# Patient Record
Sex: FEMALE | Race: WHITE | Employment: OTHER | URBAN - METROPOLITAN AREA
[De-identification: names, ages, dates, MRNs, and addresses within clinical notes are randomized per-mention and may not be internally consistent; named-entity substitution may affect disease eponyms.]

---

## 2017-01-17 DIAGNOSIS — C50.212 MALIGNANT NEOPLASM OF UPPER-INNER QUADRANT OF LEFT FEMALE BREAST (HCC): Primary | ICD-10-CM

## 2017-04-20 RX ORDER — ANASTROZOLE 1 MG/1
1 TABLET ORAL DAILY
Qty: 30 TABLET | Refills: 6 | Status: SHIPPED | OUTPATIENT
Start: 2017-04-20 | End: 2017-08-24 | Stop reason: SDUPTHER

## 2017-05-08 ENCOUNTER — HOSPITAL ENCOUNTER (OUTPATIENT)
Dept: WOMENS IMAGING | Age: 47
Discharge: HOME | End: 2017-05-08
Payer: COMMERCIAL

## 2017-05-08 DIAGNOSIS — C50.212 MALIGNANT NEOPLASM OF UPPER-INNER QUADRANT OF LEFT FEMALE BREAST (HCC): ICD-10-CM

## 2017-05-08 PROCEDURE — 77063 BREAST TOMOSYNTHESIS BI: CPT

## 2017-05-16 ENCOUNTER — HOSPITAL ENCOUNTER (OUTPATIENT)
Age: 47
Setting detail: SPECIMEN
Discharge: HOME | End: 2017-05-16
Payer: COMMERCIAL

## 2017-05-16 LAB
ALBUMIN SERPL-MCNC: 4.1 G/DL (ref 3.5–5.2)
ALBUMIN/GLOBULIN RATIO: 1.3 (ref 1–2.5)
ALP BLD-CCNC: 88 U/L (ref 35–104)
ALT SERPL-CCNC: 19 U/L (ref 5–33)
ANION GAP SERPL CALCULATED.3IONS-SCNC: 13 MMOL/L (ref 9–17)
AST SERPL-CCNC: 20 U/L
BILIRUB SERPL-MCNC: 0.32 MG/DL (ref 0.3–1.2)
BUN BLDV-MCNC: 14 MG/DL (ref 6–20)
BUN/CREAT BLD: ABNORMAL (ref 9–20)
CALCIUM SERPL-MCNC: 8.7 MG/DL (ref 8.6–10.4)
CHLORIDE BLD-SCNC: 102 MMOL/L (ref 98–107)
CHOLESTEROL/HDL RATIO: 3.4
CHOLESTEROL: 224 MG/DL
CO2: 23 MMOL/L (ref 20–31)
CREAT SERPL-MCNC: 0.78 MG/DL (ref 0.5–0.9)
GFR AFRICAN AMERICAN: >60 ML/MIN
GFR NON-AFRICAN AMERICAN: >60 ML/MIN
GFR SERPL CREATININE-BSD FRML MDRD: ABNORMAL ML/MIN/{1.73_M2}
GFR SERPL CREATININE-BSD FRML MDRD: ABNORMAL ML/MIN/{1.73_M2}
GLUCOSE BLD-MCNC: 98 MG/DL (ref 70–99)
HCT VFR BLD CALC: 32.7 % (ref 36–46)
HDLC SERPL-MCNC: 65 MG/DL
HEMOGLOBIN: 10.6 G/DL (ref 12–16)
LDL CHOLESTEROL: 140 MG/DL (ref 0–130)
MCH RBC QN AUTO: 25.3 PG (ref 26–34)
MCHC RBC AUTO-ENTMCNC: 32.6 G/DL (ref 31–37)
MCV RBC AUTO: 77.7 FL (ref 80–100)
PDW BLD-RTO: 16.1 % (ref 12.5–15.4)
PLATELET # BLD: 236 K/UL (ref 140–450)
PMV BLD AUTO: 9.1 FL (ref 6–12)
POTASSIUM SERPL-SCNC: 3.6 MMOL/L (ref 3.7–5.3)
RBC # BLD: 4.2 M/UL (ref 4–5.2)
SODIUM BLD-SCNC: 138 MMOL/L (ref 135–144)
TOTAL PROTEIN: 7.2 G/DL (ref 6.4–8.3)
TRIGL SERPL-MCNC: 94 MG/DL
VLDLC SERPL CALC-MCNC: ABNORMAL MG/DL (ref 1–30)
WBC # BLD: 4.7 K/UL (ref 3.5–11)

## 2017-06-08 ENCOUNTER — OFFICE VISIT (OUTPATIENT)
Dept: ONCOLOGY | Age: 47
End: 2017-06-08
Payer: COMMERCIAL

## 2017-06-08 ENCOUNTER — HOSPITAL ENCOUNTER (OUTPATIENT)
Age: 47
Discharge: HOME | End: 2017-06-08
Payer: COMMERCIAL

## 2017-06-08 VITALS
DIASTOLIC BLOOD PRESSURE: 85 MMHG | BODY MASS INDEX: 43.96 KG/M2 | RESPIRATION RATE: 16 BRPM | SYSTOLIC BLOOD PRESSURE: 143 MMHG | TEMPERATURE: 98.9 F | WEIGHT: 293 LBS | HEART RATE: 56 BPM

## 2017-06-08 DIAGNOSIS — D50.0 IRON DEFICIENCY ANEMIA DUE TO CHRONIC BLOOD LOSS: ICD-10-CM

## 2017-06-08 DIAGNOSIS — C50.212 MALIGNANT NEOPLASM OF UPPER-INNER QUADRANT OF LEFT FEMALE BREAST (HCC): Primary | ICD-10-CM

## 2017-06-08 DIAGNOSIS — K92.1 GASTROINTESTINAL HEMORRHAGE WITH MELENA: ICD-10-CM

## 2017-06-08 PROCEDURE — G8419 CALC BMI OUT NRM PARAM NOF/U: HCPCS | Performed by: INTERNAL MEDICINE

## 2017-06-08 PROCEDURE — G8427 DOCREV CUR MEDS BY ELIG CLIN: HCPCS | Performed by: INTERNAL MEDICINE

## 2017-06-08 PROCEDURE — 1036F TOBACCO NON-USER: CPT | Performed by: INTERNAL MEDICINE

## 2017-06-08 PROCEDURE — 99211 OFF/OP EST MAY X REQ PHY/QHP: CPT

## 2017-06-08 PROCEDURE — 99214 OFFICE O/P EST MOD 30 MIN: CPT | Performed by: INTERNAL MEDICINE

## 2017-06-08 RX ORDER — LANOLIN ALCOHOL/MO/W.PET/CERES
325 CREAM (GRAM) TOPICAL 2 TIMES DAILY
Qty: 60 TABLET | Refills: 3 | Status: SHIPPED | OUTPATIENT
Start: 2017-06-08 | End: 2018-08-16 | Stop reason: ALTCHOICE

## 2017-06-08 RX ORDER — POTASSIUM CHLORIDE 20 MEQ/1
20 TABLET, EXTENDED RELEASE ORAL DAILY
Qty: 30 TABLET | Refills: 3 | Status: ON HOLD | OUTPATIENT
Start: 2017-06-08 | End: 2017-08-30 | Stop reason: HOSPADM

## 2017-06-12 ENCOUNTER — TELEPHONE (OUTPATIENT)
Dept: ONCOLOGY | Age: 47
End: 2017-06-12

## 2017-08-22 ENCOUNTER — HOSPITAL ENCOUNTER (OUTPATIENT)
Age: 47
Setting detail: SPECIMEN
Discharge: HOME | End: 2017-08-22
Payer: COMMERCIAL

## 2017-08-22 LAB — POTASSIUM SERPL-SCNC: 4.3 MMOL/L (ref 3.7–5.3)

## 2017-08-24 DIAGNOSIS — C50.212 MALIGNANT NEOPLASM OF UPPER-INNER QUADRANT OF LEFT FEMALE BREAST (HCC): Primary | ICD-10-CM

## 2017-08-24 RX ORDER — ANASTROZOLE 1 MG/1
1 TABLET ORAL DAILY
Qty: 30 TABLET | Refills: 6 | Status: SHIPPED | OUTPATIENT
Start: 2017-08-24 | End: 2018-04-03 | Stop reason: SDUPTHER

## 2017-08-26 ENCOUNTER — APPOINTMENT (OUTPATIENT)
Dept: CT IMAGING | Age: 47
DRG: 439 | End: 2017-08-26
Payer: COMMERCIAL

## 2017-08-26 ENCOUNTER — APPOINTMENT (OUTPATIENT)
Dept: GENERAL RADIOLOGY | Age: 47
DRG: 439 | End: 2017-08-26
Payer: COMMERCIAL

## 2017-08-26 ENCOUNTER — HOSPITAL ENCOUNTER (INPATIENT)
Age: 47
LOS: 4 days | Discharge: HOME | DRG: 439 | End: 2017-08-30
Attending: EMERGENCY MEDICINE | Admitting: INTERNAL MEDICINE
Payer: COMMERCIAL

## 2017-08-26 DIAGNOSIS — K85.90 ACUTE PANCREATITIS, UNSPECIFIED COMPLICATION STATUS, UNSPECIFIED PANCREATITIS TYPE: Primary | ICD-10-CM

## 2017-08-26 LAB
-: ABNORMAL
ABSOLUTE EOS #: 0 K/UL (ref 0–0.4)
ABSOLUTE LYMPH #: 1.62 K/UL (ref 1–4.8)
ABSOLUTE MONO #: 0.81 K/UL (ref 0.1–1.3)
ALBUMIN SERPL-MCNC: 4 G/DL (ref 3.5–5.2)
ALBUMIN/GLOBULIN RATIO: ABNORMAL (ref 1–2.5)
ALP BLD-CCNC: 83 U/L (ref 35–104)
ALT SERPL-CCNC: 14 U/L (ref 5–33)
AMORPHOUS: ABNORMAL
ANION GAP SERPL CALCULATED.3IONS-SCNC: 14 MMOL/L (ref 9–17)
AST SERPL-CCNC: 16 U/L
BACTERIA: ABNORMAL
BASOPHILS # BLD: 0 %
BASOPHILS ABSOLUTE: 0 K/UL (ref 0–0.2)
BILIRUB SERPL-MCNC: 0.26 MG/DL (ref 0.3–1.2)
BILIRUBIN URINE: NEGATIVE
BUN BLDV-MCNC: 15 MG/DL (ref 6–20)
BUN/CREAT BLD: ABNORMAL (ref 9–20)
CALCIUM SERPL-MCNC: 8.8 MG/DL (ref 8.6–10.4)
CASTS UA: ABNORMAL /LPF
CHLORIDE BLD-SCNC: 101 MMOL/L (ref 98–107)
CHOLESTEROL/HDL RATIO: 2.7
CHOLESTEROL: 214 MG/DL
CO2: 22 MMOL/L (ref 20–31)
COLOR: YELLOW
COMMENT UA: ABNORMAL
CREAT SERPL-MCNC: 0.6 MG/DL (ref 0.5–0.9)
CRYSTALS, UA: ABNORMAL /HPF
DIFFERENTIAL TYPE: ABNORMAL
EOSINOPHILS RELATIVE PERCENT: 0 %
EPITHELIAL CELLS UA: ABNORMAL /HPF
GFR AFRICAN AMERICAN: >60 ML/MIN
GFR NON-AFRICAN AMERICAN: >60 ML/MIN
GFR SERPL CREATININE-BSD FRML MDRD: ABNORMAL ML/MIN/{1.73_M2}
GFR SERPL CREATININE-BSD FRML MDRD: ABNORMAL ML/MIN/{1.73_M2}
GLUCOSE BLD-MCNC: 105 MG/DL (ref 70–99)
GLUCOSE BLD-MCNC: 113 MG/DL (ref 65–105)
GLUCOSE URINE: NEGATIVE
HCT VFR BLD CALC: 28.9 % (ref 36–46)
HDLC SERPL-MCNC: 78 MG/DL
HEMOGLOBIN: 9.2 G/DL (ref 12–16)
KETONES, URINE: NEGATIVE
LACTIC ACID: 1.3 MMOL/L (ref 0.5–2.2)
LDL CHOLESTEROL: 120 MG/DL (ref 0–130)
LEUKOCYTE ESTERASE, URINE: ABNORMAL
LIPASE: 279 U/L (ref 13–60)
LYMPHOCYTES # BLD: 14 %
MCH RBC QN AUTO: 23.6 PG (ref 26–34)
MCHC RBC AUTO-ENTMCNC: 31.9 G/DL (ref 31–37)
MCV RBC AUTO: 74 FL (ref 80–100)
MONOCYTES # BLD: 7 %
MORPHOLOGY: ABNORMAL
MUCUS: ABNORMAL
NITRITE, URINE: NEGATIVE
OTHER OBSERVATIONS UA: ABNORMAL
PDW BLD-RTO: 19.6 % (ref 11.5–14.9)
PH UA: 6.5 (ref 5–8)
PLATELET # BLD: 209 K/UL (ref 150–450)
PLATELET ESTIMATE: ABNORMAL
PMV BLD AUTO: 8.1 FL (ref 6–12)
POTASSIUM SERPL-SCNC: 3.6 MMOL/L (ref 3.7–5.3)
PROTEIN UA: NEGATIVE
RBC # BLD: 3.9 M/UL (ref 4–5.2)
RBC # BLD: ABNORMAL 10*6/UL
RBC UA: ABNORMAL /HPF
RENAL EPITHELIAL, UA: ABNORMAL /HPF
SEG NEUTROPHILS: 79 %
SEGMENTED NEUTROPHILS ABSOLUTE COUNT: 9.17 K/UL (ref 1.3–9.1)
SODIUM BLD-SCNC: 137 MMOL/L (ref 135–144)
SPECIFIC GRAVITY UA: 1.02 (ref 1–1.03)
TOTAL PROTEIN: 7.4 G/DL (ref 6.4–8.3)
TRICHOMONAS: ABNORMAL
TRIGL SERPL-MCNC: 81 MG/DL
TROPONIN INTERP: NORMAL
TROPONIN INTERP: NORMAL
TROPONIN T: <0.03 NG/ML
TROPONIN T: <0.03 NG/ML
TURBIDITY: CLEAR
URINE HGB: NEGATIVE
UROBILINOGEN, URINE: NORMAL
VLDLC SERPL CALC-MCNC: ABNORMAL MG/DL (ref 1–30)
WBC # BLD: 11.6 K/UL (ref 3.5–11)
WBC # BLD: ABNORMAL 10*3/UL
WBC UA: ABNORMAL /HPF
YEAST: ABNORMAL

## 2017-08-26 PROCEDURE — 36415 COLL VENOUS BLD VENIPUNCTURE: CPT

## 2017-08-26 PROCEDURE — 81001 URINALYSIS AUTO W/SCOPE: CPT

## 2017-08-26 PROCEDURE — 82947 ASSAY GLUCOSE BLOOD QUANT: CPT

## 2017-08-26 PROCEDURE — 87086 URINE CULTURE/COLONY COUNT: CPT

## 2017-08-26 PROCEDURE — 99285 EMERGENCY DEPT VISIT HI MDM: CPT

## 2017-08-26 PROCEDURE — 96374 THER/PROPH/DIAG INJ IV PUSH: CPT

## 2017-08-26 PROCEDURE — 74176 CT ABD & PELVIS W/O CONTRAST: CPT

## 2017-08-26 PROCEDURE — 93005 ELECTROCARDIOGRAM TRACING: CPT

## 2017-08-26 PROCEDURE — 84484 ASSAY OF TROPONIN QUANT: CPT

## 2017-08-26 PROCEDURE — 6360000002 HC RX W HCPCS: Performed by: EMERGENCY MEDICINE

## 2017-08-26 PROCEDURE — 2580000003 HC RX 258: Performed by: EMERGENCY MEDICINE

## 2017-08-26 PROCEDURE — 83605 ASSAY OF LACTIC ACID: CPT

## 2017-08-26 PROCEDURE — 80061 LIPID PANEL: CPT

## 2017-08-26 PROCEDURE — 96375 TX/PRO/DX INJ NEW DRUG ADDON: CPT

## 2017-08-26 PROCEDURE — 87040 BLOOD CULTURE FOR BACTERIA: CPT

## 2017-08-26 PROCEDURE — 6370000000 HC RX 637 (ALT 250 FOR IP): Performed by: EMERGENCY MEDICINE

## 2017-08-26 PROCEDURE — 2580000003 HC RX 258: Performed by: INTERNAL MEDICINE

## 2017-08-26 PROCEDURE — 1200000000 HC SEMI PRIVATE

## 2017-08-26 PROCEDURE — 83690 ASSAY OF LIPASE: CPT

## 2017-08-26 PROCEDURE — 85025 COMPLETE CBC W/AUTO DIFF WBC: CPT

## 2017-08-26 PROCEDURE — 71010 XR CHEST PORTABLE: CPT

## 2017-08-26 PROCEDURE — 6370000000 HC RX 637 (ALT 250 FOR IP): Performed by: INTERNAL MEDICINE

## 2017-08-26 PROCEDURE — 6360000002 HC RX W HCPCS: Performed by: INTERNAL MEDICINE

## 2017-08-26 PROCEDURE — 94664 DEMO&/EVAL PT USE INHALER: CPT

## 2017-08-26 PROCEDURE — 80053 COMPREHEN METABOLIC PANEL: CPT

## 2017-08-26 RX ORDER — SODIUM CHLORIDE 9 MG/ML
INJECTION, SOLUTION INTRAVENOUS CONTINUOUS
Status: DISCONTINUED | OUTPATIENT
Start: 2017-08-26 | End: 2017-08-30

## 2017-08-26 RX ORDER — MORPHINE SULFATE 4 MG/ML
4 INJECTION, SOLUTION INTRAMUSCULAR; INTRAVENOUS
Status: DISCONTINUED | OUTPATIENT
Start: 2017-08-26 | End: 2017-08-28

## 2017-08-26 RX ORDER — ACETAMINOPHEN 325 MG/1
650 TABLET ORAL EVERY 4 HOURS PRN
Status: DISCONTINUED | OUTPATIENT
Start: 2017-08-26 | End: 2017-08-30 | Stop reason: HOSPADM

## 2017-08-26 RX ORDER — 0.9 % SODIUM CHLORIDE 0.9 %
1000 INTRAVENOUS SOLUTION INTRAVENOUS ONCE
Status: COMPLETED | OUTPATIENT
Start: 2017-08-26 | End: 2017-08-26

## 2017-08-26 RX ORDER — ACETAMINOPHEN 325 MG/1
650 TABLET ORAL ONCE
Status: COMPLETED | OUTPATIENT
Start: 2017-08-26 | End: 2017-08-26

## 2017-08-26 RX ORDER — SODIUM CHLORIDE 0.9 % (FLUSH) 0.9 %
10 SYRINGE (ML) INJECTION EVERY 12 HOURS SCHEDULED
Status: DISCONTINUED | OUTPATIENT
Start: 2017-08-26 | End: 2017-08-30 | Stop reason: HOSPADM

## 2017-08-26 RX ORDER — ONDANSETRON 2 MG/ML
4 INJECTION INTRAMUSCULAR; INTRAVENOUS ONCE
Status: COMPLETED | OUTPATIENT
Start: 2017-08-26 | End: 2017-08-26

## 2017-08-26 RX ORDER — ONDANSETRON 2 MG/ML
4 INJECTION INTRAMUSCULAR; INTRAVENOUS EVERY 6 HOURS PRN
Status: DISCONTINUED | OUTPATIENT
Start: 2017-08-26 | End: 2017-08-27

## 2017-08-26 RX ORDER — SODIUM CHLORIDE 0.9 % (FLUSH) 0.9 %
10 SYRINGE (ML) INJECTION PRN
Status: DISCONTINUED | OUTPATIENT
Start: 2017-08-26 | End: 2017-08-30 | Stop reason: HOSPADM

## 2017-08-26 RX ORDER — MORPHINE SULFATE 4 MG/ML
2 INJECTION, SOLUTION INTRAMUSCULAR; INTRAVENOUS
Status: DISCONTINUED | OUTPATIENT
Start: 2017-08-26 | End: 2017-08-28

## 2017-08-26 RX ORDER — POTASSIUM CHLORIDE 20 MEQ/1
20 TABLET, EXTENDED RELEASE ORAL DAILY
Status: DISCONTINUED | OUTPATIENT
Start: 2017-08-26 | End: 2017-08-27

## 2017-08-26 RX ORDER — MORPHINE SULFATE 4 MG/ML
4 INJECTION, SOLUTION INTRAMUSCULAR; INTRAVENOUS ONCE
Status: COMPLETED | OUTPATIENT
Start: 2017-08-26 | End: 2017-08-26

## 2017-08-26 RX ORDER — KETOROLAC TROMETHAMINE 30 MG/ML
30 INJECTION, SOLUTION INTRAMUSCULAR; INTRAVENOUS ONCE
Status: COMPLETED | OUTPATIENT
Start: 2017-08-26 | End: 2017-08-26

## 2017-08-26 RX ORDER — VENLAFAXINE HYDROCHLORIDE 37.5 MG/1
37.5 CAPSULE, EXTENDED RELEASE ORAL
Status: DISCONTINUED | OUTPATIENT
Start: 2017-08-27 | End: 2017-08-27

## 2017-08-26 RX ADMIN — SODIUM CHLORIDE: 9 INJECTION, SOLUTION INTRAVENOUS at 17:23

## 2017-08-26 RX ADMIN — MORPHINE SULFATE 4 MG: 4 INJECTION, SOLUTION INTRAMUSCULAR; INTRAVENOUS at 20:55

## 2017-08-26 RX ADMIN — ONDANSETRON 4 MG: 2 INJECTION INTRAMUSCULAR; INTRAVENOUS at 21:19

## 2017-08-26 RX ADMIN — MORPHINE SULFATE 4 MG: 4 INJECTION, SOLUTION INTRAMUSCULAR; INTRAVENOUS at 17:42

## 2017-08-26 RX ADMIN — ACETAMINOPHEN 650 MG: 325 TABLET ORAL at 14:10

## 2017-08-26 RX ADMIN — POTASSIUM CHLORIDE 20 MEQ: 20 TABLET, EXTENDED RELEASE ORAL at 17:23

## 2017-08-26 RX ADMIN — ACETAMINOPHEN 650 MG: 325 TABLET ORAL at 20:57

## 2017-08-26 RX ADMIN — METOPROLOL TARTRATE 25 MG: 25 TABLET ORAL at 20:34

## 2017-08-26 RX ADMIN — SODIUM CHLORIDE 1000 ML: 9 INJECTION, SOLUTION INTRAVENOUS at 13:43

## 2017-08-26 RX ADMIN — KETOROLAC TROMETHAMINE 30 MG: 30 INJECTION, SOLUTION INTRAMUSCULAR at 15:33

## 2017-08-26 RX ADMIN — MORPHINE SULFATE 4 MG: 4 INJECTION, SOLUTION INTRAMUSCULAR; INTRAVENOUS at 13:45

## 2017-08-26 RX ADMIN — ONDANSETRON 4 MG: 2 INJECTION INTRAMUSCULAR; INTRAVENOUS at 13:45

## 2017-08-26 ASSESSMENT — PAIN SCALES - GENERAL
PAINLEVEL_OUTOF10: 5
PAINLEVEL_OUTOF10: 6
PAINLEVEL_OUTOF10: 5
PAINLEVEL_OUTOF10: 7
PAINLEVEL_OUTOF10: 8
PAINLEVEL_OUTOF10: 5
PAINLEVEL_OUTOF10: 5
PAINLEVEL_OUTOF10: 8
PAINLEVEL_OUTOF10: 8

## 2017-08-26 ASSESSMENT — ENCOUNTER SYMPTOMS
COUGH: 0
RESPIRATORY NEGATIVE: 1
ABDOMINAL PAIN: 1
SHORTNESS OF BREATH: 0
BACK PAIN: 0
VOMITING: 1
NAUSEA: 1
EYES NEGATIVE: 1
DIARRHEA: 0

## 2017-08-26 ASSESSMENT — PAIN DESCRIPTION - LOCATION
LOCATION: ABDOMEN
LOCATION: ABDOMEN

## 2017-08-26 ASSESSMENT — PAIN DESCRIPTION - PROGRESSION: CLINICAL_PROGRESSION: GRADUALLY IMPROVING

## 2017-08-26 ASSESSMENT — PAIN DESCRIPTION - DESCRIPTORS
DESCRIPTORS: CONSTANT;ACHING
DESCRIPTORS: ACHING

## 2017-08-26 ASSESSMENT — PAIN DESCRIPTION - PAIN TYPE
TYPE: ACUTE PAIN
TYPE: ACUTE PAIN

## 2017-08-27 PROBLEM — K85.00 IDIOPATHIC ACUTE PANCREATITIS WITHOUT INFECTION OR NECROSIS: Status: ACTIVE | Noted: 2017-08-27

## 2017-08-27 LAB
ALBUMIN SERPL-MCNC: 3.6 G/DL (ref 3.5–5.2)
ALBUMIN/GLOBULIN RATIO: ABNORMAL (ref 1–2.5)
ALP BLD-CCNC: 71 U/L (ref 35–104)
ALT SERPL-CCNC: 11 U/L (ref 5–33)
AMYLASE: 103 U/L (ref 28–100)
ANION GAP SERPL CALCULATED.3IONS-SCNC: 11 MMOL/L (ref 9–17)
AST SERPL-CCNC: 11 U/L
BILIRUB SERPL-MCNC: 0.31 MG/DL (ref 0.3–1.2)
BUN BLDV-MCNC: 11 MG/DL (ref 6–20)
BUN/CREAT BLD: ABNORMAL (ref 9–20)
CALCIUM SERPL-MCNC: 8.4 MG/DL (ref 8.6–10.4)
CHLORIDE BLD-SCNC: 101 MMOL/L (ref 98–107)
CO2: 24 MMOL/L (ref 20–31)
CREAT SERPL-MCNC: 0.53 MG/DL (ref 0.5–0.9)
CULTURE: NORMAL
CULTURE: NORMAL
GFR AFRICAN AMERICAN: >60 ML/MIN
GFR NON-AFRICAN AMERICAN: >60 ML/MIN
GFR SERPL CREATININE-BSD FRML MDRD: ABNORMAL ML/MIN/{1.73_M2}
GFR SERPL CREATININE-BSD FRML MDRD: ABNORMAL ML/MIN/{1.73_M2}
GLUCOSE BLD-MCNC: 120 MG/DL (ref 70–99)
LACTIC ACID, WHOLE BLOOD: NORMAL MMOL/L (ref 0.7–2.1)
LACTIC ACID: 0.7 MMOL/L (ref 0.5–2.2)
LIPASE: 182 U/L (ref 13–60)
Lab: NORMAL
POTASSIUM SERPL-SCNC: 3.9 MMOL/L (ref 3.7–5.3)
SODIUM BLD-SCNC: 136 MMOL/L (ref 135–144)
SPECIMEN DESCRIPTION: NORMAL
SPECIMEN DESCRIPTION: NORMAL
STATUS: NORMAL
TOTAL PROTEIN: 6.4 G/DL (ref 6.4–8.3)

## 2017-08-27 PROCEDURE — 6360000002 HC RX W HCPCS: Performed by: INTERNAL MEDICINE

## 2017-08-27 PROCEDURE — 83605 ASSAY OF LACTIC ACID: CPT

## 2017-08-27 PROCEDURE — 2500000003 HC RX 250 WO HCPCS: Performed by: INTERNAL MEDICINE

## 2017-08-27 PROCEDURE — 1200000000 HC SEMI PRIVATE

## 2017-08-27 PROCEDURE — 6370000000 HC RX 637 (ALT 250 FOR IP): Performed by: INTERNAL MEDICINE

## 2017-08-27 PROCEDURE — 2580000003 HC RX 258: Performed by: INTERNAL MEDICINE

## 2017-08-27 PROCEDURE — 36415 COLL VENOUS BLD VENIPUNCTURE: CPT

## 2017-08-27 PROCEDURE — 82150 ASSAY OF AMYLASE: CPT

## 2017-08-27 PROCEDURE — 83690 ASSAY OF LIPASE: CPT

## 2017-08-27 PROCEDURE — 80053 COMPREHEN METABOLIC PANEL: CPT

## 2017-08-27 RX ORDER — ESOMEPRAZOLE SODIUM 40 MG/5ML
40 INJECTION INTRAVENOUS DAILY
Status: DISCONTINUED | OUTPATIENT
Start: 2017-08-28 | End: 2017-08-30

## 2017-08-27 RX ORDER — 0.9 % SODIUM CHLORIDE 0.9 %
10 VIAL (ML) INJECTION DAILY
Status: DISCONTINUED | OUTPATIENT
Start: 2017-08-27 | End: 2017-08-27

## 2017-08-27 RX ORDER — 0.9 % SODIUM CHLORIDE 0.9 %
10 VIAL (ML) INJECTION DAILY
Status: DISCONTINUED | OUTPATIENT
Start: 2017-08-28 | End: 2017-08-30 | Stop reason: HOSPADM

## 2017-08-27 RX ORDER — METOPROLOL TARTRATE 5 MG/5ML
5 INJECTION INTRAVENOUS EVERY 8 HOURS
Status: DISCONTINUED | OUTPATIENT
Start: 2017-08-27 | End: 2017-08-30

## 2017-08-27 RX ORDER — ESOMEPRAZOLE SODIUM 40 MG/5ML
40 INJECTION INTRAVENOUS DAILY
Status: DISCONTINUED | OUTPATIENT
Start: 2017-08-27 | End: 2017-08-27

## 2017-08-27 RX ORDER — ONDANSETRON 2 MG/ML
4 INJECTION INTRAMUSCULAR; INTRAVENOUS ONCE
Status: COMPLETED | OUTPATIENT
Start: 2017-08-27 | End: 2017-08-27

## 2017-08-27 RX ORDER — METOPROLOL TARTRATE 5 MG/5ML
5 INJECTION INTRAVENOUS EVERY 8 HOURS
Status: DISCONTINUED | OUTPATIENT
Start: 2017-08-27 | End: 2017-08-27

## 2017-08-27 RX ORDER — ONDANSETRON 2 MG/ML
4 INJECTION INTRAMUSCULAR; INTRAVENOUS EVERY 4 HOURS PRN
Status: DISCONTINUED | OUTPATIENT
Start: 2017-08-27 | End: 2017-08-30 | Stop reason: HOSPADM

## 2017-08-27 RX ORDER — DOCUSATE SODIUM 100 MG/1
100 CAPSULE, LIQUID FILLED ORAL 2 TIMES DAILY
Status: DISCONTINUED | OUTPATIENT
Start: 2017-08-27 | End: 2017-08-30 | Stop reason: HOSPADM

## 2017-08-27 RX ORDER — PANTOPRAZOLE SODIUM 40 MG/10ML
40 INJECTION, POWDER, LYOPHILIZED, FOR SOLUTION INTRAVENOUS DAILY
Status: DISCONTINUED | OUTPATIENT
Start: 2017-08-27 | End: 2017-08-27 | Stop reason: CLARIF

## 2017-08-27 RX ADMIN — ACETAMINOPHEN 650 MG: 325 TABLET ORAL at 00:45

## 2017-08-27 RX ADMIN — SODIUM CHLORIDE: 9 INJECTION, SOLUTION INTRAVENOUS at 08:51

## 2017-08-27 RX ADMIN — SODIUM CHLORIDE: 9 INJECTION, SOLUTION INTRAVENOUS at 18:23

## 2017-08-27 RX ADMIN — MORPHINE SULFATE 4 MG: 4 INJECTION, SOLUTION INTRAMUSCULAR; INTRAVENOUS at 00:45

## 2017-08-27 RX ADMIN — MORPHINE SULFATE 2 MG: 4 INJECTION, SOLUTION INTRAMUSCULAR; INTRAVENOUS at 19:48

## 2017-08-27 RX ADMIN — MORPHINE SULFATE 4 MG: 4 INJECTION, SOLUTION INTRAMUSCULAR; INTRAVENOUS at 11:45

## 2017-08-27 RX ADMIN — ACETAMINOPHEN 650 MG: 325 TABLET ORAL at 11:45

## 2017-08-27 RX ADMIN — MORPHINE SULFATE 4 MG: 4 INJECTION, SOLUTION INTRAMUSCULAR; INTRAVENOUS at 08:46

## 2017-08-27 RX ADMIN — VENLAFAXINE HYDROCHLORIDE 37.5 MG: 37.5 CAPSULE, EXTENDED RELEASE ORAL at 08:46

## 2017-08-27 RX ADMIN — METOPROLOL TARTRATE 5 MG: 5 INJECTION INTRAVENOUS at 18:28

## 2017-08-27 RX ADMIN — DOCUSATE SODIUM 100 MG: 100 CAPSULE, LIQUID FILLED ORAL at 19:48

## 2017-08-27 RX ADMIN — MORPHINE SULFATE 4 MG: 4 INJECTION, SOLUTION INTRAMUSCULAR; INTRAVENOUS at 03:57

## 2017-08-27 RX ADMIN — MORPHINE SULFATE 4 MG: 4 INJECTION, SOLUTION INTRAMUSCULAR; INTRAVENOUS at 06:20

## 2017-08-27 RX ADMIN — ONDANSETRON 4 MG: 2 INJECTION INTRAMUSCULAR; INTRAVENOUS at 19:48

## 2017-08-27 RX ADMIN — MORPHINE SULFATE 2 MG: 4 INJECTION, SOLUTION INTRAMUSCULAR; INTRAVENOUS at 22:20

## 2017-08-27 RX ADMIN — ENOXAPARIN SODIUM 40 MG: 40 INJECTION SUBCUTANEOUS at 08:46

## 2017-08-27 RX ADMIN — ONDANSETRON 4 MG: 2 INJECTION INTRAMUSCULAR; INTRAVENOUS at 03:57

## 2017-08-27 RX ADMIN — MORPHINE SULFATE 2 MG: 4 INJECTION, SOLUTION INTRAMUSCULAR; INTRAVENOUS at 17:09

## 2017-08-27 RX ADMIN — ACETAMINOPHEN 650 MG: 325 TABLET ORAL at 20:47

## 2017-08-27 RX ADMIN — ACETAMINOPHEN 650 MG: 325 TABLET ORAL at 16:36

## 2017-08-27 RX ADMIN — ACETAMINOPHEN 650 MG: 325 TABLET ORAL at 06:20

## 2017-08-27 RX ADMIN — SODIUM CHLORIDE: 9 INJECTION, SOLUTION INTRAVENOUS at 00:47

## 2017-08-27 RX ADMIN — POTASSIUM CHLORIDE 20 MEQ: 20 TABLET, EXTENDED RELEASE ORAL at 08:46

## 2017-08-27 RX ADMIN — DOCUSATE SODIUM 100 MG: 100 CAPSULE, LIQUID FILLED ORAL at 11:45

## 2017-08-27 RX ADMIN — METOPROLOL TARTRATE 25 MG: 25 TABLET ORAL at 08:46

## 2017-08-27 RX ADMIN — ONDANSETRON 4 MG: 2 INJECTION INTRAMUSCULAR; INTRAVENOUS at 21:23

## 2017-08-27 ASSESSMENT — ENCOUNTER SYMPTOMS
HEARTBURN: 0
SHORTNESS OF BREATH: 0
CONSTIPATION: 0
VOMITING: 1
ABDOMINAL PAIN: 1
SPUTUM PRODUCTION: 0
BLOOD IN STOOL: 0
BACK PAIN: 0
NAUSEA: 1
COUGH: 0
BLURRED VISION: 0

## 2017-08-27 ASSESSMENT — PAIN SCALES - GENERAL
PAINLEVEL_OUTOF10: 6
PAINLEVEL_OUTOF10: 7
PAINLEVEL_OUTOF10: 6
PAINLEVEL_OUTOF10: 8
PAINLEVEL_OUTOF10: 6
PAINLEVEL_OUTOF10: 7
PAINLEVEL_OUTOF10: 7
PAINLEVEL_OUTOF10: 8
PAINLEVEL_OUTOF10: 4
PAINLEVEL_OUTOF10: 6
PAINLEVEL_OUTOF10: 5
PAINLEVEL_OUTOF10: 7
PAINLEVEL_OUTOF10: 6
PAINLEVEL_OUTOF10: 5
PAINLEVEL_OUTOF10: 8
PAINLEVEL_OUTOF10: 5

## 2017-08-28 ENCOUNTER — APPOINTMENT (OUTPATIENT)
Dept: NUCLEAR MEDICINE | Age: 47
DRG: 439 | End: 2017-08-28
Payer: COMMERCIAL

## 2017-08-28 ENCOUNTER — APPOINTMENT (OUTPATIENT)
Dept: ULTRASOUND IMAGING | Age: 47
DRG: 439 | End: 2017-08-28
Payer: COMMERCIAL

## 2017-08-28 ENCOUNTER — APPOINTMENT (OUTPATIENT)
Dept: MRI IMAGING | Age: 47
DRG: 439 | End: 2017-08-28
Payer: COMMERCIAL

## 2017-08-28 LAB
AMYLASE: 47 U/L (ref 28–100)
HCT VFR BLD CALC: 28.8 % (ref 36–46)
HEMOGLOBIN: 8.3 G/DL (ref 12–16)
LIPASE: 66 U/L (ref 13–60)
TOTAL CK: 57 U/L (ref 26–192)

## 2017-08-28 PROCEDURE — 2580000003 HC RX 258: Performed by: INTERNAL MEDICINE

## 2017-08-28 PROCEDURE — 86038 ANTINUCLEAR ANTIBODIES: CPT

## 2017-08-28 PROCEDURE — 36415 COLL VENOUS BLD VENIPUNCTURE: CPT

## 2017-08-28 PROCEDURE — 6360000002 HC RX W HCPCS: Performed by: INTERNAL MEDICINE

## 2017-08-28 PROCEDURE — 86709 HEPATITIS A IGM ANTIBODY: CPT

## 2017-08-28 PROCEDURE — 87340 HEPATITIS B SURFACE AG IA: CPT

## 2017-08-28 PROCEDURE — 86704 HEP B CORE ANTIBODY TOTAL: CPT

## 2017-08-28 PROCEDURE — 99254 IP/OBS CNSLTJ NEW/EST MOD 60: CPT | Performed by: INTERNAL MEDICINE

## 2017-08-28 PROCEDURE — 1200000000 HC SEMI PRIVATE

## 2017-08-28 PROCEDURE — 86803 HEPATITIS C AB TEST: CPT

## 2017-08-28 PROCEDURE — 86317 IMMUNOASSAY INFECTIOUS AGENT: CPT

## 2017-08-28 PROCEDURE — 6360000004 HC RX CONTRAST MEDICATION: Performed by: INTERNAL MEDICINE

## 2017-08-28 PROCEDURE — 82150 ASSAY OF AMYLASE: CPT

## 2017-08-28 PROCEDURE — 84478 ASSAY OF TRIGLYCERIDES: CPT

## 2017-08-28 PROCEDURE — 83516 IMMUNOASSAY NONANTIBODY: CPT

## 2017-08-28 PROCEDURE — 85014 HEMATOCRIT: CPT

## 2017-08-28 PROCEDURE — 83540 ASSAY OF IRON: CPT

## 2017-08-28 PROCEDURE — 3430000000 HC RX DIAGNOSTIC RADIOPHARMACEUTICAL: Performed by: INTERNAL MEDICINE

## 2017-08-28 PROCEDURE — 86705 HEP B CORE ANTIBODY IGM: CPT

## 2017-08-28 PROCEDURE — 86708 HEPATITIS A ANTIBODY: CPT

## 2017-08-28 PROCEDURE — 83550 IRON BINDING TEST: CPT

## 2017-08-28 PROCEDURE — 82105 ALPHA-FETOPROTEIN SERUM: CPT

## 2017-08-28 PROCEDURE — 85018 HEMOGLOBIN: CPT

## 2017-08-28 PROCEDURE — 83690 ASSAY OF LIPASE: CPT

## 2017-08-28 PROCEDURE — 78226 HEPATOBILIARY SYSTEM IMAGING: CPT

## 2017-08-28 PROCEDURE — 82550 ASSAY OF CK (CPK): CPT

## 2017-08-28 PROCEDURE — 6370000000 HC RX 637 (ALT 250 FOR IP): Performed by: INTERNAL MEDICINE

## 2017-08-28 PROCEDURE — A9537 TC99M MEBROFENIN: HCPCS | Performed by: INTERNAL MEDICINE

## 2017-08-28 PROCEDURE — 76705 ECHO EXAM OF ABDOMEN: CPT

## 2017-08-28 PROCEDURE — 2500000003 HC RX 250 WO HCPCS: Performed by: INTERNAL MEDICINE

## 2017-08-28 RX ORDER — OXYCODONE HYDROCHLORIDE AND ACETAMINOPHEN 5; 325 MG/1; MG/1
1 TABLET ORAL EVERY 4 HOURS PRN
Status: DISCONTINUED | OUTPATIENT
Start: 2017-08-28 | End: 2017-08-30 | Stop reason: HOSPADM

## 2017-08-28 RX ADMIN — MORPHINE SULFATE 2 MG: 4 INJECTION, SOLUTION INTRAMUSCULAR; INTRAVENOUS at 07:42

## 2017-08-28 RX ADMIN — ACETAMINOPHEN 650 MG: 325 TABLET ORAL at 07:44

## 2017-08-28 RX ADMIN — MORPHINE SULFATE 2 MG: 4 INJECTION, SOLUTION INTRAMUSCULAR; INTRAVENOUS at 11:30

## 2017-08-28 RX ADMIN — Medication 3 MILLICURIE: at 16:45

## 2017-08-28 RX ADMIN — SODIUM CHLORIDE: 9 INJECTION, SOLUTION INTRAVENOUS at 19:32

## 2017-08-28 RX ADMIN — SINCALIDE 2.7 MCG: 5 INJECTION, POWDER, LYOPHILIZED, FOR SOLUTION INTRAVENOUS at 16:00

## 2017-08-28 RX ADMIN — SODIUM CHLORIDE: 9 INJECTION, SOLUTION INTRAVENOUS at 01:15

## 2017-08-28 RX ADMIN — SINCALIDE 2.7 MCG: 5 INJECTION, POWDER, LYOPHILIZED, FOR SOLUTION INTRAVENOUS at 17:57

## 2017-08-28 RX ADMIN — METOPROLOL TARTRATE 5 MG: 5 INJECTION INTRAVENOUS at 19:42

## 2017-08-28 RX ADMIN — ESOMEPRAZOLE SODIUM 40 MG: 40 INJECTION INTRAVENOUS at 07:56

## 2017-08-28 RX ADMIN — MORPHINE SULFATE 2 MG: 4 INJECTION, SOLUTION INTRAMUSCULAR; INTRAVENOUS at 03:18

## 2017-08-28 RX ADMIN — ACETAMINOPHEN 650 MG: 325 TABLET ORAL at 19:32

## 2017-08-28 RX ADMIN — MORPHINE SULFATE 2 MG: 4 INJECTION, SOLUTION INTRAMUSCULAR; INTRAVENOUS at 01:12

## 2017-08-28 RX ADMIN — ACETAMINOPHEN 650 MG: 325 TABLET ORAL at 03:18

## 2017-08-28 RX ADMIN — DOCUSATE SODIUM 100 MG: 100 CAPSULE, LIQUID FILLED ORAL at 07:44

## 2017-08-28 RX ADMIN — METOPROLOL TARTRATE 5 MG: 5 INJECTION INTRAVENOUS at 01:15

## 2017-08-28 RX ADMIN — DOCUSATE SODIUM 100 MG: 100 CAPSULE, LIQUID FILLED ORAL at 22:42

## 2017-08-28 RX ADMIN — SODIUM CHLORIDE 10 ML: 9 INJECTION, SOLUTION INTRAMUSCULAR; INTRAVENOUS; SUBCUTANEOUS at 07:55

## 2017-08-28 RX ADMIN — ENOXAPARIN SODIUM 40 MG: 40 INJECTION SUBCUTANEOUS at 07:45

## 2017-08-28 ASSESSMENT — PAIN SCALES - GENERAL
PAINLEVEL_OUTOF10: 4
PAINLEVEL_OUTOF10: 0
PAINLEVEL_OUTOF10: 6
PAINLEVEL_OUTOF10: 5
PAINLEVEL_OUTOF10: 7
PAINLEVEL_OUTOF10: 5
PAINLEVEL_OUTOF10: 6
PAINLEVEL_OUTOF10: 5

## 2017-08-28 ASSESSMENT — PAIN DESCRIPTION - PAIN TYPE
TYPE: ACUTE PAIN
TYPE: ACUTE PAIN

## 2017-08-28 ASSESSMENT — PAIN DESCRIPTION - LOCATION
LOCATION: HEAD
LOCATION: ABDOMEN

## 2017-08-29 ENCOUNTER — APPOINTMENT (OUTPATIENT)
Dept: MRI IMAGING | Age: 47
DRG: 439 | End: 2017-08-29
Payer: COMMERCIAL

## 2017-08-29 PROBLEM — K82.8 BILIARY DYSKINESIA: Status: ACTIVE | Noted: 2017-08-29

## 2017-08-29 LAB
AFP: 1.5 UG/L
HAV AB SERPL IA-ACNC: NONREACTIVE
HAV IGM SER IA-ACNC: NONREACTIVE
HBV SURFACE AB TITR SER: 3.81 MIU/ML
HEPATITIS B CORE IGM ANTIBODY: NONREACTIVE
HEPATITIS B CORE TOTAL ANTIBODY: NONREACTIVE
HEPATITIS B SURFACE ANTIGEN: NONREACTIVE
HEPATITIS C ANTIBODY: NONREACTIVE
IRON SATURATION: 5 % (ref 20–55)
IRON: 15 UG/DL (ref 37–145)
TOTAL IRON BINDING CAPACITY: 283 UG/DL (ref 250–450)
TRIGL SERPL-MCNC: 86 MG/DL
UNSATURATED IRON BINDING CAPACITY: 268 UG/DL (ref 112–347)

## 2017-08-29 PROCEDURE — 99232 SBSQ HOSP IP/OBS MODERATE 35: CPT | Performed by: INTERNAL MEDICINE

## 2017-08-29 PROCEDURE — 6360000004 HC RX CONTRAST MEDICATION: Performed by: INTERNAL MEDICINE

## 2017-08-29 PROCEDURE — 1200000000 HC SEMI PRIVATE

## 2017-08-29 PROCEDURE — 6370000000 HC RX 637 (ALT 250 FOR IP): Performed by: INTERNAL MEDICINE

## 2017-08-29 PROCEDURE — 2500000003 HC RX 250 WO HCPCS: Performed by: INTERNAL MEDICINE

## 2017-08-29 PROCEDURE — 2580000003 HC RX 258: Performed by: INTERNAL MEDICINE

## 2017-08-29 PROCEDURE — A9579 GAD-BASE MR CONTRAST NOS,1ML: HCPCS | Performed by: INTERNAL MEDICINE

## 2017-08-29 PROCEDURE — 74183 MRI ABD W/O CNTR FLWD CNTR: CPT

## 2017-08-29 RX ORDER — LOSARTAN POTASSIUM 100 MG/1
100 TABLET ORAL DAILY
Status: DISCONTINUED | OUTPATIENT
Start: 2017-08-29 | End: 2017-08-30 | Stop reason: HOSPADM

## 2017-08-29 RX ORDER — 0.9 % SODIUM CHLORIDE 0.9 %
50 INTRAVENOUS SOLUTION INTRAVENOUS ONCE
Status: COMPLETED | OUTPATIENT
Start: 2017-08-29 | End: 2017-08-29

## 2017-08-29 RX ORDER — SODIUM CHLORIDE 0.9 % (FLUSH) 0.9 %
10 SYRINGE (ML) INJECTION PRN
Status: DISCONTINUED | OUTPATIENT
Start: 2017-08-29 | End: 2017-08-30 | Stop reason: HOSPADM

## 2017-08-29 RX ORDER — ALPRAZOLAM 0.5 MG/1
0.5 TABLET ORAL ONCE
Status: COMPLETED | OUTPATIENT
Start: 2017-08-29 | End: 2017-08-29

## 2017-08-29 RX ADMIN — Medication 10 ML: at 13:05

## 2017-08-29 RX ADMIN — OXYCODONE HYDROCHLORIDE AND ACETAMINOPHEN 1 TABLET: 5; 325 TABLET ORAL at 18:43

## 2017-08-29 RX ADMIN — SODIUM CHLORIDE 50 ML: 9 INJECTION, SOLUTION INTRAVENOUS at 13:05

## 2017-08-29 RX ADMIN — SODIUM CHLORIDE: 9 INJECTION, SOLUTION INTRAVENOUS at 05:57

## 2017-08-29 RX ADMIN — ACETAMINOPHEN 325 MG: 325 TABLET ORAL at 04:23

## 2017-08-29 RX ADMIN — ACETAMINOPHEN 650 MG: 325 TABLET ORAL at 23:27

## 2017-08-29 RX ADMIN — OXYCODONE HYDROCHLORIDE AND ACETAMINOPHEN 1 TABLET: 5; 325 TABLET ORAL at 23:27

## 2017-08-29 RX ADMIN — Medication 10 ML: at 19:51

## 2017-08-29 RX ADMIN — OXYCODONE HYDROCHLORIDE AND ACETAMINOPHEN 1 TABLET: 5; 325 TABLET ORAL at 04:24

## 2017-08-29 RX ADMIN — METOPROLOL TARTRATE 5 MG: 5 INJECTION INTRAVENOUS at 00:33

## 2017-08-29 RX ADMIN — DOCUSATE SODIUM 100 MG: 100 CAPSULE, LIQUID FILLED ORAL at 20:02

## 2017-08-29 RX ADMIN — LOSARTAN POTASSIUM 100 MG: 100 TABLET ORAL at 18:25

## 2017-08-29 RX ADMIN — ACETAMINOPHEN 650 MG: 325 TABLET ORAL at 00:33

## 2017-08-29 RX ADMIN — ALPRAZOLAM 0.5 MG: 0.5 TABLET ORAL at 11:08

## 2017-08-29 RX ADMIN — OXYCODONE HYDROCHLORIDE AND ACETAMINOPHEN 1 TABLET: 5; 325 TABLET ORAL at 14:02

## 2017-08-29 RX ADMIN — OXYCODONE HYDROCHLORIDE AND ACETAMINOPHEN 1 TABLET: 5; 325 TABLET ORAL at 09:43

## 2017-08-29 RX ADMIN — DOCUSATE SODIUM 100 MG: 100 CAPSULE, LIQUID FILLED ORAL at 14:02

## 2017-08-29 RX ADMIN — GADOPENTETATE DIMEGLUMINE 20 ML: 469.01 INJECTION INTRAVENOUS at 13:04

## 2017-08-29 RX ADMIN — MAGNESIUM HYDROXIDE 30 ML: 400 SUSPENSION ORAL at 14:02

## 2017-08-29 ASSESSMENT — PAIN SCALES - GENERAL
PAINLEVEL_OUTOF10: 3
PAINLEVEL_OUTOF10: 0
PAINLEVEL_OUTOF10: 6
PAINLEVEL_OUTOF10: 0
PAINLEVEL_OUTOF10: 5
PAINLEVEL_OUTOF10: 8
PAINLEVEL_OUTOF10: 7
PAINLEVEL_OUTOF10: 3
PAINLEVEL_OUTOF10: 7
PAINLEVEL_OUTOF10: 4
PAINLEVEL_OUTOF10: 4
PAINLEVEL_OUTOF10: 7
PAINLEVEL_OUTOF10: 0

## 2017-08-29 ASSESSMENT — ENCOUNTER SYMPTOMS
NAUSEA: 1
SHORTNESS OF BREATH: 0

## 2017-08-29 ASSESSMENT — PAIN DESCRIPTION - LOCATION: LOCATION: HEAD

## 2017-08-29 ASSESSMENT — PAIN DESCRIPTION - DESCRIPTORS: DESCRIPTORS: ACHING

## 2017-08-30 VITALS
TEMPERATURE: 99.1 F | SYSTOLIC BLOOD PRESSURE: 152 MMHG | HEART RATE: 71 BPM | HEIGHT: 69 IN | WEIGHT: 293 LBS | DIASTOLIC BLOOD PRESSURE: 88 MMHG | RESPIRATION RATE: 16 BRPM | OXYGEN SATURATION: 100 % | BODY MASS INDEX: 43.4 KG/M2

## 2017-08-30 LAB
ANTI-NUCLEAR ANTIBODY (ANA): NEGATIVE
MITOCHONDRIAL ANTIBODY: 3.2 UNITS (ref 0–20)
SMOOTH MUSCLE ANTIBODY: 6 UNITS (ref 0–19)

## 2017-08-30 PROCEDURE — 2580000003 HC RX 258: Performed by: INTERNAL MEDICINE

## 2017-08-30 PROCEDURE — 6360000002 HC RX W HCPCS: Performed by: INTERNAL MEDICINE

## 2017-08-30 PROCEDURE — 82784 ASSAY IGA/IGD/IGG/IGM EACH: CPT

## 2017-08-30 PROCEDURE — 99232 SBSQ HOSP IP/OBS MODERATE 35: CPT | Performed by: INTERNAL MEDICINE

## 2017-08-30 PROCEDURE — 83516 IMMUNOASSAY NONANTIBODY: CPT

## 2017-08-30 PROCEDURE — 6370000000 HC RX 637 (ALT 250 FOR IP): Performed by: INTERNAL MEDICINE

## 2017-08-30 PROCEDURE — 36415 COLL VENOUS BLD VENIPUNCTURE: CPT

## 2017-08-30 PROCEDURE — 2500000003 HC RX 250 WO HCPCS: Performed by: INTERNAL MEDICINE

## 2017-08-30 PROCEDURE — 82787 IGG 1 2 3 OR 4 EACH: CPT

## 2017-08-30 RX ORDER — OXYCODONE HYDROCHLORIDE AND ACETAMINOPHEN 5; 325 MG/1; MG/1
1 TABLET ORAL EVERY 4 HOURS PRN
Qty: 21 TABLET | Refills: 0 | Status: SHIPPED | OUTPATIENT
Start: 2017-08-30 | End: 2017-09-06

## 2017-08-30 RX ORDER — PSEUDOEPHEDRINE HCL 30 MG
100 TABLET ORAL 2 TIMES DAILY
Qty: 30 CAPSULE | Refills: 0 | COMMUNITY
Start: 2017-08-30 | End: 2021-06-21 | Stop reason: ALTCHOICE

## 2017-08-30 RX ADMIN — SODIUM CHLORIDE: 9 INJECTION, SOLUTION INTRAVENOUS at 00:54

## 2017-08-30 RX ADMIN — LOSARTAN POTASSIUM 100 MG: 100 TABLET ORAL at 09:06

## 2017-08-30 RX ADMIN — METOPROLOL TARTRATE 5 MG: 5 INJECTION INTRAVENOUS at 00:47

## 2017-08-30 RX ADMIN — METOPROLOL TARTRATE 25 MG: 25 TABLET ORAL at 09:06

## 2017-08-30 RX ADMIN — ONDANSETRON 4 MG: 2 INJECTION INTRAMUSCULAR; INTRAVENOUS at 09:25

## 2017-08-30 RX ADMIN — DOCUSATE SODIUM 100 MG: 100 CAPSULE, LIQUID FILLED ORAL at 09:06

## 2017-08-30 RX ADMIN — ACETAMINOPHEN 650 MG: 325 TABLET ORAL at 04:11

## 2017-08-30 ASSESSMENT — ENCOUNTER SYMPTOMS
NAUSEA: 0
SHORTNESS OF BREATH: 0
VOMITING: 0

## 2017-08-30 ASSESSMENT — PAIN DESCRIPTION - LOCATION: LOCATION: HEAD

## 2017-08-30 ASSESSMENT — PAIN SCALES - GENERAL
PAINLEVEL_OUTOF10: 8
PAINLEVEL_OUTOF10: 9

## 2017-08-31 LAB
EKG ATRIAL RATE: 74 BPM
EKG P AXIS: 22 DEGREES
EKG P-R INTERVAL: 158 MS
EKG Q-T INTERVAL: 420 MS
EKG QRS DURATION: 94 MS
EKG QTC CALCULATION (BAZETT): 466 MS
EKG R AXIS: 12 DEGREES
EKG T AXIS: 37 DEGREES
EKG VENTRICULAR RATE: 74 BPM
GLIADIN DEAMINIDATED PEPTIDE AB IGA: 1.5 U/ML
GLIADIN DEAMINIDATED PEPTIDE AB IGG: 0.5 U/ML
IGA: 171 MG/DL (ref 70–400)
TISSUE TRANSGLUTAMINASE IGA: 0.3 U/ML

## 2017-09-01 LAB
CULTURE: NORMAL
IGG 1: 332 MG/DL (ref 240–1118)
IGG 2: 373 MG/DL (ref 124–549)
IGG 3: 40 MG/DL (ref 21–134)
IGG 4: 14 MG/DL (ref 1–123)
Lab: NORMAL
SPECIMEN DESCRIPTION: NORMAL
STATUS: NORMAL
STATUS: NORMAL

## 2017-09-05 ENCOUNTER — HOSPITAL ENCOUNTER (OUTPATIENT)
Age: 47
Discharge: HOME | End: 2017-09-05
Payer: COMMERCIAL

## 2017-09-05 DIAGNOSIS — C50.212 MALIGNANT NEOPLASM OF UPPER-INNER QUADRANT OF LEFT FEMALE BREAST (HCC): ICD-10-CM

## 2017-09-05 DIAGNOSIS — D50.0 IRON DEFICIENCY ANEMIA DUE TO CHRONIC BLOOD LOSS: ICD-10-CM

## 2017-09-05 DIAGNOSIS — K85.90 ACUTE PANCREATITIS, UNSPECIFIED COMPLICATION STATUS, UNSPECIFIED PANCREATITIS TYPE: ICD-10-CM

## 2017-09-05 DIAGNOSIS — K92.1 GASTROINTESTINAL HEMORRHAGE WITH MELENA: ICD-10-CM

## 2017-09-05 LAB
ABSOLUTE EOS #: 0.17 K/UL (ref 0–0.4)
ABSOLUTE LYMPH #: 1.74 K/UL (ref 1–4.8)
ABSOLUTE MONO #: 0.35 K/UL (ref 0.1–1.2)
ALBUMIN SERPL-MCNC: 3.7 G/DL (ref 3.5–5.2)
ALBUMIN/GLOBULIN RATIO: 1 (ref 1–2.5)
ALP BLD-CCNC: 97 U/L (ref 35–104)
ALT SERPL-CCNC: 33 U/L (ref 5–33)
AMYLASE: 58 U/L (ref 28–100)
ANION GAP SERPL CALCULATED.3IONS-SCNC: 15 MMOL/L (ref 9–17)
ANION GAP SERPL CALCULATED.3IONS-SCNC: 16 MMOL/L (ref 9–17)
AST SERPL-CCNC: 19 U/L
BASOPHILS # BLD: 1 %
BASOPHILS ABSOLUTE: 0.06 K/UL (ref 0–0.2)
BILIRUB SERPL-MCNC: 0.21 MG/DL (ref 0.3–1.2)
BUN BLDV-MCNC: 10 MG/DL (ref 6–20)
BUN BLDV-MCNC: 10 MG/DL (ref 6–20)
BUN/CREAT BLD: ABNORMAL (ref 9–20)
BUN/CREAT BLD: ABNORMAL (ref 9–20)
CALCIUM SERPL-MCNC: 9.2 MG/DL (ref 8.6–10.4)
CALCIUM SERPL-MCNC: 9.2 MG/DL (ref 8.6–10.4)
CHLORIDE BLD-SCNC: 104 MMOL/L (ref 98–107)
CHLORIDE BLD-SCNC: 104 MMOL/L (ref 98–107)
CO2: 25 MMOL/L (ref 20–31)
CO2: 25 MMOL/L (ref 20–31)
CREAT SERPL-MCNC: 0.61 MG/DL (ref 0.5–0.9)
CREAT SERPL-MCNC: 0.65 MG/DL (ref 0.5–0.9)
DIFFERENTIAL TYPE: ABNORMAL
EOSINOPHILS RELATIVE PERCENT: 3 %
FERRITIN: 16 UG/L (ref 13–150)
GFR AFRICAN AMERICAN: >60 ML/MIN
GFR AFRICAN AMERICAN: >60 ML/MIN
GFR NON-AFRICAN AMERICAN: >60 ML/MIN
GFR NON-AFRICAN AMERICAN: >60 ML/MIN
GFR SERPL CREATININE-BSD FRML MDRD: ABNORMAL ML/MIN/{1.73_M2}
GLUCOSE BLD-MCNC: 141 MG/DL (ref 70–99)
GLUCOSE BLD-MCNC: 143 MG/DL (ref 70–99)
HCT VFR BLD CALC: 29.3 % (ref 36–46)
HEMOGLOBIN: 9.1 G/DL (ref 12–16)
LIPASE: 93 U/L (ref 13–60)
LYMPHOCYTES # BLD: 30 %
MCH RBC QN AUTO: 22.6 PG (ref 26–34)
MCHC RBC AUTO-ENTMCNC: 31 G/DL (ref 31–37)
MCV RBC AUTO: 72.8 FL (ref 80–100)
MONOCYTES # BLD: 6 %
MORPHOLOGY: ABNORMAL
PDW BLD-RTO: 19.5 % (ref 12.5–15.4)
PLATELET # BLD: 304 K/UL (ref 140–450)
PLATELET ESTIMATE: ABNORMAL
PMV BLD AUTO: 6.8 FL (ref 6–12)
POTASSIUM SERPL-SCNC: 4.2 MMOL/L (ref 3.7–5.3)
POTASSIUM SERPL-SCNC: 4.2 MMOL/L (ref 3.7–5.3)
RBC # BLD: 4.02 M/UL (ref 4–5.2)
RBC # BLD: ABNORMAL 10*6/UL
SEG NEUTROPHILS: 60 %
SEGMENTED NEUTROPHILS ABSOLUTE COUNT: 3.48 K/UL (ref 1.8–7.7)
SODIUM BLD-SCNC: 144 MMOL/L (ref 135–144)
SODIUM BLD-SCNC: 145 MMOL/L (ref 135–144)
TOTAL PROTEIN: 7.4 G/DL (ref 6.4–8.3)
WBC # BLD: 5.8 K/UL (ref 3.5–11)
WBC # BLD: ABNORMAL 10*3/UL

## 2017-09-05 PROCEDURE — 80048 BASIC METABOLIC PNL TOTAL CA: CPT

## 2017-09-05 PROCEDURE — 82728 ASSAY OF FERRITIN: CPT

## 2017-09-05 PROCEDURE — 83690 ASSAY OF LIPASE: CPT

## 2017-09-05 PROCEDURE — 85025 COMPLETE CBC W/AUTO DIFF WBC: CPT

## 2017-09-05 PROCEDURE — 82150 ASSAY OF AMYLASE: CPT

## 2017-09-05 PROCEDURE — 80053 COMPREHEN METABOLIC PANEL: CPT

## 2017-09-05 PROCEDURE — 36415 COLL VENOUS BLD VENIPUNCTURE: CPT

## 2017-09-11 ENCOUNTER — HOSPITAL ENCOUNTER (OUTPATIENT)
Age: 47
Discharge: HOME | End: 2017-09-11
Payer: COMMERCIAL

## 2017-09-11 ENCOUNTER — OFFICE VISIT (OUTPATIENT)
Dept: GASTROENTEROLOGY | Age: 47
End: 2017-09-11
Payer: COMMERCIAL

## 2017-09-11 VITALS
TEMPERATURE: 97.3 F | DIASTOLIC BLOOD PRESSURE: 88 MMHG | HEIGHT: 69 IN | BODY MASS INDEX: 42.48 KG/M2 | SYSTOLIC BLOOD PRESSURE: 123 MMHG | HEART RATE: 62 BPM | WEIGHT: 286.8 LBS | OXYGEN SATURATION: 100 %

## 2017-09-11 DIAGNOSIS — K85.00 IDIOPATHIC ACUTE PANCREATITIS WITHOUT INFECTION OR NECROSIS: Primary | ICD-10-CM

## 2017-09-11 DIAGNOSIS — K85.00 IDIOPATHIC ACUTE PANCREATITIS WITHOUT INFECTION OR NECROSIS: ICD-10-CM

## 2017-09-11 LAB — LIPASE: 76 U/L (ref 13–60)

## 2017-09-11 PROCEDURE — 1111F DSCHRG MED/CURRENT MED MERGE: CPT | Performed by: INTERNAL MEDICINE

## 2017-09-11 PROCEDURE — 1036F TOBACCO NON-USER: CPT | Performed by: INTERNAL MEDICINE

## 2017-09-11 PROCEDURE — 99214 OFFICE O/P EST MOD 30 MIN: CPT | Performed by: INTERNAL MEDICINE

## 2017-09-11 PROCEDURE — 83690 ASSAY OF LIPASE: CPT

## 2017-09-11 PROCEDURE — 36415 COLL VENOUS BLD VENIPUNCTURE: CPT

## 2017-09-11 PROCEDURE — G8417 CALC BMI ABV UP PARAM F/U: HCPCS | Performed by: INTERNAL MEDICINE

## 2017-09-11 PROCEDURE — G8427 DOCREV CUR MEDS BY ELIG CLIN: HCPCS | Performed by: INTERNAL MEDICINE

## 2017-09-11 RX ORDER — LOSARTAN POTASSIUM AND HYDROCHLOROTHIAZIDE 12.5; 1 MG/1; MG/1
1 TABLET ORAL DAILY
COMMUNITY
End: 2019-04-22

## 2017-09-11 RX ORDER — AMLODIPINE BESYLATE 5 MG/1
5 TABLET ORAL DAILY
COMMUNITY
End: 2022-01-01 | Stop reason: ALTCHOICE

## 2017-09-11 RX ORDER — POTASSIUM CHLORIDE 1.5 G/1.77G
20 POWDER, FOR SOLUTION ORAL DAILY
COMMUNITY
End: 2018-08-16 | Stop reason: ALTCHOICE

## 2017-09-11 ASSESSMENT — ENCOUNTER SYMPTOMS
WHEEZING: 0
NAUSEA: 0
RECTAL PAIN: 0
BLOOD IN STOOL: 0
FACIAL SWELLING: 0
SINUS PRESSURE: 0
RESPIRATORY NEGATIVE: 1
RHINORRHEA: 0
VOMITING: 0
ABDOMINAL PAIN: 1
ANAL BLEEDING: 0
CONSTIPATION: 0
DIARRHEA: 0
SORE THROAT: 0
BACK PAIN: 0
TROUBLE SWALLOWING: 0
ABDOMINAL DISTENTION: 0
VOICE CHANGE: 0
STRIDOR: 0
COUGH: 0
EYES NEGATIVE: 1

## 2017-09-12 ENCOUNTER — OFFICE VISIT (OUTPATIENT)
Dept: ONCOLOGY | Age: 47
End: 2017-09-12
Payer: COMMERCIAL

## 2017-09-12 VITALS
TEMPERATURE: 98.5 F | DIASTOLIC BLOOD PRESSURE: 86 MMHG | BODY MASS INDEX: 42.43 KG/M2 | WEIGHT: 287.3 LBS | SYSTOLIC BLOOD PRESSURE: 131 MMHG | HEART RATE: 58 BPM

## 2017-09-12 DIAGNOSIS — K63.89 SMALL BOWEL MASS: ICD-10-CM

## 2017-09-12 DIAGNOSIS — K85.00 IDIOPATHIC ACUTE PANCREATITIS WITHOUT INFECTION OR NECROSIS: ICD-10-CM

## 2017-09-12 DIAGNOSIS — C50.212 MALIGNANT NEOPLASM OF UPPER-INNER QUADRANT OF LEFT FEMALE BREAST (HCC): Primary | ICD-10-CM

## 2017-09-12 PROCEDURE — 99214 OFFICE O/P EST MOD 30 MIN: CPT | Performed by: INTERNAL MEDICINE

## 2017-09-12 PROCEDURE — 99211 OFF/OP EST MAY X REQ PHY/QHP: CPT

## 2017-09-12 PROCEDURE — G8427 DOCREV CUR MEDS BY ELIG CLIN: HCPCS | Performed by: INTERNAL MEDICINE

## 2017-09-12 PROCEDURE — 1111F DSCHRG MED/CURRENT MED MERGE: CPT | Performed by: INTERNAL MEDICINE

## 2017-09-12 PROCEDURE — G8417 CALC BMI ABV UP PARAM F/U: HCPCS | Performed by: INTERNAL MEDICINE

## 2017-09-12 PROCEDURE — 1036F TOBACCO NON-USER: CPT | Performed by: INTERNAL MEDICINE

## 2017-10-16 ENCOUNTER — TELEPHONE (OUTPATIENT)
Dept: GASTROENTEROLOGY | Age: 47
End: 2017-10-16

## 2017-10-16 NOTE — TELEPHONE ENCOUNTER
Called patient to cf appt for 10-23-17 and per patient she is seeing a gi doctor at [address] [place] [address] to cancel!

## 2017-10-30 ENCOUNTER — HOSPITAL ENCOUNTER (OUTPATIENT)
Age: 47
Setting detail: SPECIMEN
Discharge: HOME | End: 2017-10-30
Payer: COMMERCIAL

## 2017-10-30 LAB
HCT VFR BLD CALC: 33 % (ref 36.3–47.1)
HEMOGLOBIN: 9.6 G/DL (ref 11.9–15.1)

## 2017-11-29 ENCOUNTER — HOSPITAL ENCOUNTER (OUTPATIENT)
Age: 47
Setting detail: SPECIMEN
Discharge: HOME | End: 2017-11-29
Payer: COMMERCIAL

## 2017-11-29 LAB
FERRITIN: 8 UG/L (ref 13–150)
HCT VFR BLD CALC: 34.5 % (ref 36.3–47.1)
HEMOGLOBIN: 10.1 G/DL (ref 11.9–15.1)
IRON SATURATION: 7 % (ref 20–55)
IRON: 30 UG/DL (ref 37–145)
POTASSIUM SERPL-SCNC: 4.3 MMOL/L (ref 3.7–5.3)
TOTAL IRON BINDING CAPACITY: 403 UG/DL (ref 250–450)
UNSATURATED IRON BINDING CAPACITY: 373 UG/DL (ref 112–347)

## 2018-03-10 ENCOUNTER — HOSPITAL ENCOUNTER (OUTPATIENT)
Age: 48
Discharge: HOME | End: 2018-03-10
Payer: COMMERCIAL

## 2018-03-10 LAB
HCT VFR BLD CALC: 35.6 % (ref 36–46)
HEMOGLOBIN: 11.4 G/DL (ref 12–16)

## 2018-03-10 PROCEDURE — 85018 HEMOGLOBIN: CPT

## 2018-03-10 PROCEDURE — 85014 HEMATOCRIT: CPT

## 2018-03-10 PROCEDURE — 36415 COLL VENOUS BLD VENIPUNCTURE: CPT

## 2018-04-03 DIAGNOSIS — C50.212 MALIGNANT NEOPLASM OF UPPER-INNER QUADRANT OF LEFT FEMALE BREAST, UNSPECIFIED ESTROGEN RECEPTOR STATUS (HCC): ICD-10-CM

## 2018-04-03 RX ORDER — ANASTROZOLE 1 MG/1
1 TABLET ORAL DAILY
Qty: 30 TABLET | Refills: 6 | Status: SHIPPED | OUTPATIENT
Start: 2018-04-03 | End: 2019-08-13 | Stop reason: ALTCHOICE

## 2018-05-01 ENCOUNTER — OFFICE VISIT (OUTPATIENT)
Dept: ONCOLOGY | Age: 48
End: 2018-05-01
Payer: COMMERCIAL

## 2018-05-01 VITALS
DIASTOLIC BLOOD PRESSURE: 85 MMHG | RESPIRATION RATE: 18 BRPM | TEMPERATURE: 98.4 F | SYSTOLIC BLOOD PRESSURE: 129 MMHG | WEIGHT: 265.5 LBS | HEART RATE: 68 BPM | BODY MASS INDEX: 39.21 KG/M2

## 2018-05-01 DIAGNOSIS — C50.211 BILATERAL MALIGNANT NEOPLASM OF UPPER INNER QUADRANT OF BREAST IN FEMALE, UNSPECIFIED ESTROGEN RECEPTOR STATUS (HCC): Primary | ICD-10-CM

## 2018-05-01 DIAGNOSIS — K85.00 IDIOPATHIC ACUTE PANCREATITIS WITHOUT INFECTION OR NECROSIS: ICD-10-CM

## 2018-05-01 DIAGNOSIS — C50.212 BILATERAL MALIGNANT NEOPLASM OF UPPER INNER QUADRANT OF BREAST IN FEMALE, UNSPECIFIED ESTROGEN RECEPTOR STATUS (HCC): Primary | ICD-10-CM

## 2018-05-01 DIAGNOSIS — D50.0 IRON DEFICIENCY ANEMIA DUE TO CHRONIC BLOOD LOSS: ICD-10-CM

## 2018-05-01 PROCEDURE — 1036F TOBACCO NON-USER: CPT | Performed by: INTERNAL MEDICINE

## 2018-05-01 PROCEDURE — G8417 CALC BMI ABV UP PARAM F/U: HCPCS | Performed by: INTERNAL MEDICINE

## 2018-05-01 PROCEDURE — 99211 OFF/OP EST MAY X REQ PHY/QHP: CPT

## 2018-05-01 PROCEDURE — G8428 CUR MEDS NOT DOCUMENT: HCPCS | Performed by: INTERNAL MEDICINE

## 2018-05-01 PROCEDURE — 99214 OFFICE O/P EST MOD 30 MIN: CPT | Performed by: INTERNAL MEDICINE

## 2018-06-05 ENCOUNTER — HOSPITAL ENCOUNTER (OUTPATIENT)
Dept: WOMENS IMAGING | Age: 48
Discharge: HOME | End: 2018-06-07
Payer: COMMERCIAL

## 2018-06-05 DIAGNOSIS — C50.211 BILATERAL MALIGNANT NEOPLASM OF UPPER INNER QUADRANT OF BREAST IN FEMALE, UNSPECIFIED ESTROGEN RECEPTOR STATUS (HCC): ICD-10-CM

## 2018-06-05 DIAGNOSIS — C50.212 BILATERAL MALIGNANT NEOPLASM OF UPPER INNER QUADRANT OF BREAST IN FEMALE, UNSPECIFIED ESTROGEN RECEPTOR STATUS (HCC): ICD-10-CM

## 2018-06-05 DIAGNOSIS — K85.00 IDIOPATHIC ACUTE PANCREATITIS WITHOUT INFECTION OR NECROSIS: ICD-10-CM

## 2018-06-05 DIAGNOSIS — D50.0 IRON DEFICIENCY ANEMIA DUE TO CHRONIC BLOOD LOSS: ICD-10-CM

## 2018-06-05 PROCEDURE — 77063 BREAST TOMOSYNTHESIS BI: CPT

## 2018-07-02 ENCOUNTER — HOSPITAL ENCOUNTER (OUTPATIENT)
Dept: CT IMAGING | Age: 48
Discharge: HOME | End: 2018-07-04
Payer: COMMERCIAL

## 2018-07-02 ENCOUNTER — HOSPITAL ENCOUNTER (OUTPATIENT)
Age: 48
Discharge: HOME | End: 2018-07-02
Payer: COMMERCIAL

## 2018-07-02 DIAGNOSIS — C50.211 BILATERAL MALIGNANT NEOPLASM OF UPPER INNER QUADRANT OF BREAST IN FEMALE, UNSPECIFIED ESTROGEN RECEPTOR STATUS (HCC): ICD-10-CM

## 2018-07-02 DIAGNOSIS — D50.0 IRON DEFICIENCY ANEMIA DUE TO CHRONIC BLOOD LOSS: ICD-10-CM

## 2018-07-02 DIAGNOSIS — K85.00 IDIOPATHIC ACUTE PANCREATITIS WITHOUT INFECTION OR NECROSIS: ICD-10-CM

## 2018-07-02 DIAGNOSIS — C50.212 BILATERAL MALIGNANT NEOPLASM OF UPPER INNER QUADRANT OF BREAST IN FEMALE, UNSPECIFIED ESTROGEN RECEPTOR STATUS (HCC): ICD-10-CM

## 2018-07-02 LAB — FERRITIN: 9 UG/L (ref 13–150)

## 2018-07-02 PROCEDURE — 6360000004 HC RX CONTRAST MEDICATION: Performed by: INTERNAL MEDICINE

## 2018-07-02 PROCEDURE — 71260 CT THORAX DX C+: CPT

## 2018-07-02 PROCEDURE — 2580000003 HC RX 258: Performed by: INTERNAL MEDICINE

## 2018-07-02 PROCEDURE — 74177 CT ABD & PELVIS W/CONTRAST: CPT

## 2018-07-02 PROCEDURE — 36415 COLL VENOUS BLD VENIPUNCTURE: CPT

## 2018-07-02 PROCEDURE — 82728 ASSAY OF FERRITIN: CPT

## 2018-07-02 RX ORDER — 0.9 % SODIUM CHLORIDE 0.9 %
80 INTRAVENOUS SOLUTION INTRAVENOUS ONCE
Status: COMPLETED | OUTPATIENT
Start: 2018-07-02 | End: 2018-07-02

## 2018-07-02 RX ORDER — SODIUM CHLORIDE 0.9 % (FLUSH) 0.9 %
10 SYRINGE (ML) INJECTION PRN
Status: DISCONTINUED | OUTPATIENT
Start: 2018-07-02 | End: 2018-07-05 | Stop reason: HOSPADM

## 2018-07-02 RX ADMIN — IOPAMIDOL 100 ML: 755 INJECTION, SOLUTION INTRAVENOUS at 10:51

## 2018-07-02 RX ADMIN — SODIUM CHLORIDE 80 ML: 9 INJECTION, SOLUTION INTRAVENOUS at 10:51

## 2018-07-02 RX ADMIN — Medication 10 ML: at 10:51

## 2018-07-02 RX ADMIN — IOHEXOL 50 ML: 240 INJECTION, SOLUTION INTRATHECAL; INTRAVASCULAR; INTRAVENOUS; ORAL at 10:50

## 2018-08-01 ENCOUNTER — HOSPITAL ENCOUNTER (OUTPATIENT)
Age: 48
Setting detail: SPECIMEN
Discharge: HOME | End: 2018-08-01
Payer: COMMERCIAL

## 2018-08-01 LAB
ABSOLUTE EOS #: 0.08 K/UL (ref 0–0.44)
ABSOLUTE IMMATURE GRANULOCYTE: <0.03 K/UL (ref 0–0.3)
ABSOLUTE LYMPH #: 1.8 K/UL (ref 1.1–3.7)
ABSOLUTE MONO #: 0.23 K/UL (ref 0.1–1.2)
ALBUMIN SERPL-MCNC: 4.5 G/DL (ref 3.5–5.2)
ALBUMIN/GLOBULIN RATIO: 1.6 (ref 1–2.5)
ALP BLD-CCNC: 87 U/L (ref 35–104)
ALT SERPL-CCNC: 12 U/L (ref 5–33)
ANION GAP SERPL CALCULATED.3IONS-SCNC: 14 MMOL/L (ref 9–17)
AST SERPL-CCNC: 17 U/L
BASOPHILS # BLD: 0 % (ref 0–2)
BASOPHILS ABSOLUTE: <0.03 K/UL (ref 0–0.2)
BILIRUB SERPL-MCNC: 0.36 MG/DL (ref 0.3–1.2)
BUN BLDV-MCNC: 12 MG/DL (ref 6–20)
BUN/CREAT BLD: NORMAL (ref 9–20)
CALCIUM SERPL-MCNC: 9.6 MG/DL (ref 8.6–10.4)
CHLORIDE BLD-SCNC: 102 MMOL/L (ref 98–107)
CO2: 26 MMOL/L (ref 20–31)
CREAT SERPL-MCNC: 0.72 MG/DL (ref 0.5–0.9)
DIFFERENTIAL TYPE: ABNORMAL
EOSINOPHILS RELATIVE PERCENT: 2 % (ref 1–4)
FERRITIN: 11 UG/L (ref 13–150)
GFR AFRICAN AMERICAN: >60 ML/MIN
GFR NON-AFRICAN AMERICAN: >60 ML/MIN
GFR SERPL CREATININE-BSD FRML MDRD: NORMAL ML/MIN/{1.73_M2}
GFR SERPL CREATININE-BSD FRML MDRD: NORMAL ML/MIN/{1.73_M2}
GLUCOSE BLD-MCNC: 87 MG/DL (ref 70–99)
HCT VFR BLD CALC: 39.9 % (ref 36.3–47.1)
HEMOGLOBIN: 12.8 G/DL (ref 11.9–15.1)
IMMATURE GRANULOCYTES: 0 %
LYMPHOCYTES # BLD: 44 % (ref 24–43)
MCH RBC QN AUTO: 27 PG (ref 25.2–33.5)
MCHC RBC AUTO-ENTMCNC: 32.1 G/DL (ref 28.4–34.8)
MCV RBC AUTO: 84.2 FL (ref 82.6–102.9)
MONOCYTES # BLD: 6 % (ref 3–12)
NRBC AUTOMATED: 0 PER 100 WBC
PDW BLD-RTO: 15.8 % (ref 11.8–14.4)
PLATELET # BLD: 160 K/UL (ref 138–453)
PLATELET ESTIMATE: ABNORMAL
PMV BLD AUTO: 11.6 FL (ref 8.1–13.5)
POTASSIUM SERPL-SCNC: 3.8 MMOL/L (ref 3.7–5.3)
RBC # BLD: 4.74 M/UL (ref 3.95–5.11)
RBC # BLD: ABNORMAL 10*6/UL
SEG NEUTROPHILS: 48 % (ref 36–65)
SEGMENTED NEUTROPHILS ABSOLUTE COUNT: 2.01 K/UL (ref 1.5–8.1)
SODIUM BLD-SCNC: 142 MMOL/L (ref 135–144)
TOTAL PROTEIN: 7.3 G/DL (ref 6.4–8.3)
WBC # BLD: 4.1 K/UL (ref 3.5–11.3)
WBC # BLD: ABNORMAL 10*3/UL

## 2018-08-16 ENCOUNTER — TELEPHONE (OUTPATIENT)
Dept: ONCOLOGY | Age: 48
End: 2018-08-16

## 2018-08-16 ENCOUNTER — OFFICE VISIT (OUTPATIENT)
Dept: ONCOLOGY | Age: 48
End: 2018-08-16
Payer: COMMERCIAL

## 2018-08-16 VITALS
BODY MASS INDEX: 38.26 KG/M2 | WEIGHT: 259.06 LBS | SYSTOLIC BLOOD PRESSURE: 133 MMHG | HEART RATE: 55 BPM | DIASTOLIC BLOOD PRESSURE: 88 MMHG | TEMPERATURE: 97.9 F

## 2018-08-16 DIAGNOSIS — D50.0 IRON DEFICIENCY ANEMIA DUE TO CHRONIC BLOOD LOSS: ICD-10-CM

## 2018-08-16 DIAGNOSIS — C50.212 BILATERAL MALIGNANT NEOPLASM OF UPPER INNER QUADRANT OF BREAST IN FEMALE, UNSPECIFIED ESTROGEN RECEPTOR STATUS (HCC): Primary | ICD-10-CM

## 2018-08-16 DIAGNOSIS — C50.211 BILATERAL MALIGNANT NEOPLASM OF UPPER INNER QUADRANT OF BREAST IN FEMALE, UNSPECIFIED ESTROGEN RECEPTOR STATUS (HCC): Primary | ICD-10-CM

## 2018-08-16 PROCEDURE — 1036F TOBACCO NON-USER: CPT | Performed by: INTERNAL MEDICINE

## 2018-08-16 PROCEDURE — 99211 OFF/OP EST MAY X REQ PHY/QHP: CPT | Performed by: INTERNAL MEDICINE

## 2018-08-16 PROCEDURE — G8417 CALC BMI ABV UP PARAM F/U: HCPCS | Performed by: INTERNAL MEDICINE

## 2018-08-16 PROCEDURE — G8427 DOCREV CUR MEDS BY ELIG CLIN: HCPCS | Performed by: INTERNAL MEDICINE

## 2018-08-16 PROCEDURE — 99214 OFFICE O/P EST MOD 30 MIN: CPT | Performed by: INTERNAL MEDICINE

## 2018-08-16 NOTE — PROGRESS NOTES
intermediate grade invasive ductal carcinoma, [place] lymph node was negative. The tumor was ER/[place] positive and HER-2/eve negative. We discussed options of adjuvant therapy including chemotherapy and hormone therapy, The patient decided to proceed with adjuvant chemotherapy without undergone a Oncotype study. TC chemotherapy X4 cycles is planned  After 3 cycles, and presented with persistent GI bleeding leading to symptomatic anemia and syncope, the patient's condition was Serious enough to warrant repeated hospital admissions. Repeated GI workup including endoscopic evaluation and Tagged Red cell scans were negative. We decided to treat her conservatively and chemotherapy was stopped after 3 cycles. After that All her symptoms improved and she was started on tamoxifen. After one year, she was transitioned to anastrozole since she had no periods and hormonal testing showed post menopausal state. 2017  She had been having abdominal pain and went to [facility] for consult. She had work up which showed mass in the colon showed subtle changes in the mid-small intestine, CT enterography showed 3.6CM mass in the distal small bowel. She was also hospitalized in the interim with pancreatitis. 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 Margins were negative. Observation was decided, no need for adjuvant therapy    INTERIM HISTORY: She comes in today for a visit and to review recent results. She is not taking iron supplements and is eating diet high in iron. GYNECOLOGICAL HISTORY  Menarche at age 14. He is premenopausal with regular periods. +2. She used birth control minimally. PAST MEDICAL HISTORY: has a past medical history of Anemia; Anxiety; Atrial fibrillation ([address] Breast cancer ([address] Carpal tunnel syndrome; Depression; GI bleed; Hypertension; Lymphedema;  Neurologic cardiac syncope; Obesity; Pain; Sleep

## 2018-11-13 ENCOUNTER — TELEPHONE (OUTPATIENT)
Dept: ONCOLOGY | Age: 48
End: 2018-11-13

## 2019-04-04 ENCOUNTER — HOSPITAL ENCOUNTER (OUTPATIENT)
Age: 49
Setting detail: SPECIMEN
Discharge: HOME | End: 2019-04-04
Payer: COMMERCIAL

## 2019-04-04 LAB — FERRITIN: 33 UG/L (ref 13–150)

## 2019-04-22 ENCOUNTER — APPOINTMENT (OUTPATIENT)
Dept: CT IMAGING | Age: 49
End: 2019-04-22
Payer: COMMERCIAL

## 2019-04-22 ENCOUNTER — HOSPITAL ENCOUNTER (EMERGENCY)
Age: 49
Discharge: HOME | End: 2019-04-23
Attending: EMERGENCY MEDICINE
Payer: COMMERCIAL

## 2019-04-22 DIAGNOSIS — R10.13 EPIGASTRIC PAIN: Primary | ICD-10-CM

## 2019-04-22 LAB
ABSOLUTE EOS #: 0.1 K/UL (ref 0–0.4)
ABSOLUTE IMMATURE GRANULOCYTE: ABNORMAL K/UL (ref 0–0.3)
ABSOLUTE LYMPH #: 2.3 K/UL (ref 1–4.8)
ABSOLUTE MONO #: 0.7 K/UL (ref 0.1–1.3)
ALBUMIN SERPL-MCNC: 4.5 G/DL (ref 3.5–5.2)
ALBUMIN/GLOBULIN RATIO: ABNORMAL (ref 1–2.5)
ALP BLD-CCNC: 71 U/L (ref 35–104)
ALT SERPL-CCNC: 18 U/L (ref 5–33)
ANION GAP SERPL CALCULATED.3IONS-SCNC: 15 MMOL/L (ref 9–17)
AST SERPL-CCNC: 18 U/L
BASOPHILS # BLD: 1 % (ref 0–2)
BASOPHILS ABSOLUTE: 0 K/UL (ref 0–0.2)
BILIRUB SERPL-MCNC: <0.15 MG/DL (ref 0.3–1.2)
BUN BLDV-MCNC: 18 MG/DL (ref 6–20)
BUN/CREAT BLD: ABNORMAL (ref 9–20)
CALCIUM SERPL-MCNC: 9.3 MG/DL (ref 8.6–10.4)
CHLORIDE BLD-SCNC: 99 MMOL/L (ref 98–107)
CO2: 23 MMOL/L (ref 20–31)
CREAT SERPL-MCNC: 0.68 MG/DL (ref 0.5–0.9)
DIFFERENTIAL TYPE: ABNORMAL
EOSINOPHILS RELATIVE PERCENT: 2 % (ref 0–4)
GFR AFRICAN AMERICAN: >60 ML/MIN
GFR NON-AFRICAN AMERICAN: >60 ML/MIN
GFR SERPL CREATININE-BSD FRML MDRD: ABNORMAL ML/MIN/{1.73_M2}
GFR SERPL CREATININE-BSD FRML MDRD: ABNORMAL ML/MIN/{1.73_M2}
GLUCOSE BLD-MCNC: 128 MG/DL (ref 70–99)
HCG QUALITATIVE: NEGATIVE
HCT VFR BLD CALC: 40.2 % (ref 36–46)
HEMOGLOBIN: 13.8 G/DL (ref 12–16)
IMMATURE GRANULOCYTES: ABNORMAL %
LIPASE: 32 U/L (ref 13–60)
LYMPHOCYTES # BLD: 30 % (ref 24–44)
MAGNESIUM: 2.1 MG/DL (ref 1.6–2.6)
MCH RBC QN AUTO: 30.9 PG (ref 26–34)
MCHC RBC AUTO-ENTMCNC: 34.4 G/DL (ref 31–37)
MCV RBC AUTO: 89.7 FL (ref 80–100)
MONOCYTES # BLD: 9 % (ref 1–7)
NRBC AUTOMATED: ABNORMAL PER 100 WBC
PDW BLD-RTO: 13.1 % (ref 11.5–14.9)
PLATELET # BLD: 183 K/UL (ref 150–450)
PLATELET ESTIMATE: ABNORMAL
PMV BLD AUTO: 7.9 FL (ref 6–12)
POTASSIUM SERPL-SCNC: 3.3 MMOL/L (ref 3.7–5.3)
RBC # BLD: 4.47 M/UL (ref 4–5.2)
RBC # BLD: ABNORMAL 10*6/UL
SEG NEUTROPHILS: 58 % (ref 36–66)
SEGMENTED NEUTROPHILS ABSOLUTE COUNT: 4.5 K/UL (ref 1.3–9.1)
SODIUM BLD-SCNC: 137 MMOL/L (ref 135–144)
TOTAL PROTEIN: 7.4 G/DL (ref 6.4–8.3)
TROPONIN INTERP: NORMAL
TROPONIN T: NORMAL NG/ML
TROPONIN, HIGH SENSITIVITY: <6 NG/L (ref 0–14)
WBC # BLD: 7.7 K/UL (ref 3.5–11)
WBC # BLD: ABNORMAL 10*3/UL

## 2019-04-22 PROCEDURE — 84703 CHORIONIC GONADOTROPIN ASSAY: CPT

## 2019-04-22 PROCEDURE — 85025 COMPLETE CBC W/AUTO DIFF WBC: CPT

## 2019-04-22 PROCEDURE — 99284 EMERGENCY DEPT VISIT MOD MDM: CPT

## 2019-04-22 PROCEDURE — 80053 COMPREHEN METABOLIC PANEL: CPT

## 2019-04-22 PROCEDURE — 2580000003 HC RX 258: Performed by: EMERGENCY MEDICINE

## 2019-04-22 PROCEDURE — 93005 ELECTROCARDIOGRAM TRACING: CPT

## 2019-04-22 PROCEDURE — 83735 ASSAY OF MAGNESIUM: CPT

## 2019-04-22 PROCEDURE — 96374 THER/PROPH/DIAG INJ IV PUSH: CPT

## 2019-04-22 PROCEDURE — 6360000002 HC RX W HCPCS: Performed by: EMERGENCY MEDICINE

## 2019-04-22 PROCEDURE — 74177 CT ABD & PELVIS W/CONTRAST: CPT

## 2019-04-22 PROCEDURE — 83690 ASSAY OF LIPASE: CPT

## 2019-04-22 PROCEDURE — 36415 COLL VENOUS BLD VENIPUNCTURE: CPT

## 2019-04-22 PROCEDURE — 84484 ASSAY OF TROPONIN QUANT: CPT

## 2019-04-22 PROCEDURE — 96375 TX/PRO/DX INJ NEW DRUG ADDON: CPT

## 2019-04-22 RX ORDER — 0.9 % SODIUM CHLORIDE 0.9 %
1000 INTRAVENOUS SOLUTION INTRAVENOUS ONCE
Status: COMPLETED | OUTPATIENT
Start: 2019-04-22 | End: 2019-04-23

## 2019-04-22 RX ORDER — ONDANSETRON 2 MG/ML
4 INJECTION INTRAMUSCULAR; INTRAVENOUS ONCE
Status: COMPLETED | OUTPATIENT
Start: 2019-04-22 | End: 2019-04-22

## 2019-04-22 RX ORDER — KETOROLAC TROMETHAMINE 30 MG/ML
30 INJECTION, SOLUTION INTRAMUSCULAR; INTRAVENOUS ONCE
Status: COMPLETED | OUTPATIENT
Start: 2019-04-22 | End: 2019-04-22

## 2019-04-22 RX ORDER — 0.9 % SODIUM CHLORIDE 0.9 %
80 INTRAVENOUS SOLUTION INTRAVENOUS ONCE
Status: COMPLETED | OUTPATIENT
Start: 2019-04-23 | End: 2019-04-23

## 2019-04-22 RX ORDER — SODIUM CHLORIDE 0.9 % (FLUSH) 0.9 %
10 SYRINGE (ML) INJECTION PRN
Status: DISCONTINUED | OUTPATIENT
Start: 2019-04-22 | End: 2019-04-23 | Stop reason: HOSPADM

## 2019-04-22 RX ADMIN — SODIUM CHLORIDE 1000 ML: 9 INJECTION, SOLUTION INTRAVENOUS at 23:20

## 2019-04-22 RX ADMIN — KETOROLAC TROMETHAMINE 30 MG: 30 INJECTION, SOLUTION INTRAMUSCULAR; INTRAVENOUS at 23:23

## 2019-04-22 RX ADMIN — HYDROMORPHONE HYDROCHLORIDE 1 MG: 1 INJECTION, SOLUTION INTRAMUSCULAR; INTRAVENOUS; SUBCUTANEOUS at 23:22

## 2019-04-22 RX ADMIN — ONDANSETRON 4 MG: 2 INJECTION INTRAMUSCULAR; INTRAVENOUS at 23:20

## 2019-04-22 ASSESSMENT — PAIN DESCRIPTION - ORIENTATION: ORIENTATION: MID;UPPER

## 2019-04-22 ASSESSMENT — PAIN SCALES - GENERAL
PAINLEVEL_OUTOF10: 10
PAINLEVEL_OUTOF10: 10

## 2019-04-22 ASSESSMENT — PAIN DESCRIPTION - LOCATION: LOCATION: ABDOMEN

## 2019-04-22 ASSESSMENT — PAIN DESCRIPTION - PAIN TYPE: TYPE: ACUTE PAIN

## 2019-04-23 VITALS
DIASTOLIC BLOOD PRESSURE: 57 MMHG | TEMPERATURE: 98.2 F | BODY MASS INDEX: 41.47 KG/M2 | RESPIRATION RATE: 16 BRPM | WEIGHT: 280 LBS | OXYGEN SATURATION: 95 % | HEIGHT: 69 IN | HEART RATE: 58 BPM | SYSTOLIC BLOOD PRESSURE: 99 MMHG

## 2019-04-23 LAB
BILIRUBIN URINE: NEGATIVE
COLOR: YELLOW
COMMENT UA: ABNORMAL
GLUCOSE URINE: NEGATIVE
KETONES, URINE: NEGATIVE
LEUKOCYTE ESTERASE, URINE: NEGATIVE
NITRITE, URINE: NEGATIVE
PH UA: 5 (ref 5–8)
PROTEIN UA: NEGATIVE
SPECIFIC GRAVITY UA: 1.06 (ref 1–1.03)
TROPONIN INTERP: NORMAL
TROPONIN T: NORMAL NG/ML
TROPONIN, HIGH SENSITIVITY: <6 NG/L (ref 0–14)
TURBIDITY: CLEAR
URINE HGB: NEGATIVE
UROBILINOGEN, URINE: NORMAL

## 2019-04-23 PROCEDURE — 2580000003 HC RX 258: Performed by: EMERGENCY MEDICINE

## 2019-04-23 PROCEDURE — 84484 ASSAY OF TROPONIN QUANT: CPT

## 2019-04-23 PROCEDURE — 6360000004 HC RX CONTRAST MEDICATION: Performed by: EMERGENCY MEDICINE

## 2019-04-23 PROCEDURE — 6370000000 HC RX 637 (ALT 250 FOR IP): Performed by: EMERGENCY MEDICINE

## 2019-04-23 PROCEDURE — 36415 COLL VENOUS BLD VENIPUNCTURE: CPT

## 2019-04-23 PROCEDURE — 81003 URINALYSIS AUTO W/O SCOPE: CPT

## 2019-04-23 RX ORDER — MAGNESIUM HYDROXIDE/ALUMINUM HYDROXICE/SIMETHICONE 120; 1200; 1200 MG/30ML; MG/30ML; MG/30ML
30 SUSPENSION ORAL ONCE
Status: COMPLETED | OUTPATIENT
Start: 2019-04-23 | End: 2019-04-23

## 2019-04-23 RX ORDER — 0.9 % SODIUM CHLORIDE 0.9 %
1000 INTRAVENOUS SOLUTION INTRAVENOUS ONCE
Status: COMPLETED | OUTPATIENT
Start: 2019-04-23 | End: 2019-04-23

## 2019-04-23 RX ADMIN — LIDOCAINE HYDROCHLORIDE 15 ML: 20 SOLUTION ORAL; TOPICAL at 00:35

## 2019-04-23 RX ADMIN — SODIUM CHLORIDE 1000 ML: 9 INJECTION, SOLUTION INTRAVENOUS at 01:05

## 2019-04-23 RX ADMIN — ALUMINUM HYDROXIDE, MAGNESIUM HYDROXIDE, AND SIMETHICONE 30 ML: 200; 200; 20 SUSPENSION ORAL at 00:35

## 2019-04-23 RX ADMIN — Medication 10 ML: at 00:04

## 2019-04-23 RX ADMIN — IOVERSOL 75 ML: 741 INJECTION INTRA-ARTERIAL; INTRAVENOUS at 00:04

## 2019-04-23 RX ADMIN — SODIUM CHLORIDE 80 ML: 9 INJECTION, SOLUTION INTRAVENOUS at 00:04

## 2019-04-23 ASSESSMENT — PAIN SCALES - GENERAL: PAINLEVEL_OUTOF10: 1

## 2019-04-23 ASSESSMENT — PAIN DESCRIPTION - LOCATION: LOCATION: BACK

## 2019-04-23 ASSESSMENT — PAIN DESCRIPTION - PAIN TYPE: TYPE: ACUTE PAIN

## 2019-04-23 ASSESSMENT — PAIN DESCRIPTION - ORIENTATION: ORIENTATION: UPPER

## 2019-04-23 NOTE — ED PROVIDER NOTES
facial due to accident    CYSTOSCOPY  14    DILATION AND CURETTAGE OF UTERUS      ENTEROCELE REPAIR  14    FRACTURE SURGERY      facial due to accident    HYSTERECTOMY      ILEOSCOPY  10/12/05    KNEE ARTHROSCOPY      both knees    MASTECTOMY Left 13    with Left SLN biopsy    OTHER SURGICAL HISTORY  1969    mild inflammation in duodenum, ?diverticulum in probable 3rd part of duodenum, several areas in small bowel multiple erosions    SHOULDER ARTHROSCOPY      left    HOANG AND BSO  14    TUMOR REMOVAL      in bowel    TUNNELED VENOUS PORT PLACEMENT Right     infusa port, power port    UPPER GASTROINTESTINAL ENDOSCOPY  2013    normal    UPPER GASTROINTESTINAL ENDOSCOPY  2015    MILD DISTAL ESOPHAGITIS     CURRENT MEDICATIONS       Previous Medications    AMLODIPINE (NORVASC) 5 MG TABLET    Take 5 mg by mouth daily    ANASTROZOLE (ARIMIDEX) 1 MG TABLET    Take 1 tablet by mouth daily    DOCUSATE SODIUM (COLACE, DULCOLAX) 100 MG CAPS    Take 100 mg by mouth 2 times daily    GLUCOSAMINE-CHONDROITIN (GLUCOSAMINE CHONDR COMPLEX PO)    Take  by mouth. MAGNESIUM HYDROXIDE (MILK OF MAGNESIA) 400 MG/5ML SUSPENSION    Take 30 mLs by mouth daily as needed for Constipation    METOPROLOL TARTRATE (LOPRESSOR) 25 MG TABLET    TAKE ONE TABLET BY MOUTH TWICE A DAY    VENLAFAXINE (EFFEXOR XR) 75 MG EXTENDED RELEASE CAPSULE    TAKE ONE CAPSULE BY MOUTH DAILY     ALLERGIES     is allergic to adhesive tape. FAMILY HISTORY     indicated that her mother is alive. She indicated that her father is alive. She indicated that both of her brothers are alive.      SOCIAL HISTORY       Social History     Tobacco Use    Smoking status: Former Smoker     Types: Cigarettes     Last attempt to quit: 2010     Years since quittin.4    Smokeless tobacco: Never Used   Substance Use Topics    Alcohol use: No     Alcohol/week: 0.0 oz    Drug use: No     PHYSICAL EXAM     INITIAL VITALS: BP (!) 157/88   Pulse 53   Temp 98.2 °F (36.8 °C) (Oral)   Resp 18   Ht 5' 9\" (1.753 m)   Wt 280 lb (127 kg)   LMP 03/17/2014   SpO2 100%   BMI 41.35 kg/m²    Physical Exam    MEDICAL DECISION MAKING:            Labs Reviewed   CBC WITH AUTO DIFFERENTIAL - Abnormal; Notable for the following components:       Result Value    Monocytes 9 (*)     All other components within normal limits   COMPREHENSIVE METABOLIC PANEL W/ REFLEX TO MG FOR LOW K - Abnormal; Notable for the following components:    Glucose 128 (*)     Potassium 3.3 (*)     Total Bilirubin <0.15 (*)     All other components within normal limits   LIPASE   HCG, SERUM, QUALITATIVE   TROPONIN   MAGNESIUM   URINE RT REFLEX TO CULTURE   TROPONIN     EMERGENCY DEPARTMENTCOURSE:         Vitals:    Vitals:    04/22/19 2244   BP: (!) 157/88   Pulse: 53   Resp: 18   Temp: 98.2 °F (36.8 °C)   TempSrc: Oral   SpO2: 100%   Weight: 280 lb (127 kg)   Height: 5' 9\" (1.753 m)       The patient was given the following medications while in the emergency department:  Orders Placed This Encounter   Medications    0.9 % sodium chloride bolus    ondansetron (ZOFRAN) injection 4 mg    ketorolac (TORADOL) injection 30 mg    HYDROmorphone (DILAUDID) injection 1 mg    0.9 % sodium chloride bolus    ioversol (OPTIRAY) 74 % injection 75 mL    sodium chloride flush 0.9 % injection 10 mL    0.9 % sodium chloride bolus    lidocaine viscous (XYLOCAINE) 2 % solution 15 mL    aluminum & magnesium hydroxide-simethicone (MAALOX) 200-200-20 MG/5ML suspension 30 mL     CONSULTS:  None    FINAL IMPRESSION      1. Epigastric pain          DISPOSITION/PLAN   DISPOSITION        PATIENT REFERRED TO:  No follow-up provider specified.   DISCHARGE MEDICATIONS:  New Prescriptions    No medications on file     Noemi Rosen MD  Attending Emergency Physician                   Gene Davey MD  04/23/19 7900

## 2019-04-26 ENCOUNTER — HOSPITAL ENCOUNTER (OUTPATIENT)
Dept: ULTRASOUND IMAGING | Age: 49
Discharge: HOME | End: 2019-04-28
Payer: COMMERCIAL

## 2019-04-26 DIAGNOSIS — R10.811 RIGHT UPPER QUADRANT ABDOMINAL TENDERNESS WITHOUT REBOUND TENDERNESS: ICD-10-CM

## 2019-04-26 DIAGNOSIS — N50.819 EPIDIDYMAL PAIN: ICD-10-CM

## 2019-04-26 PROCEDURE — 76705 ECHO EXAM OF ABDOMEN: CPT

## 2019-05-15 LAB
EKG ATRIAL RATE: 50 BPM
EKG P AXIS: 17 DEGREES
EKG P-R INTERVAL: 178 MS
EKG Q-T INTERVAL: 510 MS
EKG QRS DURATION: 98 MS
EKG QTC CALCULATION (BAZETT): 464 MS
EKG R AXIS: 14 DEGREES
EKG T AXIS: 25 DEGREES
EKG VENTRICULAR RATE: 50 BPM

## 2019-06-06 ENCOUNTER — HOSPITAL ENCOUNTER (OUTPATIENT)
Dept: WOMENS IMAGING | Age: 49
Discharge: HOME | End: 2019-06-08
Payer: COMMERCIAL

## 2019-06-06 DIAGNOSIS — C50.212 BILATERAL MALIGNANT NEOPLASM OF UPPER INNER QUADRANT OF BREAST IN FEMALE, UNSPECIFIED ESTROGEN RECEPTOR STATUS (HCC): ICD-10-CM

## 2019-06-06 DIAGNOSIS — D50.0 IRON DEFICIENCY ANEMIA DUE TO CHRONIC BLOOD LOSS: ICD-10-CM

## 2019-06-06 DIAGNOSIS — C50.211 BILATERAL MALIGNANT NEOPLASM OF UPPER INNER QUADRANT OF BREAST IN FEMALE, UNSPECIFIED ESTROGEN RECEPTOR STATUS (HCC): ICD-10-CM

## 2019-06-06 PROCEDURE — 77063 BREAST TOMOSYNTHESIS BI: CPT

## 2019-08-13 ENCOUNTER — OFFICE VISIT (OUTPATIENT)
Dept: ONCOLOGY | Age: 49
End: 2019-08-13
Payer: COMMERCIAL

## 2019-08-13 ENCOUNTER — TELEPHONE (OUTPATIENT)
Dept: ONCOLOGY | Age: 49
End: 2019-08-13

## 2019-08-13 VITALS
HEART RATE: 91 BPM | BODY MASS INDEX: 43.73 KG/M2 | RESPIRATION RATE: 18 BRPM | TEMPERATURE: 98.3 F | SYSTOLIC BLOOD PRESSURE: 125 MMHG | DIASTOLIC BLOOD PRESSURE: 86 MMHG | WEIGHT: 293 LBS

## 2019-08-13 DIAGNOSIS — C50.211 BILATERAL MALIGNANT NEOPLASM OF UPPER INNER QUADRANT OF BREAST IN FEMALE, UNSPECIFIED ESTROGEN RECEPTOR STATUS (HCC): Primary | ICD-10-CM

## 2019-08-13 DIAGNOSIS — C49.A3 GIST (GASTROINTESTINAL STROMAL TUMOR) OF SMALL BOWEL, MALIGNANT (HCC): ICD-10-CM

## 2019-08-13 DIAGNOSIS — C50.212 BILATERAL MALIGNANT NEOPLASM OF UPPER INNER QUADRANT OF BREAST IN FEMALE, UNSPECIFIED ESTROGEN RECEPTOR STATUS (HCC): Primary | ICD-10-CM

## 2019-08-13 PROCEDURE — 99214 OFFICE O/P EST MOD 30 MIN: CPT | Performed by: INTERNAL MEDICINE

## 2019-08-13 PROCEDURE — 99211 OFF/OP EST MAY X REQ PHY/QHP: CPT | Performed by: INTERNAL MEDICINE

## 2019-08-13 RX ORDER — LOSARTAN POTASSIUM AND HYDROCHLOROTHIAZIDE 12.5; 1 MG/1; MG/1
1 TABLET ORAL DAILY
COMMUNITY
Start: 2017-05-05 | End: 2020-05-19 | Stop reason: ALTCHOICE

## 2019-08-13 NOTE — PROGRESS NOTES
DIAGNOSIS:   1- T2, N0, M0 ER positive CA positive and HER-2/eve negative invasive ductal carcinoma of the left breast ( inner upper quadrant)  2- Repeated GI bleeding greetings 2 symptomatic anemia, despite extensive GI workup, source of bleeding was never found. 3- finally a small mass was seen in the jejunum. 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 Margins negative     CURRENT THERAPY:  1- Mastectomy and axillary sampling  2- adjuvant chemotherapy with Taxotere and Cytoxan starting 5/16/2013. Chemotherapy stopped after 3 cycles because of ongoing GI bleeding and symptomatic anemia  3- Conservative management for GI bleeding. Leading completely stopped after the discontinuation of chemotherapy  4- started tamoxifen after chemotherapy was completed. 8/2013. 5- transitioned to Arimidex 6/2014, completed 08/2018  6- GIST resection, resected 9/2017     BRIEF CASE HISTORY:   Ochoa Machado is a very pleasant 51 y.o. who felt a mass in the medial aspect of her left breast, around the 9:00 position. Patient sought medical attention and mammogram and ultrasound were done. Showing an irregular, high-density mass with indistinct margins containing pleomorphic calcifications in the lower inner quadrant of the left breast close to 9:00 location this mass measures are mammogram 2.7 x 2.6 x 2 cm. Subsequently targeted ultrasound was performed showing hypoechoic, irregular, taller than wider, solid mass at 9:00 with posterior acoustic shadowing. The calcifications were visible on ultrasound . On ultrasound the solid mass measured 2.4 x 2.6 x 1 cm. Image guided biopsy of the mass was done and showed invasive ductal carcinoma with surrounding DCIS (cribriform type) the tumor was ER and CA positive and HER-2/eve negative with a fish ratio 1.1. The patient was referred to us and she also was seen by radiation oncology , She  decided on proceeding with total mastectomy and breast reconstruction .  Allergy showed 2.5 cm 04/22/2019 2315    CO2 23 04/22/2019 2315    BUN 18 04/22/2019 2315    CREATININE 0.68 04/22/2019 2315        Component Value Date/Time    CALCIUM 9.3 04/22/2019 2315    ALKPHOS 71 04/22/2019 2315    AST 18 04/22/2019 2315    ALT 18 04/22/2019 2315    BILITOT <0.15 (L) 04/22/2019 2315        REVIEW OF RADIOLOGICAL RESULTS:  06/06/2019 CEFERINO SCREENING RIGHT IMPRESSION:   No evidence of malignancy right breast.  Patient is status post left   mastectomy.  Advise annual screening mammography.       BI-RADS 1       BIRADS:   BIRADS - CATEGORY 1       Negative, no evidence of malignancy in either breast.       OVERALL ASSESSMENT - NEGATIVE       03/15/2015 DEXA RESULTS:  LUMBAR TSCORE:-0.3  L HIP TSCORE:1.7  FEMORAL NECK TSCORE: 0.7        IMPRESSION:   1- Frances Nye  Has  T2, N0, M0 ER/[place] positive and HER-2/eve negative invasive ductal carcinoma of the left breast  2- Chemotherapy: received 3 cycles of TC, stopped due to GI bleeding  3- GI bleeding , related to the GIST tumor. Currently on oral iron and hemoglobin is improving  4- on anastrozole. We will continue, plan 5 years of therapy, completed 08/2018  5- for hot flashes, better on Effexor. 6- Recent pancreatitis, possibly biliary. MRI CT scan did not show any gallbladder stone. We will discuss with her surgeons. 7- recently identified distal small bowel mass measuring 3.6 cm, resection shows low risk GIST. No need for adjuvant therapy, margins were negative. PLAN:   1. Her lab work was reviewed and discussed, her HGB has normalized. 2. We reviewed her recent mammogram which was benign. 3. Her exam today was negative. 4. She is doing very well in remission. 5. We discussed continued follow up plan with one more follow up with continued remission.    6. I am ordering repeat lab work and mammogram to be done 06/2020.   7. Return in 1 year with mammogram.

## 2020-02-20 ENCOUNTER — HOSPITAL ENCOUNTER (OUTPATIENT)
Age: 50
Setting detail: SPECIMEN
Discharge: HOME | End: 2020-02-20
Payer: COMMERCIAL

## 2020-02-20 LAB
MAGNESIUM: 2.2 MG/DL (ref 1.6–2.6)
POTASSIUM SERPL-SCNC: 3.6 MMOL/L (ref 3.7–5.3)

## 2020-02-28 ENCOUNTER — HOSPITAL ENCOUNTER (OUTPATIENT)
Age: 50
Setting detail: SPECIMEN
Discharge: HOME | End: 2020-02-28
Payer: COMMERCIAL

## 2020-02-28 LAB — POTASSIUM SERPL-SCNC: 4 MMOL/L (ref 3.7–5.3)

## 2020-05-19 ENCOUNTER — OFFICE VISIT (OUTPATIENT)
Dept: ONCOLOGY | Age: 50
End: 2020-05-19
Payer: COMMERCIAL

## 2020-05-19 ENCOUNTER — TELEPHONE (OUTPATIENT)
Dept: ONCOLOGY | Age: 50
End: 2020-05-19

## 2020-05-19 VITALS
TEMPERATURE: 98 F | HEART RATE: 78 BPM | SYSTOLIC BLOOD PRESSURE: 130 MMHG | DIASTOLIC BLOOD PRESSURE: 85 MMHG | RESPIRATION RATE: 20 BRPM | WEIGHT: 293 LBS | BODY MASS INDEX: 44.49 KG/M2

## 2020-05-19 PROCEDURE — 99211 OFF/OP EST MAY X REQ PHY/QHP: CPT | Performed by: INTERNAL MEDICINE

## 2020-05-19 PROCEDURE — 99215 OFFICE O/P EST HI 40 MIN: CPT | Performed by: INTERNAL MEDICINE

## 2020-05-19 RX ORDER — LOSARTAN POTASSIUM 100 MG/1
100 TABLET ORAL DAILY
COMMUNITY

## 2020-05-19 RX ORDER — TRAMADOL HYDROCHLORIDE 50 MG/1
50 TABLET ORAL EVERY 6 HOURS PRN
COMMUNITY
End: 2020-06-02 | Stop reason: ALTCHOICE

## 2020-05-19 RX ORDER — DEXAMETHASONE 4 MG/1
12 TABLET ORAL
COMMUNITY
End: 2020-08-27 | Stop reason: ALTCHOICE

## 2020-05-19 RX ORDER — CYCLOBENZAPRINE HCL 10 MG
10 TABLET ORAL 3 TIMES DAILY PRN
COMMUNITY
End: 2020-08-27 | Stop reason: ALTCHOICE

## 2020-05-19 RX ORDER — DULOXETIN HYDROCHLORIDE 30 MG/1
30 CAPSULE, DELAYED RELEASE ORAL DAILY
COMMUNITY
End: 2021-03-11 | Stop reason: SDUPTHER

## 2020-05-19 RX ORDER — HYDROCHLOROTHIAZIDE 12.5 MG/1
12.5 TABLET ORAL DAILY
COMMUNITY

## 2020-05-19 RX ORDER — CELECOXIB 200 MG/1
200 CAPSULE ORAL DAILY
COMMUNITY
End: 2020-08-27 | Stop reason: ALTCHOICE

## 2020-05-19 RX ORDER — OMEPRAZOLE 20 MG/1
20 CAPSULE, DELAYED RELEASE ORAL DAILY
COMMUNITY

## 2020-05-19 NOTE — PROGRESS NOTES
radiation oncology , She  decided on proceeding with total mastectomy and breast reconstruction . Allergy showed 2.5 cm intermediate grade invasive ductal carcinoma, [place] lymph node was negative. The tumor was ER/[place] positive and HER-2/eve negative. We discussed options of adjuvant therapy including chemotherapy and hormone therapy, The patient decided to proceed with adjuvant chemotherapy without undergone a Oncotype study. TC chemotherapy X4 cycles is planned  After 3 cycles, and presented with persistent GI bleeding leading to symptomatic anemia and syncope, the patient's condition was Serious enough to warrant repeated hospital admissions. Repeated GI workup including endoscopic evaluation and Tagged Red cell scans were negative. We decided to treat her conservatively and chemotherapy was stopped after 3 cycles. After that All her symptoms improved and she was started on tamoxifen. After one year, she was transitioned to anastrozole since she had no periods and hormonal testing showed post menopausal state. 08/2017  She had been having abdominal pain and went to [facility] for consult. She had work up which showed mass in the colon showed subtle changes in the mid-small intestine, CT enterography showed 3.6CM mass in the distal small bowel. She was also hospitalized in the interim with pancreatitis. 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 Margins were negative. Observation was decided, no need for adjuvant therapy    INTERIM HISTORY: She comes in today for follow up for GIST tumor, breast cancer and to review mammogram. She is feeling discouraged by disease recurrence. She reports 11/2019 she felt pop in rib area accompanied by pain, she followed up with chiropractor with imaging and treatment.  Since that time she has had increasing back pain overall radiating to the chest and underwent physcial therapy and aqua therapy, her pain persisted. She had MRI with PCP last week that shows some abnormality. She has follow up with [facility] today. GYNECOLOGICAL HISTORY  Menarche at age 14. He is premenopausal with regular periods. +2. She used birth control minimally. PAST MEDICAL HISTORY: has a past medical history of Anemia, Anxiety, Atrial fibrillation ([facility] [address] Breast cancer ([facility] [address] Carpal tunnel syndrome, Depression, GI bleed, Hypertension, Lymphedema, Neurologic cardiac syncope, Obesity, Pain, Sleep apnea, ANA PAULA (stress urinary incontinence, female), Tachycardia, Varicella, and Varicella without complication. PAST SURGICAL HISTORY: has a past surgical history that includes Shoulder arthroscopy (); Colonoscopy (); Dilation and curettage of uterus; Knee arthroscopy; fracture surgery; Tunneled venous port placement (Right); Upper gastrointestinal endoscopy (2013); Colonoscopy (2013); Mastectomy (Left, 13); other surgical history (1969); Breast biopsy (Left, 3/21/13); ileoscopy (10/12/05); Cosmetic surgery; Hysterectomy; denia and bso (cervix removed) (14); Enterocele repair (14); Cystoscopy (14); bladder suspension (14); Colonoscopy (10/12/2005); Upper gastrointestinal endoscopy (2015); and tumor removal.     CURRENT MEDICATIONS:  has a current medication list which includes the following prescription(s): boswellia-glucosamine-vit d, duloxetine, cyclobenzaprine, tramadol, losartan, dexamethasone, omeprazole, celecoxib, hydrochlorothiazide, amlodipine, docusate, and metoprolol tartrate. ALLERGIES:  is allergic to adhesive tape. FAMILY HISTORY: Negative for any hematological or oncological conditions. Specifically no history of breast cancer in the family    SOCIAL HISTORY:  reports that she quit smoking about 2 years ago. Her smoking use included Cigarettes. She smoked About 20 years.  She has never used smokeless tobacco. She reports that she does not drink alcohol or use illicit drugs. REVIEW OF SYSTEMS:   General: No fever or night sweats. Weight is stable. Eyes: No double or blurred vision. Ears: No tinnitus or hearing problem,    Throat: No dysphagia or sore throat   Respiratory: No chest pain, shortness of breath, cough no hemoptysis. Cardiovascular: Denies chest pain, PND or orthopnea. No L E swelling or palpitations. Gastrointestinal: No nausea or vomiting, diarrhea or constipation, or abdominal pain  Genitourinary: Denies dysuria, hematuria, frequency, urgency or incontinence. No vaginal bleeding. Neurological: Denies headaches, decreased LOC, no sensory or motor focal deficits. Musculoskeletal: No arthralgia no back pain or joint swelling. +upper back pain radiating to chest  Skin: There are no rashes or bleeding. Psychiatric: No anxiety, no depression. Endocrine: No diabetes or thyroid disease. Hematologic: No bleeding, no adenopathy. PHYSICAL EXAM:  The patient is not in acute distress. Vital signs: Blood pressure 130/85, pulse 78, temperature 98 °F (36.7 °C), temperature source Oral, resp. rate 20, weight (!) 301 lb 4.8 oz (136.7 kg), last menstrual period 03/17/2014, not currently breastfeeding. HEENT:  Eyes are normal. Ears, nose is congested, no bleeding. Neck: Supple. No lymph node enlargement. No thyroid enlargement. Trachea is centrally located. Chest:  Clear to auscultation. No wheezes or crepitations. Breast:  Right: No masses, no adenopathy. No skin or nippple abnormalities. Left: left-sided mastectomy, surgery site is healed completely, no adenopathy  Heart: Regular sinus rhythm. Abdomen: Soft, nontender. No hepatosplenomegaly. No masses. Extremities:  With no edema. Lymph Nodes:  No cervical, axillary or inguinal lymph node enlargement. Neurologic: Conscious and oriented. No focal neurological deficits. Psychosocial: No depression, anxiety or stress.  Skin: No rashes, bruises or ecchymoses       REVIEW OF

## 2020-05-20 ENCOUNTER — TELEPHONE (OUTPATIENT)
Dept: ONCOLOGY | Age: 50
End: 2020-05-20

## 2020-06-02 ENCOUNTER — TELEPHONE (OUTPATIENT)
Dept: ONCOLOGY | Age: 50
End: 2020-06-02

## 2020-06-02 ENCOUNTER — OFFICE VISIT (OUTPATIENT)
Dept: ONCOLOGY | Age: 50
End: 2020-06-02
Payer: COMMERCIAL

## 2020-06-02 VITALS
RESPIRATION RATE: 18 BRPM | WEIGHT: 293 LBS | SYSTOLIC BLOOD PRESSURE: 121 MMHG | DIASTOLIC BLOOD PRESSURE: 83 MMHG | TEMPERATURE: 98.4 F | HEART RATE: 65 BPM | BODY MASS INDEX: 43.8 KG/M2

## 2020-06-02 PROCEDURE — 99215 OFFICE O/P EST HI 40 MIN: CPT | Performed by: INTERNAL MEDICINE

## 2020-06-02 PROCEDURE — 99211 OFF/OP EST MAY X REQ PHY/QHP: CPT | Performed by: INTERNAL MEDICINE

## 2020-06-02 RX ORDER — ANASTROZOLE 1 MG/1
1 TABLET ORAL DAILY
Qty: 30 TABLET | Refills: 6 | Status: SHIPPED | OUTPATIENT
Start: 2020-06-02 | End: 2020-12-16 | Stop reason: SDUPTHER

## 2020-06-02 NOTE — TELEPHONE ENCOUNTER
Dr Teofilo Herndon requested HER2 by [facility] results from [facility]. Writer called Irais Sapp, nurse care manager at [facility], at [phone] and left a detailed voicemail requesting above test results. Writer provided Irais Sapp with our office phone number to call back at her earliest convenience.  Wendi Reddy

## 2020-06-02 NOTE — PROGRESS NOTES
conditions. Specifically no history of breast cancer in the family    SOCIAL HISTORY:  reports that she quit smoking about 2 years ago. Her smoking use included Cigarettes. She smoked About 20 years. She has never used smokeless tobacco. She reports that she does not drink alcohol or use illicit drugs. REVIEW OF SYSTEMS:   General: No fever or night sweats. Weight is stable. Eyes: No double or blurred vision. Ears: No tinnitus or hearing problem,    Throat: No dysphagia or sore throat   Respiratory: No chest pain, shortness of breath, cough no hemoptysis. Cardiovascular: Denies chest pain, PND or orthopnea. No L E swelling or palpitations. Gastrointestinal: No nausea or vomiting, diarrhea or constipation, or abdominal pain  Genitourinary: Denies dysuria, hematuria, frequency, urgency or incontinence. No vaginal bleeding. Neurological: Denies headaches, decreased LOC, no sensory or motor focal deficits. Musculoskeletal: No arthralgia no back pain or joint swelling. +upper back pain radiating to chest - resolved; sternal, left hip +femur, and lower back pain  Skin: There are no rashes or bleeding. Psychiatric: No anxiety, no depression. Endocrine: No diabetes or thyroid disease. Hematologic: No bleeding, no adenopathy. PHYSICAL EXAM:  The patient is not in acute distress. Vital signs: Blood pressure 121/83, pulse 65, temperature 98.4 °F (36.9 °C), temperature source Oral, resp. rate 18, weight 296 lb 9.6 oz (134.5 kg), last menstrual period 03/17/2014, not currently breastfeeding. HEENT:  Eyes are normal. Ears, nose is congested, no bleeding. Neck: Supple. No lymph node enlargement. No thyroid enlargement. Trachea is centrally located. Chest:  Clear to auscultation. No wheezes or crepitations. Breast:  Right: No masses, no adenopathy. No skin or nippple abnormalities. Left: left-sided mastectomy, surgery site is healed completely, no adenopathy  Heart: Regular sinus rhythm.   Abdomen: hot flashes, better on Effexor. 6- Recent pancreatitis, possibly biliary. MRI CT scan did not show any gallbladder stone. We will discuss with her surgeons. 7-   distal small bowel mass measuring 3.6 cm, resection shows low risk GIST. No need for adjuvant therapy, margins were negative. 8-bone metastases with compression fracture diagnosed in May/2020. Status post kyphoplasty and biopsy. PLAN:   1. We had detailed review of her pathology showing weakly ER/OK+ status, HER2 is pending and I will follow up. 2. Her kyphoplasty was successful with no recurrent pain in that area, but her left femur, sternum and lower back pain persist and we will plan for radiation and I am putting in referral.   3. We reviewed her CT which was negative for malignancy in the lung and liver and will plan for staging PET. 4. I explained plan for Arimidex to be started with radiation and will continue to assess for recommendations once it has been completed. 5. I discussed the plan for palliative radiation and during her treatment, we will put together a plan for systemic therapy. It will almost completely depend on the HER-2 testing. Her tumor was very weakly positive for ER so possibly I could treat with antiestrogen plus CDK inhibitor. 6. We will refill pain medication as needed. 7. Return in 2 weeks to discuss further recommendations.

## 2020-06-05 ENCOUNTER — HOSPITAL ENCOUNTER (OUTPATIENT)
Dept: NUCLEAR MEDICINE | Age: 50
Discharge: HOME | End: 2020-06-07
Payer: COMMERCIAL

## 2020-06-05 VITALS — BODY MASS INDEX: 43.4 KG/M2 | HEIGHT: 69 IN | WEIGHT: 293 LBS

## 2020-06-05 LAB — GLUCOSE BLD-MCNC: 109 MG/DL (ref 65–105)

## 2020-06-05 PROCEDURE — 2580000003 HC RX 258: Performed by: INTERNAL MEDICINE

## 2020-06-05 PROCEDURE — 3430000000 HC RX DIAGNOSTIC RADIOPHARMACEUTICAL: Performed by: INTERNAL MEDICINE

## 2020-06-05 PROCEDURE — 82947 ASSAY GLUCOSE BLOOD QUANT: CPT

## 2020-06-05 PROCEDURE — 78815 PET IMAGE W/CT SKULL-THIGH: CPT

## 2020-06-05 PROCEDURE — A9552 F18 FDG: HCPCS | Performed by: INTERNAL MEDICINE

## 2020-06-05 RX ORDER — FLUDEOXYGLUCOSE F 18 200 MCI/ML
10 INJECTION, SOLUTION INTRAVENOUS
Status: COMPLETED | OUTPATIENT
Start: 2020-06-05 | End: 2020-06-05

## 2020-06-05 RX ORDER — SODIUM CHLORIDE 0.9 % (FLUSH) 0.9 %
10 SYRINGE (ML) INJECTION PRN
Status: DISCONTINUED | OUTPATIENT
Start: 2020-06-05 | End: 2020-06-08 | Stop reason: HOSPADM

## 2020-06-05 RX ADMIN — FLUDEOXYGLUCOSE F 18 8.5 MILLICURIE: 200 INJECTION, SOLUTION INTRAVENOUS at 14:28

## 2020-06-05 RX ADMIN — Medication 10 ML: at 14:28

## 2020-06-08 ENCOUNTER — HOSPITAL ENCOUNTER (OUTPATIENT)
Dept: RADIATION ONCOLOGY | Age: 50
Discharge: HOME | End: 2020-06-08
Payer: COMMERCIAL

## 2020-06-08 ENCOUNTER — TELEPHONE (OUTPATIENT)
Dept: ONCOLOGY | Age: 50
End: 2020-06-08

## 2020-06-08 VITALS
HEART RATE: 74 BPM | TEMPERATURE: 98.2 F | OXYGEN SATURATION: 94 % | DIASTOLIC BLOOD PRESSURE: 85 MMHG | SYSTOLIC BLOOD PRESSURE: 123 MMHG | RESPIRATION RATE: 16 BRPM

## 2020-06-08 PROCEDURE — 99213 OFFICE O/P EST LOW 20 MIN: CPT | Performed by: RADIOLOGY

## 2020-06-08 PROCEDURE — 99204 OFFICE O/P NEW MOD 45 MIN: CPT | Performed by: RADIOLOGY

## 2020-06-08 NOTE — TELEPHONE ENCOUNTER
Notified of metastatic breast cancer diagnosis. Will follow. Plan for xrt course with Dr. Barrington Michelle follow up with Dr. Magen Mcdaniel and referral to orthopedic surgeon. Pt on pending.

## 2020-06-08 NOTE — CONSULTS
Relationship status: Not on file    Intimate partner violence     Fear of current or ex partner: Not on file     Emotionally abused: Not on file     Physically abused: Not on file     Forced sexual activity: Not on file   Other Topics Concern    Not on file   Social History Narrative    Not on file       FAMILY HISTORY:  Family History   Problem Relation Age of Onset    High Blood Pressure Mother     High Blood Pressure Father     High Blood Pressure Brother     High Blood Pressure Brother        REVIEW OF SYSTEMS:    GENERAL/CONSTITUTIONAL: The patient denies fever, fatigue, weakness, weight gain or weight loss. HEENT: The patient denies pain, redness, loss of vision, double or blurred vision. The patient denies ringing in the ears, loss of hearing, hoarseness, trouble swallowing, or painful swallowing. CARDIOVASCULAR: The patient denies chest pain, irregular heartbeats, sudden changes in heartbeat or palpitation, shortness of breath. RESPIRATORY: The patient denies shortness of breath, cough, hemoptysis. GASTROINTESTINAL: The patient denies nausea, vomiting, diarrhea, constipation, blood in the stools. GENITOURINARY: The patient denies difficult urination, pain or burning with urination, blood in the urine. MUSCULOSKELETAL: (+) Sternum pain, back pain from the thoracic to sacral spine, left hip pain. The patient denies arm, buttock, thigh or calf cramps. No muscle pain. SKIN: The patient denies easy bruising, skin redness, skin rash, hives. NEUROLOGIC: The patient denies headache, dizziness, fainting, muscle spasm, loss of consciousness. ENDOCRINE: (+) Hot flashes. the patient denies intolerance to hot or cold temperature, flushing. HEMATOLOGIC/LYMPHATIC: The patient denies anemia, bleeding tendency or clotting tendency. ALLERGIC/IMMUNOLOGIC: The patient denies rhinitis, asthma, skin sensitivity. PYSCHOLOGIC: The patient denies any depression, anxiety, or confusion.     A full 14 point review

## 2020-06-09 ENCOUNTER — OFFICE VISIT (OUTPATIENT)
Dept: ORTHOPEDIC SURGERY | Age: 50
End: 2020-06-09
Payer: COMMERCIAL

## 2020-06-09 ENCOUNTER — HOSPITAL ENCOUNTER (OUTPATIENT)
Dept: RADIATION ONCOLOGY | Age: 50
Discharge: HOME | End: 2020-06-09
Attending: RADIOLOGY
Payer: COMMERCIAL

## 2020-06-09 ENCOUNTER — TELEPHONE (OUTPATIENT)
Dept: RADIATION ONCOLOGY | Age: 50
End: 2020-06-09

## 2020-06-09 PROCEDURE — 99203 OFFICE O/P NEW LOW 30 MIN: CPT | Performed by: ORTHOPAEDIC SURGERY

## 2020-06-09 PROCEDURE — 77263 THER RADIOLOGY TX PLNG CPLX: CPT | Performed by: RADIOLOGY

## 2020-06-09 PROCEDURE — 77334 RADIATION TREATMENT AID(S): CPT | Performed by: RADIOLOGY

## 2020-06-09 PROCEDURE — 77470 SPECIAL RADIATION TREATMENT: CPT | Performed by: RADIOLOGY

## 2020-06-09 PROCEDURE — 77290 THER RAD SIMULAJ FIELD CPLX: CPT | Performed by: RADIOLOGY

## 2020-06-09 NOTE — PROGRESS NOTES
Patient ID: Andreina Kapoor is a 47 y.o. female    Chief Compliant:  Chief Complaint   Patient presents with    Established New Doctor    Pain     left femur         History of Present Illness:  47 y.o. female presents for evaluation of left femur pain and low back pain. She notes that her left hip pain is located mostly at her groin region. 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 Patient with history of T5 kyphoplasty. Patient has metastatic breast cancer. She reports that 7 years ago she was first diagnosed with breast cancer than had a cancerous mass in her bowel. She was recently confirmed with metastatic breast cancer and plans to start radiation treatments. Patient pretty presents with predominately 2 regions of pain low back and left hip    Review of Systems   Constitutional: Negative for fever, chills, sweats, or weight loss. Eyes: Negative for changes in vision, pain. HENT: Negative for congestion, bleeding, or pain. Respiratory/Cardio: Negative for chest pain, SOB. Gastrointestinal:  Negative for abdominal pain, Nausea/vomiting/diarrhea. Genitourinary: Negative for any urinary symptoms. Neurological: Negative for paresthesia, asthenia. Integumentary: Negative for rash, wounds, itching, ecchymosis. Musculoskeletal: Positive for Established New Doctor and Pain (left femur )       Past History:    Current Outpatient Medications:     oxyCODONE HCl 5 MG TABA, Take 5 mg by mouth every 4 hours as needed. , Disp: , Rfl:     anastrozole (ARIMIDEX) 1 MG tablet, Take 1 tablet by mouth daily, Disp: 30 tablet, Rfl: 6    Boswellia-Glucosamine-Vit D (OSTEO BI-FLEX ONE PER DAY PO), Take 1 tablet by mouth daily, Disp: , Rfl:     DULoxetine (CYMBALTA) 30 MG extended release capsule, Take 30 mg by mouth daily, Disp: , Rfl:     cyclobenzaprine (FLEXERIL) 10 MG tablet, Take 10 mg by mouth 3 times daily as needed for Muscle spasms, Disp: , Rfl:     losartan (COZAAR) 100 MG tablet, Take 100 mg by mouth daily, Disp: , Rfl:     dexamethasone (DECADRON) 4 MG tablet, Take 12 mg by mouth daily (with breakfast), Disp: , Rfl:     omeprazole (PRILOSEC) 20 MG delayed release capsule, Take 20 mg by mouth daily, Disp: , Rfl:     celecoxib (CELEBREX) 200 MG capsule, Take 200 mg by mouth daily PRN, Disp: , Rfl:     hydrochlorothiazide (HYDRODIURIL) 12.5 MG tablet, Take 12.5 mg by mouth daily, Disp: , Rfl:     amLODIPine (NORVASC) 5 MG tablet, Take 10 mg by mouth daily , Disp: , Rfl:     docusate sodium (COLACE, DULCOLAX) 100 MG CAPS, Take 100 mg by mouth 2 times daily, Disp: 30 capsule, Rfl: 0    metoprolol tartrate (LOPRESSOR) 25 MG tablet, TAKE ONE TABLET BY MOUTH TWICE A DAY, Disp: 60 tablet, Rfl: 4  Allergies   Allergen Reactions    Adhesive Tape Other (See Comments)     Skin breakdown     Social History     Socioeconomic History    Marital status: Single     Spouse name: Not on file    Number of children: Not on file    Years of education: Not on file    Highest education level: Not on file   Occupational History    Not on file   Social Needs    Financial resource strain: Not on file    Food insecurity     Worry: Not on file     Inability: Not on file    Transportation needs     Medical: Not on file     Non-medical: Not on file   Tobacco Use    Smoking status: Former Smoker     Types: Cigarettes     Last attempt to quit: 2010     Years since quittin.5    Smokeless tobacco: Never Used   Substance and Sexual Activity    Alcohol use: No     Alcohol/week: 0.0 standard drinks    Drug use: No    Sexual activity: Yes     Partners: Male     Birth control/protection: Surgical     Comment: HOANG BYRD   Lifestyle    Physical activity     Days per week: Not on file     Minutes per session: Not on file    Stress: Not on file   Relationships    Social connections     Talks on phone: Not on file     Gets together: Not on file     Attends Religion service: Not on file     Active member of club or organization: Not on file     Attends meetings of clubs or organizations: Not on file     Relationship status: Not on file    Intimate partner violence     Fear of current or ex partner: Not on file     Emotionally abused: Not on file     Physically abused: Not on file     Forced sexual activity: Not on file   Other Topics Concern    Not on file   Social History Narrative    Not on file     Past Medical History:   Diagnosis Date    Anemia     Anxiety     Atrial fibrillation ([address] 3 28 12    [facility]     lt./chemo 6/2013    Carpal tunnel syndrome     Depression     post partum    GI bleed     Hypertension     Lymphedema     Neurologic cardiac syncope     Obesity     Pain     pelvic    Sleep apnea     ANA PAULA (stress urinary incontinence, female) 3/4/2014    Tachycardia     Varicella     Age 4, severe    Varicella without complication 0/8/9162     Past Surgical History:   Procedure Laterality Date    BLADDER SUSPENSION  4/23/14    midurethral    BREAST BIOPSY Left 3/21/13    COLONOSCOPY  2005    normal    COLONOSCOPY  7/6/2013    normal, fair prep    COLONOSCOPY  10/12/2005    normal    COSMETIC SURGERY      facial due to accident    CYSTOSCOPY  4/23/14    DILATION AND CURETTAGE OF UTERUS      ENTEROCELE REPAIR  4/23/14    FRACTURE SURGERY      facial due to accident    HYSTERECTOMY      ILEOSCOPY  10/12/05    KNEE ARTHROSCOPY      both knees    MASTECTOMY Left 4/8/13    with Left SLN biopsy    OTHER SURGICAL HISTORY  1969    mild inflammation in duodenum, ?diverticulum in probable 3rd part of duodenum, several areas in small bowel multiple erosions    SHOULDER ARTHROSCOPY  2008    left    HOANG AND BSO  4/23/14    TUMOR REMOVAL      in bowel    TUNNELED VENOUS PORT PLACEMENT Right     infusa port, power port    UPPER GASTROINTESTINAL ENDOSCOPY  7/6/2013    normal    UPPER GASTROINTESTINAL ENDOSCOPY  04/23/2015    MILD DISTAL ESOPHAGITIS

## 2020-06-09 NOTE — TELEPHONE ENCOUNTER
Writer attempted to contact nurse at [facility] for an update on pt status, unable to talk at this time. Will attempt at a later time.

## 2020-06-10 ENCOUNTER — HOSPITAL ENCOUNTER (OUTPATIENT)
Dept: RADIATION ONCOLOGY | Age: 50
Discharge: HOME | End: 2020-06-10
Attending: RADIOLOGY
Payer: COMMERCIAL

## 2020-06-10 PROCEDURE — 77334 RADIATION TREATMENT AID(S): CPT | Performed by: RADIOLOGY

## 2020-06-10 PROCEDURE — 77307 TELETHX ISODOSE PLAN CPLX: CPT | Performed by: RADIOLOGY

## 2020-06-12 ENCOUNTER — FOLLOWUP TELEPHONE ENCOUNTER (OUTPATIENT)
Dept: NUTRITION | Age: 50
End: 2020-06-12

## 2020-06-15 ENCOUNTER — TELEPHONE (OUTPATIENT)
Dept: ONCOLOGY | Age: 50
End: 2020-06-15

## 2020-06-15 ENCOUNTER — TELEPHONE (OUTPATIENT)
Dept: RADIATION ONCOLOGY | Age: 50
End: 2020-06-15

## 2020-06-15 NOTE — TELEPHONE ENCOUNTER
Pt was called to see when/where her surgery is scheduled. Per Dr Krista Eisenberg pt to start radiation treatments post surgery. No answer, VM left. Pt later returned call and reported that she had surgery at [address] [place] [address] but is still admitted due to \" blood clots in both legs\" She will call RO office once she is discharged to begin her RT treatments.

## 2020-06-16 ENCOUNTER — FOLLOWUP TELEPHONE ENCOUNTER (OUTPATIENT)
Dept: NUTRITION | Age: 50
End: 2020-06-16

## 2020-06-16 ENCOUNTER — TELEPHONE (OUTPATIENT)
Dept: ONCOLOGY | Age: 50
End: 2020-06-16

## 2020-06-17 ENCOUNTER — TELEPHONE (OUTPATIENT)
Dept: ONCOLOGY | Age: 50
End: 2020-06-17

## 2020-06-17 NOTE — TELEPHONE ENCOUNTER
Called and spoke with Levi Weston. Reminded her I am the nurse navigator at [facility] ([facility] in [place]. I work with the radiation oncologist at [address] [place] [address] at [facility] holly Spivey. Levi Weston let me know that last Thursday she had kyphoplasty on L5, she also had a charisma an pins placed in her left femur/hip to stabilize it. She relates the plan was to come home on Saturday from [facility]. On Friday she noticed some numbness and tingling in her leg and toes. She was found to have a large clot in her thigh and in her calf. She was started on heparin. She is now on coumadin. And needs two days of INR at 2.0 before they will discharge. She asked how she would get set up with a coumadin clinic locally. I let her know that we have a coumadin clinic in the [facility] where she is coming for radiation therapy. I gave her the phone number to give to her nurse to get fax number to fax order to. Encouraged her to notify me tomorrow if she is discharged. If Friday or later I asked her to call the radiation oncology department and I gave her the phone number. I let her know that I will be out of town next week. I let her know that I will update Dr. Jovita Guerra and rad onc nurse of her status.

## 2020-06-23 ENCOUNTER — APPOINTMENT (OUTPATIENT)
Dept: RADIATION ONCOLOGY | Age: 50
End: 2020-06-23
Attending: RADIOLOGY
Payer: COMMERCIAL

## 2020-06-23 ENCOUNTER — HOSPITAL ENCOUNTER (OUTPATIENT)
Dept: RADIATION ONCOLOGY | Age: 50
Discharge: HOME | End: 2020-06-23
Attending: RADIOLOGY
Payer: COMMERCIAL

## 2020-06-23 PROCEDURE — 77280 THER RAD SIMULAJ FIELD SMPL: CPT | Performed by: RADIOLOGY

## 2020-06-23 PROCEDURE — 77412 RADIATION TX DELIVERY LVL 3: CPT | Performed by: RADIOLOGY

## 2020-06-24 ENCOUNTER — HOSPITAL ENCOUNTER (OUTPATIENT)
Dept: RADIATION ONCOLOGY | Age: 50
Discharge: HOME | End: 2020-06-24
Payer: COMMERCIAL

## 2020-06-24 ENCOUNTER — HOSPITAL ENCOUNTER (OUTPATIENT)
Dept: RADIATION ONCOLOGY | Age: 50
Discharge: HOME | End: 2020-06-24
Attending: RADIOLOGY
Payer: COMMERCIAL

## 2020-06-24 VITALS
DIASTOLIC BLOOD PRESSURE: 79 MMHG | BODY MASS INDEX: 43.56 KG/M2 | OXYGEN SATURATION: 99 % | TEMPERATURE: 97 F | HEART RATE: 99 BPM | WEIGHT: 293 LBS | RESPIRATION RATE: 14 BRPM | SYSTOLIC BLOOD PRESSURE: 112 MMHG

## 2020-06-24 PROCEDURE — 77387 GUIDANCE FOR RADJ TX DLVR: CPT | Performed by: RADIOLOGY

## 2020-06-24 PROCEDURE — G6002 STEREOSCOPIC X-RAY GUIDANCE: HCPCS | Performed by: RADIOLOGY

## 2020-06-24 PROCEDURE — 77412 RADIATION TX DELIVERY LVL 3: CPT | Performed by: RADIOLOGY

## 2020-06-24 ASSESSMENT — PAIN SCALES - GENERAL: PAINLEVEL_OUTOF10: 4

## 2020-06-25 ENCOUNTER — HOSPITAL ENCOUNTER (OUTPATIENT)
Dept: RADIATION ONCOLOGY | Age: 50
Discharge: HOME | End: 2020-06-25
Attending: RADIOLOGY
Payer: COMMERCIAL

## 2020-06-25 PROCEDURE — 77387 GUIDANCE FOR RADJ TX DLVR: CPT | Performed by: RADIOLOGY

## 2020-06-25 PROCEDURE — G6002 STEREOSCOPIC X-RAY GUIDANCE: HCPCS | Performed by: RADIOLOGY

## 2020-06-25 PROCEDURE — 77336 RADIATION PHYSICS CONSULT: CPT | Performed by: RADIOLOGY

## 2020-06-25 PROCEDURE — 77412 RADIATION TX DELIVERY LVL 3: CPT | Performed by: RADIOLOGY

## 2020-06-25 NOTE — PROGRESS NOTES
[facility]            Radiation Oncology          [facility]          [address]        Lawence Boast: [phone]        F: [phone]       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 Overall she is doing well since being discharged from the hospital.  She does continue to have pain as well as significant fatigue as she started physical therapy for her hip yesterday. She does note some discomfort in her sternum and has not yet experienced any pain relief though is taking her pain medication to help. She denies any symptoms of chest pain, esophagitis, coughing, shortness of breath, or skin irritation.     OBJECTIVE:   CHAPERONE: Not Required    ECO Symptomatic, <50% in bed during the day    VITAL SIGNS: /79   Pulse 99   Temp 97 °F (36.1 °C)   Resp 14   Wt 295 lb (133.8 kg)   LMP 2014   SpO2 99%   BMI 43.56 kg/m²   Wt Readings from Last 5 Encounters:   20 295 lb (133.8 kg)   20 296 lb (134.3 kg)   20 296 lb 9.6 oz (134.5 kg)   20 (!) 301 lb 4.8 oz (136.7 kg)   19 296 lb 1.6 oz (134.3 kg)     GENERAL:  General appearance is that of a well-nourished, well-developed in no apparent distress. SKIN: No erythema or desquamation. LABS:  WBC   Date Value Ref Range Status   04/22/2019 7.7 3.5 - 11.0 k/uL Final     Segs Absolute   Date Value Ref Range Status   04/22/2019 4.50 1.3 - 9.1 k/uL Final     Hemoglobin   Date Value Ref Range Status   04/22/2019 13.8 12.0 - 16.0 g/dL Final     Platelet Count   Date Value Ref Range Status   04/26/2012 181 140 - 450 k/uL Final     Platelets   Date Value Ref Range Status   04/22/2019 183 150 - 450 k/uL Final     No results found for: , CEA  No results found for: PSA    MEDICATIONS:    Current Outpatient Medications:     oxyCODONE HCl 5 MG TABA, Take 5 mg by mouth every 4 hours as needed. , Disp: , Rfl:     anastrozole (ARIMIDEX) 1 MG tablet, Take 1 tablet by mouth daily, Disp: 30 tablet, Rfl: 6    Boswellia-Glucosamine-Vit D (OSTEO BI-FLEX ONE PER DAY PO), Take 1 tablet by mouth daily, Disp: , Rfl:     DULoxetine (CYMBALTA) 30 MG extended release capsule, Take 30 mg by mouth daily, Disp: , Rfl:     cyclobenzaprine (FLEXERIL) 10 MG tablet, Take 10 mg by mouth 3 times daily as needed for Muscle spasms, Disp: , Rfl:     losartan (COZAAR) 100 MG tablet, Take 100 mg by mouth daily, Disp: , Rfl:     dexamethasone (DECADRON) 4 MG tablet, Take 12 mg by mouth daily (with breakfast), Disp: , Rfl:     omeprazole (PRILOSEC) 20 MG delayed release capsule, Take 20 mg by mouth daily, Disp: , Rfl:     celecoxib (CELEBREX) 200 MG capsule, Take 200 mg by mouth daily PRN, Disp: , Rfl:     hydrochlorothiazide (HYDRODIURIL) 12.5 MG tablet, Take 12.5 mg by mouth daily, Disp: , Rfl:     amLODIPine (NORVASC) 5 MG tablet, Take 10 mg by mouth daily , Disp: , Rfl:     docusate sodium (COLACE, DULCOLAX) 100 MG CAPS, Take 100 mg by mouth 2 times daily, Disp: 30 capsule, Rfl: 0    metoprolol tartrate (LOPRESSOR) 25 MG tablet, TAKE ONE TABLET BY MOUTH TWICE A DAY, Disp: 60 tablet, Rfl: 4      ASSESSMENT PLAN:   Treatment setup and plan

## 2020-06-26 ENCOUNTER — HOSPITAL ENCOUNTER (OUTPATIENT)
Dept: RADIATION ONCOLOGY | Age: 50
Discharge: HOME | End: 2020-06-26
Attending: RADIOLOGY
Payer: COMMERCIAL

## 2020-06-26 PROCEDURE — 77412 RADIATION TX DELIVERY LVL 3: CPT | Performed by: RADIOLOGY

## 2020-06-26 PROCEDURE — 77387 GUIDANCE FOR RADJ TX DLVR: CPT | Performed by: RADIOLOGY

## 2020-06-26 PROCEDURE — G6002 STEREOSCOPIC X-RAY GUIDANCE: HCPCS | Performed by: RADIOLOGY

## 2020-06-29 ENCOUNTER — HOSPITAL ENCOUNTER (OUTPATIENT)
Dept: RADIATION ONCOLOGY | Age: 50
Discharge: HOME | End: 2020-06-29
Attending: RADIOLOGY
Payer: COMMERCIAL

## 2020-06-29 ENCOUNTER — TELEPHONE (OUTPATIENT)
Dept: ONCOLOGY | Age: 50
End: 2020-06-29

## 2020-06-29 PROCEDURE — 77412 RADIATION TX DELIVERY LVL 3: CPT | Performed by: RADIOLOGY

## 2020-06-29 PROCEDURE — 77387 GUIDANCE FOR RADJ TX DLVR: CPT | Performed by: RADIOLOGY

## 2020-06-29 PROCEDURE — 77427 RADIATION TX MANAGEMENT X5: CPT | Performed by: RADIOLOGY

## 2020-06-29 PROCEDURE — G6002 STEREOSCOPIC X-RAY GUIDANCE: HCPCS | Performed by: RADIOLOGY

## 2020-06-29 NOTE — TELEPHONE ENCOUNTER
Called and spoke with Keith Baptiste today. She relates she is doing well. Radiation treatments are going well. She will have another sim so that they can treat another area. She relates her inr is good. Keith Baptiste denies any needs at present. She relates she has a home health nurse. Encouraged to call as needed. Pt on pending.

## 2020-06-30 ENCOUNTER — HOSPITAL ENCOUNTER (OUTPATIENT)
Dept: RADIATION ONCOLOGY | Age: 50
Discharge: HOME | End: 2020-06-30
Attending: RADIOLOGY
Payer: COMMERCIAL

## 2020-06-30 PROCEDURE — 77412 RADIATION TX DELIVERY LVL 3: CPT | Performed by: RADIOLOGY

## 2020-06-30 PROCEDURE — 77334 RADIATION TREATMENT AID(S): CPT | Performed by: RADIOLOGY

## 2020-06-30 PROCEDURE — 77470 SPECIAL RADIATION TREATMENT: CPT | Performed by: RADIOLOGY

## 2020-06-30 PROCEDURE — 77417 THER RADIOLOGY PORT IMAGE(S): CPT | Performed by: RADIOLOGY

## 2020-06-30 PROCEDURE — 77290 THER RAD SIMULAJ FIELD CPLX: CPT | Performed by: RADIOLOGY

## 2020-06-30 PROCEDURE — 77263 THER RADIOLOGY TX PLNG CPLX: CPT | Performed by: RADIOLOGY

## 2020-07-01 ENCOUNTER — HOSPITAL ENCOUNTER (OUTPATIENT)
Dept: RADIATION ONCOLOGY | Age: 50
Discharge: HOME | End: 2020-07-01
Payer: COMMERCIAL

## 2020-07-01 ENCOUNTER — HOSPITAL ENCOUNTER (OUTPATIENT)
Dept: RADIATION ONCOLOGY | Age: 50
Discharge: HOME | End: 2020-07-01
Attending: RADIOLOGY
Payer: COMMERCIAL

## 2020-07-01 PROCEDURE — 77412 RADIATION TX DELIVERY LVL 3: CPT | Performed by: RADIOLOGY

## 2020-07-01 PROCEDURE — G6002 STEREOSCOPIC X-RAY GUIDANCE: HCPCS | Performed by: RADIOLOGY

## 2020-07-01 PROCEDURE — 77387 GUIDANCE FOR RADJ TX DLVR: CPT | Performed by: RADIOLOGY

## 2020-07-01 NOTE — PROGRESS NOTES
[facility]            Radiation Oncology          [address] [facility]          [address]        Aleisha Passer: [phone]        F: [phone]       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 Overall she is doing well noting some relief in the pain in her sternum. She continues to have lower back pain which causes her difficulty with sleeping. Patient did undergo CT simulation for treatment planning for treatment to her lower back her left ribs and her left femur yesterday. She does also have constipation for the past 6 days. Patient notes some easy bruising on her foot likely related to her Coumadin. She denies any symptoms of chest pain, esophagitis, coughing, shortness of breath, or any skin irritation.     OBJECTIVE:   CHAPERONE: Not Required    ECO Symptomatic, <50% in bed during the day    VITAL SIGNS: LMP 2014   Wt Readings from Last 5 Encounters:   20 295 lb (133.8 kg)   20 296 lb (134.3 kg)   20 296 lb 9.6 oz (134.5 kg)   20 (!) 301 lb 4.8 oz (136.7 kg)   19 296 lb 1.6 oz (134.3 kg)     GENERAL:  General appearance is that of a well-nourished, well-developed in no apparent distress. SKIN: No erythema or desquamation. LABS:  WBC   Date Value Ref Range Status   04/22/2019 7.7 3.5 - 11.0 k/uL Final     Segs Absolute   Date Value Ref Range Status   04/22/2019 4.50 1.3 - 9.1 k/uL Final     Hemoglobin   Date Value Ref Range Status   04/22/2019 13.8 12.0 - 16.0 g/dL Final     Platelet Count   Date Value Ref Range Status   04/26/2012 181 140 - 450 k/uL Final     Platelets   Date Value Ref Range Status   04/22/2019 183 150 - 450 k/uL Final       MEDICATIONS:    Current Outpatient Medications:     anastrozole (ARIMIDEX) 1 MG tablet, Take 1 tablet by mouth daily, Disp: 30 tablet, Rfl: 6    Boswellia-Glucosamine-Vit D (OSTEO BI-FLEX ONE PER DAY PO), Take 1 tablet by mouth daily, Disp: , Rfl:     DULoxetine (CYMBALTA) 30 MG extended release capsule, Take 30 mg by mouth daily, Disp: , Rfl:     cyclobenzaprine (FLEXERIL) 10 MG tablet, Take 10 mg by mouth 3 times daily as needed for Muscle spasms, Disp: , Rfl:     losartan (COZAAR) 100 MG tablet, Take 100 mg by mouth daily, Disp: , Rfl:     dexamethasone (DECADRON) 4 MG tablet, Take 12 mg by mouth daily (with breakfast), Disp: , Rfl:     omeprazole (PRILOSEC) 20 MG delayed release capsule, Take 20 mg by mouth daily, Disp: , Rfl:     celecoxib (CELEBREX) 200 MG capsule, Take 200 mg by mouth daily PRN, Disp: , Rfl:     hydrochlorothiazide (HYDRODIURIL) 12.5 MG tablet, Take 12.5 mg by mouth daily, Disp: , Rfl:     amLODIPine (NORVASC) 5 MG tablet, Take 10 mg by mouth daily , Disp: , Rfl:     docusate sodium (COLACE, DULCOLAX) 100 MG CAPS, Take 100 mg by mouth 2 times daily, Disp: 30 capsule, Rfl: 0    metoprolol tartrate (LOPRESSOR) 25 MG tablet, TAKE ONE TABLET BY MOUTH TWICE A DAY, Disp: 60 tablet, Rfl: 4      ASSESSMENT PLAN:   Treatment setup and plan reviewed. Port images/CBCT images reviewed. Appropriate laboratory work was reviewed.  Treatment side effects and toxicities reviewed with the patient, and appropriate management was advised. Will continue radiation treatment as planned, and recommend patient contact us if they have any questions or concerns. Recommend skin care to help avoid irritation. Encourage patient to use stool softener to help with her constipation, and recommend she take it regularly due to her pain medication. Electronically signed by Candy Vance MD on 7/1/2020 at 11:59 AM      Drugs Prescribed:  New Prescriptions    No medications on file       Other Orders Placed:  No orders of the defined types were placed in this encounter.

## 2020-07-02 ENCOUNTER — HOSPITAL ENCOUNTER (OUTPATIENT)
Dept: RADIATION ONCOLOGY | Age: 50
Discharge: HOME | End: 2020-07-02
Attending: RADIOLOGY
Payer: COMMERCIAL

## 2020-07-02 ENCOUNTER — OFFICE VISIT (OUTPATIENT)
Dept: ONCOLOGY | Age: 50
End: 2020-07-02
Payer: COMMERCIAL

## 2020-07-02 ENCOUNTER — TELEPHONE (OUTPATIENT)
Dept: ONCOLOGY | Age: 50
End: 2020-07-02

## 2020-07-02 VITALS
HEART RATE: 97 BPM | WEIGHT: 290.6 LBS | TEMPERATURE: 98.7 F | BODY MASS INDEX: 42.91 KG/M2 | DIASTOLIC BLOOD PRESSURE: 72 MMHG | SYSTOLIC BLOOD PRESSURE: 109 MMHG

## 2020-07-02 PROBLEM — Z17.0 MALIGNANT NEOPLASM OF UPPER-OUTER QUADRANT OF LEFT BREAST IN FEMALE, ESTROGEN RECEPTOR POSITIVE (HCC): Status: ACTIVE | Noted: 2020-07-02

## 2020-07-02 PROBLEM — C49.A3 GIST (GASTROINTESTINAL STROMAL TUMOR) OF SMALL BOWEL, MALIGNANT (HCC): Status: ACTIVE | Noted: 2020-07-02

## 2020-07-02 PROBLEM — C50.412 MALIGNANT NEOPLASM OF UPPER-OUTER QUADRANT OF LEFT BREAST IN FEMALE, ESTROGEN RECEPTOR POSITIVE (HCC): Status: ACTIVE | Noted: 2020-07-02

## 2020-07-02 PROBLEM — C79.51 BONE METASTASIS (HCC): Status: ACTIVE | Noted: 2020-07-02

## 2020-07-02 PROCEDURE — 77412 RADIATION TX DELIVERY LVL 3: CPT | Performed by: RADIOLOGY

## 2020-07-02 PROCEDURE — 77307 TELETHX ISODOSE PLAN CPLX: CPT | Performed by: RADIOLOGY

## 2020-07-02 PROCEDURE — 77336 RADIATION PHYSICS CONSULT: CPT | Performed by: RADIOLOGY

## 2020-07-02 PROCEDURE — 99211 OFF/OP EST MAY X REQ PHY/QHP: CPT | Performed by: INTERNAL MEDICINE

## 2020-07-02 PROCEDURE — 77334 RADIATION TREATMENT AID(S): CPT | Performed by: RADIOLOGY

## 2020-07-02 PROCEDURE — 99214 OFFICE O/P EST MOD 30 MIN: CPT | Performed by: INTERNAL MEDICINE

## 2020-07-02 PROCEDURE — G6002 STEREOSCOPIC X-RAY GUIDANCE: HCPCS | Performed by: RADIOLOGY

## 2020-07-02 PROCEDURE — 77387 GUIDANCE FOR RADJ TX DLVR: CPT | Performed by: RADIOLOGY

## 2020-07-02 RX ORDER — FENTANYL 12 UG/H
1 PATCH TRANSDERMAL
COMMUNITY
End: 2020-08-27 | Stop reason: ALTCHOICE

## 2020-07-02 RX ORDER — ONDANSETRON 4 MG/1
TABLET, ORALLY DISINTEGRATING ORAL
COMMUNITY
Start: 2020-06-18 | End: 2020-07-30 | Stop reason: SDUPTHER

## 2020-07-02 RX ORDER — DIAZEPAM 2 MG/1
TABLET ORAL
COMMUNITY
Start: 2020-05-06 | End: 2021-01-01 | Stop reason: ALTCHOICE

## 2020-07-02 RX ORDER — PALBOCICLIB 125 MG/1
CAPSULE ORAL
Qty: 21 CAPSULE | Refills: 3 | Status: SHIPPED | OUTPATIENT
Start: 2020-07-02 | End: 2020-07-02 | Stop reason: SDUPTHER

## 2020-07-02 RX ORDER — PALBOCICLIB 125 MG/1
125 TABLET, FILM COATED ORAL DAILY
Qty: 21 TABLET | Refills: 3 | Status: SHIPPED | OUTPATIENT
Start: 2020-07-02 | End: 2020-07-06 | Stop reason: SDUPTHER

## 2020-07-02 RX ORDER — OXYCODONE HYDROCHLORIDE 5 MG/1
5 CAPSULE ORAL EVERY 4 HOURS PRN
COMMUNITY
End: 2020-08-27

## 2020-07-02 RX ORDER — PALBOCICLIB 125 MG/1
CAPSULE ORAL
Qty: 21 CAPSULE | Refills: 3 | Status: SHIPPED
Start: 2020-07-02 | End: 2020-07-06 | Stop reason: CLARIF

## 2020-07-02 RX ORDER — WARFARIN SODIUM 5 MG/1
5 TABLET ORAL
COMMUNITY
End: 2021-01-01

## 2020-07-02 RX ORDER — SENNA PLUS 8.6 MG/1
17.2 TABLET ORAL 2 TIMES DAILY
COMMUNITY
Start: 2020-06-18 | End: 2020-07-18

## 2020-07-02 NOTE — TELEPHONE ENCOUNTER
Per avs, rv in 4 weeks/appt scheduled for 7/30/2020 @ 0915. See orders for Krissy Tinoco (send for pharmacy) , plan to start after I see her/script given to triage. See orders for Xgeva to start in 4-6 weeks depending on xrt/Adry will contact pt to schedule once back from precert. Need cbc, cmp CA 27-29 upon RV/lab slips given to pt, pt aware and understood.

## 2020-07-02 NOTE — TELEPHONE ENCOUNTER
[facility] escbribed to [facility]. Faxed demographics and last progress note to [facility] with confirmation.

## 2020-07-02 NOTE — PROGRESS NOTES
DIAGNOSIS:   1- T2, N0, M0 ER positive MI positive and HER-2/eve negative invasive ductal carcinoma of the left breast ( inner upper quadrant)  2- Repeated GI bleeding greetings 2 symptomatic anemia, despite extensive GI workup, source of bleeding was never found. 3- finally a small mass was seen in the jejunum. 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 Margins negative   4- compression fractures and bone lesions. Likely metastatic cancer (5/2020)     CURRENT THERAPY:  1- Mastectomy and axillary sampling  2- adjuvant chemotherapy with Taxotere and Cytoxan starting 5/16/2013. Chemotherapy stopped after 3 cycles because of ongoing GI bleeding and symptomatic anemia  3- Conservative management for GI bleeding. Leading completely stopped after the discontinuation of chemotherapy  4- started tamoxifen after chemotherapy was completed. 8/2013. 5- transitioned to Arimidex 6/2014, completed 08/2018  6- GIST resection, resected 9/2017   7-status post kyphoplasty and biopsy of bone metastases 5/2020. The tumor was minimally ER positive at 5% and MI negative. 8- rodding of the left femur completed. Plan for  Radiation  9- Systemic therapy, plan Arimidex plus Ibrance  10- Plan Xgeva monthly after XRT completed     BRIEF CASE HISTORY:   Zack Shrestha is a very pleasant 47 y.o. who felt a mass in the medial aspect of her left breast, around the 9:00 position. Patient sought medical attention and mammogram and ultrasound were done. Showing an irregular, high-density mass with indistinct margins containing pleomorphic calcifications in the lower inner quadrant of the left breast close to 9:00 location this mass measures are mammogram 2.7 x 2.6 x 2 cm. Subsequently targeted ultrasound was performed showing hypoechoic, irregular, taller than wider, solid mass at 9:00 with posterior acoustic shadowing. The calcifications were visible on ultrasound . On ultrasound the solid mass measured 2.4 x 2.6 x 1 cm.   Image guided biopsy of the mass was done and showed invasive ductal carcinoma with surrounding DCIS (cribriform type) the tumor was ER and LA positive and HER-2/eve negative with a fish ratio 1.1. The patient was referred to us and she also was seen by radiation oncology , She  decided on proceeding with total mastectomy and breast reconstruction . Allergy showed 2.5 cm intermediate grade invasive ductal carcinoma, [place] lymph node was negative. The tumor was ER/LA positive and HER-2/eve negative. We discussed options of adjuvant therapy including chemotherapy and hormone therapy, The patient decided to proceed with adjuvant chemotherapy without undergone a Oncotype study. TC chemotherapy X4 cycles is planned  After 3 cycles, and presented with persistent GI bleeding leading to symptomatic anemia and syncope, the patient's condition was Serious enough to warrant repeated hospital admissions. Repeated GI workup including endoscopic evaluation and Tagged Red cell scans were negative. We decided to treat her conservatively and chemotherapy was stopped after 3 cycles. After that All her symptoms improved and she was started on tamoxifen. After one year, she was transitioned to anastrozole since she had no periods and hormonal testing showed post menopausal state. 08/2017  She had been having abdominal pain and went to [facility] for consult. She had work up which showed mass in the colon showed subtle changes in the mid-small intestine, CT enterography showed 3.6CM mass in the distal small bowel. She was also hospitalized in the interim with pancreatitis. 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 Margins were negative. Observation was decided, no need for adjuvant therapy  In May/2020, the patient presented with severe back pain. MRI showed multiple bone lesions with evidence of compression fracture.  Patient underwent successful in acute distress. Vital signs: Blood pressure 109/72, pulse 97, temperature 98.7 °F (37.1 °C), temperature source Oral, weight 290 lb 9.6 oz (131.8 kg), last menstrual period 03/17/2014, not currently breastfeeding. HEENT:  Eyes are normal. Ears, nose is congested, no bleeding. Neck: Supple. No lymph node enlargement. No thyroid enlargement. Trachea is centrally located. Chest:  Clear to auscultation. No wheezes or crepitations. Breast:  Right: No masses, no adenopathy. No skin or nippple abnormalities. Left: left-sided mastectomy, surgery site is healed completely, no adenopathy  Heart: Regular sinus rhythm. Abdomen: Soft, nontender. No hepatosplenomegaly. No masses. Extremities:  With no edema. Lymph Nodes:  No cervical, axillary or inguinal lymph node enlargement. Neurologic: Conscious and oriented. No focal neurological deficits. Psychosocial: No depression, anxiety or stress. Skin: No rashes, bruises or ecchymoses       REVIEW OF LABORATORY DATA:     Lab Results   Component Value Date    WBC 7.7 04/22/2019    HGB 13.8 04/22/2019    HCT 40.2 04/22/2019    MCV 89.7 04/22/2019     04/22/2019     Lab Results   Component Value Date    IRON 30 (L) 11/29/2017    TIBC 403 11/29/2017    FERRITIN 33 04/04/2019           Chemistry        Component Value Date/Time     04/22/2019 2315    K 4.0 02/28/2020 0715    CL 99 04/22/2019 2315    CO2 23 04/22/2019 2315    BUN 18 04/22/2019 2315    CREATININE 0.68 04/22/2019 2315        Component Value Date/Time    CALCIUM 9.3 04/22/2019 2315    ALKPHOS 71 04/22/2019 2315    AST 18 04/22/2019 2315    ALT 18 04/22/2019 2315    BILITOT <0.15 (L) 04/22/2019 2315        REVIEW OF RADIOLOGICAL RESULTS:        IMPRESSION:   1- Sha Tate  Has  T2, N0, M0 ER/CA positive and HER-2/eve negative invasive ductal carcinoma of the left breast  2- Chemotherapy: received 3 cycles of TC, stopped due to GI bleeding  3- GI bleeding , related to the GIST tumor.  Currently on oral iron and hemoglobin is improving  4- on anastrozole. We will continue, plan 5 years of therapy, completed 08/2018  5- for hot flashes, better on Effexor. 6- Recent pancreatitis, possibly biliary. MRI CT scan did not show any gallbladder stone. We will discuss with her surgeons. 7-   distal small bowel mass measuring 3.6 cm, resection shows low risk GIST. No need for adjuvant therapy, margins were negative. 8-bone metastases with compression fracture diagnosed in May/2020. Status post kyphoplasty and biopsy. 9-plan for radiation followed by CDK inhibitor and continued Armidex      PLAN:   1. We reviewed her recent surgery and current plans for radiation. 2. I completed toxicity check. 3. I discussed plan for oral CDK inhibitor, likely Ibrance to be taken concurrently with Arimidex but this will have to start after radiation because of hematologic toxicity. 4. I explained following radiation we will plan for bone building therapy. 5. We reviewed prognostic data and plan for prolonged palliative treatment and goals of treatment. 6. We discussed managing her pain, I have consulted with her pain management physician and I will fill scripts throughout her treatment. 7. Return in 4 weeks.

## 2020-07-02 NOTE — PATIENT INSTRUCTIONS
rv in 4 weeks,   See orders for Lonni Slot (send for pharmacy) , plan to start after I see her.   See orders for Xgeva to start in4-6 weeks depending on xrt   Need cbc, cmp CA 27-29 upon RV

## 2020-07-06 ENCOUNTER — HOSPITAL ENCOUNTER (OUTPATIENT)
Dept: RADIATION ONCOLOGY | Age: 50
Discharge: HOME | End: 2020-07-06
Attending: RADIOLOGY
Payer: COMMERCIAL

## 2020-07-06 PROCEDURE — 77412 RADIATION TX DELIVERY LVL 3: CPT | Performed by: RADIOLOGY

## 2020-07-06 PROCEDURE — G6002 STEREOSCOPIC X-RAY GUIDANCE: HCPCS | Performed by: RADIOLOGY

## 2020-07-06 PROCEDURE — 77387 GUIDANCE FOR RADJ TX DLVR: CPT | Performed by: RADIOLOGY

## 2020-07-06 RX ORDER — PALBOCICLIB 125 MG/1
125 TABLET, FILM COATED ORAL DAILY
Qty: 21 TABLET | Refills: 3 | Status: SHIPPED | OUTPATIENT
Start: 2020-07-06 | End: 2020-08-27

## 2020-07-06 NOTE — TELEPHONE ENCOUNTER
[facility] stated that they cannot fill Ibrance and stated that their pharmacist gave a verbal rx to [facility] on 7/2. Patient will need a teach once approved.  Brea Burton

## 2020-07-07 ENCOUNTER — APPOINTMENT (OUTPATIENT)
Dept: RADIATION ONCOLOGY | Age: 50
End: 2020-07-07
Attending: RADIOLOGY
Payer: COMMERCIAL

## 2020-07-07 ENCOUNTER — TELEPHONE (OUTPATIENT)
Dept: ONCOLOGY | Age: 50
End: 2020-07-07

## 2020-07-07 ENCOUNTER — HOSPITAL ENCOUNTER (OUTPATIENT)
Dept: RADIATION ONCOLOGY | Age: 50
Discharge: HOME | End: 2020-07-07
Attending: RADIOLOGY
Payer: COMMERCIAL

## 2020-07-07 PROCEDURE — 77427 RADIATION TX MANAGEMENT X5: CPT | Performed by: RADIOLOGY

## 2020-07-07 PROCEDURE — 77280 THER RAD SIMULAJ FIELD SMPL: CPT | Performed by: RADIOLOGY

## 2020-07-07 PROCEDURE — 77412 RADIATION TX DELIVERY LVL 3: CPT | Performed by: RADIOLOGY

## 2020-07-08 ENCOUNTER — APPOINTMENT (OUTPATIENT)
Dept: RADIATION ONCOLOGY | Age: 50
End: 2020-07-08
Attending: RADIOLOGY
Payer: COMMERCIAL

## 2020-07-09 ENCOUNTER — HOSPITAL ENCOUNTER (OUTPATIENT)
Dept: RADIATION ONCOLOGY | Age: 50
Discharge: HOME | End: 2020-07-09
Attending: RADIOLOGY
Payer: COMMERCIAL

## 2020-07-09 ENCOUNTER — APPOINTMENT (OUTPATIENT)
Dept: RADIATION ONCOLOGY | Age: 50
End: 2020-07-09
Attending: RADIOLOGY
Payer: COMMERCIAL

## 2020-07-09 VITALS
SYSTOLIC BLOOD PRESSURE: 117 MMHG | DIASTOLIC BLOOD PRESSURE: 90 MMHG | TEMPERATURE: 98 F | RESPIRATION RATE: 14 BRPM | HEART RATE: 90 BPM | BODY MASS INDEX: 43.27 KG/M2 | WEIGHT: 293 LBS | OXYGEN SATURATION: 98 %

## 2020-07-09 PROCEDURE — 77387 GUIDANCE FOR RADJ TX DLVR: CPT | Performed by: RADIOLOGY

## 2020-07-09 PROCEDURE — 77412 RADIATION TX DELIVERY LVL 3: CPT | Performed by: RADIOLOGY

## 2020-07-09 PROCEDURE — G6002 STEREOSCOPIC X-RAY GUIDANCE: HCPCS | Performed by: RADIOLOGY

## 2020-07-09 NOTE — PROGRESS NOTES
Rowan Bryant  7/9/2020  Wt Readings from Last 3 Encounters:   07/09/20 293 lb (132.9 kg)   07/02/20 290 lb 9.6 oz (131.8 kg)   06/24/20 295 lb (133.8 kg)     Body mass index is 43.27 kg/m². Treatment Area:rib,Spine, left femur     Patient was seen today for weekly visit. Comfort Alteration  Fatigue: Moderate      Nutritional Alteration  Anorexia: Yes   Nausea: No   Vomiting: No     Elimination Alterations  Constipation: no  Diarrhea:  no  Bowel Incontinence: No  Urinary Incontinence: No    Skin Alteration   Sensation:intact     Radiation Dermatitis:  Intact [x]     Erythema  []     Discoloration  []     Rash []     Dry desquamation  []     Moist desquamation []       Emotional  Coping: somewhat effective      Injury, potential bleeding or infection: n/a     Lab Results   Component Value Date    WBC 7.7 04/22/2019     04/22/2019         BP (!) 117/90   Pulse 90   Temp 98 °F (36.7 °C)   Resp 14   Wt 293 lb (132.9 kg)   LMP 03/17/2014   SpO2 98%   BMI 43.27 kg/m²   Patient Currently in Pain: Yes                 Assessment/Plan: Patient was seen today for weekly visit. SHe reprots having discomfort in her throat. Dr Nav Ferrari notified and examined pt.  MMW ordered      Deneise Conception

## 2020-07-10 ENCOUNTER — APPOINTMENT (OUTPATIENT)
Dept: RADIATION ONCOLOGY | Age: 50
End: 2020-07-10
Attending: RADIOLOGY
Payer: COMMERCIAL

## 2020-07-10 ENCOUNTER — HOSPITAL ENCOUNTER (OUTPATIENT)
Dept: RADIATION ONCOLOGY | Age: 50
Discharge: HOME | End: 2020-07-10
Attending: RADIOLOGY
Payer: COMMERCIAL

## 2020-07-10 PROCEDURE — G6002 STEREOSCOPIC X-RAY GUIDANCE: HCPCS | Performed by: RADIOLOGY

## 2020-07-10 PROCEDURE — 77412 RADIATION TX DELIVERY LVL 3: CPT | Performed by: RADIOLOGY

## 2020-07-10 PROCEDURE — 77387 GUIDANCE FOR RADJ TX DLVR: CPT | Performed by: RADIOLOGY

## 2020-07-10 NOTE — TELEPHONE ENCOUNTER
Fax received from [facility] stating that they did not receive completed form for [place]. Form re-faxed to [phone]. Confirmation fax received.  Kanwal Choi

## 2020-07-13 ENCOUNTER — APPOINTMENT (OUTPATIENT)
Dept: RADIATION ONCOLOGY | Age: 50
End: 2020-07-13
Attending: RADIOLOGY
Payer: COMMERCIAL

## 2020-07-13 ENCOUNTER — HOSPITAL ENCOUNTER (OUTPATIENT)
Dept: RADIATION ONCOLOGY | Age: 50
Discharge: HOME | End: 2020-07-13
Attending: RADIOLOGY
Payer: COMMERCIAL

## 2020-07-13 PROCEDURE — 77387 GUIDANCE FOR RADJ TX DLVR: CPT | Performed by: RADIOLOGY

## 2020-07-13 PROCEDURE — G6002 STEREOSCOPIC X-RAY GUIDANCE: HCPCS | Performed by: RADIOLOGY

## 2020-07-13 PROCEDURE — 77412 RADIATION TX DELIVERY LVL 3: CPT | Performed by: RADIOLOGY

## 2020-07-14 ENCOUNTER — HOSPITAL ENCOUNTER (OUTPATIENT)
Dept: RADIATION ONCOLOGY | Age: 50
Discharge: HOME | End: 2020-07-14
Attending: RADIOLOGY
Payer: COMMERCIAL

## 2020-07-14 ENCOUNTER — APPOINTMENT (OUTPATIENT)
Dept: RADIATION ONCOLOGY | Age: 50
End: 2020-07-14
Attending: RADIOLOGY
Payer: COMMERCIAL

## 2020-07-14 LAB
ABSOLUTE EOS #: 0.03 K/UL (ref 0–0.4)
ABSOLUTE IMMATURE GRANULOCYTE: ABNORMAL K/UL (ref 0–0.3)
ABSOLUTE LYMPH #: 0.63 K/UL (ref 1–4.8)
ABSOLUTE MONO #: 0.1 K/UL (ref 0.1–0.8)
ALBUMIN SERPL-MCNC: 3.8 G/DL (ref 3.5–5.2)
ALBUMIN/GLOBULIN RATIO: 1.2 (ref 1–2.5)
ALP BLD-CCNC: 158 U/L (ref 35–104)
ALT SERPL-CCNC: 26 U/L (ref 5–33)
ANION GAP SERPL CALCULATED.3IONS-SCNC: 13 MMOL/L (ref 9–17)
AST SERPL-CCNC: 31 U/L
BASOPHILS # BLD: 0 % (ref 0–2)
BASOPHILS ABSOLUTE: 0 K/UL (ref 0–0.2)
BILIRUB SERPL-MCNC: 0.5 MG/DL (ref 0.3–1.2)
BUN BLDV-MCNC: 9 MG/DL (ref 6–20)
BUN/CREAT BLD: ABNORMAL (ref 9–20)
CALCIUM SERPL-MCNC: 9.6 MG/DL (ref 8.6–10.4)
CHLORIDE BLD-SCNC: 100 MMOL/L (ref 98–107)
CO2: 27 MMOL/L (ref 20–31)
CREAT SERPL-MCNC: 0.68 MG/DL (ref 0.5–0.9)
DIFFERENTIAL TYPE: ABNORMAL
EOSINOPHILS RELATIVE PERCENT: 1 % (ref 1–4)
GFR AFRICAN AMERICAN: >60 ML/MIN
GFR NON-AFRICAN AMERICAN: >60 ML/MIN
GFR SERPL CREATININE-BSD FRML MDRD: ABNORMAL ML/MIN/{1.73_M2}
GFR SERPL CREATININE-BSD FRML MDRD: ABNORMAL ML/MIN/{1.73_M2}
GLUCOSE BLD-MCNC: 121 MG/DL (ref 70–99)
HCT VFR BLD CALC: 37.1 % (ref 36–46)
HEMOGLOBIN: 12.2 G/DL (ref 12–16)
IMMATURE GRANULOCYTES: ABNORMAL %
LYMPHOCYTES # BLD: 25 % (ref 24–44)
MCH RBC QN AUTO: 29.1 PG (ref 26–34)
MCHC RBC AUTO-ENTMCNC: 32.8 G/DL (ref 31–37)
MCV RBC AUTO: 88.6 FL (ref 80–100)
MONOCYTES # BLD: 4 % (ref 1–7)
MORPHOLOGY: NORMAL
NRBC AUTOMATED: ABNORMAL PER 100 WBC
NUCLEATED RED BLOOD CELLS: 1 PER 100 WBC
PDW BLD-RTO: 16.8 % (ref 12.5–15.4)
PLATELET # BLD: 171 K/UL (ref 140–450)
PLATELET ESTIMATE: ABNORMAL
PMV BLD AUTO: 7.5 FL (ref 6–12)
POTASSIUM SERPL-SCNC: 3.4 MMOL/L (ref 3.7–5.3)
RBC # BLD: 4.19 M/UL (ref 4–5.2)
RBC # BLD: ABNORMAL 10*6/UL
SEG NEUTROPHILS: 70 % (ref 36–66)
SEGMENTED NEUTROPHILS ABSOLUTE COUNT: 1.74 K/UL (ref 1.8–7.7)
SODIUM BLD-SCNC: 140 MMOL/L (ref 135–144)
TOTAL PROTEIN: 6.9 G/DL (ref 6.4–8.3)
WBC # BLD: 2.5 K/UL (ref 3.5–11)
WBC # BLD: ABNORMAL 10*3/UL

## 2020-07-14 PROCEDURE — 85025 COMPLETE CBC W/AUTO DIFF WBC: CPT

## 2020-07-14 PROCEDURE — 36415 COLL VENOUS BLD VENIPUNCTURE: CPT

## 2020-07-14 PROCEDURE — 77412 RADIATION TX DELIVERY LVL 3: CPT | Performed by: RADIOLOGY

## 2020-07-14 PROCEDURE — 80053 COMPREHEN METABOLIC PANEL: CPT

## 2020-07-14 PROCEDURE — 77387 GUIDANCE FOR RADJ TX DLVR: CPT | Performed by: RADIOLOGY

## 2020-07-14 PROCEDURE — G6002 STEREOSCOPIC X-RAY GUIDANCE: HCPCS | Performed by: RADIOLOGY

## 2020-07-15 ENCOUNTER — HOSPITAL ENCOUNTER (OUTPATIENT)
Dept: RADIATION ONCOLOGY | Age: 50
Discharge: HOME | End: 2020-07-15
Attending: RADIOLOGY
Payer: COMMERCIAL

## 2020-07-15 ENCOUNTER — APPOINTMENT (OUTPATIENT)
Dept: RADIATION ONCOLOGY | Age: 50
End: 2020-07-15
Attending: RADIOLOGY
Payer: COMMERCIAL

## 2020-07-15 VITALS — WEIGHT: 283 LBS | BODY MASS INDEX: 41.79 KG/M2

## 2020-07-15 VITALS
RESPIRATION RATE: 14 BRPM | HEART RATE: 83 BPM | OXYGEN SATURATION: 99 % | DIASTOLIC BLOOD PRESSURE: 86 MMHG | SYSTOLIC BLOOD PRESSURE: 117 MMHG | TEMPERATURE: 97.7 F

## 2020-07-15 PROCEDURE — 77336 RADIATION PHYSICS CONSULT: CPT | Performed by: RADIOLOGY

## 2020-07-15 PROCEDURE — 77387 GUIDANCE FOR RADJ TX DLVR: CPT | Performed by: RADIOLOGY

## 2020-07-15 PROCEDURE — G6002 STEREOSCOPIC X-RAY GUIDANCE: HCPCS | Performed by: RADIOLOGY

## 2020-07-15 PROCEDURE — 77427 RADIATION TX MANAGEMENT X5: CPT | Performed by: RADIOLOGY

## 2020-07-15 PROCEDURE — 77412 RADIATION TX DELIVERY LVL 3: CPT | Performed by: RADIOLOGY

## 2020-07-15 RX ORDER — POTASSIUM CHLORIDE 20 MEQ/1
20 TABLET, EXTENDED RELEASE ORAL 2 TIMES DAILY
Qty: 60 TABLET | Refills: 0 | Status: SHIPPED | OUTPATIENT
Start: 2020-07-15 | End: 2020-08-27 | Stop reason: ALTCHOICE

## 2020-07-15 RX ORDER — MEGESTROL ACETATE 40 MG/ML
400 SUSPENSION ORAL DAILY
Qty: 240 ML | Refills: 2 | Status: SHIPPED | OUTPATIENT
Start: 2020-07-15 | End: 2021-01-14

## 2020-07-15 ASSESSMENT — PAIN SCALES - GENERAL: PAINLEVEL_OUTOF10: 3

## 2020-07-15 NOTE — PROGRESS NOTES
[facility]            Radiation Oncology          [address] [facility]          [address]        Ailyn Davisster: [phone]        F: [phone]       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 Overall she is having more fatigue as well as had a 10 pound weight loss. She has no appetite she states as well as no energy. Her pain is better controlled now that she is only taking her oxycodone and is off her fentanyl patch. She notes relief and pain in her sternum as well as ribs although she still has pain in her lower back and femur which make it difficult for her to sleep and therefore she is sleeping in her recliner. She denies have any further symptoms of esophagitis and is no longer using the Magic mouthwash.         OBJECTIVE:   CHAPERONE: Not Required    ECO Symptomatic, <50% in bed during the day    VITAL SIGNS: Wt 283 lb (128.4 kg)   LMP 2014   BMI 41.79 kg/m²   Wt Readings from Last 5 Encounters:   07/15/20 283 lb (128.4 kg)   20 293 lb (132.9 kg)   20 290 lb 9.6 oz (131.8 kg)   20 295 lb (133.8 kg)   20 296 lb (134.3 kg)     GENERAL:  General appearance is that of a well-nourished, well-developed in no apparent distress. SKIN: No erythema or desquamation.     LABS:  WBC   Date Value Ref Range Status   07/14/2020 2.5 (L) 3.5 - 11.0 k/uL Final     Segs Absolute   Date Value Ref Range Status   07/14/2020 1.74 (L) 1.8 - 7.7 k/uL Final     Hemoglobin   Date Value Ref Range Status   07/14/2020 12.2 12.0 - 16.0 g/dL Final     Platelet Count   Date Value Ref Range Status   04/26/2012 181 140 - 450 k/uL Final     Platelets   Date Value Ref Range Status   07/14/2020 171 140 - 450 k/uL Final     Lab Results   Component Value Date     07/14/2020    K 3.4 (L) 07/14/2020     07/14/2020    CO2 27 07/14/2020    BUN 9 07/14/2020    CREATININE 0.68 07/14/2020    GLUCOSE 121 (H) 07/14/2020    CALCIUM 9.6 07/14/2020    PROT 6.9 07/14/2020    LABALBU 3.8 07/14/2020    BILITOT 0.50 07/14/2020    ALKPHOS 158 (H) 07/14/2020    AST 31 07/14/2020    ALT 26 07/14/2020    LABGLOM >60 07/14/2020    GFRAA >60 07/14/2020    GLOB NOT REPORTED 04/21/2015           MEDICATIONS:    Current Outpatient Medications:     potassium chloride (KLOR-CON M) 20 MEQ extended release tablet, Take 1 tablet by mouth 2 times daily, Disp: 60 tablet, Rfl: 0    megestrol (MEGACE) 40 MG/ML suspension, Take 10 mLs by mouth daily, Disp: 240 mL, Rfl: 2    Magic Mouthwash (MIRACLE MOUTHWASH), Swish and spit 5 mLs 4 times daily as needed for Irritation, Disp: 400 mL, Rfl: 1    palbociclib (IBRANCE) 125 MG tablet, Take 125 mg by mouth daily Three weeks on then one week off, Disp: 21 tablet, Rfl: 3    warfarin (COUMADIN) 5 MG tablet, Take 5 mg by mouth Mon,Wed,Fri 7.5mg other days 5mg, Disp: , Rfl:     ondansetron (ZOFRAN-ODT) 4 MG disintegrating tablet, , Disp: , Rfl:     senna (SENOKOT) 8.6 MG tablet, Take 17.2 mg by mouth 2 times daily, Disp: , Rfl:     diazePAM (VALIUM) 2 MG tablet, TAKE ONE TABLET BY MOUTH FOR ONE DOSE PRIOR TO MRI, Disp: , Rfl:     fentaNYL (DURAGESIC) 12 MCG/HR, Place 1 patch onto the skin every 72 hours. , Disp: , Rfl:     oxyCODONE 5 MG capsule, Take 5 mg by mouth every 4 hours as needed for Pain., Disp: , Rfl:     anastrozole (ARIMIDEX) 1 MG tablet, Take 1 tablet by mouth daily, Disp: 30 tablet, Rfl: 6    DULoxetine (CYMBALTA) 30 MG extended release capsule, Take 30 mg by mouth daily, Disp: , Rfl:     cyclobenzaprine (FLEXERIL) 10 MG tablet, Take 10 mg by mouth 3 times daily as needed for Muscle spasms, Disp: , Rfl:     losartan (COZAAR) 100 MG tablet, Take 100 mg by mouth daily, Disp: , Rfl:     dexamethasone (DECADRON) 4 MG tablet, Take 12 mg by mouth daily (with breakfast), Disp: , Rfl:     omeprazole (PRILOSEC) 20 MG delayed release capsule, Take 20 mg by mouth daily, Disp: , Rfl:     celecoxib (CELEBREX) 200 MG capsule, Take 200 mg by mouth daily PRN, Disp: , Rfl:     hydrochlorothiazide (HYDRODIURIL) 12.5 MG tablet, Take 12.5 mg by mouth daily, Disp: , Rfl:     amLODIPine (NORVASC) 5 MG tablet, Take 10 mg by mouth daily , Disp: , Rfl:     docusate sodium (COLACE, DULCOLAX) 100 MG CAPS, Take 100 mg by mouth 2 times daily, Disp: 30 capsule, Rfl: 0    metoprolol tartrate (LOPRESSOR) 25 MG tablet, TAKE ONE TABLET BY MOUTH TWICE A DAY, Disp: 60 tablet, Rfl: 4      ASSESSMENT PLAN:   Treatment setup and plan reviewed. Port images/CBCT images reviewed. Appropriate laboratory work was reviewed. Treatment side effects and toxicities reviewed with the patient, and appropriate management was advised. Will continue radiation treatment as planned, and recommend patient contact us if they have any questions or concerns. Recommend skin care to help avoid irritation. Offered patient the option to get IV hydration as well as potassium supplements however patient did not want to stay for infusion and feels that she is getting enough fluids. We do recommend patient increase her oral intake and will prescribe Megace to help with appetite stimulation.   We will also prescribe patient with potassium tablets to take to help supplement her diet and will remeasure her labs next week. Electronically signed by Harjinder Kumar MD on 7/15/2020 at 3:00 PM      Drugs Prescribed:  New Prescriptions    MEGESTROL (MEGACE) 40 MG/ML SUSPENSION    Take 10 mLs by mouth daily    POTASSIUM CHLORIDE (KLOR-CON M) 20 MEQ EXTENDED RELEASE TABLET    Take 1 tablet by mouth 2 times daily       Other Orders Placed:  No orders of the defined types were placed in this encounter.

## 2020-07-16 ENCOUNTER — APPOINTMENT (OUTPATIENT)
Dept: RADIATION ONCOLOGY | Age: 50
End: 2020-07-16
Attending: RADIOLOGY
Payer: COMMERCIAL

## 2020-07-16 ENCOUNTER — TELEPHONE (OUTPATIENT)
Dept: ONCOLOGY | Age: 50
End: 2020-07-16

## 2020-07-16 ENCOUNTER — HOSPITAL ENCOUNTER (OUTPATIENT)
Dept: RADIATION ONCOLOGY | Age: 50
Discharge: HOME | End: 2020-07-16
Attending: RADIOLOGY
Payer: COMMERCIAL

## 2020-07-16 ENCOUNTER — FOLLOWUP TELEPHONE ENCOUNTER (OUTPATIENT)
Dept: NUTRITION | Age: 50
End: 2020-07-16

## 2020-07-16 PROCEDURE — 77417 THER RADIOLOGY PORT IMAGE(S): CPT | Performed by: RADIOLOGY

## 2020-07-16 PROCEDURE — 77412 RADIATION TX DELIVERY LVL 3: CPT | Performed by: RADIOLOGY

## 2020-07-16 NOTE — TELEPHONE ENCOUNTER
Writer reached out to Renuka Nowak to schedule a consultation. No answer, left message with a call back number. Will monitor.

## 2020-07-16 NOTE — TELEPHONE ENCOUNTER
Writer called [facility] to check status of medication. Eden Young states medication is approved with $0 copay. She states they reached out to pt to set up delivery, but they have not had a call back. Writer called pt and left detailed message and provided her with pharmacy phone number. Also notified Estefany Gore. oral compliance RN to follow up and schedule teach.

## 2020-07-16 NOTE — PROGRESS NOTES
NUTRITION NOTE    Called pt on listed home phone number re: pgsga score. No answer, left detailed message encouraging return phone call if pt has nutrition concerns or questions. Noted: Pt wt unplanned wt loss 10# x 1 month and reported poor appetite according to MD note 7/15. Will continue to follow.   Tere Rahman, FELECIA, LD  Registered Dietitian  Kenneth Olivera  [phone]

## 2020-07-17 ENCOUNTER — APPOINTMENT (OUTPATIENT)
Dept: RADIATION ONCOLOGY | Age: 50
End: 2020-07-17
Attending: RADIOLOGY
Payer: COMMERCIAL

## 2020-07-17 ENCOUNTER — HOSPITAL ENCOUNTER (OUTPATIENT)
Dept: RADIATION ONCOLOGY | Age: 50
Discharge: HOME | End: 2020-07-17
Attending: RADIOLOGY
Payer: COMMERCIAL

## 2020-07-17 PROCEDURE — 77387 GUIDANCE FOR RADJ TX DLVR: CPT | Performed by: RADIOLOGY

## 2020-07-17 PROCEDURE — G6002 STEREOSCOPIC X-RAY GUIDANCE: HCPCS | Performed by: RADIOLOGY

## 2020-07-17 PROCEDURE — 77412 RADIATION TX DELIVERY LVL 3: CPT | Performed by: RADIOLOGY

## 2020-07-20 ENCOUNTER — APPOINTMENT (OUTPATIENT)
Dept: RADIATION ONCOLOGY | Age: 50
End: 2020-07-20
Attending: RADIOLOGY
Payer: COMMERCIAL

## 2020-07-20 ENCOUNTER — HOSPITAL ENCOUNTER (OUTPATIENT)
Dept: RADIATION ONCOLOGY | Age: 50
Discharge: HOME | End: 2020-07-20
Attending: RADIOLOGY
Payer: COMMERCIAL

## 2020-07-20 PROCEDURE — 77387 GUIDANCE FOR RADJ TX DLVR: CPT | Performed by: RADIOLOGY

## 2020-07-20 PROCEDURE — G6002 STEREOSCOPIC X-RAY GUIDANCE: HCPCS | Performed by: RADIOLOGY

## 2020-07-20 PROCEDURE — 77412 RADIATION TX DELIVERY LVL 3: CPT | Performed by: RADIOLOGY

## 2020-07-21 ENCOUNTER — HOSPITAL ENCOUNTER (OUTPATIENT)
Dept: RADIATION ONCOLOGY | Age: 50
Discharge: HOME | End: 2020-07-21
Attending: RADIOLOGY
Payer: COMMERCIAL

## 2020-07-21 ENCOUNTER — HOSPITAL ENCOUNTER (OUTPATIENT)
Age: 50
Discharge: HOME | End: 2020-07-21
Payer: COMMERCIAL

## 2020-07-21 DIAGNOSIS — C50.412 MALIGNANT NEOPLASM OF UPPER-OUTER QUADRANT OF LEFT BREAST IN FEMALE, ESTROGEN RECEPTOR POSITIVE (HCC): ICD-10-CM

## 2020-07-21 DIAGNOSIS — C49.A3 GIST (GASTROINTESTINAL STROMAL TUMOR) OF SMALL BOWEL, MALIGNANT (HCC): ICD-10-CM

## 2020-07-21 DIAGNOSIS — Z17.0 MALIGNANT NEOPLASM OF UPPER-OUTER QUADRANT OF LEFT BREAST IN FEMALE, ESTROGEN RECEPTOR POSITIVE (HCC): ICD-10-CM

## 2020-07-21 DIAGNOSIS — C79.51 BONE METASTASIS (HCC): ICD-10-CM

## 2020-07-21 LAB
ABSOLUTE EOS #: 0.2 K/UL (ref 0–0.4)
ABSOLUTE IMMATURE GRANULOCYTE: ABNORMAL K/UL (ref 0–0.3)
ABSOLUTE LYMPH #: 0.44 K/UL (ref 1–4.8)
ABSOLUTE MONO #: 0.26 K/UL (ref 0.1–0.8)
ALBUMIN SERPL-MCNC: 4.1 G/DL (ref 3.5–5.2)
ALBUMIN/GLOBULIN RATIO: 1.2 (ref 1–2.5)
ALP BLD-CCNC: 140 U/L (ref 35–104)
ALT SERPL-CCNC: 53 U/L (ref 5–33)
ANION GAP SERPL CALCULATED.3IONS-SCNC: 12 MMOL/L (ref 9–17)
AST SERPL-CCNC: 52 U/L
BASOPHILS # BLD: 0 % (ref 0–2)
BASOPHILS ABSOLUTE: 0 K/UL (ref 0–0.2)
BILIRUB SERPL-MCNC: 0.44 MG/DL (ref 0.3–1.2)
BUN BLDV-MCNC: 10 MG/DL (ref 6–20)
BUN/CREAT BLD: ABNORMAL (ref 9–20)
CALCIUM SERPL-MCNC: 10.5 MG/DL (ref 8.6–10.4)
CHLORIDE BLD-SCNC: 99 MMOL/L (ref 98–107)
CO2: 26 MMOL/L (ref 20–31)
CREAT SERPL-MCNC: 0.55 MG/DL (ref 0.5–0.9)
DIFFERENTIAL TYPE: ABNORMAL
EOSINOPHILS RELATIVE PERCENT: 7 % (ref 1–4)
GFR AFRICAN AMERICAN: >60 ML/MIN
GFR NON-AFRICAN AMERICAN: >60 ML/MIN
GFR SERPL CREATININE-BSD FRML MDRD: ABNORMAL ML/MIN/{1.73_M2}
GFR SERPL CREATININE-BSD FRML MDRD: ABNORMAL ML/MIN/{1.73_M2}
GLUCOSE BLD-MCNC: 95 MG/DL (ref 70–99)
HCT VFR BLD CALC: 39 % (ref 36–46)
HEMOGLOBIN: 12.9 G/DL (ref 12–16)
IMMATURE GRANULOCYTES: ABNORMAL %
LYMPHOCYTES # BLD: 15 % (ref 24–44)
MCH RBC QN AUTO: 29 PG (ref 26–34)
MCHC RBC AUTO-ENTMCNC: 33.1 G/DL (ref 31–37)
MCV RBC AUTO: 87.9 FL (ref 80–100)
METAMYELOCYTES ABSOLUTE COUNT: 0.03 K/UL
METAMYELOCYTES: 1 %
MONOCYTES # BLD: 9 % (ref 1–7)
MORPHOLOGY: NORMAL
NRBC AUTOMATED: ABNORMAL PER 100 WBC
PDW BLD-RTO: 16.9 % (ref 12.5–15.4)
PLATELET # BLD: 165 K/UL (ref 140–450)
PLATELET ESTIMATE: ABNORMAL
PMV BLD AUTO: 7.3 FL (ref 6–12)
POTASSIUM SERPL-SCNC: 3.3 MMOL/L (ref 3.7–5.3)
RBC # BLD: 4.44 M/UL (ref 4–5.2)
RBC # BLD: ABNORMAL 10*6/UL
SEG NEUTROPHILS: 68 % (ref 36–66)
SEGMENTED NEUTROPHILS ABSOLUTE COUNT: 1.97 K/UL (ref 1.8–7.7)
SODIUM BLD-SCNC: 137 MMOL/L (ref 135–144)
TOTAL PROTEIN: 7.5 G/DL (ref 6.4–8.3)
WBC # BLD: 2.9 K/UL (ref 3.5–11)
WBC # BLD: ABNORMAL 10*3/UL

## 2020-07-21 PROCEDURE — 77412 RADIATION TX DELIVERY LVL 3: CPT | Performed by: RADIOLOGY

## 2020-07-21 PROCEDURE — 86300 IMMUNOASSAY TUMOR CA 15-3: CPT

## 2020-07-21 PROCEDURE — 36415 COLL VENOUS BLD VENIPUNCTURE: CPT

## 2020-07-21 PROCEDURE — 85025 COMPLETE CBC W/AUTO DIFF WBC: CPT

## 2020-07-21 PROCEDURE — 80053 COMPREHEN METABOLIC PANEL: CPT

## 2020-07-21 PROCEDURE — G6002 STEREOSCOPIC X-RAY GUIDANCE: HCPCS | Performed by: RADIOLOGY

## 2020-07-21 PROCEDURE — 77387 GUIDANCE FOR RADJ TX DLVR: CPT | Performed by: RADIOLOGY

## 2020-07-21 NOTE — PROGRESS NOTES
Pt's potassium remains low after PO potassium medications. Per Dr Ceasar Crum pt to have Potassium infusion. Order given to [address], infusion . Pt will have infusion tomorrow.

## 2020-07-22 ENCOUNTER — HOSPITAL ENCOUNTER (OUTPATIENT)
Dept: RADIATION ONCOLOGY | Age: 50
Discharge: HOME | End: 2020-07-22
Attending: RADIOLOGY
Payer: COMMERCIAL

## 2020-07-22 ENCOUNTER — HOSPITAL ENCOUNTER (OUTPATIENT)
Dept: INFUSION THERAPY | Age: 50
Discharge: HOME | End: 2020-07-22
Payer: COMMERCIAL

## 2020-07-22 VITALS
OXYGEN SATURATION: 98 % | RESPIRATION RATE: 14 BRPM | BODY MASS INDEX: 41.64 KG/M2 | WEIGHT: 282 LBS | TEMPERATURE: 97 F | SYSTOLIC BLOOD PRESSURE: 107 MMHG | HEART RATE: 68 BPM | DIASTOLIC BLOOD PRESSURE: 89 MMHG

## 2020-07-22 VITALS
TEMPERATURE: 98.8 F | RESPIRATION RATE: 16 BRPM | DIASTOLIC BLOOD PRESSURE: 79 MMHG | SYSTOLIC BLOOD PRESSURE: 115 MMHG | HEART RATE: 78 BPM

## 2020-07-22 LAB — CA 27-29: 164 U/ML (ref 0–38)

## 2020-07-22 PROCEDURE — 77412 RADIATION TX DELIVERY LVL 3: CPT | Performed by: RADIOLOGY

## 2020-07-22 PROCEDURE — 77336 RADIATION PHYSICS CONSULT: CPT | Performed by: RADIOLOGY

## 2020-07-22 PROCEDURE — 96361 HYDRATE IV INFUSION ADD-ON: CPT | Performed by: NURSE PRACTITIONER

## 2020-07-22 PROCEDURE — 2580000003 HC RX 258: Performed by: RADIOLOGY

## 2020-07-22 PROCEDURE — 6360000002 HC RX W HCPCS: Performed by: RADIOLOGY

## 2020-07-22 PROCEDURE — G6002 STEREOSCOPIC X-RAY GUIDANCE: HCPCS | Performed by: RADIOLOGY

## 2020-07-22 PROCEDURE — 96360 HYDRATION IV INFUSION INIT: CPT | Performed by: NURSE PRACTITIONER

## 2020-07-22 PROCEDURE — 77387 GUIDANCE FOR RADJ TX DLVR: CPT | Performed by: RADIOLOGY

## 2020-07-22 PROCEDURE — 77427 RADIATION TX MANAGEMENT X5: CPT | Performed by: RADIOLOGY

## 2020-07-22 PROCEDURE — 2500000003 HC RX 250 WO HCPCS: Performed by: RADIOLOGY

## 2020-07-22 RX ADMIN — POTASSIUM CHLORIDE: 2 INJECTION, SOLUTION, CONCENTRATE INTRAVENOUS at 12:06

## 2020-07-22 ASSESSMENT — PAIN SCALES - GENERAL: PAINLEVEL_OUTOF10: 3

## 2020-07-22 NOTE — PROGRESS NOTES
[facility]            Radiation Oncology          [address] [facility] [address]        Aleisha Passer: [phone]        F: [phone]       [email or web address]         Date of Service: 2020      Location:  [facility] [place],   [address] 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 Overall she is having less pain and using only her pain medication 2 or 3 times a day. Her weight and energy are stable. She is able to sleep better with less discomfort. She no longer has any symptoms of esophagitis and difficulty eating. Overall she is feeling much better. Her last blood work did show that her potassium was still low therefore we have referred her to get IV hydration and potassium infusion today.       OBJECTIVE:   CHAPERONE: Not Required    ECO Symptomatic, <50% in bed during the day    VITAL SIGNS: /89   Pulse 68   Temp 97 °F (36.1 °C)   Resp 14   Wt 282 lb (127.9 kg)   LMP 2014   SpO2 98%   BMI 41.64 kg/m²   Wt Readings from Last 5 Encounters:   20 282 lb (127.9 kg)   07/15/20 283 lb (128.4 kg)   20 293 lb (132.9 kg)   20 290 lb 9.6 oz (131.8 kg)   20 295 lb (133.8 kg)     GENERAL:  General appearance is that of a well-nourished, well-developed in no apparent distress. SKIN: No erythema or desquamation. LABS:  WBC   Date Value Ref Range Status   07/21/2020 2.9 (L) 3.5 - 11.0 k/uL Final     Segs Absolute   Date Value Ref Range Status   07/21/2020 1.97 1.8 - 7.7 k/uL Final     Hemoglobin   Date Value Ref Range Status   07/21/2020 12.9 12.0 - 16.0 g/dL Final     Platelet Count   Date Value Ref Range Status   04/26/2012 181 140 - 450 k/uL Final     Platelets   Date Value Ref Range Status   07/21/2020 165 140 - 450 k/uL Final     Lab Results   Component Value Date     07/21/2020    K 3.3 (L) 07/21/2020    CL 99 07/21/2020    CO2 26 07/21/2020    BUN 10 07/21/2020    CREATININE 0.55 07/21/2020    GLUCOSE 95 07/21/2020    CALCIUM 10.5 (H) 07/21/2020    PROT 7.5 07/21/2020    LABALBU 4.1 07/21/2020    BILITOT 0.44 07/21/2020    ALKPHOS 140 (H) 07/21/2020    AST 52 (H) 07/21/2020    ALT 53 (H) 07/21/2020    LABGLOM >60 07/21/2020    GFRAA >60 07/21/2020    GLOB NOT REPORTED 04/21/2015           MEDICATIONS:    Current Outpatient Medications:     potassium chloride (KLOR-CON M) 20 MEQ extended release tablet, Take 1 tablet by mouth 2 times daily, Disp: 60 tablet, Rfl: 0    megestrol (MEGACE) 40 MG/ML suspension, Take 10 mLs by mouth daily, Disp: 240 mL, Rfl: 2    Magic Mouthwash (MIRACLE MOUTHWASH), Swish and spit 5 mLs 4 times daily as needed for Irritation, Disp: 400 mL, Rfl: 1    palbociclib (IBRANCE) 125 MG tablet, Take 125 mg by mouth daily Three weeks on then one week off, Disp: 21 tablet, Rfl: 3    warfarin (COUMADIN) 5 MG tablet, Take 5 mg by mouth Mon,Wed,Fri 7.5mg other days 5mg, Disp: , Rfl:     ondansetron (ZOFRAN-ODT) 4 MG disintegrating tablet, , Disp: , Rfl:     diazePAM (VALIUM) 2 MG tablet, TAKE ONE TABLET BY MOUTH FOR ONE DOSE PRIOR TO MRI, Disp: , Rfl:     fentaNYL (DURAGESIC) 12 MCG/HR, Place 1 patch onto the skin every 72 hours. , Disp: , Rfl:     oxyCODONE 5 MG capsule, Take 5 mg by mouth every 4 hours as needed for Pain., Disp: , Rfl:     anastrozole (ARIMIDEX) 1 MG tablet, Take 1 tablet by mouth daily, Disp: 30 tablet, Rfl: 6    DULoxetine (CYMBALTA) 30 MG extended release capsule, Take 30 mg by mouth daily, Disp: , Rfl:     cyclobenzaprine (FLEXERIL) 10 MG tablet, Take 10 mg by mouth 3 times daily as needed for Muscle spasms, Disp: , Rfl:     losartan (COZAAR) 100 MG tablet, Take 100 mg by mouth daily, Disp: , Rfl:     dexamethasone (DECADRON) 4 MG tablet, Take 12 mg by mouth daily (with breakfast), Disp: , Rfl:     omeprazole (PRILOSEC) 20 MG delayed release capsule, Take 20 mg by mouth daily, Disp: , Rfl:     celecoxib (CELEBREX) 200 MG capsule, Take 200 mg by mouth daily PRN, Disp: , Rfl:     hydrochlorothiazide (HYDRODIURIL) 12.5 MG tablet, Take 12.5 mg by mouth daily, Disp: , Rfl:     amLODIPine (NORVASC) 5 MG tablet, Take 10 mg by mouth daily , Disp: , Rfl:     docusate sodium (COLACE, DULCOLAX) 100 MG CAPS, Take 100 mg by mouth 2 times daily, Disp: 30 capsule, Rfl: 0    metoprolol tartrate (LOPRESSOR) 25 MG tablet, TAKE ONE TABLET BY MOUTH TWICE A DAY, Disp: 60 tablet, Rfl: 4      ASSESSMENT PLAN:   Treatment setup and plan reviewed. Port images/CBCT images reviewed. Appropriate laboratory work was reviewed. Treatment side effects and toxicities reviewed with the patient, and appropriate management was advised. Patient complete radiation treatment as planned, and recommend patient contact us if they have any questions or concerns. Recommend skin care to help avoid irritation and to promote healing. Patient will also be getting IV hydration with potassium supplement today. She is recommended to continue close follow-up with medical oncology for her next systemic therapy and treatments.   Patient will be recommended to come back to see us in approximately 1 month for routine follow-up visit and exam.    Electronically signed by Shira Robertson MD on 7/22/2020 at 4:44 PM      Drugs Prescribed:  New Prescriptions    No medications on file       Other Orders Placed:  No orders of the defined types were placed in this encounter.

## 2020-07-22 NOTE — PROGRESS NOTES
Patient here for hydration with K+. K+ was 3.3. She will receive 1000mls NS with 40meq potassium per Dr. Radha Delgadillo. IV started without incident. Patient tolerated hydration with K+ well.

## 2020-07-22 NOTE — PROGRESS NOTES
Presley Queen  7/22/2020  Wt Readings from Last 3 Encounters:   07/22/20 282 lb (127.9 kg)   07/15/20 283 lb (128.4 kg)   07/09/20 293 lb (132.9 kg)     Body mass index is 41.64 kg/m². Treatment Area:10 th rib, L4-S1, L femur     Patient was seen today for weekly visit. Comfort Alteration  Fatigue: Moderate      Nutritional Alteration  Anorexia: Yes   Nausea: Yes   Vomiting: No     Elimination Alterations  Constipation: no  Diarrhea:  no  Bowel Incontinence: No  Urinary Incontinence: No    Skin Alteration   Sensation:intact     Radiation Dermatitis:  Intact [x]     Erythema  []     Discoloration  []     Rash []     Dry desquamation  []     Moist desquamation []       Emotional  Coping: not very effective , support provided     Injury, potential bleeding or infection:n/a     Lab Results   Component Value Date    WBC 2.9 (L) 07/21/2020     07/21/2020         /89   Pulse 68   Temp 97 °F (36.1 °C)   Resp 14   Wt 282 lb (127.9 kg)   LMP 03/17/2014   SpO2 98%   BMI 41.64 kg/m²   Patient Currently in Pain: Yes     Pain Level: 3           Assessment/Plan: Patient was seen today for weekly visit. Dr Rubi Marquis notified and examined pt. Pt to f/u in 1 month.       Tawny Bagley Attending Attestation (For Attendings USE Only)...

## 2020-07-22 NOTE — FLOWSHEET NOTE
Situation:  Writer visited with Ms. Emily Gilbert and her Aunt in the treatment cubicle. Assessment:  Ms. Emily Gilbert was joined by her aunt. Patient shared that she went through treatment for breast cancer 6 years ago and that it returned two months ago. Pt stated, \"I learned I will have cancer the rest of my life. \" Pt acknowledged that this has been difficult for her. She shared about her recent health challenges. She identified her family and friends as what has helped her cope. She smiled as she talked about her grandson who lives with her. She expressed gratitude for the prayers and support she is receiving from others. 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 Pt was receptive to learning about the support group for people with metastatic breast cancer. She expressed gratitude to know about other women who are going through the same thing she is. Intervention:  Writer provided supportive presence and explored Pt's coping and needs; inquired about Pt's sources of support and strength; offered words of encouragement and support; told Pt about the [employer] and the [facility] support group; gave Pt a flyer for the support group along with writer's business card and brochure; told Pt she is available for support. Outcome:  Ms. Valeriano Garvey for the information and the visit.        07/22/20 1516   Encounter Summary   Services provided to: Patient and family together   Referral/Consult From: [address] [facility] Family members;Friends/neighbors   Continue Visiting   (7/22/20)   Complexity of Encounter Moderate   Length of Encounter 15 minutes   Spiritual Assessment Completed Yes   Routine   Type Follow up   Spiritual/Evangelical   Type Spiritual support   Assessment Calm; Approachable;Coping; Anticipatory grief   Intervention Active listening;Explored feelings, thoughts, concerns;Explored coping resources;Provided reading materials/devotional materials;Sustaining presence/ Ministry of presence; Discussed illness/injury and it's impact; Discussed belief system/Islam practices/bibiana;Discussed relationship with God;Discussed meaning/purpose   Outcome Hopeful;Encouraged;Receptive;Coping;Expressed feelings/needs/concerns;Engaged in conversation;Expressed gratitude     Electronically signed by Karen Win, Oncology Outpatient [facility], [address] [facility] Radiation Oncology  7/22/2020  3:17 PM

## 2020-07-23 ENCOUNTER — TELEPHONE (OUTPATIENT)
Dept: RADIATION ONCOLOGY | Age: 50
End: 2020-07-23

## 2020-07-24 NOTE — PROGRESS NOTES
[facility]            Radiation Oncology          [address]          [address]        Preethi Mu: [phone]        F: [phone]       [email or web address]         Dear Dr Madge Moritz: Thank you for referring Presley Queen to me for evaluation and treatment. Below is a summary of the patient's recently completed radiation course. If you have questions, please do not hesitate to call me. I look forward to following this patient along with you.     Sincerely,  Electronically signed by Candy Vance MD on 20 at 3:55 PM EDT      CC: Patient Care Team:  Natali Clancy MD as PCP - General  Rohit Hernández MD as Surgeon (General Surgery)  Michelle Briceño DO as Consulting Physician (Obstetrics & Gynecology)  Eileen Haile MD as Consulting Physician (Hematology and Oncology)  Brenna Holloway RN as Nurse Navigator (Oncology)  Dane Dominguez RN as Registered Nurse (Oncology)  ------------------------------------------------------------------------------------------------------------------------------------------------------------------------------------------        Date of Service: 2020     Location:  [facility] Radiation Oncology,   [address] [place]   [phone]        RADIATION ONCOLOGY END OF TREATMENT SUMMARY:    Patient ID:   Presley Queen  : 1970   MRN: [id number]    DIAGNOSIS:  Cancer Staging  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   bone mets  3,000 cGy x15  2020  14 10                Treatment Summary:  Radiation Oncology - Course: 2 Protocol:    Treatment Site Current Dose Modality From To Elapsed Days Fx. L 10th rib  3,000 cGy x10  7/09/2020 7/22/2020  13 10   L4-SI Joints  3,000 cGy x15  7/09/2020 7/22/2020  13 10   L Femur  3,000 cGy x15  7/09/2020 7/22/2020  13 10                    Concurrent Chemotherapy: N/A    CLINICAL COURSE:    Patient completed the treatment as prescribed and tolerated treatment as expected. Patient developed some esophagitis, but did get pain relief. Patient will come back in 1 month for a follow up visit and to assess treatment toxicity and response. Patient was advised to continue close follow up with medical oncology and orthopedic surgery as well. Patient does have our contact information in case they have any questions or concerns in the interim.     Electronically signed by Isreal Dow MD on 7/24/2020 at 3:55 PM

## 2020-07-30 ENCOUNTER — OFFICE VISIT (OUTPATIENT)
Dept: ONCOLOGY | Age: 50
End: 2020-07-30
Payer: COMMERCIAL

## 2020-07-30 ENCOUNTER — HOSPITAL ENCOUNTER (OUTPATIENT)
Dept: INFUSION THERAPY | Age: 50
Discharge: HOME | End: 2020-07-30
Payer: COMMERCIAL

## 2020-07-30 ENCOUNTER — TELEPHONE (OUTPATIENT)
Dept: ONCOLOGY | Age: 50
End: 2020-07-30

## 2020-07-30 VITALS
TEMPERATURE: 97.7 F | WEIGHT: 281.6 LBS | HEART RATE: 116 BPM | SYSTOLIC BLOOD PRESSURE: 127 MMHG | BODY MASS INDEX: 41.59 KG/M2 | DIASTOLIC BLOOD PRESSURE: 85 MMHG

## 2020-07-30 DIAGNOSIS — C50.211 BILATERAL MALIGNANT NEOPLASM OF UPPER INNER QUADRANT OF BREAST IN FEMALE, UNSPECIFIED ESTROGEN RECEPTOR STATUS (HCC): Primary | ICD-10-CM

## 2020-07-30 DIAGNOSIS — C79.51 BONE METASTASIS (HCC): ICD-10-CM

## 2020-07-30 DIAGNOSIS — C50.212 BILATERAL MALIGNANT NEOPLASM OF UPPER INNER QUADRANT OF BREAST IN FEMALE, UNSPECIFIED ESTROGEN RECEPTOR STATUS (HCC): Primary | ICD-10-CM

## 2020-07-30 PROCEDURE — 96401 CHEMO ANTI-NEOPL SQ/IM: CPT

## 2020-07-30 PROCEDURE — 6360000002 HC RX W HCPCS: Performed by: INTERNAL MEDICINE

## 2020-07-30 PROCEDURE — 99215 OFFICE O/P EST HI 40 MIN: CPT | Performed by: INTERNAL MEDICINE

## 2020-07-30 PROCEDURE — 99211 OFF/OP EST MAY X REQ PHY/QHP: CPT | Performed by: INTERNAL MEDICINE

## 2020-07-30 RX ORDER — ONDANSETRON 4 MG/1
4 TABLET, ORALLY DISINTEGRATING ORAL EVERY 8 HOURS PRN
Qty: 30 TABLET | Refills: 1 | Status: SHIPPED | OUTPATIENT
Start: 2020-07-30 | End: 2020-09-24 | Stop reason: SDUPTHER

## 2020-07-30 RX ORDER — LIDOCAINE 4 G/G
1 PATCH TOPICAL DAILY
Qty: 30 PATCH | Refills: 0 | Status: SHIPPED | OUTPATIENT
Start: 2020-07-30 | End: 2020-08-29

## 2020-07-30 RX ADMIN — DENOSUMAB 120 MG: 120 INJECTION SUBCUTANEOUS at 10:58

## 2020-07-30 NOTE — TELEPHONE ENCOUNTER
start Stephen Sites and Xgeva   2- need weekly cbc and cmp, until next visit pt wants to get them closer to home (lab slips given to pt w/instruction)  3- rv in 4 weeks (8/27/2020) with labs and Xgeva , labs being cbc, cmp CA 27-29  4- give pt info about  \"chronic pain relief clinic Dr Fay Hernandez on [facility]\"-phone number given to pt to call.

## 2020-07-30 NOTE — TELEPHONE ENCOUNTER
Reviewed Dr. Rian Morales notes from visit today. Patient to start [address] [facility]. Pain and constipation seem to be problems. Patient had a busy day today with appointment. I will call patient tomorrow.

## 2020-07-30 NOTE — PATIENT INSTRUCTIONS
Plan  1- start Ibrance and Xgeva   2- need weekly cbc and cmp, until next visit pt wants to get them closer to home  3- rv in 4 weeks with labs and Xgeva , labs being cbc, cmp CA 27-29  4- give pt info about  \"chronic pain relief clinic Dr Winn Situ on [facility]\"

## 2020-07-31 ENCOUNTER — TELEPHONE (OUTPATIENT)
Dept: ONCOLOGY | Age: 50
End: 2020-07-31

## 2020-07-31 NOTE — TELEPHONE ENCOUNTER
Called [place] and left message. Reminded her that I am her oncology nurse navigator at [facility] Let her know that I wanted to check in with her, see how she is doing. Reminded her that I am here as a resource, support, and to help facilitate care. Encouraged her to call me. Left my name and contact information.

## 2020-08-03 ENCOUNTER — TELEPHONE (OUTPATIENT)
Dept: ONCOLOGY | Age: 50
End: 2020-08-03

## 2020-08-03 NOTE — TELEPHONE ENCOUNTER
Pt phoned in today stating she has vomited today for the first time. This is day 5 of taking her new chemo. She did take a Zofran and it seems to be working. Instructed her to take the Zofran ATC for a couple days to see if that helps then taper off. Dr Melissa Alonzo is here on Thursday and she will let us know if it is working if not we will have her see Dr Melissa Alonzo. Pt verbalizes understanding.

## 2020-08-04 ENCOUNTER — TELEPHONE (OUTPATIENT)
Dept: ONCOLOGY | Age: 50
End: 2020-08-04

## 2020-08-04 NOTE — TELEPHONE ENCOUNTER
Beth Edwards returned my call. She relates she has received first xgeva injection and has started the ibrance. She relates that she has been on for 5 days. She relates she had nausea and vomiting yesterday. She is taking the anti nausea medication and seems to be doing better today. Pt relates she is doing ok. Patient is quiet. I asked her if there is anything I can do and she relates she does not think so. I let her know that she can speak with our  or spiritual care rep. She relates she has a open relationship with her  and does not think she needs regular calls at this time from [place]. I let her know that [facility] may have some programs she would like or she can consider a support group. I encouraged her to call me if she wants information about any of these or if she needs assistance. She relates she will and thanked me for the call. Pt on pending.

## 2020-08-05 ENCOUNTER — HOSPITAL ENCOUNTER (OUTPATIENT)
Age: 50
Setting detail: SPECIMEN
Discharge: HOME | End: 2020-08-05
Payer: COMMERCIAL

## 2020-08-05 LAB
ALBUMIN SERPL-MCNC: 3.8 G/DL (ref 3.5–5.2)
ALBUMIN/GLOBULIN RATIO: 1.5 (ref 1–2.5)
ALP BLD-CCNC: 130 U/L (ref 35–104)
ALT SERPL-CCNC: 33 U/L (ref 5–33)
ANION GAP SERPL CALCULATED.3IONS-SCNC: 18 MMOL/L (ref 9–17)
AST SERPL-CCNC: 34 U/L
BILIRUB SERPL-MCNC: 0.34 MG/DL (ref 0.3–1.2)
BUN BLDV-MCNC: 10 MG/DL (ref 6–20)
BUN/CREAT BLD: ABNORMAL (ref 9–20)
CALCIUM SERPL-MCNC: 8.5 MG/DL (ref 8.6–10.4)
CHLORIDE BLD-SCNC: 105 MMOL/L (ref 98–107)
CO2: 20 MMOL/L (ref 20–31)
CREAT SERPL-MCNC: 0.63 MG/DL (ref 0.5–0.9)
GFR AFRICAN AMERICAN: >60 ML/MIN
GFR NON-AFRICAN AMERICAN: >60 ML/MIN
GFR SERPL CREATININE-BSD FRML MDRD: ABNORMAL ML/MIN/{1.73_M2}
GFR SERPL CREATININE-BSD FRML MDRD: ABNORMAL ML/MIN/{1.73_M2}
GLUCOSE BLD-MCNC: 109 MG/DL (ref 70–99)
POTASSIUM SERPL-SCNC: 4.2 MMOL/L (ref 3.7–5.3)
SODIUM BLD-SCNC: 143 MMOL/L (ref 135–144)
TOTAL PROTEIN: 6.4 G/DL (ref 6.4–8.3)

## 2020-08-06 ENCOUNTER — TELEPHONE (OUTPATIENT)
Dept: ONCOLOGY | Age: 50
End: 2020-08-06

## 2020-08-06 LAB
ABSOLUTE EOS #: 0.23 K/UL (ref 0–0.4)
ABSOLUTE IMMATURE GRANULOCYTE: 0 K/UL (ref 0–0.3)
ABSOLUTE LYMPH #: 0.99 K/UL (ref 1–4.8)
ABSOLUTE MONO #: 0.2 K/UL (ref 0.1–0.8)
BASOPHILS # BLD: 0 % (ref 0–2)
BASOPHILS ABSOLUTE: 0 K/UL (ref 0–0.2)
DIFFERENTIAL TYPE: ABNORMAL
EOSINOPHILS RELATIVE PERCENT: 8 % (ref 1–4)
HCT VFR BLD CALC: 35.3 % (ref 36.3–47.1)
HEMOGLOBIN: 11.3 G/DL (ref 11.9–15.1)
IMMATURE GRANULOCYTES: 0 %
LYMPHOCYTES # BLD: 34 % (ref 24–44)
MCH RBC QN AUTO: 29 PG (ref 25.2–33.5)
MCHC RBC AUTO-ENTMCNC: 32 G/DL (ref 28.4–34.8)
MCV RBC AUTO: 90.7 FL (ref 82.6–102.9)
MONOCYTES # BLD: 7 % (ref 1–7)
MORPHOLOGY: ABNORMAL
NRBC AUTOMATED: 0 PER 100 WBC
PDW BLD-RTO: 16.2 % (ref 11.8–14.4)
PLATELET # BLD: 219 K/UL (ref 138–453)
PLATELET ESTIMATE: ABNORMAL
PMV BLD AUTO: 10.5 FL (ref 8.1–13.5)
RBC # BLD: 3.89 M/UL (ref 3.95–5.11)
RBC # BLD: ABNORMAL 10*6/UL
SEG NEUTROPHILS: 51 % (ref 36–66)
SEGMENTED NEUTROPHILS ABSOLUTE COUNT: 1.48 K/UL (ref 1.8–7.7)
WBC # BLD: 2.9 K/UL (ref 3.5–11.3)
WBC # BLD: ABNORMAL 10*3/UL

## 2020-08-06 NOTE — TELEPHONE ENCOUNTER
Pt called stating she took her Ibrance pill this morning and vomited shortly after. She did not see the pill, but did not know if she should take another one just in case. Writer called Mirna oral compliance RN, she states not to take another pill. Notified pt and she verbalized understanding. Pt states she has had this happen 3 times now, and asked what she should do to prevent vomiting. Advised for pt to take zofran 30-60 minutes prior to taking Ibrance. Pt verbalized understanding. Advised to call office if this continues while using zofran.

## 2020-08-11 ENCOUNTER — TELEPHONE (OUTPATIENT)
Dept: ONCOLOGY | Age: 50
End: 2020-08-11

## 2020-08-11 NOTE — TELEPHONE ENCOUNTER
Writer placed call to monitor Ibrance. Stu Reyes has had some nausea and vomiting since starting Ibrance. Stu Reyes says taking the zofran 30 min before taking the Kim Husk is helping. Stu Reyes says she either has diarrhea or constipation. She had medications for this. Poor appetite, Stu Reyes it drinking at least 1 protein drink daily to help with nutrition. Will continue to monitor.

## 2020-08-12 ENCOUNTER — HOSPITAL ENCOUNTER (OUTPATIENT)
Age: 50
Setting detail: SPECIMEN
Discharge: HOME | End: 2020-08-12
Payer: COMMERCIAL

## 2020-08-13 ENCOUNTER — TELEPHONE (OUTPATIENT)
Dept: ONCOLOGY | Age: 50
End: 2020-08-13

## 2020-08-13 LAB
ABSOLUTE EOS #: 0.05 K/UL (ref 0–0.4)
ABSOLUTE IMMATURE GRANULOCYTE: 0 K/UL (ref 0–0.3)
ABSOLUTE LYMPH #: 0.88 K/UL (ref 1–4.8)
ABSOLUTE MONO #: 0.12 K/UL (ref 0.1–0.8)
ALBUMIN SERPL-MCNC: 4 G/DL (ref 3.5–5.2)
ALBUMIN/GLOBULIN RATIO: 1.5 (ref 1–2.5)
ALP BLD-CCNC: 156 U/L (ref 35–104)
ALT SERPL-CCNC: 30 U/L (ref 5–33)
ANION GAP SERPL CALCULATED.3IONS-SCNC: 17 MMOL/L (ref 9–17)
AST SERPL-CCNC: 34 U/L
BASOPHILS # BLD: 1 % (ref 0–2)
BASOPHILS ABSOLUTE: 0.02 K/UL (ref 0–0.2)
BILIRUB SERPL-MCNC: 0.44 MG/DL (ref 0.3–1.2)
BUN BLDV-MCNC: 12 MG/DL (ref 6–20)
BUN/CREAT BLD: ABNORMAL (ref 9–20)
CALCIUM SERPL-MCNC: 8.5 MG/DL (ref 8.6–10.4)
CHLORIDE BLD-SCNC: 102 MMOL/L (ref 98–107)
CO2: 21 MMOL/L (ref 20–31)
CREAT SERPL-MCNC: 0.75 MG/DL (ref 0.5–0.9)
DIFFERENTIAL TYPE: ABNORMAL
EOSINOPHILS RELATIVE PERCENT: 3 % (ref 1–4)
GFR AFRICAN AMERICAN: >60 ML/MIN
GFR NON-AFRICAN AMERICAN: >60 ML/MIN
GFR SERPL CREATININE-BSD FRML MDRD: ABNORMAL ML/MIN/{1.73_M2}
GFR SERPL CREATININE-BSD FRML MDRD: ABNORMAL ML/MIN/{1.73_M2}
GLUCOSE BLD-MCNC: 148 MG/DL (ref 70–99)
HCT VFR BLD CALC: 39.9 % (ref 36.3–47.1)
HEMOGLOBIN: 12.3 G/DL (ref 11.9–15.1)
IMMATURE GRANULOCYTES: 0 %
LYMPHOCYTES # BLD: 52 % (ref 24–44)
MCH RBC QN AUTO: 29.8 PG (ref 25.2–33.5)
MCHC RBC AUTO-ENTMCNC: 30.8 G/DL (ref 28.4–34.8)
MCV RBC AUTO: 96.6 FL (ref 82.6–102.9)
MONOCYTES # BLD: 7 % (ref 1–7)
MORPHOLOGY: ABNORMAL
NRBC AUTOMATED: 0 PER 100 WBC
PDW BLD-RTO: 17.8 % (ref 11.8–14.4)
PLATELET # BLD: 172 K/UL (ref 138–453)
PLATELET ESTIMATE: ABNORMAL
PMV BLD AUTO: 10.2 FL (ref 8.1–13.5)
POTASSIUM SERPL-SCNC: 4.1 MMOL/L (ref 3.7–5.3)
RBC # BLD: 4.13 M/UL (ref 3.95–5.11)
RBC # BLD: ABNORMAL 10*6/UL
SEG NEUTROPHILS: 37 % (ref 36–66)
SEGMENTED NEUTROPHILS ABSOLUTE COUNT: 0.63 K/UL (ref 1.8–7.7)
SODIUM BLD-SCNC: 140 MMOL/L (ref 135–144)
TOTAL PROTEIN: 6.7 G/DL (ref 6.4–8.3)
WBC # BLD: 1.7 K/UL (ref 3.5–11.3)
WBC # BLD: ABNORMAL 10*3/UL

## 2020-08-19 ENCOUNTER — HOSPITAL ENCOUNTER (OUTPATIENT)
Age: 50
Setting detail: SPECIMEN
Discharge: HOME | End: 2020-08-19
Payer: COMMERCIAL

## 2020-08-19 LAB
ABSOLUTE EOS #: 0 K/UL (ref 0–0.4)
ABSOLUTE IMMATURE GRANULOCYTE: 0 K/UL (ref 0–0.3)
ABSOLUTE LYMPH #: 1.08 K/UL (ref 1–4.8)
ABSOLUTE MONO #: 0.03 K/UL (ref 0.1–0.8)
ALBUMIN SERPL-MCNC: 3.9 G/DL (ref 3.5–5.2)
ALBUMIN/GLOBULIN RATIO: 1.6 (ref 1–2.5)
ALP BLD-CCNC: 168 U/L (ref 35–104)
ALT SERPL-CCNC: 22 U/L (ref 5–33)
ANION GAP SERPL CALCULATED.3IONS-SCNC: 15 MMOL/L (ref 9–17)
AST SERPL-CCNC: 28 U/L
BASOPHILS # BLD: 2 % (ref 0–2)
BASOPHILS ABSOLUTE: 0.03 K/UL (ref 0–0.2)
BILIRUB SERPL-MCNC: 0.44 MG/DL (ref 0.3–1.2)
BUN BLDV-MCNC: 11 MG/DL (ref 6–20)
BUN/CREAT BLD: ABNORMAL (ref 9–20)
CALCIUM SERPL-MCNC: 8.3 MG/DL (ref 8.6–10.4)
CELLS COUNTED: 50
CHLORIDE BLD-SCNC: 102 MMOL/L (ref 98–107)
CO2: 22 MMOL/L (ref 20–31)
CREAT SERPL-MCNC: 0.7 MG/DL (ref 0.5–0.9)
DIFFERENTIAL TYPE: ABNORMAL
EOSINOPHILS RELATIVE PERCENT: 0 % (ref 1–4)
GFR AFRICAN AMERICAN: >60 ML/MIN
GFR NON-AFRICAN AMERICAN: >60 ML/MIN
GFR SERPL CREATININE-BSD FRML MDRD: ABNORMAL ML/MIN/{1.73_M2}
GFR SERPL CREATININE-BSD FRML MDRD: ABNORMAL ML/MIN/{1.73_M2}
GLUCOSE BLD-MCNC: 126 MG/DL (ref 70–99)
HCT VFR BLD CALC: 38.1 % (ref 36.3–47.1)
HEMOGLOBIN: 12 G/DL (ref 11.9–15.1)
IMMATURE GRANULOCYTES: 0 %
LYMPHOCYTES # BLD: 72 % (ref 24–44)
MCH RBC QN AUTO: 29.9 PG (ref 25.2–33.5)
MCHC RBC AUTO-ENTMCNC: 31.5 G/DL (ref 28.4–34.8)
MCV RBC AUTO: 94.8 FL (ref 82.6–102.9)
MONOCYTES # BLD: 2 % (ref 1–7)
MORPHOLOGY: ABNORMAL
NRBC AUTOMATED: 0 PER 100 WBC
PDW BLD-RTO: 18.6 % (ref 11.8–14.4)
PLATELET # BLD: 113 K/UL (ref 138–453)
PLATELET ESTIMATE: ABNORMAL
PMV BLD AUTO: 10.9 FL (ref 8.1–13.5)
POTASSIUM SERPL-SCNC: 3.9 MMOL/L (ref 3.7–5.3)
RBC # BLD: 4.02 M/UL (ref 3.95–5.11)
RBC # BLD: ABNORMAL 10*6/UL
SEG NEUTROPHILS: 24 % (ref 36–66)
SEGMENTED NEUTROPHILS ABSOLUTE COUNT: 0.36 K/UL (ref 1.8–7.7)
SODIUM BLD-SCNC: 139 MMOL/L (ref 135–144)
TOTAL PROTEIN: 6.4 G/DL (ref 6.4–8.3)
WBC # BLD: 1.5 K/UL (ref 3.5–11.3)
WBC # BLD: ABNORMAL 10*3/UL

## 2020-08-20 ENCOUNTER — TELEPHONE (OUTPATIENT)
Dept: ONCOLOGY | Age: 50
End: 2020-08-20

## 2020-08-20 NOTE — TELEPHONE ENCOUNTER
Writer reached out to Víctor Maya to review lab results with her from yesterday. No answer, left message with return contact number will monitor.

## 2020-08-20 NOTE — TELEPHONE ENCOUNTER
Abraham Ann returned the phone call. Abraham Ann denies signs or symptoms of infection. Informed her that she has a low white count and can be more prone to infection. Abraham Ann denies any soreness in her mouth or upper respiratory symptoms. Instructed her to call if temp goes to 100.4 f. Encouraged hand washing using face mask as well as good hygiene and diet . This will be Arlene's week off of [facility] and she will have her follow up on 8/27/20 with lab work. Call placed to Dr Parul Davalos to update patient status. Left message on his phone. Will continue to monitor.

## 2020-08-27 ENCOUNTER — TELEPHONE (OUTPATIENT)
Dept: RADIATION ONCOLOGY | Age: 50
End: 2020-08-27

## 2020-08-27 ENCOUNTER — HOSPITAL ENCOUNTER (OUTPATIENT)
Dept: INFUSION THERAPY | Age: 50
Discharge: HOME | End: 2020-08-27
Payer: COMMERCIAL

## 2020-08-27 ENCOUNTER — TELEPHONE (OUTPATIENT)
Dept: ONCOLOGY | Age: 50
End: 2020-08-27

## 2020-08-27 ENCOUNTER — TELEPHONE (OUTPATIENT)
Dept: INFUSION THERAPY | Age: 50
End: 2020-08-27

## 2020-08-27 ENCOUNTER — HOSPITAL ENCOUNTER (OUTPATIENT)
Dept: GENERAL RADIOLOGY | Age: 50
Discharge: HOME | End: 2020-08-29
Payer: COMMERCIAL

## 2020-08-27 ENCOUNTER — HOSPITAL ENCOUNTER (OUTPATIENT)
Dept: RADIATION ONCOLOGY | Age: 50
Discharge: HOME | End: 2020-08-27
Attending: RADIOLOGY
Payer: COMMERCIAL

## 2020-08-27 ENCOUNTER — HOSPITAL ENCOUNTER (OUTPATIENT)
Age: 50
Discharge: HOME | End: 2020-08-29
Payer: COMMERCIAL

## 2020-08-27 ENCOUNTER — OFFICE VISIT (OUTPATIENT)
Dept: ONCOLOGY | Age: 50
End: 2020-08-27
Payer: COMMERCIAL

## 2020-08-27 VITALS
SYSTOLIC BLOOD PRESSURE: 110 MMHG | TEMPERATURE: 97.9 F | HEART RATE: 64 BPM | BODY MASS INDEX: 41.59 KG/M2 | DIASTOLIC BLOOD PRESSURE: 74 MMHG | WEIGHT: 281.6 LBS | RESPIRATION RATE: 18 BRPM

## 2020-08-27 VITALS
RESPIRATION RATE: 14 BRPM | HEART RATE: 72 BPM | TEMPERATURE: 97.8 F | SYSTOLIC BLOOD PRESSURE: 122 MMHG | DIASTOLIC BLOOD PRESSURE: 82 MMHG

## 2020-08-27 DIAGNOSIS — C50.211 BILATERAL MALIGNANT NEOPLASM OF UPPER INNER QUADRANT OF BREAST IN FEMALE, UNSPECIFIED ESTROGEN RECEPTOR STATUS (HCC): ICD-10-CM

## 2020-08-27 DIAGNOSIS — Z17.0 MALIGNANT NEOPLASM OF UPPER-OUTER QUADRANT OF LEFT BREAST IN FEMALE, ESTROGEN RECEPTOR POSITIVE (HCC): ICD-10-CM

## 2020-08-27 DIAGNOSIS — C79.51 BONE METASTASIS (HCC): Primary | ICD-10-CM

## 2020-08-27 DIAGNOSIS — C50.412 MALIGNANT NEOPLASM OF UPPER-OUTER QUADRANT OF LEFT BREAST IN FEMALE, ESTROGEN RECEPTOR POSITIVE (HCC): ICD-10-CM

## 2020-08-27 DIAGNOSIS — C50.212 BILATERAL MALIGNANT NEOPLASM OF UPPER INNER QUADRANT OF BREAST IN FEMALE, UNSPECIFIED ESTROGEN RECEPTOR STATUS (HCC): ICD-10-CM

## 2020-08-27 DIAGNOSIS — C49.A3 GIST (GASTROINTESTINAL STROMAL TUMOR) OF SMALL BOWEL, MALIGNANT (HCC): ICD-10-CM

## 2020-08-27 LAB
ABSOLUTE EOS #: 0.02 K/UL (ref 0–0.4)
ABSOLUTE IMMATURE GRANULOCYTE: ABNORMAL K/UL (ref 0–0.3)
ABSOLUTE LYMPH #: 0.82 K/UL (ref 1–4.8)
ABSOLUTE MONO #: 0.3 K/UL (ref 0.1–0.8)
ALBUMIN SERPL-MCNC: 4 G/DL (ref 3.5–5.2)
ALBUMIN/GLOBULIN RATIO: 1.3 (ref 1–2.5)
ALP BLD-CCNC: 176 U/L (ref 35–104)
ALT SERPL-CCNC: 29 U/L (ref 5–33)
ANION GAP SERPL CALCULATED.3IONS-SCNC: 13 MMOL/L (ref 9–17)
AST SERPL-CCNC: 34 U/L
BASOPHILS # BLD: 0 % (ref 0–2)
BASOPHILS ABSOLUTE: 0 K/UL (ref 0–0.2)
BILIRUB SERPL-MCNC: 0.37 MG/DL (ref 0.3–1.2)
BUN BLDV-MCNC: 9 MG/DL (ref 6–20)
BUN/CREAT BLD: ABNORMAL (ref 9–20)
CALCIUM SERPL-MCNC: 9 MG/DL (ref 8.6–10.4)
CHLORIDE BLD-SCNC: 101 MMOL/L (ref 98–107)
CO2: 24 MMOL/L (ref 20–31)
CREAT SERPL-MCNC: 0.56 MG/DL (ref 0.5–0.9)
DIFFERENTIAL TYPE: ABNORMAL
EOSINOPHILS RELATIVE PERCENT: 1 % (ref 1–4)
GFR AFRICAN AMERICAN: >60 ML/MIN
GFR NON-AFRICAN AMERICAN: >60 ML/MIN
GFR SERPL CREATININE-BSD FRML MDRD: ABNORMAL ML/MIN/{1.73_M2}
GFR SERPL CREATININE-BSD FRML MDRD: ABNORMAL ML/MIN/{1.73_M2}
GLUCOSE BLD-MCNC: 136 MG/DL (ref 70–99)
HCT VFR BLD CALC: 34.6 % (ref 36–46)
HEMOGLOBIN: 11.9 G/DL (ref 12–16)
IMMATURE GRANULOCYTES: ABNORMAL %
LYMPHOCYTES # BLD: 51 % (ref 24–44)
MCH RBC QN AUTO: 30.7 PG (ref 26–34)
MCHC RBC AUTO-ENTMCNC: 34.4 G/DL (ref 31–37)
MCV RBC AUTO: 89.2 FL (ref 80–100)
MONOCYTES # BLD: 19 % (ref 1–7)
MORPHOLOGY: ABNORMAL
NRBC AUTOMATED: ABNORMAL PER 100 WBC
PDW BLD-RTO: 22.3 % (ref 12.5–15.4)
PLATELET # BLD: 128 K/UL (ref 140–450)
PLATELET ESTIMATE: ABNORMAL
PMV BLD AUTO: 7.4 FL (ref 6–12)
POTASSIUM SERPL-SCNC: 3.5 MMOL/L (ref 3.7–5.3)
RBC # BLD: 3.89 M/UL (ref 4–5.2)
RBC # BLD: ABNORMAL 10*6/UL
SEG NEUTROPHILS: 29 % (ref 36–66)
SEGMENTED NEUTROPHILS ABSOLUTE COUNT: 0.46 K/UL (ref 1.8–7.7)
SODIUM BLD-SCNC: 138 MMOL/L (ref 135–144)
TOTAL PROTEIN: 7 G/DL (ref 6.4–8.3)
WBC # BLD: 1.6 K/UL (ref 3.5–11)
WBC # BLD: ABNORMAL 10*3/UL

## 2020-08-27 PROCEDURE — 6360000002 HC RX W HCPCS: Performed by: INTERNAL MEDICINE

## 2020-08-27 PROCEDURE — 80053 COMPREHEN METABOLIC PANEL: CPT

## 2020-08-27 PROCEDURE — 99212 OFFICE O/P EST SF 10 MIN: CPT | Performed by: RADIOLOGY

## 2020-08-27 PROCEDURE — 86300 IMMUNOASSAY TUMOR CA 15-3: CPT

## 2020-08-27 PROCEDURE — 36415 COLL VENOUS BLD VENIPUNCTURE: CPT

## 2020-08-27 PROCEDURE — 96401 CHEMO ANTI-NEOPL SQ/IM: CPT

## 2020-08-27 PROCEDURE — 73030 X-RAY EXAM OF SHOULDER: CPT

## 2020-08-27 PROCEDURE — 99214 OFFICE O/P EST MOD 30 MIN: CPT | Performed by: INTERNAL MEDICINE

## 2020-08-27 PROCEDURE — 85025 COMPLETE CBC W/AUTO DIFF WBC: CPT

## 2020-08-27 PROCEDURE — 71101 X-RAY EXAM UNILAT RIBS/CHEST: CPT

## 2020-08-27 PROCEDURE — 99211 OFF/OP EST MAY X REQ PHY/QHP: CPT

## 2020-08-27 PROCEDURE — 73060 X-RAY EXAM OF HUMERUS: CPT

## 2020-08-27 RX ORDER — OXYCODONE HYDROCHLORIDE 5 MG/1
5 TABLET ORAL EVERY 6 HOURS PRN
Qty: 90 TABLET | Refills: 0 | Status: SHIPPED | OUTPATIENT
Start: 2020-08-27 | End: 2021-02-11 | Stop reason: SDUPTHER

## 2020-08-27 RX ORDER — OXYCODONE HYDROCHLORIDE 5 MG/1
5 CAPSULE ORAL EVERY 4 HOURS PRN
Status: CANCELLED | OUTPATIENT
Start: 2020-08-27

## 2020-08-27 RX ADMIN — DENOSUMAB 120 MG: 120 INJECTION SUBCUTANEOUS at 12:53

## 2020-08-27 ASSESSMENT — PAIN DESCRIPTION - ORIENTATION: ORIENTATION: RIGHT

## 2020-08-27 ASSESSMENT — PAIN DESCRIPTION - FREQUENCY: FREQUENCY: INTERMITTENT

## 2020-08-27 ASSESSMENT — PAIN DESCRIPTION - LOCATION: LOCATION: ARM;ELBOW;SHOULDER

## 2020-08-27 ASSESSMENT — PAIN DESCRIPTION - PAIN TYPE: TYPE: ACUTE PAIN

## 2020-08-27 ASSESSMENT — PAIN SCALES - GENERAL: PAINLEVEL_OUTOF10: 6

## 2020-08-27 NOTE — PROGRESS NOTES
DIAGNOSIS:   1- T2, N0, M0 ER positive WA positive and HER-2/eve negative invasive ductal carcinoma of the left breast ( inner upper quadrant)  2- Repeated GI bleeding greetings 2 symptomatic anemia, despite extensive GI workup, source of bleeding was never found. 3- finally a small mass was seen in the jejunum. 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 Margins negative   4- compression fractures and bone lesions. Likely metastatic cancer (5/2020)     CURRENT THERAPY:  1- Mastectomy and axillary sampling  2- adjuvant chemotherapy with Taxotere and Cytoxan starting 5/16/2013. Chemotherapy stopped after 3 cycles because of ongoing GI bleeding and symptomatic anemia  3- Conservative management for GI bleeding. Leading completely stopped after the discontinuation of chemotherapy  4- started tamoxifen after chemotherapy was completed. 8/2013. 5- transitioned to Arimidex 6/2014, completed 08/2018  6- GIST resection, resected 9/2017   7-status post kyphoplasty and biopsy of bone metastases 5/2020. The tumor was minimally ER positive at 5% and WA negative. 8- rodding of the left femur completed. Plan for  Radiation  9- Systemic therapy, plan Arimidex plus Ibrance  10 - XRT completed 07/2020  10- Plan Mirella Shirley and Nandini Rhodes 07/30/2020    BRIEF CASE HISTORY:   Olivia Ramirez is a very pleasant 47 y.o. who felt a mass in the medial aspect of her left breast, around the 9:00 position. Patient sought medical attention and mammogram and ultrasound were done. Showing an irregular, high-density mass with indistinct margins containing pleomorphic calcifications in the lower inner quadrant of the left breast close to 9:00 location this mass measures are mammogram 2.7 x 2.6 x 2 cm. Subsequently targeted ultrasound was performed showing hypoechoic, irregular, taller than wider, solid mass at 9:00 with posterior acoustic shadowing. The calcifications were visible on ultrasound .  On ultrasound the solid mass measured 2.4 x 2.6 x 1 underwent successful kyphoplasty and biopsy and pathology showed breast cancer, it was 5% ER positive and AK was negative. HER-2 was +2 which is equivocal so a fish test was ordered. Plan for radiation, staging PET, and Arimidex. PET scan showed widespread metastatic bony disease including the thoracic spine, the lumbar spine, the left femur and the rib area. She underwent repeated kyphoplasty and rodding of the left femur. She is undergoing radiation to the painful bony areas in thoracic, rib and lumbar areas as well as the left femur. We will likely use Ibrance plus Arimidex. As well as monthly Xgeva to be started after completion of radiation. INTERIM HISTORY: She comes in today for follow up for metastatic breast cancer and for cycle #2 of Xgeva. She reports she tolerated Ibrance with no mouth sores, diarrhea, or rash. Her bowels have fluctuated and she is working to manage with stool softener. She did have single episode of nausea and vomiting, managed with Zofran. Her left side pain is resolved but now has right sided pain, may be over compensation, imaging has been scheduled by rad-onc. She has some pain in left lower leg, she is concerned about previous clotting, she does continue with Coumadin with INR in therapeutic range. GYNECOLOGICAL HISTORY  Menarche at age 14. He is premenopausal with regular periods. +2. She used birth control minimally. PAST MEDICAL HISTORY: has a past medical history of Anemia, Anxiety, Atrial fibrillation ([address] Breast cancer ([address] Carpal tunnel syndrome, Depression, GI bleed, Hypertension, Lymphedema, Neurologic cardiac syncope, Obesity, Pain, Sleep apnea, ANA PAULA (stress urinary incontinence, female), Tachycardia, Varicella, and Varicella without complication. PAST SURGICAL HISTORY: has a past surgical history that includes Shoulder arthroscopy (); Colonoscopy (); Dilation and curettage of uterus; Knee arthroscopy; fracture surgery;  Tunneled venous port placement (Right); Upper gastrointestinal endoscopy (7/6/2013); Colonoscopy (7/6/2013); Mastectomy (Left, 4/8/13); other surgical history (1969); Breast biopsy (Left, 3/21/13); ileoscopy (10/12/05); Cosmetic surgery; Hysterectomy; denia and bso (cervix removed) (4/23/14); Enterocele repair (4/23/14); Cystoscopy (4/23/14); bladder suspension (4/23/14); Colonoscopy (10/12/2005); Upper gastrointestinal endoscopy (04/23/2015); and tumor removal.     CURRENT MEDICATIONS:  has a current medication list which includes the following prescription(s): ondansetron, lidocaine, ibrance, diazepam, oxycodone, anastrozole, duloxetine, losartan, omeprazole, hydrochlorothiazide, amlodipine, docusate, metoprolol tartrate, megestrol, and warfarin. ALLERGIES:  is allergic to adhesive tape. FAMILY HISTORY: Negative for any hematological or oncological conditions. Specifically no history of breast cancer in the family    SOCIAL HISTORY:  reports that she quit smoking about 2 years ago. Her smoking use included Cigarettes. She smoked About 20 years. She has never used smokeless tobacco. She reports that she does not drink alcohol or use illicit drugs. REVIEW OF SYSTEMS:   General: No fever or night sweats. Weight is stable. +hot flashes   Eyes: No double or blurred vision. Ears: No tinnitus or hearing problem,    Throat: No dysphagia or sore throat   Respiratory: No chest pain, shortness of breath, cough no hemoptysis. Cardiovascular: Denies chest pain, PND or orthopnea. No L E swelling or palpitations. Gastrointestinal: No diarrhea, or abdominal pain +constipation - managing; +nausea and vomiting controlled  Genitourinary: Denies dysuria, hematuria, frequency, urgency or incontinence. No vaginal bleeding. Neurological: Denies headaches, decreased LOC, no sensory or motor focal deficits.   Musculoskeletal: No arthralgia no back pain or joint swelling. +  right rib pain and lower left leg pain  Skin: There are no rashes or bleeding. Psychiatric: No anxiety, no depression. Endocrine: No diabetes or thyroid disease. Hematologic: No bleeding, no adenopathy. PHYSICAL EXAM:  The patient is not in acute distress. Vital signs: Blood pressure 110/74, pulse 64, temperature 97.9 °F (36.6 °C), temperature source Tympanic, resp. rate 18, weight 281 lb 9.6 oz (127.7 kg), last menstrual period 03/17/2014, not currently breastfeeding. HEENT:  Eyes are normal. Ears, nose is congested, no bleeding. Neck: Supple. No lymph node enlargement. No thyroid enlargement. Trachea is centrally located. Chest:  Clear to auscultation. No wheezes or crepitations. Breast:  Right: No masses, no adenopathy. No skin or nippple abnormalities. Left: left-sided mastectomy, surgery site is healed completely, no adenopathy  Heart: Regular sinus rhythm. Abdomen: Soft, nontender. No hepatosplenomegaly. No masses. Extremities:  With no edema. Lymph Nodes:  No cervical, axillary or inguinal lymph node enlargement. Neurologic: Conscious and oriented. No focal neurological deficits. Psychosocial: No depression, anxiety or stress.  Skin: No rashes, bruises or ecchymoses       REVIEW OF LABORATORY DATA:     Lab Results   Component Value Date    WBC 1.6 (L) 08/27/2020    HGB 11.9 (L) 08/27/2020    HCT 34.6 (L) 08/27/2020    MCV 89.2 08/27/2020     (L) 08/27/2020     Lab Results   Component Value Date    IRON 30 (L) 11/29/2017    TIBC 403 11/29/2017    FERRITIN 33 04/04/2019     Lab Results   Component Value Date    NEUTROABS 0.46 (LL) 08/27/2020         Chemistry        Component Value Date/Time     08/27/2020 1020    K 3.5 (L) 08/27/2020 1020     08/27/2020 1020    CO2 24 08/27/2020 1020    BUN 9 08/27/2020 1020    CREATININE 0.56 08/27/2020 1020        Component Value Date/Time    CALCIUM 9.0 08/27/2020 1020    ALKPHOS 176 (H) 08/27/2020 1020    AST 34 (H) 08/27/2020 1020    ALT 29 08/27/2020 1020    BILITOT 0.37 08/27/2020 1020        REVIEW OF RADIOLOGICAL RESULTS:        IMPRESSION:   1- Jelena Garcia  Has  T2, N0, M0 ER/DC positive and HER-2/eve negative invasive ductal carcinoma of the left breast  2- Chemotherapy: received 3 cycles of TC, stopped due to GI bleeding  3- GI bleeding , related to the GIST tumor. Currently on oral iron and hemoglobin is improving  4- on anastrozole. We will continue, plan 5 years of therapy, completed 08/2018  5- for hot flashes, better on Effexor. 6- Recent pancreatitis, possibly biliary. MRI CT scan did not show any gallbladder stone. We will discuss with her surgeons. 7-   distal small bowel mass measuring 3.6 cm, resection shows low risk GIST. No need for adjuvant therapy, margins were negative. 8-bone metastases with compression fracture diagnosed in May/2020. Status post kyphoplasty and biopsy. 9- S/P radiation 07/2020  10- for systemic therapy, she was started on Arimidex, adding Ibrance       PLAN:   1. Her lab work was reviewed and discussed, her [address] is improving. 2. I completed toxicity check - nausea, vomiting, contstipation managed. 3. With her low ANC I explained plan to hold Ibrance until it reaches 1.00 and I am writing for lower dose and we discussed dosing schedule adjustment, plan for lab work next week to assess. 4. I discussed plan for PET after 3 cycles of Ibrance. 5. We will proceed with Xgeva today as per orders, continue with Arimidex unchanged. 6. Clinically she is improved, we discussed her new pain, possibly over compensation, I will review imaging when available. 7. We discussed her concerns about recurrent clotting, with INR in therapeutic range we will continue with Coumadin. 8. I am refilling her Oxycodone. 9. Return in 4 weeks.

## 2020-08-27 NOTE — TELEPHONE ENCOUNTER
Dr Hannah Mccall reviewed imaging. Per Dr Hannah Mccall, pt notified of images and that no treatment will be needed at this time. Pt to call PRN .

## 2020-08-27 NOTE — FLOWSHEET NOTE
Situation:  Writer visited with Ms. Michele Lopez and [employer] as they were checking out following Pt's medical oncology visit. Assessment:  Ms. Michele Lopez was joined by her [employer]. She updated writer about her progress, noting that she wished her white blood cell count was not as low as it was. 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 She told writer she was not interested in the Metast[facility] Breast Cancer group at this time. Intervention:  Writer provided supportive presence and explored Pt's coping and needs; inquired about Pt's sources of support and strength; offered words of encouragement and support; affirmed Pt's strengths; asked if Pt is interested in the [facility] group. Outcome:  Ms. Michele Lopez and [employer] acknowledged writer's words of encouragement and expressed thanks. 08/27/20 1539   Encounter Summary   Services provided to: Patient and family together   Referral/Consult From: [address] [facility] Family members   Continue Visiting   (8/27/20)   Complexity of Encounter Moderate   Length of Encounter 15 minutes   Spiritual Assessment Completed Yes   Routine   Type Follow up   Spiritual/Quaker   Type Spiritual support   Assessment Calm; Approachable; Hopeful;Coping   Intervention Active listening;Explored feelings, thoughts, concerns;Explored coping resources;Sustaining presence/ Ministry of presence; Discussed meaning/purpose   Outcome Receptive; Hopeful;Encouraged;Coping;Expressed feelings/needs/concerns;Engaged in conversation;Expressed gratitude     Electronically signed by Teto Chamberlain, Oncology Outpatient [address], [address] [facility] Radiation Oncology  8/27/2020  3:41 PM

## 2020-08-27 NOTE — PROGRESS NOTES
Pt here for xgeva. Denies any problems. Had Dr appt prior to treatment. Will return 9/24 for Dr visit and treatment.

## 2020-08-27 NOTE — PROGRESS NOTES
Rubio Police  8/27/2020  11:31 AM      Vitals:    08/27/20 1030   BP: 122/82   Pulse: 72   Resp: 14   Temp: 97.8 °F (36.6 °C)    : Patient Currently in Pain: Yes     Pain Level: 6  Patient's Stated Pain Goal: No pain    Wt Readings from Last 1 Encounters:   07/30/20 281 lb 9.6 oz (127.7 kg)                Current Outpatient Medications:     ondansetron (ZOFRAN-ODT) 4 MG disintegrating tablet, Place 1 tablet under the tongue every 8 hours as needed for Nausea or Vomiting, Disp: 30 tablet, Rfl: 1    lidocaine 4 % external patch, Place 1 patch onto the skin daily, Disp: 30 patch, Rfl: 0    potassium chloride (KLOR-CON M) 20 MEQ extended release tablet, Take 1 tablet by mouth 2 times daily, Disp: 60 tablet, Rfl: 0    megestrol (MEGACE) 40 MG/ML suspension, Take 10 mLs by mouth daily, Disp: 240 mL, Rfl: 2    Magic Mouthwash (MIRACLE MOUTHWASH), Swish and spit 5 mLs 4 times daily as needed for Irritation, Disp: 400 mL, Rfl: 1    palbociclib (IBRANCE) 125 MG tablet, Take 125 mg by mouth daily Three weeks on then one week off, Disp: 21 tablet, Rfl: 3    warfarin (COUMADIN) 5 MG tablet, Take 5 mg by mouth Mon,Wed,Fri 7.5mg other days 5mg, Disp: , Rfl:     diazePAM (VALIUM) 2 MG tablet, TAKE ONE TABLET BY MOUTH FOR ONE DOSE PRIOR TO MRI, Disp: , Rfl:     fentaNYL (DURAGESIC) 12 MCG/HR, Place 1 patch onto the skin every 72 hours. , Disp: , Rfl:     oxyCODONE 5 MG capsule, Take 5 mg by mouth every 4 hours as needed for Pain., Disp: , Rfl:     anastrozole (ARIMIDEX) 1 MG tablet, Take 1 tablet by mouth daily, Disp: 30 tablet, Rfl: 6    DULoxetine (CYMBALTA) 30 MG extended release capsule, Take 30 mg by mouth daily, Disp: , Rfl:     cyclobenzaprine (FLEXERIL) 10 MG tablet, Take 10 mg by mouth 3 times daily as needed for Muscle spasms, Disp: , Rfl:     losartan (COZAAR) 100 MG tablet, Take 100 mg by mouth daily, Disp: , Rfl:     dexamethasone (DECADRON) 4 MG tablet, Take 12 mg by mouth daily (with breakfast), Disp: , Rfl:     omeprazole (PRILOSEC) 20 MG delayed release capsule, Take 20 mg by mouth daily, Disp: , Rfl:     celecoxib (CELEBREX) 200 MG capsule, Take 200 mg by mouth daily PRN, Disp: , Rfl:     hydrochlorothiazide (HYDRODIURIL) 12.5 MG tablet, Take 12.5 mg by mouth daily, Disp: , Rfl:     amLODIPine (NORVASC) 5 MG tablet, Take 10 mg by mouth daily , Disp: , Rfl:     docusate sodium (COLACE, DULCOLAX) 100 MG CAPS, Take 100 mg by mouth 2 times daily, Disp: 30 capsule, Rfl: 0    metoprolol tartrate (LOPRESSOR) 25 MG tablet, TAKE ONE TABLET BY MOUTH TWICE A DAY, Disp: 60 tablet, Rfl: 4    Immunizations:    Influenza status:    []   Current   [x]   Patient declined    Pneumococcal status:  []   Current  [x]   Patient declined    Smoking Status:    [] Smoker - PPD:  Smoking cessation education: Provided []   Declined []    [x] Nonsmoker - Quit Date:2010               [] Never a smoker        Hormone:  Lupron []   Last dose given:           Next dose due:   Eligard []   Last dose given:           Next dose due:   Aromatase Inhibitors [x]   Medication name: Arimidex   N/A:  []                  FALLS RISK SCREEN  Instructions:  Assess the patient and enter the appropriate indicators that are present for fall risk identification. Total the numbers entered and assign a fall risk score from Table 2.  Reassess patient at a minimum every 12 weeks or with status change. Assessment   Date  8/27/2020     1. Mental Ability: confusion/cognitively impaired 0     2. Elimination Issues: incontinence, frequency 0       3. Ambulatory: use of assistive devices (walker, cane, off-loading devices),        attached to equipment (IV pole, oxygen) 2     4. Sensory Limitations: dizziness, vertigo, impaired vision 0     5. Age less than 65        0     6. Age 72 or greater 0     7. Medication: diuretics, strong analgesics, hypnotics, sedatives,        antihypertensive agents 3   8.   Falls:  recent history of falls within the last 3

## 2020-08-27 NOTE — LETTER
[address].  Phone: [phone]  Fax: [phone]    Marley Tim MD        August 27, 2020     Patient: Jazz Demarco   YOB: 1970   Date of Visit: 8/27/2020       To Whom It May Concern: It is my medical opinion that Huyen Orr should refrain from participation until 12/1/2020. If you have any questions or concerns, please don't hesitate to call.     Sincerely,        Marley Tim MD

## 2020-08-27 NOTE — PATIENT INSTRUCTIONS
Plan  1- continue to hold Ibrance  2- recheck counts weekly, resume Ibrance when ANC>1000, see new script for 100 mg.   3- rv in 4 weeks with Lisandro

## 2020-08-27 NOTE — LETTER
[address] [facility] CANCER [address].  Phone: [phone]  Fax: [phone]    Mary Carmen Root MD        August 27, 2020     Patient: Darcy Francis   YOB: 1970   Date of Visit: 8/27/2020       To Whom It May Concern: It is my medical opinion that Jass Sloan requires a disability parking placard for the following reasons:  She cannot walk 200 feet without stopping to rest.  Duration of need: 2 years    If you have any questions or concerns, please don't hesitate to call.     Sincerely,        Mary Carmen Root MD

## 2020-08-27 NOTE — TELEPHONE ENCOUNTER
1- continue to hold Ibrance  2- recheck counts weekly, resume Ibrance when ANC>1000, see new script for 100 mg.   3- rv in 4 weeks with Xgeva (9/24/2020)

## 2020-08-28 ENCOUNTER — TELEPHONE (OUTPATIENT)
Dept: ONCOLOGY | Age: 50
End: 2020-08-28

## 2020-08-28 LAB — CA 27-29: 88 U/ML (ref 0–38)

## 2020-08-28 NOTE — TELEPHONE ENCOUNTER
Reviewed patient's follow up with Dr. Lebron Saunders. Holding ibrance till anc is 1.0 or greater. New orders for ibrance dosage change. Checking weekly labs. Pet ct after 3 cycles of ibrance. Pt on pending.

## 2020-08-28 NOTE — PROGRESS NOTES
[address]            Radiation Oncology          [address] [facility]          [place], [address]        Erlinda Connors: [phone]        F: [phone]       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 [facility])  Staging form: Breast, AJCC 7th Edition  - Clinical stage from 2020: Stage IV (T2, N0, M1) - Signed by Deo Perkins MD on 2020  -s/p L MRM SLN 13  -s/p TC x3   -on Tamoxifen then Arimedex completing in   RT to new bone mets:  Sternum, L 6th ribs, and T4-T8: 30Gy 30Gy 20  L 10th rib, L4-SI Joints: 30Gy 20  L femur (s/p ORIF): 30Gy 20  On Ibrance and Xgeva    INTERVAL HISTORY:   Ms Loren Montoya is a 58-year-old female with newly diagnosed stage IV breast cancer for which she underwent palliative radiation therapy to multiple sites completing approximately 1 month ago. Patient is here today for a follow-up visit is overall doing well. She does note improvement in the areas where we treated. Patient however does report new pain in the right shoulder and arm which have started recently since completion of her previous radiation treatments on the left side. Patient is unsure if this is due to bony disease or straining from favoring the right side when sitting up or using her arm. She otherwise denies any other acute symptoms of headaches, dizziness, chest pain, shortness of breath, abdominal pain, nausea, diarrhea, dysuria, or any bleeding. She states that her energy is improving as well as her appetite. She does follow with Dr. Angelique Aquino who she is seeing today.     MEDICATIONS:    Current Outpatient Medications:     palbociclib (IBRANCE) 100 MG capsule, Take 1 daily for 21 days then 1 week off, Disp: 21 capsule, Rfl: 3    oxyCODONE (ROXICODONE) 5 MG immediate release tablet, Take 1 tablet by mouth every 6 hours as needed for Pain for up to 30 days. , Disp: 90 tablet, Rfl: 0    ondansetron (ZOFRAN-ODT) 4 MG disintegrating tablet, Place 1 tablet under the tongue every 8 hours as needed for Nausea or Vomiting, Disp: 30 tablet, Rfl: 1    lidocaine 4 % external patch, Place 1 patch onto the skin daily, Disp: 30 patch, Rfl: 0    megestrol (MEGACE) 40 MG/ML suspension, Take 10 mLs by mouth daily (Patient not taking: Reported on 2020), Disp: 240 mL, Rfl: 2    warfarin (COUMADIN) 5 MG tablet, Take 5 mg by mouth Mon,Wed,Fri 7.5mg other days 5mg, Disp: , Rfl:     diazePAM (VALIUM) 2 MG tablet, TAKE ONE TABLET BY MOUTH FOR ONE DOSE PRIOR TO MRI, Disp: , Rfl:     anastrozole (ARIMIDEX) 1 MG tablet, Take 1 tablet by mouth daily, Disp: 30 tablet, Rfl: 6    DULoxetine (CYMBALTA) 30 MG extended release capsule, Take 30 mg by mouth daily, Disp: , Rfl:     losartan (COZAAR) 100 MG tablet, Take 100 mg by mouth daily, Disp: , Rfl:     omeprazole (PRILOSEC) 20 MG delayed release capsule, Take 20 mg by mouth daily, Disp: , Rfl:     hydrochlorothiazide (HYDRODIURIL) 12.5 MG tablet, Take 12.5 mg by mouth daily, Disp: , Rfl:     amLODIPine (NORVASC) 5 MG tablet, Take 10 mg by mouth daily , Disp: , Rfl:     docusate sodium (COLACE, DULCOLAX) 100 MG CAPS, Take 100 mg by mouth 2 times daily, Disp: 30 capsule, Rfl: 0    metoprolol tartrate (LOPRESSOR) 25 MG tablet, TAKE ONE TABLET BY MOUTH TWICE A DAY, Disp: 60 tablet, Rfl: 4    ALLERGIES:  Allergies   Allergen Reactions    Adhesive Tape Other (See Comments)     Skin breakdown         REVIEW OF SYSTEMS:    A full 14 point review of systems was performed and assessed and found to be negative except as noted above.       PHYSICAL EXAMINATION:    CHAPERONE: Family/friend/companieon Present    ECO Symptomatic but completely ambulatory    VITAL SIGNS: /82   Pulse 72   Temp 97.8 °F (36.6 °C)   Resp 14   LMP 03/17/2014   GENERAL:  General appearance is that of a well-nourished, well-developed in no apparent distress. HEENT: Normocephalic, atraumatic, EOMI, moist mucosa, no erythema. NECK:  No adenopathy or a palpable thyroid mass, trachea is midline. HEART:  Regular rate and rhythm, S1, S2, no murmurs. LUNGS:  Clear to auscultation bilaterally with no wheezing or crackles. ABDOMEN:  Soft, nontender, non distended. EXTREMITIES:  (+) R arm tenderness. No clubbing, cyanosis, or edema. No calf tenderness. MSK: No spinal tenderness. NEUROLOGICAL: No focal deficits. CN II-XII intact. Strength and sensation intact bilaterally. SKIN: No erythema or desquamation. LABS:  WBC   Date Value Ref Range Status   08/27/2020 1.6 (L) 3.5 - 11.0 k/uL Final     Segs Absolute   Date Value Ref Range Status   08/27/2020 0.46 (LL) 1.8 - 7.7 k/uL Final     Hemoglobin   Date Value Ref Range Status   08/27/2020 11.9 (L) 12.0 - 16.0 g/dL Final     Platelet Count   Date Value Ref Range Status   04/26/2012 181 140 - 450 k/uL Final     Platelets   Date Value Ref Range Status   08/27/2020 128 (L) 140 - 450 k/uL Final        IMAGING:   None recent      ASSESSMENT AND PLAN:  Cayetano Diana is a 47 y.o. 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 Overall she is doing well her pain is better controlled. However she does have new symptoms of pain in her right shoulder and arm. Therefore we will order a set of x-rays changes ensure there is no significant metastatic disease that requires urgent treatment. Otherwise patient is recommended to continue on her systemic therapy as it will be her primary method of treatment and it will treat all sites of her disease. Patient otherwise has had improvement in her pain or fatigue and we encouraged her to maintain good hydration and nutrition. Patient is recommended to come back in 3 months for another follow up visit and exam, or can call to come see us sooner if symptoms change. Patient was in agreement with my recommendations. All questions were answered to their satisfaction. Patient was advised to contact us anytime should they have any questions or concerns.      Electronically signed by Maggie Fried MD on 8/28/2020 at 2:13 PM        Medications Prescribed:   New Prescriptions    No medications on file       Orders:   Orders Placed This Encounter   Procedures    XR HUMERUS RIGHT (MIN 2 VIEWS)    XR SHOULDER RIGHT (MIN 2 VIEWS)    XR RIBS RIGHT INCLUDE CHEST (MIN 3 VIEWS)       CC:  Patient Care Team:  No Burden MD as PCP - Sade Valle MD as Surgeon (General Surgery)  Thalia Hernandez DO as Consulting Physician (Obstetrics & Gynecology)  Marley Tim MD as Consulting Physician (Hematology and Oncology)  Zunilda Valles RN as Nurse Navigator (Oncology)  Rosendo Wolf RN as Registered Nurse (Oncology)

## 2020-09-01 ENCOUNTER — TELEPHONE (OUTPATIENT)
Dept: ONCOLOGY | Age: 50
End: 2020-09-01

## 2020-09-02 ENCOUNTER — HOSPITAL ENCOUNTER (OUTPATIENT)
Age: 50
Setting detail: SPECIMEN
Discharge: HOME | End: 2020-09-02
Payer: COMMERCIAL

## 2020-09-02 LAB
ABSOLUTE EOS #: 0 K/UL (ref 0–0.4)
ABSOLUTE IMMATURE GRANULOCYTE: 0 K/UL (ref 0–0.3)
ABSOLUTE LYMPH #: 0.68 K/UL (ref 1–4.8)
ABSOLUTE MONO #: 0.25 K/UL (ref 0.1–0.8)
ALBUMIN SERPL-MCNC: 3.9 G/DL (ref 3.5–5.2)
ALBUMIN/GLOBULIN RATIO: 1.6 (ref 1–2.5)
ALP BLD-CCNC: 178 U/L (ref 35–104)
ALT SERPL-CCNC: 73 U/L (ref 5–33)
ANION GAP SERPL CALCULATED.3IONS-SCNC: 13 MMOL/L (ref 9–17)
AST SERPL-CCNC: 66 U/L
BASOPHILS # BLD: 2 % (ref 0–2)
BASOPHILS ABSOLUTE: 0.04 K/UL (ref 0–0.2)
BILIRUB SERPL-MCNC: 0.42 MG/DL (ref 0.3–1.2)
BUN BLDV-MCNC: 8 MG/DL (ref 6–20)
BUN/CREAT BLD: ABNORMAL (ref 9–20)
CALCIUM SERPL-MCNC: 8.5 MG/DL (ref 8.6–10.4)
CHLORIDE BLD-SCNC: 106 MMOL/L (ref 98–107)
CO2: 23 MMOL/L (ref 20–31)
CREAT SERPL-MCNC: 0.63 MG/DL (ref 0.5–0.9)
DIFFERENTIAL TYPE: ABNORMAL
EOSINOPHILS RELATIVE PERCENT: 0 % (ref 1–4)
GFR AFRICAN AMERICAN: >60 ML/MIN
GFR NON-AFRICAN AMERICAN: >60 ML/MIN
GFR SERPL CREATININE-BSD FRML MDRD: ABNORMAL ML/MIN/{1.73_M2}
GFR SERPL CREATININE-BSD FRML MDRD: ABNORMAL ML/MIN/{1.73_M2}
GLUCOSE BLD-MCNC: 123 MG/DL (ref 70–99)
HCT VFR BLD CALC: 36.8 % (ref 36.3–47.1)
HEMOGLOBIN: 11.9 G/DL (ref 11.9–15.1)
IMMATURE GRANULOCYTES: 0 %
LYMPHOCYTES # BLD: 36 % (ref 24–44)
MCH RBC QN AUTO: 30.1 PG (ref 25.2–33.5)
MCHC RBC AUTO-ENTMCNC: 32.3 G/DL (ref 28.4–34.8)
MCV RBC AUTO: 92.9 FL (ref 82.6–102.9)
MONOCYTES # BLD: 13 % (ref 1–7)
MORPHOLOGY: ABNORMAL
NRBC AUTOMATED: 1 PER 100 WBC
NUCLEATED RED BLOOD CELLS: 1 PER 100 WBC
PDW BLD-RTO: 19.3 % (ref 11.8–14.4)
PLATELET # BLD: 190 K/UL (ref 138–453)
PLATELET ESTIMATE: ABNORMAL
PMV BLD AUTO: 10.7 FL (ref 8.1–13.5)
POTASSIUM SERPL-SCNC: 3.8 MMOL/L (ref 3.7–5.3)
RBC # BLD: 3.96 M/UL (ref 3.95–5.11)
RBC # BLD: ABNORMAL 10*6/UL
SEG NEUTROPHILS: 49 % (ref 36–66)
SEGMENTED NEUTROPHILS ABSOLUTE COUNT: 0.93 K/UL (ref 1.8–7.7)
SODIUM BLD-SCNC: 142 MMOL/L (ref 135–144)
TOTAL PROTEIN: 6.3 G/DL (ref 6.4–8.3)
WBC # BLD: 1.9 K/UL (ref 3.5–11.3)
WBC # BLD: ABNORMAL 10*3/UL

## 2020-09-02 RX ORDER — PALBOCICLIB 100 MG/1
TABLET, FILM COATED ORAL
Qty: 21 TABLET | Refills: 3 | Status: SHIPPED | OUTPATIENT
Start: 2020-09-02 | End: 2020-12-28 | Stop reason: SDUPTHER

## 2020-09-03 ENCOUNTER — TELEPHONE (OUTPATIENT)
Dept: ONCOLOGY | Age: 50
End: 2020-09-03

## 2020-09-03 NOTE — TELEPHONE ENCOUNTER
Patient called office stating she had labs drawn 9/2. Patient stated, per Dr Garcia Hunt, she is to resume Ibrance when ANC>1000. Patient's ANC is 0.93; therefore, she will continue to hold Ibrance and will get labs drawn again next week. Patient to call office 1 day after lab draw to check on results to see if she can resume Ibrance. Writer informed Yuliet Cochran oral chemo compliance RN, of above.  Leticia Godinez

## 2020-09-09 ENCOUNTER — HOSPITAL ENCOUNTER (OUTPATIENT)
Age: 50
Setting detail: SPECIMEN
Discharge: HOME | End: 2020-09-09
Payer: COMMERCIAL

## 2020-09-09 LAB
ABSOLUTE EOS #: 0.08 K/UL (ref 0–0.44)
ABSOLUTE IMMATURE GRANULOCYTE: <0.03 K/UL (ref 0–0.3)
ABSOLUTE LYMPH #: 1.06 K/UL (ref 1.1–3.7)
ABSOLUTE MONO #: 0.31 K/UL (ref 0.1–1.2)
ALBUMIN SERPL-MCNC: 3.9 G/DL (ref 3.5–5.2)
ALBUMIN/GLOBULIN RATIO: 1.6 (ref 1–2.5)
ALP BLD-CCNC: 162 U/L (ref 35–104)
ALT SERPL-CCNC: 51 U/L (ref 5–33)
ANION GAP SERPL CALCULATED.3IONS-SCNC: 15 MMOL/L (ref 9–17)
AST SERPL-CCNC: 46 U/L
BASOPHILS # BLD: 1 % (ref 0–2)
BASOPHILS ABSOLUTE: <0.03 K/UL (ref 0–0.2)
BILIRUB SERPL-MCNC: 0.4 MG/DL (ref 0.3–1.2)
BUN BLDV-MCNC: 8 MG/DL (ref 6–20)
BUN/CREAT BLD: ABNORMAL (ref 9–20)
CALCIUM SERPL-MCNC: 8.7 MG/DL (ref 8.6–10.4)
CHLORIDE BLD-SCNC: 108 MMOL/L (ref 98–107)
CO2: 18 MMOL/L (ref 20–31)
CREAT SERPL-MCNC: 0.7 MG/DL (ref 0.5–0.9)
DIFFERENTIAL TYPE: ABNORMAL
EOSINOPHILS RELATIVE PERCENT: 3 % (ref 1–4)
GFR AFRICAN AMERICAN: >60 ML/MIN
GFR NON-AFRICAN AMERICAN: >60 ML/MIN
GFR SERPL CREATININE-BSD FRML MDRD: ABNORMAL ML/MIN/{1.73_M2}
GFR SERPL CREATININE-BSD FRML MDRD: ABNORMAL ML/MIN/{1.73_M2}
GLUCOSE BLD-MCNC: 155 MG/DL (ref 70–99)
HCT VFR BLD CALC: 35.7 % (ref 36.3–47.1)
HEMOGLOBIN: 11.7 G/DL (ref 11.9–15.1)
IMMATURE GRANULOCYTES: 0 %
LYMPHOCYTES # BLD: 36 % (ref 24–43)
MCH RBC QN AUTO: 30.5 PG (ref 25.2–33.5)
MCHC RBC AUTO-ENTMCNC: 32.8 G/DL (ref 28.4–34.8)
MCV RBC AUTO: 93.2 FL (ref 82.6–102.9)
MONOCYTES # BLD: 10 % (ref 3–12)
NRBC AUTOMATED: 0 PER 100 WBC
PDW BLD-RTO: 18.7 % (ref 11.8–14.4)
PLATELET # BLD: 214 K/UL (ref 138–453)
PLATELET ESTIMATE: ABNORMAL
PMV BLD AUTO: 10.1 FL (ref 8.1–13.5)
POTASSIUM SERPL-SCNC: 3.7 MMOL/L (ref 3.7–5.3)
RBC # BLD: 3.83 M/UL (ref 3.95–5.11)
RBC # BLD: ABNORMAL 10*6/UL
SEG NEUTROPHILS: 50 % (ref 36–65)
SEGMENTED NEUTROPHILS ABSOLUTE COUNT: 1.5 K/UL (ref 1.5–8.1)
SODIUM BLD-SCNC: 141 MMOL/L (ref 135–144)
TOTAL PROTEIN: 6.4 G/DL (ref 6.4–8.3)
WBC # BLD: 3 K/UL (ref 3.5–11.3)
WBC # BLD: ABNORMAL 10*3/UL

## 2020-09-10 ENCOUNTER — TELEPHONE (OUTPATIENT)
Dept: ONCOLOGY | Age: 50
End: 2020-09-10

## 2020-09-16 ENCOUNTER — HOSPITAL ENCOUNTER (OUTPATIENT)
Age: 50
Setting detail: SPECIMEN
Discharge: HOME | End: 2020-09-16
Payer: COMMERCIAL

## 2020-09-16 LAB
ABSOLUTE EOS #: 0.22 K/UL (ref 0–0.44)
ABSOLUTE IMMATURE GRANULOCYTE: <0.03 K/UL (ref 0–0.3)
ABSOLUTE LYMPH #: 1.26 K/UL (ref 1.1–3.7)
ABSOLUTE MONO #: 0.21 K/UL (ref 0.1–1.2)
ALBUMIN SERPL-MCNC: 4 G/DL (ref 3.5–5.2)
ALBUMIN/GLOBULIN RATIO: 1.6 (ref 1–2.5)
ALP BLD-CCNC: 132 U/L (ref 35–104)
ALT SERPL-CCNC: 23 U/L (ref 5–33)
ANION GAP SERPL CALCULATED.3IONS-SCNC: 13 MMOL/L (ref 9–17)
AST SERPL-CCNC: 26 U/L
BASOPHILS # BLD: 1 % (ref 0–2)
BASOPHILS ABSOLUTE: 0.03 K/UL (ref 0–0.2)
BILIRUB SERPL-MCNC: 0.42 MG/DL (ref 0.3–1.2)
BUN BLDV-MCNC: 9 MG/DL (ref 6–20)
BUN/CREAT BLD: ABNORMAL (ref 9–20)
CALCIUM SERPL-MCNC: 8.6 MG/DL (ref 8.6–10.4)
CHLORIDE BLD-SCNC: 107 MMOL/L (ref 98–107)
CO2: 23 MMOL/L (ref 20–31)
CREAT SERPL-MCNC: 0.63 MG/DL (ref 0.5–0.9)
DIFFERENTIAL TYPE: ABNORMAL
EOSINOPHILS RELATIVE PERCENT: 5 % (ref 1–4)
GFR AFRICAN AMERICAN: >60 ML/MIN
GFR NON-AFRICAN AMERICAN: >60 ML/MIN
GFR SERPL CREATININE-BSD FRML MDRD: ABNORMAL ML/MIN/{1.73_M2}
GFR SERPL CREATININE-BSD FRML MDRD: ABNORMAL ML/MIN/{1.73_M2}
GLUCOSE BLD-MCNC: 170 MG/DL (ref 70–99)
HCT VFR BLD CALC: 37.6 % (ref 36.3–47.1)
HEMOGLOBIN: 12.2 G/DL (ref 11.9–15.1)
IMMATURE GRANULOCYTES: 0 %
LYMPHOCYTES # BLD: 30 % (ref 24–43)
MCH RBC QN AUTO: 30.4 PG (ref 25.2–33.5)
MCHC RBC AUTO-ENTMCNC: 32.4 G/DL (ref 28.4–34.8)
MCV RBC AUTO: 93.8 FL (ref 82.6–102.9)
MONOCYTES # BLD: 5 % (ref 3–12)
NRBC AUTOMATED: 0 PER 100 WBC
PDW BLD-RTO: 18.3 % (ref 11.8–14.4)
PLATELET # BLD: 240 K/UL (ref 138–453)
PLATELET ESTIMATE: ABNORMAL
PMV BLD AUTO: 10.1 FL (ref 8.1–13.5)
POTASSIUM SERPL-SCNC: 3.8 MMOL/L (ref 3.7–5.3)
RBC # BLD: 4.01 M/UL (ref 3.95–5.11)
RBC # BLD: ABNORMAL 10*6/UL
SEG NEUTROPHILS: 59 % (ref 36–65)
SEGMENTED NEUTROPHILS ABSOLUTE COUNT: 2.41 K/UL (ref 1.5–8.1)
SODIUM BLD-SCNC: 143 MMOL/L (ref 135–144)
TOTAL PROTEIN: 6.5 G/DL (ref 6.4–8.3)
WBC # BLD: 4.1 K/UL (ref 3.5–11.3)
WBC # BLD: ABNORMAL 10*3/UL

## 2020-09-23 ENCOUNTER — TELEPHONE (OUTPATIENT)
Dept: ONCOLOGY | Age: 50
End: 2020-09-23

## 2020-09-24 ENCOUNTER — HOSPITAL ENCOUNTER (OUTPATIENT)
Dept: INFUSION THERAPY | Age: 50
Discharge: HOME | End: 2020-09-24
Payer: COMMERCIAL

## 2020-09-24 ENCOUNTER — OFFICE VISIT (OUTPATIENT)
Dept: ONCOLOGY | Age: 50
End: 2020-09-24
Payer: COMMERCIAL

## 2020-09-24 ENCOUNTER — TELEPHONE (OUTPATIENT)
Dept: ONCOLOGY | Age: 50
End: 2020-09-24

## 2020-09-24 VITALS
TEMPERATURE: 97.7 F | RESPIRATION RATE: 18 BRPM | BODY MASS INDEX: 41.53 KG/M2 | WEIGHT: 281.2 LBS | DIASTOLIC BLOOD PRESSURE: 85 MMHG | SYSTOLIC BLOOD PRESSURE: 117 MMHG | HEART RATE: 98 BPM

## 2020-09-24 DIAGNOSIS — Z17.0 MALIGNANT NEOPLASM OF UPPER-OUTER QUADRANT OF LEFT BREAST IN FEMALE, ESTROGEN RECEPTOR POSITIVE (HCC): ICD-10-CM

## 2020-09-24 DIAGNOSIS — C50.211 BILATERAL MALIGNANT NEOPLASM OF UPPER INNER QUADRANT OF BREAST IN FEMALE, UNSPECIFIED ESTROGEN RECEPTOR STATUS (HCC): ICD-10-CM

## 2020-09-24 DIAGNOSIS — C50.212 BILATERAL MALIGNANT NEOPLASM OF UPPER INNER QUADRANT OF BREAST IN FEMALE, UNSPECIFIED ESTROGEN RECEPTOR STATUS (HCC): ICD-10-CM

## 2020-09-24 DIAGNOSIS — C49.A3 GIST (GASTROINTESTINAL STROMAL TUMOR) OF SMALL BOWEL, MALIGNANT (HCC): ICD-10-CM

## 2020-09-24 DIAGNOSIS — C50.412 MALIGNANT NEOPLASM OF UPPER-OUTER QUADRANT OF LEFT BREAST IN FEMALE, ESTROGEN RECEPTOR POSITIVE (HCC): ICD-10-CM

## 2020-09-24 DIAGNOSIS — C79.51 BONE METASTASIS (HCC): Primary | ICD-10-CM

## 2020-09-24 LAB
ABSOLUTE EOS #: 0.09 K/UL (ref 0–0.4)
ABSOLUTE IMMATURE GRANULOCYTE: ABNORMAL K/UL (ref 0–0.3)
ABSOLUTE LYMPH #: 0.94 K/UL (ref 1–4.8)
ABSOLUTE MONO #: 0.17 K/UL (ref 0.1–1.2)
ALBUMIN SERPL-MCNC: 3.9 G/DL (ref 3.5–5.2)
ALBUMIN/GLOBULIN RATIO: 1.3 (ref 1–2.5)
ALP BLD-CCNC: 119 U/L (ref 35–104)
ALT SERPL-CCNC: 20 U/L (ref 5–33)
ANION GAP SERPL CALCULATED.3IONS-SCNC: 16 MMOL/L (ref 9–17)
AST SERPL-CCNC: 24 U/L
BASOPHILS # BLD: 1 % (ref 0–2)
BASOPHILS ABSOLUTE: 0.01 K/UL (ref 0–0.2)
BILIRUB SERPL-MCNC: 0.42 MG/DL (ref 0.3–1.2)
BUN BLDV-MCNC: 10 MG/DL (ref 6–20)
BUN/CREAT BLD: ABNORMAL (ref 9–20)
CALCIUM SERPL-MCNC: 8.5 MG/DL (ref 8.6–10.4)
CHLORIDE BLD-SCNC: 104 MMOL/L (ref 98–107)
CO2: 21 MMOL/L (ref 20–31)
CREAT SERPL-MCNC: 0.73 MG/DL (ref 0.5–0.9)
DIFFERENTIAL TYPE: ABNORMAL
EOSINOPHILS RELATIVE PERCENT: 4 % (ref 1–4)
GFR AFRICAN AMERICAN: >60 ML/MIN
GFR NON-AFRICAN AMERICAN: >60 ML/MIN
GFR SERPL CREATININE-BSD FRML MDRD: ABNORMAL ML/MIN/{1.73_M2}
GFR SERPL CREATININE-BSD FRML MDRD: ABNORMAL ML/MIN/{1.73_M2}
GLUCOSE BLD-MCNC: 169 MG/DL (ref 70–99)
HCT VFR BLD CALC: 33.7 % (ref 36–46)
HEMOGLOBIN: 11.7 G/DL (ref 12–16)
IMMATURE GRANULOCYTES: ABNORMAL %
LYMPHOCYTES # BLD: 43 % (ref 24–44)
MCH RBC QN AUTO: 31.1 PG (ref 26–34)
MCHC RBC AUTO-ENTMCNC: 34.7 G/DL (ref 31–37)
MCV RBC AUTO: 89.6 FL (ref 80–100)
MONOCYTES # BLD: 8 % (ref 2–11)
NRBC AUTOMATED: ABNORMAL PER 100 WBC
PDW BLD-RTO: 18.4 % (ref 12.5–15.4)
PLATELET # BLD: 172 K/UL (ref 140–450)
PLATELET ESTIMATE: ABNORMAL
PMV BLD AUTO: 8.8 FL (ref 8–14)
POTASSIUM SERPL-SCNC: 2.8 MMOL/L (ref 3.7–5.3)
RBC # BLD: 3.76 M/UL (ref 4–5.2)
RBC # BLD: ABNORMAL 10*6/UL
SEG NEUTROPHILS: 44 % (ref 36–66)
SEGMENTED NEUTROPHILS ABSOLUTE COUNT: 0.98 K/UL (ref 1.8–7.7)
SODIUM BLD-SCNC: 141 MMOL/L (ref 135–144)
TOTAL PROTEIN: 7 G/DL (ref 6.4–8.3)
WBC # BLD: 2.2 K/UL (ref 3.5–11)
WBC # BLD: ABNORMAL 10*3/UL

## 2020-09-24 PROCEDURE — 6370000000 HC RX 637 (ALT 250 FOR IP): Performed by: INTERNAL MEDICINE

## 2020-09-24 PROCEDURE — 86300 IMMUNOASSAY TUMOR CA 15-3: CPT

## 2020-09-24 PROCEDURE — 96401 CHEMO ANTI-NEOPL SQ/IM: CPT

## 2020-09-24 PROCEDURE — 80053 COMPREHEN METABOLIC PANEL: CPT

## 2020-09-24 PROCEDURE — 85025 COMPLETE CBC W/AUTO DIFF WBC: CPT

## 2020-09-24 PROCEDURE — 6360000002 HC RX W HCPCS: Performed by: INTERNAL MEDICINE

## 2020-09-24 PROCEDURE — 99211 OFF/OP EST MAY X REQ PHY/QHP: CPT | Performed by: INTERNAL MEDICINE

## 2020-09-24 PROCEDURE — 99214 OFFICE O/P EST MOD 30 MIN: CPT | Performed by: INTERNAL MEDICINE

## 2020-09-24 PROCEDURE — 36415 COLL VENOUS BLD VENIPUNCTURE: CPT

## 2020-09-24 RX ORDER — POTASSIUM CHLORIDE 20 MEQ/1
40 TABLET, EXTENDED RELEASE ORAL ONCE
Status: COMPLETED | OUTPATIENT
Start: 2020-09-24 | End: 2020-09-24

## 2020-09-24 RX ORDER — POTASSIUM CHLORIDE 20 MEQ/1
20 TABLET, EXTENDED RELEASE ORAL DAILY
Qty: 30 TABLET | Refills: 5 | Status: SHIPPED | OUTPATIENT
Start: 2020-09-24 | End: 2021-03-15

## 2020-09-24 RX ORDER — ONDANSETRON 4 MG/1
4 TABLET, ORALLY DISINTEGRATING ORAL EVERY 8 HOURS PRN
Qty: 30 TABLET | Refills: 1 | Status: SHIPPED | OUTPATIENT
Start: 2020-09-24 | End: 2021-07-29 | Stop reason: SDUPTHER

## 2020-09-24 RX ADMIN — DENOSUMAB 120 MG: 120 INJECTION SUBCUTANEOUS at 10:59

## 2020-09-24 RX ADMIN — POTASSIUM CHLORIDE 40 MEQ: 1500 TABLET, EXTENDED RELEASE ORAL at 10:59

## 2020-09-24 NOTE — PROGRESS NOTES
Patient here for xgeva injection. Labs reviewed. She saw Dr. Frank Allen today see his dictation. K+ low and 40Meq given her orders. She tolerated injection well and was discharged home in stable condition. She is due to return 10/22 for next injection.

## 2020-09-24 NOTE — PROGRESS NOTES
DIAGNOSIS:   1- T2, N0, M0 ER positive NE positive and HER-2/eve negative invasive ductal carcinoma of the left breast ( inner upper quadrant)  2- Repeated GI bleeding greetings 2 symptomatic anemia, despite extensive GI workup, source of bleeding was never found. 3- finally a small mass was seen in the jejunum. 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 Margins negative   4- compression fractures and bone lesions. Likely metastatic cancer (5/2020)     CURRENT THERAPY:  1- Mastectomy and axillary sampling  2- adjuvant chemotherapy with Taxotere and Cytoxan starting 5/16/2013. Chemotherapy stopped after 3 cycles because of ongoing GI bleeding and symptomatic anemia  3- Conservative management for GI bleeding. Leading completely stopped after the discontinuation of chemotherapy  4- started tamoxifen after chemotherapy was completed. 8/2013. 5- transitioned to Arimidex 6/2014, completed 08/2018  6- GIST resection, resected 9/2017   7-status post kyphoplasty and biopsy of bone metastases 5/2020. The tumor was minimally ER positive at 5% and NE negative. 8- rodding of the left femur completed. Plan for  Radiation  9- Systemic therapy, plan Arimidex plus Ibrance  10 - XRT completed 07/2020  10-current treatment starting 7/30/2020, monthly Xgeva, oral Ibrance and Arimidex    BRIEF CASE HISTORY:   Joi Rios is a very pleasant 47 y.o. who felt a mass in the medial aspect of her left breast, around the 9:00 position. Patient sought medical attention and mammogram and ultrasound were done. Showing an irregular, high-density mass with indistinct margins containing pleomorphic calcifications in the lower inner quadrant of the left breast close to 9:00 location this mass measures are mammogram 2.7 x 2.6 x 2 cm. Subsequently targeted ultrasound was performed showing hypoechoic, irregular, taller than wider, solid mass at 9:00 with posterior acoustic shadowing. The calcifications were visible on ultrasound .  On ultrasound the solid mass measured 2.4 x 2.6 x 1 cm. Image guided biopsy of the mass was done and showed invasive ductal carcinoma with surrounding DCIS (cribriform type) the tumor was ER and MA positive and HER-2/eve negative with a fish ratio 1.1. The patient was referred to us and she also was seen by radiation oncology , She  decided on proceeding with total mastectomy and breast reconstruction . Allergy showed 2.5 cm intermediate grade invasive ductal carcinoma, Kennedy lymph node was negative. The tumor was ER/MA positive and HER-2/eve negative. We discussed options of adjuvant therapy including chemotherapy and hormone therapy, The patient decided to proceed with adjuvant chemotherapy without undergone a Oncotype study. TC chemotherapy X4 cycles is planned  After 3 cycles, and presented with persistent GI bleeding leading to symptomatic anemia and syncope, the patient's condition was Serious enough to warrant repeated hospital admissions. Repeated GI workup including endoscopic evaluation and Tagged Red cell scans were negative. We decided to treat her conservatively and chemotherapy was stopped after 3 cycles. After that All her symptoms improved and she was started on tamoxifen. After one year, she was transitioned to anastrozole since she had no periods and hormonal testing showed post menopausal state. 08/2017  She had been having abdominal pain and went to [facility] for consult. She had work up which showed mass in the colon showed subtle changes in the mid-small intestine, CT enterography showed 3.6CM mass in the distal small bowel. She was also hospitalized in the interim with pancreatitis. 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 Margins were negative. Observation was decided, no need for adjuvant therapy  In May/2020, the patient presented with severe back pain.   MRI showed multiple bone lesions with evidence of compression fracture. Patient underwent successful kyphoplasty and biopsy and pathology showed breast cancer, it was 5% ER positive and ME was negative. HER-2 was +2 which is equivocal so a fish test was ordered. Plan for radiation, staging PET, and Arimidex. PET scan showed widespread metastatic bony disease including the thoracic spine, the lumbar spine, the left femur and the rib area. She underwent repeated kyphoplasty and rodding of the left femur. She is undergoing radiation to the painful bony areas in thoracic, rib and lumbar areas as well as the left femur. We will likely use Ibrance plus Arimidex. As well as monthly Xgeva to be started after completion of radiation. After completion of radiation, we maintained her on Arimidex plus Ibrance, due to cytopenias, Ibrance dose was decreased to 100 mg/day which was much better tolerated. Also we maintained her on Xgeva monthly  INTERIM HISTORY: She comes in today for follow up for metastatic breast cancer and for cycle #3 of Xgeva. She is now using walker and feel significantly improved. She is taking Ibrance at previous and will start new dose with next cycle. She notes persistent dull headache and dry cough in the morning. GYNECOLOGICAL HISTORY  Menarche at age 14. He is premenopausal with regular periods. +2. She used birth control minimally. PAST MEDICAL HISTORY: has a past medical history of Anemia, Anxiety, Atrial fibrillation ([address] Breast cancer ([address] Carpal tunnel syndrome, Depression, GI bleed, Hypertension, Lymphedema, Neurologic cardiac syncope, Obesity, Pain, Sleep apnea, ANA PAULA (stress urinary incontinence, female), Tachycardia, Varicella, and Varicella without complication. PAST SURGICAL HISTORY: has a past surgical history that includes Shoulder arthroscopy (); Colonoscopy (); Dilation and curettage of uterus; Knee arthroscopy; fracture surgery; Tunneled venous port placement (Right);  Upper gastrointestinal endoscopy (7/6/2013); Colonoscopy (7/6/2013); Mastectomy (Left, 4/8/13); other surgical history (1969); Breast biopsy (Left, 3/21/13); ileoscopy (10/12/05); Cosmetic surgery; Hysterectomy; denia and bso (cervix removed) (4/23/14); Enterocele repair (4/23/14); Cystoscopy (4/23/14); bladder suspension (4/23/14); Colonoscopy (10/12/2005); Upper gastrointestinal endoscopy (04/23/2015); and tumor removal.     CURRENT MEDICATIONS:  has a current medication list which includes the following prescription(s): calcium-vitamin d, ibrance, palbociclib, oxycodone, ondansetron, warfarin, diazepam, anastrozole, duloxetine, losartan, omeprazole, hydrochlorothiazide, amlodipine, docusate, metoprolol tartrate, and megestrol. ALLERGIES:  is allergic to adhesive tape. FAMILY HISTORY: Negative for any hematological or oncological conditions. Specifically no history of breast cancer in the family    SOCIAL HISTORY:  reports that she quit smoking about 2 years ago. Her smoking use included Cigarettes. She smoked About 20 years. She has never used smokeless tobacco. She reports that she does not drink alcohol or use illicit drugs. REVIEW OF SYSTEMS:   General: No fever or night sweats. Weight is stable. +hot flashes   Eyes: No double or blurred vision. Ears: No tinnitus or hearing problem,    Throat: No dysphagia or sore throat   Respiratory: No chest pain, shortness of breath, no hemoptysis. +dry cough in the morning  Cardiovascular: Denies chest pain, PND or orthopnea. No L E swelling or palpitations. Gastrointestinal: No diarrhea, or abdominal pain +constipation - managing; +nausea and vomiting controlled  Genitourinary: Denies dysuria, hematuria, frequency, urgency or incontinence. No vaginal bleeding. Neurological: Denies decreased LOC, no sensory or motor focal deficits. +headaches  Musculoskeletal: No arthralgia no back pain or joint swelling. +  right rib pain and lower left leg pain - 09/16/2020 0900        Results for Radha Godoy (MRN [id number]) as of 9/24/2020 10:18   Ref. Range 7/21/2020 10:42 8/27/2020 10:20   CA 27-29 Latest Ref Range: 0 - 38 U/mL 164 (H) 88 (H)       REVIEW OF RADIOLOGICAL RESULTS:        IMPRESSION:   1- Arlene  Has  T2, N0, M0 ER/MA positive and HER-2/eve negative invasive ductal carcinoma of the left breast  2- Chemotherapy: received 3 cycles of TC, stopped due to GI bleeding  3- GI bleeding , related to the GIST tumor. Currently on oral iron and hemoglobin is improving  4- on anastrozole. We will continue, plan 5 years of therapy, completed 08/2018  5- for hot flashes, better on Effexor. 6- Recent pancreatitis, possibly biliary. MRI CT scan did not show any gallbladder stone. We will discuss with her surgeons. 7-   distal small bowel mass measuring 3.6 cm, resection shows low risk GIST. No need for adjuvant therapy, margins were negative. 8-bone metastases with compression fracture diagnosed in May/2020. Status post kyphoplasty and biopsy. 9- S/P radiation 07/2020  10- for systemic therapy, she was started on Arimidex, adding Ibrance . Due to cytopenias, dose was reduced to 100 mg/day      PLAN:   1. Her lab work was reviewed and discussed, her previous cancer marker showed significant decrease, her counts are stable. Her ANC is above 500 so we will keep her at 100 mg  2. I completed toxicity check - headaches are mild and controllable with Tylenol  3. Clinically she is improved and I feel she is responding to treatment. 4. She is severely hypokalemic, will give her some potassium today and prescription of potassium was given to her today. 5. We will continue with treatment today as per orders and I discussed plan for PET next month. 6. I encouraged her to increase activity. 7. Return in 4 weeks.

## 2020-09-24 NOTE — PATIENT INSTRUCTIONS
Continue current therapy without changes   Give 40 MEQ kcl today PO   rv in 4 weeks with labs  Need PEt scan prior to RV

## 2020-09-24 NOTE — TELEPHONE ENCOUNTER
Pt was seen today by Dr. Maribel Epps. Per AVS, pt is scheduled for pet scan on 10-15-20 at the [facility] ED. Writer called Lennie AGUILAR/pt to have [address] today. Follow up scheduled for 10-22-20 with labs and  tx to follow.

## 2020-09-25 ENCOUNTER — TELEPHONE (OUTPATIENT)
Dept: ONCOLOGY | Age: 50
End: 2020-09-25

## 2020-09-25 LAB — CA 27-29: 95 U/ML (ref 0–38)

## 2020-09-25 NOTE — TELEPHONE ENCOUNTER
Disability paperwork completed for [employer] [place] [employer] and faxed to [phone]. Confirmation fax and paperwork placed in pile to be scanned. Writer called and informed patient of above.  Misha Dorsey

## 2020-09-30 ENCOUNTER — HOSPITAL ENCOUNTER (OUTPATIENT)
Age: 50
Setting detail: SPECIMEN
Discharge: HOME | End: 2020-09-30
Payer: COMMERCIAL

## 2020-09-30 LAB
ABSOLUTE EOS #: 0.04 K/UL (ref 0–0.4)
ABSOLUTE IMMATURE GRANULOCYTE: 0 K/UL (ref 0–0.3)
ABSOLUTE LYMPH #: 0.68 K/UL (ref 1–4.8)
ABSOLUTE MONO #: 0.12 K/UL (ref 0.1–0.8)
ALBUMIN SERPL-MCNC: 3.9 G/DL (ref 3.5–5.2)
ALBUMIN/GLOBULIN RATIO: 1.4 (ref 1–2.5)
ALP BLD-CCNC: 102 U/L (ref 35–104)
ALT SERPL-CCNC: 19 U/L (ref 5–33)
ANION GAP SERPL CALCULATED.3IONS-SCNC: 14 MMOL/L (ref 9–17)
AST SERPL-CCNC: 25 U/L
BASOPHILS # BLD: 0 % (ref 0–2)
BASOPHILS ABSOLUTE: 0 K/UL (ref 0–0.2)
BILIRUB SERPL-MCNC: 0.28 MG/DL (ref 0.3–1.2)
BUN BLDV-MCNC: 10 MG/DL (ref 6–20)
BUN/CREAT BLD: ABNORMAL (ref 9–20)
CALCIUM SERPL-MCNC: 8.6 MG/DL (ref 8.6–10.4)
CELLS COUNTED: 50
CHLORIDE BLD-SCNC: 106 MMOL/L (ref 98–107)
CO2: 20 MMOL/L (ref 20–31)
CREAT SERPL-MCNC: 0.7 MG/DL (ref 0.5–0.9)
DIFFERENTIAL TYPE: ABNORMAL
EOSINOPHILS RELATIVE PERCENT: 2 % (ref 1–4)
GFR AFRICAN AMERICAN: >60 ML/MIN
GFR NON-AFRICAN AMERICAN: >60 ML/MIN
GFR SERPL CREATININE-BSD FRML MDRD: ABNORMAL ML/MIN/{1.73_M2}
GFR SERPL CREATININE-BSD FRML MDRD: ABNORMAL ML/MIN/{1.73_M2}
GLUCOSE BLD-MCNC: 116 MG/DL (ref 70–99)
HCT VFR BLD CALC: 35.9 % (ref 36.3–47.1)
HEMOGLOBIN: 12 G/DL (ref 11.9–15.1)
IMMATURE GRANULOCYTES: 0 %
LYMPHOCYTES # BLD: 34 % (ref 24–44)
MCH RBC QN AUTO: 31.3 PG (ref 25.2–33.5)
MCHC RBC AUTO-ENTMCNC: 33.4 G/DL (ref 28.4–34.8)
MCV RBC AUTO: 93.5 FL (ref 82.6–102.9)
MONOCYTES # BLD: 6 % (ref 1–7)
MORPHOLOGY: ABNORMAL
NRBC AUTOMATED: 0 PER 100 WBC
PDW BLD-RTO: 18.8 % (ref 11.8–14.4)
PLATELET # BLD: 155 K/UL (ref 138–453)
PLATELET ESTIMATE: ABNORMAL
PMV BLD AUTO: 10.2 FL (ref 8.1–13.5)
POTASSIUM SERPL-SCNC: 3.5 MMOL/L (ref 3.7–5.3)
RBC # BLD: 3.84 M/UL (ref 3.95–5.11)
RBC # BLD: ABNORMAL 10*6/UL
SEG NEUTROPHILS: 58 % (ref 36–66)
SEGMENTED NEUTROPHILS ABSOLUTE COUNT: 1.16 K/UL (ref 1.8–7.7)
SODIUM BLD-SCNC: 140 MMOL/L (ref 135–144)
TOTAL PROTEIN: 6.6 G/DL (ref 6.4–8.3)
WBC # BLD: 2 K/UL (ref 3.5–11.3)
WBC # BLD: ABNORMAL 10*3/UL

## 2020-10-06 ENCOUNTER — TELEPHONE (OUTPATIENT)
Dept: ONCOLOGY | Age: 50
End: 2020-10-06

## 2020-10-06 ENCOUNTER — HOSPITAL ENCOUNTER (OUTPATIENT)
Dept: MAMMOGRAPHY | Age: 50
Discharge: HOME | End: 2020-10-08
Payer: COMMERCIAL

## 2020-10-06 PROCEDURE — 77063 BREAST TOMOSYNTHESIS BI: CPT

## 2020-10-06 NOTE — TELEPHONE ENCOUNTER
Pt called stating the [facility] is requesting last 3 office notes, PET scan, and bx report. Documentation faxed to [address] [place] [address] [address] at [phone] as requested with confirmation.

## 2020-10-07 ENCOUNTER — HOSPITAL ENCOUNTER (OUTPATIENT)
Age: 50
Setting detail: SPECIMEN
Discharge: HOME | End: 2020-10-07
Payer: COMMERCIAL

## 2020-10-07 LAB
ABSOLUTE EOS #: 0.07 K/UL (ref 0–0.44)
ABSOLUTE IMMATURE GRANULOCYTE: <0.03 K/UL (ref 0–0.3)
ABSOLUTE LYMPH #: 0.82 K/UL (ref 1.1–3.7)
ABSOLUTE MONO #: 0.28 K/UL (ref 0.1–1.2)
ALBUMIN SERPL-MCNC: 3.8 G/DL (ref 3.5–5.2)
ALBUMIN/GLOBULIN RATIO: 1.6 (ref 1–2.5)
ALP BLD-CCNC: 101 U/L (ref 35–104)
ALT SERPL-CCNC: 39 U/L (ref 5–33)
ANION GAP SERPL CALCULATED.3IONS-SCNC: 13 MMOL/L (ref 9–17)
AST SERPL-CCNC: 47 U/L
BASOPHILS # BLD: 1 % (ref 0–2)
BASOPHILS ABSOLUTE: 0.03 K/UL (ref 0–0.2)
BILIRUB SERPL-MCNC: 0.43 MG/DL (ref 0.3–1.2)
BUN BLDV-MCNC: 10 MG/DL (ref 6–20)
BUN/CREAT BLD: ABNORMAL (ref 9–20)
CALCIUM SERPL-MCNC: 8.2 MG/DL (ref 8.6–10.4)
CHLORIDE BLD-SCNC: 107 MMOL/L (ref 98–107)
CO2: 19 MMOL/L (ref 20–31)
CREAT SERPL-MCNC: 0.66 MG/DL (ref 0.5–0.9)
DIFFERENTIAL TYPE: ABNORMAL
EOSINOPHILS RELATIVE PERCENT: 3 % (ref 1–4)
GFR AFRICAN AMERICAN: >60 ML/MIN
GFR NON-AFRICAN AMERICAN: >60 ML/MIN
GFR SERPL CREATININE-BSD FRML MDRD: ABNORMAL ML/MIN/{1.73_M2}
GFR SERPL CREATININE-BSD FRML MDRD: ABNORMAL ML/MIN/{1.73_M2}
GLUCOSE BLD-MCNC: 146 MG/DL (ref 70–99)
HCT VFR BLD CALC: 34.2 % (ref 36.3–47.1)
HEMOGLOBIN: 11.6 G/DL (ref 11.9–15.1)
IMMATURE GRANULOCYTES: 0 %
LYMPHOCYTES # BLD: 35 % (ref 24–43)
MCH RBC QN AUTO: 31.4 PG (ref 25.2–33.5)
MCHC RBC AUTO-ENTMCNC: 33.9 G/DL (ref 28.4–34.8)
MCV RBC AUTO: 92.7 FL (ref 82.6–102.9)
MONOCYTES # BLD: 12 % (ref 3–12)
NRBC AUTOMATED: 0 PER 100 WBC
PDW BLD-RTO: 18.6 % (ref 11.8–14.4)
PLATELET # BLD: 136 K/UL (ref 138–453)
PLATELET ESTIMATE: ABNORMAL
PMV BLD AUTO: 10.2 FL (ref 8.1–13.5)
POTASSIUM SERPL-SCNC: 3.5 MMOL/L (ref 3.7–5.3)
RBC # BLD: 3.69 M/UL (ref 3.95–5.11)
RBC # BLD: ABNORMAL 10*6/UL
SEG NEUTROPHILS: 48 % (ref 36–65)
SEGMENTED NEUTROPHILS ABSOLUTE COUNT: 1.13 K/UL (ref 1.5–8.1)
SODIUM BLD-SCNC: 139 MMOL/L (ref 135–144)
TOTAL PROTEIN: 6.2 G/DL (ref 6.4–8.3)
WBC # BLD: 2.3 K/UL (ref 3.5–11.3)
WBC # BLD: ABNORMAL 10*3/UL

## 2020-10-08 ENCOUNTER — TELEPHONE (OUTPATIENT)
Dept: ONCOLOGY | Age: 50
End: 2020-10-08

## 2020-10-08 NOTE — TELEPHONE ENCOUNTER
Vinay Georges called triage to review lab results, call forwarded to writer. Writer reviewed lab results, anc= 1.13 and needs to be >1000 to start the next cycle. Writer called Vinay Georges to let her know it was alright to start her next cycle. Understanding voiced. Will continue to monitor.

## 2020-10-14 ENCOUNTER — HOSPITAL ENCOUNTER (OUTPATIENT)
Age: 50
Setting detail: SPECIMEN
Discharge: HOME | End: 2020-10-14
Payer: COMMERCIAL

## 2020-10-15 ENCOUNTER — HOSPITAL ENCOUNTER (OUTPATIENT)
Dept: NUCLEAR MEDICINE | Age: 50
Discharge: HOME | End: 2020-10-17
Payer: COMMERCIAL

## 2020-10-15 LAB
ABSOLUTE EOS #: 0.04 K/UL (ref 0–0.4)
ABSOLUTE IMMATURE GRANULOCYTE: 0 K/UL (ref 0–0.3)
ABSOLUTE LYMPH #: 0.77 K/UL (ref 1–4.8)
ABSOLUTE MONO #: 0.22 K/UL (ref 0.1–0.8)
ALBUMIN SERPL-MCNC: 4.2 G/DL (ref 3.5–5.2)
ALBUMIN/GLOBULIN RATIO: 1.6 (ref 1–2.5)
ALP BLD-CCNC: 103 U/L (ref 35–104)
ALT SERPL-CCNC: 25 U/L (ref 5–33)
ANION GAP SERPL CALCULATED.3IONS-SCNC: 15 MMOL/L (ref 9–17)
AST SERPL-CCNC: 37 U/L
BASOPHILS # BLD: 0 % (ref 0–2)
BASOPHILS ABSOLUTE: 0 K/UL (ref 0–0.2)
BILIRUB SERPL-MCNC: 0.55 MG/DL (ref 0.3–1.2)
BUN BLDV-MCNC: 9 MG/DL (ref 6–20)
BUN/CREAT BLD: ABNORMAL (ref 9–20)
CALCIUM SERPL-MCNC: 9.4 MG/DL (ref 8.6–10.4)
CHLORIDE BLD-SCNC: 105 MMOL/L (ref 98–107)
CO2: 22 MMOL/L (ref 20–31)
CREAT SERPL-MCNC: 0.71 MG/DL (ref 0.5–0.9)
DIFFERENTIAL TYPE: ABNORMAL
EOSINOPHILS RELATIVE PERCENT: 2 % (ref 1–4)
GFR AFRICAN AMERICAN: >60 ML/MIN
GFR NON-AFRICAN AMERICAN: >60 ML/MIN
GFR SERPL CREATININE-BSD FRML MDRD: ABNORMAL ML/MIN/{1.73_M2}
GFR SERPL CREATININE-BSD FRML MDRD: ABNORMAL ML/MIN/{1.73_M2}
GLUCOSE BLD-MCNC: 103 MG/DL (ref 65–105)
GLUCOSE BLD-MCNC: 91 MG/DL (ref 70–99)
HCT VFR BLD CALC: 39.6 % (ref 36.3–47.1)
HEMOGLOBIN: 12.8 G/DL (ref 11.9–15.1)
IMMATURE GRANULOCYTES: 0 %
LYMPHOCYTES # BLD: 35 % (ref 24–44)
MCH RBC QN AUTO: 31.5 PG (ref 25.2–33.5)
MCHC RBC AUTO-ENTMCNC: 32.3 G/DL (ref 28.4–34.8)
MCV RBC AUTO: 97.5 FL (ref 82.6–102.9)
MONOCYTES # BLD: 10 % (ref 1–7)
MORPHOLOGY: ABNORMAL
NRBC AUTOMATED: 0 PER 100 WBC
PDW BLD-RTO: 19 % (ref 11.8–14.4)
PLATELET # BLD: 233 K/UL (ref 138–453)
PLATELET ESTIMATE: ABNORMAL
PMV BLD AUTO: 10.4 FL (ref 8.1–13.5)
POTASSIUM SERPL-SCNC: 4.3 MMOL/L (ref 3.7–5.3)
RBC # BLD: 4.06 M/UL (ref 3.95–5.11)
RBC # BLD: ABNORMAL 10*6/UL
SEG NEUTROPHILS: 53 % (ref 36–66)
SEGMENTED NEUTROPHILS ABSOLUTE COUNT: 1.17 K/UL (ref 1.8–7.7)
SODIUM BLD-SCNC: 142 MMOL/L (ref 135–144)
TOTAL PROTEIN: 6.8 G/DL (ref 6.4–8.3)
WBC # BLD: 2.2 K/UL (ref 3.5–11.3)
WBC # BLD: ABNORMAL 10*3/UL

## 2020-10-15 PROCEDURE — 78815 PET IMAGE W/CT SKULL-THIGH: CPT

## 2020-10-15 PROCEDURE — 2580000003 HC RX 258: Performed by: INTERNAL MEDICINE

## 2020-10-15 PROCEDURE — 82947 ASSAY GLUCOSE BLOOD QUANT: CPT

## 2020-10-15 PROCEDURE — A9552 F18 FDG: HCPCS | Performed by: INTERNAL MEDICINE

## 2020-10-15 PROCEDURE — 3430000000 HC RX DIAGNOSTIC RADIOPHARMACEUTICAL: Performed by: INTERNAL MEDICINE

## 2020-10-15 RX ORDER — SODIUM CHLORIDE 0.9 % (FLUSH) 0.9 %
10 SYRINGE (ML) INJECTION ONCE
Status: COMPLETED | OUTPATIENT
Start: 2020-10-15 | End: 2020-10-15

## 2020-10-15 RX ORDER — FLUDEOXYGLUCOSE F 18 200 MCI/ML
10 INJECTION, SOLUTION INTRAVENOUS
Status: COMPLETED | OUTPATIENT
Start: 2020-10-15 | End: 2020-10-15

## 2020-10-15 RX ADMIN — FLUDEOXYGLUCOSE F 18 12.57 MILLICURIE: 200 INJECTION, SOLUTION INTRAVENOUS at 09:16

## 2020-10-15 RX ADMIN — Medication 10 ML: at 09:16

## 2020-10-22 ENCOUNTER — TELEPHONE (OUTPATIENT)
Dept: ONCOLOGY | Age: 50
End: 2020-10-22

## 2020-10-22 ENCOUNTER — OFFICE VISIT (OUTPATIENT)
Dept: ONCOLOGY | Age: 50
End: 2020-10-22
Payer: COMMERCIAL

## 2020-10-22 ENCOUNTER — HOSPITAL ENCOUNTER (OUTPATIENT)
Dept: INFUSION THERAPY | Age: 50
Discharge: HOME | End: 2020-10-22
Payer: COMMERCIAL

## 2020-10-22 VITALS
WEIGHT: 281.5 LBS | DIASTOLIC BLOOD PRESSURE: 88 MMHG | TEMPERATURE: 98.2 F | SYSTOLIC BLOOD PRESSURE: 117 MMHG | BODY MASS INDEX: 41.57 KG/M2 | HEART RATE: 80 BPM

## 2020-10-22 DIAGNOSIS — C50.412 MALIGNANT NEOPLASM OF UPPER-OUTER QUADRANT OF LEFT BREAST IN FEMALE, ESTROGEN RECEPTOR POSITIVE (HCC): Primary | ICD-10-CM

## 2020-10-22 DIAGNOSIS — Z17.0 MALIGNANT NEOPLASM OF UPPER-OUTER QUADRANT OF LEFT BREAST IN FEMALE, ESTROGEN RECEPTOR POSITIVE (HCC): Primary | ICD-10-CM

## 2020-10-22 DIAGNOSIS — C50.211 BILATERAL MALIGNANT NEOPLASM OF UPPER INNER QUADRANT OF BREAST IN FEMALE, UNSPECIFIED ESTROGEN RECEPTOR STATUS (HCC): ICD-10-CM

## 2020-10-22 DIAGNOSIS — C50.212 BILATERAL MALIGNANT NEOPLASM OF UPPER INNER QUADRANT OF BREAST IN FEMALE, UNSPECIFIED ESTROGEN RECEPTOR STATUS (HCC): ICD-10-CM

## 2020-10-22 DIAGNOSIS — C49.A3 GIST (GASTROINTESTINAL STROMAL TUMOR) OF SMALL BOWEL, MALIGNANT (HCC): ICD-10-CM

## 2020-10-22 DIAGNOSIS — C79.51 BONE METASTASIS (HCC): ICD-10-CM

## 2020-10-22 LAB
ABSOLUTE EOS #: 0.1 K/UL (ref 0–0.4)
ABSOLUTE IMMATURE GRANULOCYTE: ABNORMAL K/UL (ref 0–0.3)
ABSOLUTE LYMPH #: 0.5 K/UL (ref 1–4.8)
ABSOLUTE MONO #: 0.1 K/UL (ref 0.1–1.2)
ALBUMIN SERPL-MCNC: 3.9 G/DL (ref 3.5–5.2)
ALBUMIN/GLOBULIN RATIO: 1.6 (ref 1–2.5)
ALP BLD-CCNC: 95 U/L (ref 35–104)
ALT SERPL-CCNC: 24 U/L (ref 5–33)
ANION GAP SERPL CALCULATED.3IONS-SCNC: 10 MMOL/L (ref 9–17)
AST SERPL-CCNC: 30 U/L
BASOPHILS # BLD: 1 % (ref 0–2)
BASOPHILS ABSOLUTE: 0 K/UL (ref 0–0.2)
BILIRUB SERPL-MCNC: 0.51 MG/DL (ref 0.3–1.2)
BUN BLDV-MCNC: 11 MG/DL (ref 6–20)
BUN/CREAT BLD: ABNORMAL (ref 9–20)
CALCIUM SERPL-MCNC: 9.1 MG/DL (ref 8.6–10.4)
CHLORIDE BLD-SCNC: 106 MMOL/L (ref 98–107)
CO2: 24 MMOL/L (ref 20–31)
CREAT SERPL-MCNC: 0.86 MG/DL (ref 0.5–0.9)
DIFFERENTIAL TYPE: ABNORMAL
EOSINOPHILS RELATIVE PERCENT: 4 % (ref 1–4)
GFR AFRICAN AMERICAN: >60 ML/MIN
GFR NON-AFRICAN AMERICAN: >60 ML/MIN
GFR SERPL CREATININE-BSD FRML MDRD: ABNORMAL ML/MIN/{1.73_M2}
GFR SERPL CREATININE-BSD FRML MDRD: ABNORMAL ML/MIN/{1.73_M2}
GLUCOSE BLD-MCNC: 127 MG/DL (ref 70–99)
HCT VFR BLD CALC: 35.4 % (ref 36–46)
HEMOGLOBIN: 12 G/DL (ref 12–16)
IMMATURE GRANULOCYTES: ABNORMAL %
LYMPHOCYTES # BLD: 30 % (ref 24–44)
MCH RBC QN AUTO: 31.9 PG (ref 26–34)
MCHC RBC AUTO-ENTMCNC: 33.9 G/DL (ref 31–37)
MCV RBC AUTO: 94.1 FL (ref 80–100)
MONOCYTES # BLD: 8 % (ref 2–11)
NRBC AUTOMATED: ABNORMAL PER 100 WBC
PDW BLD-RTO: 19.4 % (ref 12.5–15.4)
PLATELET # BLD: 189 K/UL (ref 140–450)
PLATELET ESTIMATE: ABNORMAL
PMV BLD AUTO: 7 FL (ref 6–12)
POTASSIUM SERPL-SCNC: 3.5 MMOL/L (ref 3.7–5.3)
RBC # BLD: 3.76 M/UL (ref 4–5.2)
RBC # BLD: ABNORMAL 10*6/UL
SEG NEUTROPHILS: 57 % (ref 36–66)
SEGMENTED NEUTROPHILS ABSOLUTE COUNT: 1 K/UL (ref 1.8–7.7)
SODIUM BLD-SCNC: 140 MMOL/L (ref 135–144)
TOTAL PROTEIN: 6.4 G/DL (ref 6.4–8.3)
WBC # BLD: 1.7 K/UL (ref 3.5–11)
WBC # BLD: ABNORMAL 10*3/UL

## 2020-10-22 PROCEDURE — 99214 OFFICE O/P EST MOD 30 MIN: CPT | Performed by: INTERNAL MEDICINE

## 2020-10-22 PROCEDURE — 85025 COMPLETE CBC W/AUTO DIFF WBC: CPT

## 2020-10-22 PROCEDURE — 96401 CHEMO ANTI-NEOPL SQ/IM: CPT

## 2020-10-22 PROCEDURE — 6360000002 HC RX W HCPCS: Performed by: INTERNAL MEDICINE

## 2020-10-22 PROCEDURE — 80053 COMPREHEN METABOLIC PANEL: CPT

## 2020-10-22 PROCEDURE — 99211 OFF/OP EST MAY X REQ PHY/QHP: CPT | Performed by: INTERNAL MEDICINE

## 2020-10-22 PROCEDURE — 86300 IMMUNOASSAY TUMOR CA 15-3: CPT

## 2020-10-22 PROCEDURE — 36415 COLL VENOUS BLD VENIPUNCTURE: CPT

## 2020-10-22 RX ADMIN — DENOSUMAB 120 MG: 120 INJECTION SUBCUTANEOUS at 11:17

## 2020-10-22 NOTE — TELEPHONE ENCOUNTER
Pt was seen today by Dr. Cathie Barros. Per AVS, Change labs to every 4 weeks to heidi done here on the day of xgeva  Continue ibrance at the same dose, continue arimidex. rv to see me in 4 weeks with xgeva and labs cbc, cmp CA27-29  Pt is scheduled for f/u on 11-19-20 with cbc cmp ca27.29 to be drawn with inj to follow. Yes

## 2020-10-22 NOTE — PROGRESS NOTES
DIAGNOSIS:   1- T2, N0, M0 ER positive SD positive and HER-2/eve negative invasive ductal carcinoma of the left breast ( inner upper quadrant)  2- Repeated GI bleeding greetings 2 symptomatic anemia, despite extensive GI workup, source of bleeding was never found. 3- finally a small mass was seen in the jejunum. 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 Margins negative   4- compression fractures and bone lesions. Likely metastatic cancer (5/2020)     CURRENT THERAPY:  1- Mastectomy and axillary sampling  2- adjuvant chemotherapy with Taxotere and Cytoxan starting 5/16/2013. Chemotherapy stopped after 3 cycles because of ongoing GI bleeding and symptomatic anemia  3- Conservative management for GI bleeding. Leading completely stopped after the discontinuation of chemotherapy  4- started tamoxifen after chemotherapy was completed. 8/2013. 5- transitioned to Arimidex 6/2014, completed 08/2018  6- GIST resection, resected 9/2017   7-status post kyphoplasty and biopsy of bone metastases 5/2020. The tumor was minimally ER positive at 5% and SD negative. 8- rodding of the left femur completed. Plan for  Radiation  9- Systemic therapy, plan Arimidex plus Ibrance  10 - XRT completed 07/2020  10-current treatment starting 7/30/2020, monthly Xgeva, oral Ibrance and Arimidex    BRIEF CASE HISTORY:   Xiomara Sheppard is a very pleasant 47 y.o. who felt a mass in the medial aspect of her left breast, around the 9:00 position. Patient sought medical attention and mammogram and ultrasound were done. Showing an irregular, high-density mass with indistinct margins containing pleomorphic calcifications in the lower inner quadrant of the left breast close to 9:00 location this mass measures are mammogram 2.7 x 2.6 x 2 cm. Subsequently targeted ultrasound was performed showing hypoechoic, irregular, taller than wider, solid mass at 9:00 with posterior acoustic shadowing. The calcifications were visible on ultrasound .  On ultrasound the solid mass measured 2.4 x 2.6 x 1 cm. Image guided biopsy of the mass was done and showed invasive ductal carcinoma with surrounding DCIS (cribriform type) the tumor was ER and [place] positive and HER-2/eve negative with a fish ratio 1.1. The patient was referred to us and she also was seen by radiation oncology , She  decided on proceeding with total mastectomy and breast reconstruction . Allergy showed 2.5 cm intermediate grade invasive ductal carcinoma, [place] lymph node was negative. The tumor was ER/NJ positive and HER-2/eve negative. We discussed options of adjuvant therapy including chemotherapy and hormone therapy, The patient decided to proceed with adjuvant chemotherapy without undergone a Oncotype study. TC chemotherapy X4 cycles is planned  After 3 cycles, and presented with persistent GI bleeding leading to symptomatic anemia and syncope, the patient's condition was Serious enough to warrant repeated hospital admissions. Repeated GI workup including endoscopic evaluation and Tagged Red cell scans were negative. We decided to treat her conservatively and chemotherapy was stopped after 3 cycles. After that All her symptoms improved and she was started on tamoxifen. After one year, she was transitioned to anastrozole since she had no periods and hormonal testing showed post menopausal state. 08/2017  She had been having abdominal pain and went to [facility] for consult. She had work up which showed mass in the colon showed subtle changes in the mid-small intestine, CT enterography showed 3.6CM mass in the distal small bowel. She was also hospitalized in the interim with pancreatitis. 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 Margins were negative. Observation was decided, no need for adjuvant therapy  In May/2020, the patient presented with severe back pain.   MRI showed multiple bone lesions and curettage of uterus; Knee arthroscopy; fracture surgery; Tunneled venous port placement (Right); Upper gastrointestinal endoscopy (7/6/2013); Colonoscopy (7/6/2013); Mastectomy (Left, 4/8/13); other surgical history (1969); Breast biopsy (Left, 3/21/13); ileoscopy (10/12/05); Cosmetic surgery; Hysterectomy; denia and bso (cervix removed) (4/23/14); Enterocele repair (4/23/14); Cystoscopy (4/23/14); bladder suspension (4/23/14); Colonoscopy (10/12/2005); Upper gastrointestinal endoscopy (04/23/2015); and tumor removal.     CURRENT MEDICATIONS:  has a current medication list which includes the following prescription(s): ondansetron, calcium-vitamin d, potassium chloride, ibrance, palbociclib, megestrol, warfarin, diazepam, anastrozole, duloxetine, losartan, omeprazole, hydrochlorothiazide, amlodipine, docusate, and metoprolol tartrate. ALLERGIES:  is allergic to adhesive tape. FAMILY HISTORY: Negative for any hematological or oncological conditions. Specifically no history of breast cancer in the family    SOCIAL HISTORY:  reports that she quit smoking about 2 years ago. Her smoking use included Cigarettes. She smoked About 20 years. She has never used smokeless tobacco. She reports that she does not drink alcohol or use illicit drugs. REVIEW OF SYSTEMS:   General: No fever or night sweats. Weight is stable. +hot flashes   Eyes: No double or blurred vision. Ears: No tinnitus or hearing problem,    Throat: No dysphagia or sore throat   Respiratory: No chest pain, shortness of breath, no hemoptysis. +dry cough in the morning  Cardiovascular: Denies chest pain, PND or orthopnea. No L E swelling or palpitations. Gastrointestinal: No abdominal pain +constipation and diarrhea - managing; +nausea and vomiting resolved  Genitourinary: Denies dysuria, hematuria, frequency, urgency or incontinence. No vaginal bleeding. Neurological: Denies decreased LOC, no sensory or motor focal deficits. +headaches - unchanged  Musculoskeletal: No arthralgia no back pain or joint swelling. +  right rib pain and lower left leg pain - controlled  Skin: There are no rashes or bleeding. +hair thinning  Psychiatric: No anxiety, no depression. Endocrine: No diabetes or thyroid disease. Hematologic: No bleeding, no adenopathy. PHYSICAL EXAM:  The patient is not in acute distress. Vital signs: Blood pressure 117/88, pulse 80, temperature 98.2 °F (36.8 °C), temperature source Oral, weight 281 lb 8 oz (127.7 kg), last menstrual period 03/17/2014, not currently breastfeeding. HEENT:  Eyes are normal. Ears, nose is congested, no bleeding. Neck: Supple. No lymph node enlargement. No thyroid enlargement. Trachea is centrally located. Chest:  Clear to auscultation. No wheezes or crepitations. Breast:  Right: No masses, no adenopathy. No skin or nippple abnormalities. Left: left-sided mastectomy, surgery site is healed completely, no adenopathy  Heart: Regular sinus rhythm. Abdomen: Soft, nontender. No hepatosplenomegaly. No masses. Extremities:  With no edema. Lymph Nodes:  No cervical, axillary or inguinal lymph node enlargement. Neurologic: Conscious and oriented. No focal neurological deficits. Psychosocial: No depression, anxiety or stress.  Skin: No rashes, bruises or ecchymoses       REVIEW OF LABORATORY DATA:     Lab Results   Component Value Date    WBC 1.7 (L) 10/22/2020    HGB 12.0 10/22/2020    HCT 35.4 (L) 10/22/2020    MCV 94.1 10/22/2020     10/22/2020     Lab Results   Component Value Date    IRON 30 (L) 11/29/2017    TIBC 403 11/29/2017    FERRITIN 33 04/04/2019     Lab Results   Component Value Date    NEUTROABS 1.00 (L) 10/22/2020         Chemistry        Component Value Date/Time     10/22/2020 0924    K 3.5 (L) 10/22/2020 0924     10/22/2020 0924    CO2 24 10/22/2020 0924    BUN 11 10/22/2020 0924    CREATININE 0.86 10/22/2020 0924        Component Value Date/Time    CALCIUM 9.1 10/22/2020 0924    ALKPHOS 95 10/22/2020 0924    AST 30 10/22/2020 0924    ALT 24 10/22/2020 0924    BILITOT 0.51 10/22/2020 0924        Results for Nicole Reyes (MRN [id number]) as of 10/22/2020 10:46   Ref. Range 8/27/2020 10:20 9/24/2020 09:47   CA 27-29 Latest Ref Range: 0 - 38 U/mL 88 (H) 95 (H)         REVIEW OF RADIOLOGICAL RESULTS:  10/15/2020 PET IMPRESSION:   1. The previously identified abnormal FDG activity within the pancreatic body    appears to have resolved.  No new areas of abnormal FDG activity is    identified to suggest progression of disease. 2. Diffuse FDG avid bony metastatic disease similar in distribution to the    prior study from 06/05/2020.    3. Focal areas of abnormal FDG activity is identified involving the superior    segments of the lower lobes corresponding to ill-defined consolidations    within this region.  No definite mass is seen within the region on the    concurrent CT.  Finding is nonspecific and underlying infectious process    cannot be excluded.  CT follow-up is recommended. IMPRESSION:   1- Arlene  Has  T2, N0, M0 ER/[place] positive and HER-2/eve negative invasive ductal carcinoma of the left breast  2- Chemotherapy: received 3 cycles of TC, stopped due to GI bleeding  3- GI bleeding , related to the GIST tumor. Currently on oral iron and hemoglobin is improving  4- on anastrozole. We will continue, plan 5 years of therapy, completed 08/2018  5- for hot flashes, better on Effexor. 6- Recent pancreatitis, possibly biliary. MRI CT scan did not show any gallbladder stone. We will discuss with her surgeons. 7-   distal small bowel mass measuring 3.6 cm, resection shows low risk GIST. No need for adjuvant therapy, margins were negative. 8-bone metastases with compression fracture diagnosed in May/2020. Status post kyphoplasty and biopsy. 9- S/P radiation 07/2020  10- for systemic therapy, she was started on Arimidex, adding Ibrance .  Due to cytopenias, dose was reduced to 100 mg/day      PLAN:   1. We reviewed in detail her PET which shows very good radiological response to treatment with some minor activity remaining in the right hip, some infiltrative changes seen in the lung incidentally - likely inflammatory. she is not sympotmatic. 2. Her lab work was reviewed, counts are stable with leukopenia with ANC above 1000, she is hypokalemic but electrolytes are otherwise stable. Her cancer marker is elevated but stable. Has potassium supplement at home and we asked her to continue taking it. 3. I asked her to continue with potassium unchanged. 4. We will continue with treatment unchanged. 5. She is doing very well with treatment and I recommend she increase her activity. 6. I discussed plan for reducing lab work to treatment days only. 7. Return in 4 weeks.

## 2020-10-22 NOTE — PROGRESS NOTES
Pt here for Xgeva and  appt. Denies any problems. Tolerates very well. Will return 11/19 for Dr visit and treatment.

## 2020-10-22 NOTE — PATIENT INSTRUCTIONS
Change labs to every 4 weeks to heidi done here on the day of xgeva  Continue ibrance at the same dose, continue arimidex.   rv to see me in 4 weeks with xgeva and labs cbc, cmp CA27-29

## 2020-10-23 LAB — CA 27-29: 129 U/ML (ref 0–38)

## 2020-11-19 ENCOUNTER — OFFICE VISIT (OUTPATIENT)
Dept: ONCOLOGY | Age: 50
End: 2020-11-19
Payer: COMMERCIAL

## 2020-11-19 ENCOUNTER — TELEPHONE (OUTPATIENT)
Dept: ONCOLOGY | Age: 50
End: 2020-11-19

## 2020-11-19 ENCOUNTER — HOSPITAL ENCOUNTER (OUTPATIENT)
Dept: INFUSION THERAPY | Age: 50
Discharge: HOME | End: 2020-11-19
Payer: COMMERCIAL

## 2020-11-19 VITALS
HEART RATE: 102 BPM | BODY MASS INDEX: 40.61 KG/M2 | TEMPERATURE: 97.8 F | DIASTOLIC BLOOD PRESSURE: 85 MMHG | WEIGHT: 275 LBS | OXYGEN SATURATION: 98 % | SYSTOLIC BLOOD PRESSURE: 121 MMHG

## 2020-11-19 DIAGNOSIS — C49.A3 GIST (GASTROINTESTINAL STROMAL TUMOR) OF SMALL BOWEL, MALIGNANT (HCC): ICD-10-CM

## 2020-11-19 DIAGNOSIS — Z17.0 MALIGNANT NEOPLASM OF UPPER-OUTER QUADRANT OF LEFT BREAST IN FEMALE, ESTROGEN RECEPTOR POSITIVE (HCC): ICD-10-CM

## 2020-11-19 DIAGNOSIS — C50.211 BILATERAL MALIGNANT NEOPLASM OF UPPER INNER QUADRANT OF BREAST IN FEMALE, UNSPECIFIED ESTROGEN RECEPTOR STATUS (HCC): Primary | ICD-10-CM

## 2020-11-19 DIAGNOSIS — C50.412 MALIGNANT NEOPLASM OF UPPER-OUTER QUADRANT OF LEFT BREAST IN FEMALE, ESTROGEN RECEPTOR POSITIVE (HCC): ICD-10-CM

## 2020-11-19 DIAGNOSIS — C79.51 BONE METASTASIS (HCC): ICD-10-CM

## 2020-11-19 DIAGNOSIS — C50.212 BILATERAL MALIGNANT NEOPLASM OF UPPER INNER QUADRANT OF BREAST IN FEMALE, UNSPECIFIED ESTROGEN RECEPTOR STATUS (HCC): Primary | ICD-10-CM

## 2020-11-19 LAB
ABSOLUTE EOS #: 0.1 K/UL (ref 0–0.4)
ABSOLUTE IMMATURE GRANULOCYTE: ABNORMAL K/UL (ref 0–0.3)
ABSOLUTE LYMPH #: 0.5 K/UL (ref 1–4.8)
ABSOLUTE MONO #: 0.2 K/UL (ref 0.1–1.2)
ALBUMIN SERPL-MCNC: 3.9 G/DL (ref 3.5–5.2)
ALBUMIN/GLOBULIN RATIO: 1.3 (ref 1–2.5)
ALP BLD-CCNC: 86 U/L (ref 35–104)
ALT SERPL-CCNC: 19 U/L (ref 5–33)
ANION GAP SERPL CALCULATED.3IONS-SCNC: 13 MMOL/L (ref 9–17)
AST SERPL-CCNC: 26 U/L
BASOPHILS # BLD: 1 % (ref 0–2)
BASOPHILS ABSOLUTE: 0 K/UL (ref 0–0.2)
BILIRUB SERPL-MCNC: 0.38 MG/DL (ref 0.3–1.2)
BUN BLDV-MCNC: 13 MG/DL (ref 6–20)
BUN/CREAT BLD: ABNORMAL (ref 9–20)
CALCIUM SERPL-MCNC: 8.8 MG/DL (ref 8.6–10.4)
CHLORIDE BLD-SCNC: 108 MMOL/L (ref 98–107)
CO2: 20 MMOL/L (ref 20–31)
CREAT SERPL-MCNC: 0.85 MG/DL (ref 0.5–0.9)
DIFFERENTIAL TYPE: ABNORMAL
EOSINOPHILS RELATIVE PERCENT: 4 % (ref 1–4)
GFR AFRICAN AMERICAN: >60 ML/MIN
GFR NON-AFRICAN AMERICAN: >60 ML/MIN
GFR SERPL CREATININE-BSD FRML MDRD: ABNORMAL ML/MIN/{1.73_M2}
GFR SERPL CREATININE-BSD FRML MDRD: ABNORMAL ML/MIN/{1.73_M2}
GLUCOSE BLD-MCNC: 151 MG/DL (ref 70–99)
HCT VFR BLD CALC: 35.4 % (ref 36–46)
HEMOGLOBIN: 11.8 G/DL (ref 12–16)
IMMATURE GRANULOCYTES: ABNORMAL %
LYMPHOCYTES # BLD: 27 % (ref 24–44)
MCH RBC QN AUTO: 33 PG (ref 26–34)
MCHC RBC AUTO-ENTMCNC: 33.2 G/DL (ref 31–37)
MCV RBC AUTO: 99.3 FL (ref 80–100)
MONOCYTES # BLD: 9 % (ref 2–11)
NRBC AUTOMATED: ABNORMAL PER 100 WBC
PDW BLD-RTO: 18.1 % (ref 12.5–15.4)
PLATELET # BLD: 196 K/UL (ref 140–450)
PLATELET ESTIMATE: ABNORMAL
PMV BLD AUTO: 7.2 FL (ref 6–12)
POTASSIUM SERPL-SCNC: 3.4 MMOL/L (ref 3.7–5.3)
RBC # BLD: 3.56 M/UL (ref 4–5.2)
RBC # BLD: ABNORMAL 10*6/UL
SEG NEUTROPHILS: 59 % (ref 36–66)
SEGMENTED NEUTROPHILS ABSOLUTE COUNT: 1 K/UL (ref 1.8–7.7)
SODIUM BLD-SCNC: 141 MMOL/L (ref 135–144)
TOTAL PROTEIN: 7 G/DL (ref 6.4–8.3)
WBC # BLD: 1.7 K/UL (ref 3.5–11)
WBC # BLD: ABNORMAL 10*3/UL

## 2020-11-19 PROCEDURE — 99211 OFF/OP EST MAY X REQ PHY/QHP: CPT | Performed by: INTERNAL MEDICINE

## 2020-11-19 PROCEDURE — 6360000002 HC RX W HCPCS: Performed by: INTERNAL MEDICINE

## 2020-11-19 PROCEDURE — 85025 COMPLETE CBC W/AUTO DIFF WBC: CPT

## 2020-11-19 PROCEDURE — 86300 IMMUNOASSAY TUMOR CA 15-3: CPT

## 2020-11-19 PROCEDURE — 96401 CHEMO ANTI-NEOPL SQ/IM: CPT

## 2020-11-19 PROCEDURE — 80053 COMPREHEN METABOLIC PANEL: CPT

## 2020-11-19 PROCEDURE — 36415 COLL VENOUS BLD VENIPUNCTURE: CPT

## 2020-11-19 PROCEDURE — 99214 OFFICE O/P EST MOD 30 MIN: CPT | Performed by: INTERNAL MEDICINE

## 2020-11-19 RX ADMIN — DENOSUMAB 120 MG: 120 INJECTION SUBCUTANEOUS at 11:07

## 2020-11-19 NOTE — PROGRESS NOTES
Pt here for Xgeva. Arrives ambulatory by self. Pt was seen by Dr. Kim Garza, order rec'd to proceed with tx. Lab results reviewed. Injection complete without incident. D/c'd in stable condition. Returns 12/17/20 for f/u with Dr. Kim Garza + Bronwyn Phillips.

## 2020-11-19 NOTE — PROGRESS NOTES
DIAGNOSIS:   1- T2, N0, M0 ER positive [place] positive and HER-2/eve negative invasive ductal carcinoma of the left breast ( inner upper quadrant)  2- Repeated GI bleeding greetings 2 symptomatic anemia, despite extensive GI workup, source of bleeding was never found. 3- finally a small mass was seen in the jejunum. 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 Margins negative   4- compression fractures and bone lesions. Likely metastatic cancer (5/2020)     CURRENT THERAPY:  1- Mastectomy and axillary sampling  2- adjuvant chemotherapy with Taxotere and Cytoxan starting 5/16/2013. Chemotherapy stopped after 3 cycles because of ongoing GI bleeding and symptomatic anemia  3- Conservative management for GI bleeding. Leading completely stopped after the discontinuation of chemotherapy  4- started tamoxifen after chemotherapy was completed. 8/2013. 5- transitioned to Arimidex 6/2014, completed 08/2018  6- GIST resection, resected 9/2017   7-status post kyphoplasty and biopsy of bone metastases 5/2020. The tumor was minimally ER positive at 5% and OH negative. 8- rodding of the left femur completed. Plan for  Radiation  9- Systemic therapy, plan Arimidex plus Ibrance  10 - XRT completed 07/2020  10-current treatment starting 7/30/2020, monthly Xgeva, oral Ibrance and Arimidex    BRIEF CASE HISTORY:   Maddi Francis is a very pleasant 47 y.o. who felt a mass in the medial aspect of her left breast, around the 9:00 position. Patient sought medical attention and mammogram and ultrasound were done. Showing an irregular, high-density mass with indistinct margins containing pleomorphic calcifications in the lower inner quadrant of the left breast close to 9:00 location this mass measures are mammogram 2.7 x 2.6 x 2 cm. Subsequently targeted ultrasound was performed showing hypoechoic, irregular, taller than wider, solid mass at 9:00 with posterior acoustic shadowing. The calcifications were visible on ultrasound .  On ultrasound the solid mass measured 2.4 x 2.6 x 1 cm. Image guided biopsy of the mass was done and showed invasive ductal carcinoma with surrounding DCIS (cribriform type) the tumor was ER and IA positive and HER-2/eve negative with a fish ratio 1.1. The patient was referred to us and she also was seen by radiation oncology , She  decided on proceeding with total mastectomy and breast reconstruction . Allergy showed 2.5 cm intermediate grade invasive ductal carcinoma, [place] lymph node was negative. The tumor was ER/[place] positive and HER-2/eve negative. We discussed options of adjuvant therapy including chemotherapy and hormone therapy, The patient decided to proceed with adjuvant chemotherapy without undergone a Oncotype study. TC chemotherapy X4 cycles is planned  After 3 cycles, and presented with persistent GI bleeding leading to symptomatic anemia and syncope, the patient's condition was Serious enough to warrant repeated hospital admissions. Repeated GI workup including endoscopic evaluation and Tagged Red cell scans were negative. We decided to treat her conservatively and chemotherapy was stopped after 3 cycles. After that All her symptoms improved and she was started on tamoxifen. After one year, she was transitioned to anastrozole since she had no periods and hormonal testing showed post menopausal state. 08/2017  She had been having abdominal pain and went to [facility] for consult. She had work up which showed mass in the colon showed subtle changes in the mid-small intestine, CT enterography showed 3.6CM mass in the distal small bowel. She was also hospitalized in the interim with pancreatitis. 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 Margins were negative. Observation was decided, no need for adjuvant therapy  In May/2020, the patient presented with severe back pain.   MRI showed multiple bone lesions with evidence of compression fracture. Patient underwent successful kyphoplasty and biopsy and pathology showed breast cancer, it was 5% ER positive and CT was negative. HER-2 was +2 which is equivocal so a fish test was ordered. Plan for radiation, staging PET, and Arimidex. PET scan showed widespread metastatic bony disease including the thoracic spine, the lumbar spine, the left femur and the rib area. She underwent repeated kyphoplasty and rodding of the left femur. She is undergoing radiation to the painful bony areas in thoracic, rib and lumbar areas as well as the left femur. We will likely use Ibrance plus Arimidex. As well as monthly Xgeva to be started after completion of radiation. After completion of radiation, we maintained her on Arimidex plus Ibrance, due to cytopenias, Ibrance dose was decreased to 100 mg/day which was much better tolerated. Also we maintained her on Xgeva monthly. PET scan was done in October and showed essentially stable disease. There was some suspicious activity in the hip but this was asymptomatic and we decided that this might be post radiation or related to physical therapy. We decided to continue current therapy until clear progression. INTERIM HISTORY: She comes in today for follow up for metastatic breast cancer and for cycle #5 of Xgeva  She is doing well and feeling well. She is more active. Only complaint is fatigue and exercise intolerance and I think reviewing her imaging study, there is no evidence of lung metastases. She is not anemic. I think most of that is due to deconditioning. She is working with physical therapy and we advised her to start an exercise program.      GYNECOLOGICAL HISTORY  Menarche at age 14. He is premenopausal with regular periods. +2. She used birth control minimally.      PAST MEDICAL HISTORY: has a past medical history of Anemia, Anxiety, Atrial fibrillation ([address] Breast cancer ([address] Carpal tunnel syndrome, Depression, GI bleed, Hypertension, Lymphedema, Neurologic cardiac syncope, Obesity, Pain, Sleep apnea, ANA PAULA (stress urinary incontinence, female), Tachycardia, Varicella, and Varicella without complication. PAST SURGICAL HISTORY: has a past surgical history that includes Shoulder arthroscopy (2008); Colonoscopy (2005); Dilation and curettage of uterus; Knee arthroscopy; fracture surgery; Tunneled venous port placement (Right); Upper gastrointestinal endoscopy (7/6/2013); Colonoscopy (7/6/2013); Mastectomy (Left, 4/8/13); other surgical history (1969); Breast biopsy (Left, 3/21/13); ileoscopy (10/12/05); Cosmetic surgery; Hysterectomy; denia and bso (cervix removed) (4/23/14); Enterocele repair (4/23/14); Cystoscopy (4/23/14); bladder suspension (4/23/14); Colonoscopy (10/12/2005); Upper gastrointestinal endoscopy (04/23/2015); and tumor removal.     CURRENT MEDICATIONS:  has a current medication list which includes the following prescription(s): ondansetron, calcium-vitamin d, potassium chloride, ibrance, palbociclib, megestrol, warfarin, diazepam, duloxetine, losartan, omeprazole, hydrochlorothiazide, amlodipine, docusate, metoprolol tartrate, and anastrozole, and the following Facility-Administered Medications: denosumab. ALLERGIES:  is allergic to adhesive tape. FAMILY HISTORY: Negative for any hematological or oncological conditions. Specifically no history of breast cancer in the family    SOCIAL HISTORY:  reports that she quit smoking about 2 years ago. Her smoking use included Cigarettes. She smoked About 20 years. She has never used smokeless tobacco. She reports that she does not drink alcohol or use illicit drugs. REVIEW OF SYSTEMS:   General: No fever or night sweats. Weight is stable. +hot flashes, fatigue and exercise intolerance  Eyes: No double or blurred vision.   Ears: No tinnitus or hearing problem,    Throat: No dysphagia or sore throat   Respiratory: No chest pain, shortness of breath at rest, Date    IRON 30 (L) 11/29/2017    TIBC 403 11/29/2017    FERRITIN 33 04/04/2019     Lab Results   Component Value Date    NEUTROABS 1.00 (L) 11/19/2020         Chemistry        Component Value Date/Time     11/19/2020 1017    K 3.4 (L) 11/19/2020 1017     (H) 11/19/2020 1017    CO2 20 11/19/2020 1017    BUN 13 11/19/2020 1017    CREATININE 0.85 11/19/2020 1017        Component Value Date/Time    CALCIUM 8.8 11/19/2020 1017    ALKPHOS 86 11/19/2020 1017    AST 26 11/19/2020 1017    ALT 19 11/19/2020 1017    BILITOT 0.38 11/19/2020 1017        Results for Jason Miller (MRN [id number]) as of 11/19/2020 11:05   Ref. Range 8/30/2017 09:15 7/21/2020 10:42 8/27/2020 10:20 9/24/2020 09:47 10/22/2020 09:24   CA 27-29 Latest Ref Range: 0 - 38 U/mL  164 (H) 88 (H) 95 (H) 129 (H)         REVIEW OF RADIOLOGICAL RESULTS:  10/15/2020 PET IMPRESSION:   1. The previously identified abnormal FDG activity within the pancreatic body    appears to have resolved.  No new areas of abnormal FDG activity is    identified to suggest progression of disease. 2. Diffuse FDG avid bony metastatic disease similar in distribution to the    prior study from 06/05/2020.    3. Focal areas of abnormal FDG activity is identified involving the superior    segments of the lower lobes corresponding to ill-defined consolidations    within this region.  No definite mass is seen within the region on the    concurrent CT.  Finding is nonspecific and underlying infectious process    cannot be excluded.  CT follow-up is recommended. IMPRESSION:   1- Arlene  Has  T2, N0, M0 ER/NE positive and HER-2/eve negative invasive ductal carcinoma of the left breast  2- Chemotherapy: received 3 cycles of TC, stopped due to GI bleeding  3- GI bleeding , related to the GIST tumor. Currently on oral iron and hemoglobin is improving  4- on anastrozole. We will continue, plan 5 years of therapy, completed 08/2018  5- for hot flashes, better on Effexor. 6- Recent pancreatitis, possibly biliary. MRI CT scan did not show any gallbladder stone. We will discuss with her surgeons. 7-   distal small bowel mass measuring 3.6 cm, resection shows low risk GIST. No need for adjuvant therapy, margins were negative. 8-bone metastases with compression fracture diagnosed in May/2020. Status post kyphoplasty and biopsy. 9- S/P radiation 07/2020  10- for systemic therapy, she was started on Arimidex, adding Ibrance . Due to cytopenias, dose was reduced to 100 mg/day      PLAN:   1. Jayda Mayo continues to do very well, based on imaging study and clinical examination, she has stable disease  2. I am not sure how accurate is her tumor marker. We will continue to follow. But I have no plans to change therapy unless she has clear evidence of progression clinically or by radiological evaluation  3. Continue current therapy without any changes, her ANC is about 1000 today. No need for further adjustment of her medication, she has leukopenia and anemia that are grade 1 at this point  4. We talked about an exercise program  5. She has grade 1 GI toxicity is managed with medication. 6. She continues to have mild hypokalemia, continue potassium supplementation.   7. We will see her back in 4 weeks for follow-up

## 2020-11-19 NOTE — TELEPHONE ENCOUNTER
AVS from 11/19/20     Proceed with Xgeva today per orders, return in 4 weeks with labs and Xgeva    rv scheduled for 12/17/20  @ 10:15am with inj to follow    Pt was given AVS and appt schedule

## 2020-11-20 LAB — CA 27-29: 146 U/ML (ref 0–38)

## 2020-12-03 ENCOUNTER — APPOINTMENT (OUTPATIENT)
Dept: RADIATION ONCOLOGY | Age: 50
End: 2020-12-03
Attending: RADIOLOGY
Payer: COMMERCIAL

## 2020-12-14 ENCOUNTER — HOSPITAL ENCOUNTER (OUTPATIENT)
Dept: RADIATION ONCOLOGY | Age: 50
Discharge: HOME | End: 2020-12-14
Attending: RADIOLOGY
Payer: COMMERCIAL

## 2020-12-14 ENCOUNTER — TELEPHONE (OUTPATIENT)
Dept: SPIRITUAL SERVICES | Age: 50
End: 2020-12-14

## 2020-12-14 ENCOUNTER — HOSPITAL ENCOUNTER (OUTPATIENT)
Age: 50
Discharge: HOME | End: 2020-12-14
Payer: COMMERCIAL

## 2020-12-14 VITALS
WEIGHT: 283 LBS | HEART RATE: 86 BPM | SYSTOLIC BLOOD PRESSURE: 136 MMHG | BODY MASS INDEX: 41.79 KG/M2 | RESPIRATION RATE: 18 BRPM | TEMPERATURE: 97.8 F | DIASTOLIC BLOOD PRESSURE: 85 MMHG

## 2020-12-14 DIAGNOSIS — Z17.0 MALIGNANT NEOPLASM OF UPPER-OUTER QUADRANT OF LEFT BREAST IN FEMALE, ESTROGEN RECEPTOR POSITIVE (HCC): ICD-10-CM

## 2020-12-14 DIAGNOSIS — C50.412 MALIGNANT NEOPLASM OF UPPER-OUTER QUADRANT OF LEFT BREAST IN FEMALE, ESTROGEN RECEPTOR POSITIVE (HCC): ICD-10-CM

## 2020-12-14 DIAGNOSIS — C50.212 BILATERAL MALIGNANT NEOPLASM OF UPPER INNER QUADRANT OF BREAST IN FEMALE, UNSPECIFIED ESTROGEN RECEPTOR STATUS (HCC): ICD-10-CM

## 2020-12-14 DIAGNOSIS — C49.A3 GIST (GASTROINTESTINAL STROMAL TUMOR) OF SMALL BOWEL, MALIGNANT (HCC): ICD-10-CM

## 2020-12-14 DIAGNOSIS — C50.211 BILATERAL MALIGNANT NEOPLASM OF UPPER INNER QUADRANT OF BREAST IN FEMALE, UNSPECIFIED ESTROGEN RECEPTOR STATUS (HCC): ICD-10-CM

## 2020-12-14 DIAGNOSIS — C79.51 BONE METASTASIS (HCC): ICD-10-CM

## 2020-12-14 LAB
ABSOLUTE EOS #: 0.02 K/UL (ref 0–0.4)
ABSOLUTE IMMATURE GRANULOCYTE: ABNORMAL K/UL (ref 0–0.3)
ABSOLUTE LYMPH #: 0.46 K/UL (ref 1–4.8)
ABSOLUTE MONO #: 0.16 K/UL (ref 0.1–0.8)
ALBUMIN SERPL-MCNC: 4.1 G/DL (ref 3.5–5.2)
ALBUMIN/GLOBULIN RATIO: 1.3 (ref 1–2.5)
ALP BLD-CCNC: 82 U/L (ref 35–104)
ALT SERPL-CCNC: 25 U/L (ref 5–33)
ANION GAP SERPL CALCULATED.3IONS-SCNC: 11 MMOL/L (ref 9–17)
AST SERPL-CCNC: 29 U/L
BASOPHILS # BLD: 1 % (ref 0–2)
BASOPHILS ABSOLUTE: 0.02 K/UL (ref 0–0.2)
BILIRUB SERPL-MCNC: 0.3 MG/DL (ref 0.3–1.2)
BUN BLDV-MCNC: 13 MG/DL (ref 6–20)
BUN/CREAT BLD: NORMAL (ref 9–20)
CALCIUM SERPL-MCNC: 9.5 MG/DL (ref 8.6–10.4)
CHLORIDE BLD-SCNC: 103 MMOL/L (ref 98–107)
CO2: 24 MMOL/L (ref 20–31)
CREAT SERPL-MCNC: 0.81 MG/DL (ref 0.5–0.9)
DIFFERENTIAL TYPE: ABNORMAL
EOSINOPHILS RELATIVE PERCENT: 1 % (ref 1–4)
GFR AFRICAN AMERICAN: >60 ML/MIN
GFR NON-AFRICAN AMERICAN: >60 ML/MIN
GFR SERPL CREATININE-BSD FRML MDRD: NORMAL ML/MIN/{1.73_M2}
GFR SERPL CREATININE-BSD FRML MDRD: NORMAL ML/MIN/{1.73_M2}
GLUCOSE BLD-MCNC: 97 MG/DL (ref 70–99)
HCT VFR BLD CALC: 34.8 % (ref 36–46)
HEMOGLOBIN: 11.9 G/DL (ref 12–16)
IMMATURE GRANULOCYTES: ABNORMAL %
LYMPHOCYTES # BLD: 23 % (ref 24–44)
MCH RBC QN AUTO: 33.5 PG (ref 26–34)
MCHC RBC AUTO-ENTMCNC: 34.3 G/DL (ref 31–37)
MCV RBC AUTO: 97.9 FL (ref 80–100)
MONOCYTES # BLD: 8 % (ref 1–7)
MORPHOLOGY: NORMAL
NRBC AUTOMATED: ABNORMAL PER 100 WBC
PDW BLD-RTO: 16.4 % (ref 12.5–15.4)
PLATELET # BLD: 249 K/UL (ref 140–450)
PLATELET ESTIMATE: ABNORMAL
PMV BLD AUTO: 6.8 FL (ref 6–12)
POTASSIUM SERPL-SCNC: 3.8 MMOL/L (ref 3.7–5.3)
RBC # BLD: 3.56 M/UL (ref 4–5.2)
RBC # BLD: ABNORMAL 10*6/UL
SEG NEUTROPHILS: 67 % (ref 36–66)
SEGMENTED NEUTROPHILS ABSOLUTE COUNT: 1.34 K/UL (ref 1.8–7.7)
SODIUM BLD-SCNC: 138 MMOL/L (ref 135–144)
TOTAL PROTEIN: 7.2 G/DL (ref 6.4–8.3)
WBC # BLD: 2 K/UL (ref 3.5–11)
WBC # BLD: ABNORMAL 10*3/UL

## 2020-12-14 PROCEDURE — 99212 OFFICE O/P EST SF 10 MIN: CPT | Performed by: RADIOLOGY

## 2020-12-14 PROCEDURE — 36415 COLL VENOUS BLD VENIPUNCTURE: CPT

## 2020-12-14 PROCEDURE — 99213 OFFICE O/P EST LOW 20 MIN: CPT | Performed by: RADIOLOGY

## 2020-12-14 PROCEDURE — 86300 IMMUNOASSAY TUMOR CA 15-3: CPT

## 2020-12-14 PROCEDURE — 85025 COMPLETE CBC W/AUTO DIFF WBC: CPT

## 2020-12-14 PROCEDURE — 80053 COMPREHEN METABOLIC PANEL: CPT

## 2020-12-14 ASSESSMENT — PAIN DESCRIPTION - LOCATION: LOCATION: HIP

## 2020-12-14 ASSESSMENT — PAIN DESCRIPTION - ORIENTATION: ORIENTATION: RIGHT

## 2020-12-14 ASSESSMENT — PAIN DESCRIPTION - FREQUENCY: FREQUENCY: INTERMITTENT

## 2020-12-14 ASSESSMENT — PAIN DESCRIPTION - PAIN TYPE: TYPE: ACUTE PAIN

## 2020-12-14 ASSESSMENT — PAIN SCALES - GENERAL: PAINLEVEL_OUTOF10: 4

## 2020-12-14 NOTE — PROGRESS NOTES
Rebecca Gathers  12/14/2020  2:22 PM      Vitals:    12/14/20 1400   BP: 136/85   Pulse: 86   Resp: 18   Temp: 97.8 °F (36.6 °C)    :   Patient Currently in Pain: Yes     Pain Level: 4       Wt Readings from Last 1 Encounters:   12/14/20 283 lb (128.4 kg)                Current Outpatient Medications:     ondansetron (ZOFRAN-ODT) 4 MG disintegrating tablet, Place 1 tablet under the tongue every 8 hours as needed for Nausea or Vomiting, Disp: 30 tablet, Rfl: 1    CALCIUM-VITAMIN D PO, Take by mouth daily, Disp: , Rfl:     potassium chloride (KLOR-CON M) 20 MEQ extended release tablet, Take 1 tablet by mouth daily, Disp: 30 tablet, Rfl: 5    palbociclib (IBRANCE) 100 MG tablet, Take 1 tab po daily for 21 days then off 1 week., Disp: 21 tablet, Rfl: 3    palbociclib (IBRANCE) 100 MG capsule, Take 1 daily for 21 days then 1 week off, Disp: 21 capsule, Rfl: 3    megestrol (MEGACE) 40 MG/ML suspension, Take 10 mLs by mouth daily, Disp: 240 mL, Rfl: 2    warfarin (COUMADIN) 5 MG tablet, Take 5 mg by mouth Mon,Wed,Fri 7.5mg other days 5mg, Disp: , Rfl:     diazePAM (VALIUM) 2 MG tablet, TAKE ONE TABLET BY MOUTH FOR ONE DOSE PRIOR TO MRI, Disp: , Rfl:     anastrozole (ARIMIDEX) 1 MG tablet, Take 1 tablet by mouth daily, Disp: 30 tablet, Rfl: 6    DULoxetine (CYMBALTA) 30 MG extended release capsule, Take 30 mg by mouth daily, Disp: , Rfl:     losartan (COZAAR) 100 MG tablet, Take 100 mg by mouth daily, Disp: , Rfl:     omeprazole (PRILOSEC) 20 MG delayed release capsule, Take 20 mg by mouth daily, Disp: , Rfl:     hydrochlorothiazide (HYDRODIURIL) 12.5 MG tablet, Take 12.5 mg by mouth daily, Disp: , Rfl:     amLODIPine (NORVASC) 5 MG tablet, Take 10 mg by mouth daily , Disp: , Rfl:     docusate sodium (COLACE, DULCOLAX) 100 MG CAPS, Take 100 mg by mouth 2 times daily, Disp: 30 capsule, Rfl: 0    metoprolol tartrate (LOPRESSOR) 25 MG tablet, TAKE ONE TABLET BY MOUTH TWICE A DAY, Disp: 60 tablet, Rfl: 4 Immunizations:    Influenza status:    [x]   Current   []   Patient declined    Pneumococcal status:  [x]   Current  []   Patient declined    Smoking Status:    [] Smoker - PPD:  Smoking cessation education: Provided []   Declined []    [x] Nonsmoker - Quit Date: 2010              [] Never a smoker           Cancer Screening:  Colonoscopy [x] Current [] Not current   [] Not current, but scheduled   [] NA  Mammogram [x] Current [] Not current   [] Not current, but scheduled   [] NA  Prostate [] Current [] Not current   [] Not current, but scheduled   [x] NA  PAP/Pelvic [] Current [x] Not current   [] Not current, but scheduled   [] NA  Skin  [] Current  [x] Not current   [] Not current, but scheduled   [] NA    Hormone:  Lupron []   Last dose given:           Next dose due:   Eligard []   Last dose given:           Next dose due:   Aromatase Inhibitors [x]   Medication name: Arimidex   N/A:  []              *BREAST Patient only:    Lymphedema Eval:   [] left arm      [] right arm  Location:     Measurement (cm)    Upper Bicep :    Lower Bicep :         FALLS RISK SCREEN  Instructions:  Assess the patient and enter the appropriate indicators that are present for fall risk identification. Total the numbers entered and assign a fall risk score from Table 2.  Reassess patient at a minimum every 12 weeks or with status change. Assessment   Date  12/14/2020     1. Mental Ability: confusion/cognitively impaired 0     2. Elimination Issues: incontinence, frequency 0       3. Ambulatory: use of assistive devices (walker, cane, off-loading devices),        attached to equipment (IV pole, oxygen) 2     4. Sensory Limitations: dizziness, vertigo, impaired vision 0     5. Age less than 65        0     6. Age 72 or greater 0     7. Medication: diuretics, strong analgesics, hypnotics, sedatives,        antihypertensive agents 3   8.   Falls:  recent history of falls within the last 3 months (not to include slipping or tripping) 0   TOTAL 5    If score of 4 or greater was education given? Yes           TABLE 2   Risk Score Risk Level Plan of Care   0-3 Little or  No Risk 1. Provide assistance as indicated for ambulation activities  2. Reorient confused/cognitively impaired patient  3. Chair/bed in low position, stretcher/bed with siderails up except when performing patient care activities  5. Educate patient/family/caregiver on falls prevention  6.  Reassess in 12 weeks or with any noted change in patient condition which places them at a risk for a fall   4-6 Moderate Risk 1. Provide assistance as indicated for ambulation activities  2. Reorient confused/cognitively impaired patient  3. Chair/bed in low position, stretcher/bed with siderails up except when performing patient care activities  4. Educate patient/family/caregiver on falls prevention     7 or   Higher High Risk 1. Place patient in easily observable treatment room  2. Patient attended at all times by family member or staff  3. Provide assistance as indicated for ambulation activities  4. Reorient confused/cognitively impaired patient  5. Chair/bed in low position, stretcher/bed with siderails up except when performing patient care activities  6. Educate patient/family/caregiver on falls prevention         PLAN: Patient is seen today in follow up. She reports that she is having mild pains in her lower back and hips but states it \"doesn't feel like the pains I had before. \" Dr Emily Koehler notified and examined pt. Pt to f/u with RO prn. Per Dr Esperanza Garcia notified Jefferson Hitchcock to help navigate pt.            Carin Buckner

## 2020-12-14 NOTE — TELEPHONE ENCOUNTER
Writer spoke with Patient on the phone.     Electronically signed by Digna Lamb, Oncology Outpatient [facility], [address] [facility] Radiation Oncology  12/14/2020  5:53 PM

## 2020-12-14 NOTE — FLOWSHEET NOTE
Situation:  Writer received referral from Ms. Sylvia Schaefer who communicated with Bernadine Calderon, that she wanted to speak with writer. Assessment: Ms. Sylvia Schaefer answered the phone. She reported that she was doing \"much better. \" She told writer she would like to attend the monthly virtual [address] [place] [address] group and would like to contribute. She stated her intention to call the [facility] and [facility] to the group. She acknowledged the impact of the pandemic on her and expressed a desire to have more social support. She shared that she has accepted her diagnosis. Intervention:  Writer provided supportive presence and explored Pt's coping and needs; gave Pt information about the [address] [place] [address] group and encouraged her to call the [facility] to RSVP to the group; affirmed Pt's strengths; offered words of encouragement and support. Outcome:  Ms. Steph Mayo for the call and told her she would see her next month. 12/14/20 6815   Encounter Summary   Services provided to: Patient   Referral/Consult From: Patient   Support System Children;Friends/neighbors; Family members   Continue Visiting   (12/14/20)   Complexity of Encounter Moderate   Length of Encounter 15 minutes   Spiritual Assessment Completed Yes   Routine   Type Follow up   Spiritual/Worship   Type Spiritual support   Assessment Calm   Intervention Active listening;Explored feelings, thoughts, concerns;Explored coping resources;Sustaining presence/ Ministry of presence; Discussed illness/injury and it's impact; Discussed meaning/purpose   Outcome Expressed feelings/needs/concerns;Engaged in conversation;Expressed gratitude;Receptive;Coping; Hopeful;Encouraged     Electronically signed by Kanwal Guillen, [address] [facility] [address], [address] [facility] Radiation Oncology  12/14/2020  6:00 PM

## 2020-12-15 NOTE — PROGRESS NOTES
[facility] [address]            Radiation Oncology          [address] [facility]          [address]        Qi Ee: [phone]        F: [phone]       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 [facility])  Staging form: Breast, AJCC 7th Edition  - Clinical stage from 2020: Stage IV (T2, N0, M1) - Signed by Delfin Mehta MD on 2020  -s/p L MRM SLN 13  -s/p TC x3   -on Tamoxifen then Arimedex completing in 2018  RT to new bone mets:  Sternum, L 6th ribs, and T4-T8: 30Gy 30Gy 20  L 10th rib, L4-SI Joints: 30Gy 20  L femur (s/p ORIF): 30Gy 20  On [place]    INTERVAL HISTORY:   diazePAM (VALIUM) 2 MG tablet, TAKE ONE TABLET BY MOUTH FOR ONE DOSE PRIOR TO MRI, Disp: , Rfl:     anastrozole (ARIMIDEX) 1 MG tablet, Take 1 tablet by mouth daily, Disp: 30 tablet, Rfl: 6    DULoxetine (CYMBALTA) 30 MG extended release capsule, Take 30 mg by mouth daily, Disp: , Rfl:     losartan (COZAAR) 100 MG tablet, Take 100 mg by mouth daily, Disp: , Rfl:     omeprazole (PRILOSEC) 20 MG delayed release capsule, Take 20 mg by mouth daily, Disp: , Rfl:     hydrochlorothiazide (HYDRODIURIL) 12.5 MG tablet, Take 12.5 mg by mouth daily, Disp: , Rfl:     amLODIPine (NORVASC) 5 MG tablet, Take 10 mg by mouth daily , Disp: , Rfl:     docusate sodium (COLACE, DULCOLAX) 100 MG CAPS, Take 100 mg by mouth 2 times daily, Disp: 30 capsule, Rfl: 0    metoprolol tartrate (LOPRESSOR) 25 MG tablet, TAKE ONE TABLET BY MOUTH TWICE A DAY, Disp: 60 tablet, Rfl: 4    ALLERGIES:  Allergies   Allergen Reactions    Adhesive Tape Other (See Comments)     Skin breakdown         REVIEW OF SYSTEMS:    A full 14 point review of systems was performed and assessed and found to be negative except as noted above. PHYSICAL EXAMINATION:    CHAPERONE: Family/friend/companieon Present    ECO Symptomatic but completely ambulatory    VITAL SIGNS: /85   Pulse 86   Temp 97.8 °F (36.6 °C)   Resp 18   Wt 283 lb (128.4 kg)   LMP 2014   BMI 41.79 kg/m²   GENERAL:  General appearance is that of a well-nourished, well-developed in no apparent distress. HEENT: Normocephalic, atraumatic, EOMI, moist mucosa, no erythema. NECK:  No adenopathy or a palpable thyroid mass, trachea is midline. HEART:  Regular rate and rhythm, S1, S2, no murmurs. LUNGS:  Clear to auscultation bilaterally with no wheezing or crackles. ABDOMEN:  Soft, nontender, non distended. EXTREMITIES:  No clubbing, cyanosis, or edema. No calf tenderness. MSK: No spinal tenderness. NEUROLOGICAL: No focal deficits. CN II-XII intact. Strength and sensation intact bilaterally. SKIN: No erythema or desquamation. LABS:  WBC   Date Value Ref Range Status   12/14/2020 2.0 (L) 3.5 - 11.0 k/uL Final     Segs Absolute   Date Value Ref Range Status   12/14/2020 1.34 (L) 1.8 - 7.7 k/uL Final     Hemoglobin   Date Value Ref Range Status   12/14/2020 11.9 (L) 12.0 - 16.0 g/dL Final     Platelet Count   Date Value Ref Range Status   04/26/2012 181 140 - 450 k/uL Final     Platelets   Date Value Ref Range Status   12/14/2020 249 140 - 450 k/uL Final        IMAGING:   PET Scan 10/15/20  Impression   1. The previously identified abnormal FDG activity within the pancreatic body   appears to have resolved.  No new areas of abnormal FDG activity is   identified to suggest progression of disease. 2. Diffuse FDG avid bony metastatic disease similar in distribution to the   prior study from 06/05/2020.   3. Focal areas of abnormal FDG activity is identified involving the superior   segments of the lower lobes corresponding to ill-defined consolidations   within this region.  No definite mass is seen within the region on the   concurrent CT.  Finding is nonspecific and underlying infectious process   cannot be excluded.  CT follow-up is recommended.          ASSESSMENT AND PLAN:  Sheryl Méndez is a 45 y.o. 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 ([facility] [address]  Staging form: Breast, AJCC 7th Edition  - Clinical stage from 5/27/2020: Stage IV (T2, N0, M1) - Signed by Shon Baird MD on 6/8/2020  -s/p L MRM SLN 4/8/13  -s/p TC x3 2013  -on Tamoxifen then Arimedex completing in 2018  RT to new bone mets:  Sternum, L 6th ribs, and T4-T8: 30Gy 30Gy 7/7/20  L 10th rib, L4-SI Joints: 30Gy 7/22/20  L femur (s/p ORIF): 30Gy 7/22/20  On Ibrance and Rosezena Din Patient comes in today for 5-month follow-up visit after completion of her radiation therapy course to the multiple sites outlined above. Overall she is doing well her pain completely controlled. She does continue to tolerate her systemic treatments well with some mild symptoms of headaches and loose stool. Patient otherwise though is doing well and has no clinical or radiographic signs of any disease progression at this time. We encourage patient to follow-up with the [facility] as well as our  to discuss supportive groups that may be beneficial for her to partake in. Patient otherwise is encouraged to maintain good hydration and nutrition. Patient was offered the option to continue seeing us for routine follow-up visit and exams, however patient elected to see us on an as-needed basis that she does follow with her medical oncologist regularly. Patient does have our contact information was told should she have any questions or concerns or change in symptoms in the future she can certainly call us or come see us anytime. Patient was in agreement with my recommendations. All questions were answered to their satisfaction. Patient was advised to contact us anytime should they have any questions or concerns. Electronically signed by Wilner Gaytan MD on 12/15/2020 at 10:05 AM        Medications Prescribed:   New Prescriptions    No medications on file       Orders:   No orders of the defined types were placed in this encounter.       CC:  Patient Care Team:  Yumiko Noguera MD as PCP - Iris Key MD as Surgeon (General Surgery)  Sade Jaime DO as Consulting Physician (Obstetrics & Gynecology)  Kayleigh De La O MD as Consulting Physician (Hematology and Oncology)  Marianne Miller RN as Nurse Navigator (Oncology)  Hewitt Alpers, RN as Registered Nurse (Oncology)

## 2020-12-16 LAB — CA 27-29: 208 U/ML (ref 0–38)

## 2020-12-16 RX ORDER — ANASTROZOLE 1 MG/1
1 TABLET ORAL DAILY
Qty: 30 TABLET | Refills: 6 | Status: SHIPPED | OUTPATIENT
Start: 2020-12-16 | End: 2021-04-08 | Stop reason: ALTCHOICE

## 2020-12-17 ENCOUNTER — OFFICE VISIT (OUTPATIENT)
Dept: ONCOLOGY | Age: 50
End: 2020-12-17
Payer: COMMERCIAL

## 2020-12-17 ENCOUNTER — HOSPITAL ENCOUNTER (OUTPATIENT)
Dept: INFUSION THERAPY | Age: 50
Discharge: HOME | End: 2020-12-17
Payer: COMMERCIAL

## 2020-12-17 ENCOUNTER — TELEPHONE (OUTPATIENT)
Dept: ONCOLOGY | Age: 50
End: 2020-12-17

## 2020-12-17 VITALS
HEART RATE: 87 BPM | WEIGHT: 283.6 LBS | SYSTOLIC BLOOD PRESSURE: 137 MMHG | TEMPERATURE: 98.1 F | OXYGEN SATURATION: 97 % | BODY MASS INDEX: 41.88 KG/M2 | DIASTOLIC BLOOD PRESSURE: 82 MMHG

## 2020-12-17 DIAGNOSIS — C50.211 BILATERAL MALIGNANT NEOPLASM OF UPPER INNER QUADRANT OF BREAST IN FEMALE, UNSPECIFIED ESTROGEN RECEPTOR STATUS (HCC): Primary | ICD-10-CM

## 2020-12-17 DIAGNOSIS — C50.212 BILATERAL MALIGNANT NEOPLASM OF UPPER INNER QUADRANT OF BREAST IN FEMALE, UNSPECIFIED ESTROGEN RECEPTOR STATUS (HCC): Primary | ICD-10-CM

## 2020-12-17 DIAGNOSIS — C79.51 BONE METASTASIS (HCC): ICD-10-CM

## 2020-12-17 PROCEDURE — 96401 CHEMO ANTI-NEOPL SQ/IM: CPT

## 2020-12-17 PROCEDURE — 99214 OFFICE O/P EST MOD 30 MIN: CPT | Performed by: INTERNAL MEDICINE

## 2020-12-17 PROCEDURE — 6360000002 HC RX W HCPCS: Performed by: INTERNAL MEDICINE

## 2020-12-17 RX ADMIN — DENOSUMAB 120 MG: 120 INJECTION SUBCUTANEOUS at 11:21

## 2020-12-17 NOTE — PROGRESS NOTES
Pt here for Dr visit and Lebron Morales. Denies any problems. Will return 1/14 for Dr visit. PET scan and labs prior to return Dr visit.

## 2020-12-17 NOTE — TELEPHONE ENCOUNTER
AVS from 12/17/20      Continue current therapy without changes, return in 4 weeks with Xgeva. Labs CBC, CMP and CA 27-29 before next visit.   Please order PET CT scan to be done before mid January    Continue tx as scheduled  rv scheduled for 1/14/21 @ 9:30am with tx to follow  Pet/ct scheduled for 1/11/21 @ 9:30am, pt will have labs at that time     Pt was given AVS and appt schedule

## 2020-12-17 NOTE — PROGRESS NOTES
DIAGNOSIS:   1- T2, N0, M0 ER positive SD positive and HER-2/eve negative invasive ductal carcinoma of the left breast ( inner upper quadrant)  2- Repeated GI bleeding greetings 2 symptomatic anemia, despite extensive GI workup, source of bleeding was never found. 3- finally a small mass was seen in the jejunum. 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 Margins negative   4- compression fractures and bone lesions. Likely metastatic cancer (5/2020)     CURRENT THERAPY:  1- Mastectomy and axillary sampling  2- adjuvant chemotherapy with Taxotere and Cytoxan starting 5/16/2013. Chemotherapy stopped after 3 cycles because of ongoing GI bleeding and symptomatic anemia  3- Conservative management for GI bleeding. Leading completely stopped after the discontinuation of chemotherapy  4- started tamoxifen after chemotherapy was completed. 8/2013. 5- transitioned to Arimidex 6/2014, completed 08/2018  6- GIST resection, resected 9/2017   7-status post kyphoplasty and biopsy of bone metastases 5/2020. The tumor was minimally ER positive at 5% and SD negative. 8- rodding of the left femur completed. Plan for  Radiation  9- Systemic therapy, plan Arimidex plus Ibrance  10 - XRT completed 07/2020  10-current treatment starting 7/30/2020, monthly Xgeva, oral Ibrance and Arimidex    BRIEF CASE HISTORY:   Freedom Munroe is a very pleasant 45 y.o. who felt a mass in the medial aspect of her left breast, around the 9:00 position. Patient sought medical attention and mammogram and ultrasound were done. Showing an irregular, high-density mass with indistinct margins containing pleomorphic calcifications in the lower inner quadrant of the left breast close to 9:00 location this mass measures are mammogram 2.7 x 2.6 x 2 cm. Subsequently targeted ultrasound was performed showing hypoechoic, irregular, taller than wider, solid mass at 9:00 with posterior acoustic shadowing. The calcifications were visible on ultrasound . On ultrasound the solid mass measured 2.4 x 2.6 x 1 cm. Image guided biopsy of the mass was done and showed invasive ductal carcinoma with surrounding DCIS (cribriform type) the tumor was ER and WI positive and HER-2/eve negative with a fish ratio 1.1. The patient was referred to us and she also was seen by radiation oncology , She  decided on proceeding with total mastectomy and breast reconstruction . Allergy showed 2.5 cm intermediate grade invasive ductal carcinoma, [place] lymph node was negative. The tumor was ER/WI positive and HER-2/eve negative. We discussed options of adjuvant therapy including chemotherapy and hormone therapy, The patient decided to proceed with adjuvant chemotherapy without undergone a Oncotype study. TC chemotherapy X4 cycles is planned  After 3 cycles, and presented with persistent GI bleeding leading to symptomatic anemia and syncope, the patient's condition was Serious enough to warrant repeated hospital admissions. Repeated GI workup including endoscopic evaluation and Tagged Red cell scans were negative. We decided to treat her conservatively and chemotherapy was stopped after 3 cycles. After that All her symptoms improved and she was started on tamoxifen. After one year, she was transitioned to anastrozole since she had no periods and hormonal testing showed post menopausal state. 08/2017  She had been having abdominal pain and went to [facility] for consult. She had work up which showed mass in the colon showed subtle changes in the mid-small intestine, CT enterography showed 3.6CM mass in the distal small bowel. She was also hospitalized in the interim with pancreatitis. 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 Margins were negative. Observation was decided, no need for adjuvant therapy  In May/2020, the patient presented with severe back pain. MRI showed multiple bone lesions with evidence of compression fracture. Patient underwent successful kyphoplasty and biopsy and pathology showed breast cancer, it was 5% ER positive and MO was negative. HER-2 was +2 which is equivocal so a fish test was ordered. Plan for radiation, staging PET, and Arimidex. PET scan showed widespread metastatic bony disease including the thoracic spine, the lumbar spine, the left femur and the rib area. She underwent repeated kyphoplasty and rodding of the left femur. She is undergoing radiation to the painful bony areas in thoracic, rib and lumbar areas as well as the left femur. We will likely use Ibrance plus Arimidex. As well as monthly Xgeva to be started after completion of radiation. After completion of radiation, we maintained her on Arimidex plus Ibrance, due to cytopenias, Ibrance dose was decreased to 100 mg/day which was much better tolerated. Also we maintained her on Xgeva monthly. PET scan was done in October and showed essentially stable disease. There was some suspicious activity in the hip but this was asymptomatic and we decided that this might be post radiation or related to physical therapy. We decided to continue current therapy until clear progression. INTERIM HISTORY: She comes in today for follow up for metastatic breast cancer and to receive  Xgeva and to discuss results. She complains of increased pain in upper right and arm as well as left rib area, she has been taking pain medication sparingly to manage. She reports she feels unwell overall with fatigue. She does have some diarrhea and headache with first week of treatment, both are manageable.          GYNECOLOGICAL HISTORY Menarche at age 14. He is premenopausal with regular periods. +2. She used birth control minimally. PAST MEDICAL HISTORY: has a past medical history of Anemia, Anxiety, Atrial fibrillation ([address] Breast cancer ([facility] [address] Carpal tunnel syndrome, Depression, GI bleed, Hypertension, Lymphedema, Neurologic cardiac syncope, Obesity, Pain, Sleep apnea, ANA PAULA (stress urinary incontinence, female), Tachycardia, Varicella, and Varicella without complication. PAST SURGICAL HISTORY: has a past surgical history that includes Shoulder arthroscopy (); Colonoscopy (); Dilation and curettage of uterus; Knee arthroscopy; fracture surgery; Tunneled venous port placement (Right); Upper gastrointestinal endoscopy (2013); Colonoscopy (2013); Mastectomy (Left, 13); other surgical history (1969); Breast biopsy (Left, 3/21/13); ileoscopy (10/12/05); Cosmetic surgery; Hysterectomy; denia and bso (cervix removed) (14); Enterocele repair (14); Cystoscopy (14); bladder suspension (14); Colonoscopy (10/12/2005); Upper gastrointestinal endoscopy (2015); and tumor removal.     CURRENT MEDICATIONS:  has a current medication list which includes the following prescription(s): anastrozole, ondansetron, calcium-vitamin d, potassium chloride, ibrance, palbociclib, megestrol, warfarin, diazepam, duloxetine, losartan, omeprazole, hydrochlorothiazide, amlodipine, docusate, and metoprolol tartrate, and the following Facility-Administered Medications: denosumab. ALLERGIES:  is allergic to adhesive tape. FAMILY HISTORY: Negative for any hematological or oncological conditions. Specifically no history of breast cancer in the family    SOCIAL HISTORY:  reports that she quit smoking about 2 years ago. Her smoking use included Cigarettes. She smoked About 20 years. She has never used smokeless tobacco. She reports that she does not drink alcohol or use illicit drugs.     REVIEW OF SYSTEMS: HEENT:  Eyes are normal. Ears, nose is congested, no bleeding. Neck: Supple. No lymph node enlargement. No thyroid enlargement. Trachea is centrally located. Chest:  Clear to auscultation. No wheezes or crepitations. Breast:  Right: No masses, no adenopathy. No skin or nippple abnormalities. Left: left-sided mastectomy, surgery site is healed completely, no adenopathy  Heart: Regular sinus rhythm. Abdomen: Soft, nontender. No hepatosplenomegaly. No masses. Extremities:  With no edema. Lymph Nodes:  No cervical, axillary or inguinal lymph node enlargement. Neurologic: Conscious and oriented. No focal neurological deficits. Psychosocial: No depression, anxiety or stress. Skin: No rashes, bruises or ecchymoses       REVIEW OF LABORATORY DATA:     Lab Results   Component Value Date    WBC 2.0 (L) 12/14/2020    HGB 11.9 (L) 12/14/2020    HCT 34.8 (L) 12/14/2020    MCV 97.9 12/14/2020     12/14/2020     Lab Results   Component Value Date    IRON 30 (L) 11/29/2017    TIBC 403 11/29/2017    FERRITIN 33 04/04/2019     Lab Results   Component Value Date    NEUTROABS 1.34 (L) 12/14/2020         Chemistry        Component Value Date/Time     12/14/2020 1438    K 3.8 12/14/2020 1438     12/14/2020 1438    CO2 24 12/14/2020 1438    BUN 13 12/14/2020 1438    CREATININE 0.81 12/14/2020 1438        Component Value Date/Time    CALCIUM 9.5 12/14/2020 1438    ALKPHOS 82 12/14/2020 1438    AST 29 12/14/2020 1438    ALT 25 12/14/2020 1438    BILITOT 0.30 12/14/2020 1438        Results for Jessica Conteh (MRN [id number]) as of 11/19/2020 11:05   Ref.  Range 8/30/2017 09:15 7/21/2020 10:42 8/27/2020 10:20 9/24/2020 09:47 10/22/2020 09:24   CA 27-29 Latest Ref Range: 0 - 38 U/mL  164 (H) 88 (H) 95 (H) 129 (H)         REVIEW OF RADIOLOGICAL RESULTS:    IMPRESSION:   1- Arlene  Has  T2, N0, M0 ER/ID positive and HER-2/eve negative invasive ductal carcinoma of the left breast 2- Chemotherapy: received 3 cycles of TC, stopped due to GI bleeding  3- GI bleeding , related to the GIST tumor. Currently on oral iron and hemoglobin is improving  4- on anastrozole. We will continue, plan 5 years of therapy, completed 08/2018  5- for hot flashes, better on Effexor. 6- Recent pancreatitis, possibly biliary. MRI CT scan did not show any gallbladder stone. We will discuss with her surgeons. 7-   distal small bowel mass measuring 3.6 cm, resection shows low risk GIST. No need for adjuvant therapy, margins were negative. 8-bone metastases with compression fracture diagnosed in May/2020. Status post kyphoplasty and biopsy. 9- S/P radiation 07/2020  10- for systemic therapy, she was started on Arimidex, adding Ibrance. Due to cytopenias, dose was reduced to 100 mg/day      PLAN:   1. Her lab work was reviewed and discussed, her cancer marker continues to rise, counts are stable and electrolytes are in range. 2. I completed toxicity check - grade 1 diarrhea and headache. 3. We discussed her pain , it is definitely worsening over the last month. Her cancer marker has been rising. I wonder if she is progressing so scans will be done sooner than the planned 3 months  4. I explained her pain may also be muscular or related to Xgeva. 5. We will continue with treatment as per orders. 6. We discussed vaccinations and she may receive at her discretion. 7. Return in 4 weeks.

## 2020-12-28 RX ORDER — PALBOCICLIB 100 MG/1
TABLET, FILM COATED ORAL
Qty: 21 TABLET | Refills: 3 | Status: SHIPPED | OUTPATIENT
Start: 2020-12-28 | End: 2021-02-26

## 2021-01-01 ENCOUNTER — CLINICAL DOCUMENTATION (OUTPATIENT)
Dept: SPIRITUAL SERVICES | Age: 51
End: 2021-01-01

## 2021-01-01 ENCOUNTER — TELEPHONE (OUTPATIENT)
Dept: ONCOLOGY | Age: 51
End: 2021-01-01

## 2021-01-01 ENCOUNTER — OFFICE VISIT (OUTPATIENT)
Dept: ONCOLOGY | Age: 51
End: 2021-01-01
Payer: COMMERCIAL

## 2021-01-01 ENCOUNTER — HOSPITAL ENCOUNTER (OUTPATIENT)
Dept: INFUSION THERAPY | Age: 51
Discharge: HOME | End: 2021-10-28
Payer: COMMERCIAL

## 2021-01-01 ENCOUNTER — HOSPITAL ENCOUNTER (OUTPATIENT)
Dept: INFUSION THERAPY | Age: 51
Discharge: HOME | End: 2021-12-14
Payer: COMMERCIAL

## 2021-01-01 ENCOUNTER — HOSPITAL ENCOUNTER (OUTPATIENT)
Dept: INFUSION THERAPY | Age: 51
Discharge: HOME | End: 2021-08-26
Payer: COMMERCIAL

## 2021-01-01 ENCOUNTER — TELEPHONE (OUTPATIENT)
Dept: INFUSION THERAPY | Age: 51
End: 2021-01-01

## 2021-01-01 ENCOUNTER — HOSPITAL ENCOUNTER (OUTPATIENT)
Age: 51
Discharge: HOME | End: 2021-10-28
Payer: COMMERCIAL

## 2021-01-01 ENCOUNTER — OFFICE VISIT (OUTPATIENT)
Dept: ONCOLOGY | Age: 51
End: 2021-01-01

## 2021-01-01 ENCOUNTER — HOSPITAL ENCOUNTER (OUTPATIENT)
Dept: RADIATION ONCOLOGY | Age: 51
Discharge: HOME | End: 2021-08-26
Attending: STUDENT IN AN ORGANIZED HEALTH CARE EDUCATION/TRAINING PROGRAM
Payer: COMMERCIAL

## 2021-01-01 ENCOUNTER — INITIAL CONSULT (OUTPATIENT)
Dept: ONCOLOGY | Age: 51
End: 2021-01-01
Payer: COMMERCIAL

## 2021-01-01 ENCOUNTER — HOSPITAL ENCOUNTER (OUTPATIENT)
Dept: ULTRASOUND IMAGING | Age: 51
Discharge: HOME | End: 2021-11-06
Payer: COMMERCIAL

## 2021-01-01 ENCOUNTER — HOSPITAL ENCOUNTER (OUTPATIENT)
Age: 51
Discharge: HOME | End: 2021-10-26
Payer: COMMERCIAL

## 2021-01-01 ENCOUNTER — HOSPITAL ENCOUNTER (OUTPATIENT)
Age: 51
Discharge: HOME | End: 2021-09-23
Payer: COMMERCIAL

## 2021-01-01 ENCOUNTER — HOSPITAL ENCOUNTER (OUTPATIENT)
Dept: INFUSION THERAPY | Age: 51
Discharge: HOME | End: 2021-09-28
Payer: COMMERCIAL

## 2021-01-01 ENCOUNTER — TELEPHONE (OUTPATIENT)
Dept: SPIRITUAL SERVICES | Age: 51
End: 2021-01-01

## 2021-01-01 ENCOUNTER — HOSPITAL ENCOUNTER (OUTPATIENT)
Dept: PHARMACY | Age: 51
Setting detail: THERAPIES SERIES
Discharge: HOME | End: 2021-08-26
Payer: COMMERCIAL

## 2021-01-01 ENCOUNTER — HOSPITAL ENCOUNTER (OUTPATIENT)
Dept: NUCLEAR MEDICINE | Age: 51
Discharge: HOME | End: 2021-10-28
Payer: COMMERCIAL

## 2021-01-01 ENCOUNTER — HOSPITAL ENCOUNTER (OUTPATIENT)
Dept: INFUSION THERAPY | Age: 51
Discharge: HOME | End: 2021-11-16
Payer: COMMERCIAL

## 2021-01-01 ENCOUNTER — HOSPITAL ENCOUNTER (OUTPATIENT)
Dept: RADIATION ONCOLOGY | Age: 51
Discharge: HOME | End: 2021-10-28
Attending: STUDENT IN AN ORGANIZED HEALTH CARE EDUCATION/TRAINING PROGRAM
Payer: COMMERCIAL

## 2021-01-01 ENCOUNTER — PREP FOR PROCEDURE (OUTPATIENT)
Dept: GENERAL RADIOLOGY | Age: 51
End: 2021-01-01

## 2021-01-01 VITALS
SYSTOLIC BLOOD PRESSURE: 124 MMHG | OXYGEN SATURATION: 98 % | RESPIRATION RATE: 18 BRPM | WEIGHT: 271.8 LBS | HEART RATE: 80 BPM | BODY MASS INDEX: 40.14 KG/M2 | DIASTOLIC BLOOD PRESSURE: 81 MMHG | TEMPERATURE: 98.5 F

## 2021-01-01 VITALS
DIASTOLIC BLOOD PRESSURE: 75 MMHG | TEMPERATURE: 98.6 F | SYSTOLIC BLOOD PRESSURE: 116 MMHG | OXYGEN SATURATION: 98 % | HEART RATE: 90 BPM | BODY MASS INDEX: 40.32 KG/M2 | WEIGHT: 273 LBS | RESPIRATION RATE: 16 BRPM

## 2021-01-01 VITALS
HEART RATE: 80 BPM | SYSTOLIC BLOOD PRESSURE: 124 MMHG | TEMPERATURE: 98.5 F | WEIGHT: 271 LBS | DIASTOLIC BLOOD PRESSURE: 81 MMHG | BODY MASS INDEX: 40.02 KG/M2

## 2021-01-01 VITALS
DIASTOLIC BLOOD PRESSURE: 74 MMHG | SYSTOLIC BLOOD PRESSURE: 110 MMHG | TEMPERATURE: 96.5 F | BODY MASS INDEX: 40.24 KG/M2 | WEIGHT: 272.5 LBS | HEART RATE: 92 BPM | OXYGEN SATURATION: 99 %

## 2021-01-01 VITALS
SYSTOLIC BLOOD PRESSURE: 121 MMHG | HEART RATE: 80 BPM | DIASTOLIC BLOOD PRESSURE: 74 MMHG | BODY MASS INDEX: 40.48 KG/M2 | TEMPERATURE: 96.3 F | WEIGHT: 274.1 LBS

## 2021-01-01 VITALS
DIASTOLIC BLOOD PRESSURE: 75 MMHG | BODY MASS INDEX: 39.78 KG/M2 | SYSTOLIC BLOOD PRESSURE: 113 MMHG | HEART RATE: 69 BPM | WEIGHT: 269.4 LBS | TEMPERATURE: 97.3 F

## 2021-01-01 VITALS
HEIGHT: 69 IN | SYSTOLIC BLOOD PRESSURE: 104 MMHG | DIASTOLIC BLOOD PRESSURE: 60 MMHG | BODY MASS INDEX: 39.99 KG/M2 | TEMPERATURE: 96.8 F | HEART RATE: 72 BPM | RESPIRATION RATE: 18 BRPM | OXYGEN SATURATION: 99 % | WEIGHT: 270 LBS

## 2021-01-01 VITALS
SYSTOLIC BLOOD PRESSURE: 110 MMHG | TEMPERATURE: 98.1 F | RESPIRATION RATE: 16 BRPM | WEIGHT: 273.2 LBS | DIASTOLIC BLOOD PRESSURE: 73 MMHG | HEART RATE: 80 BPM | BODY MASS INDEX: 40.34 KG/M2

## 2021-01-01 DIAGNOSIS — Z17.0 MALIGNANT NEOPLASM OF UPPER-OUTER QUADRANT OF LEFT BREAST IN FEMALE, ESTROGEN RECEPTOR POSITIVE (HCC): ICD-10-CM

## 2021-01-01 DIAGNOSIS — C49.A3 GIST (GASTROINTESTINAL STROMAL TUMOR) OF SMALL BOWEL, MALIGNANT (HCC): ICD-10-CM

## 2021-01-01 DIAGNOSIS — C50.412 MALIGNANT NEOPLASM OF UPPER-OUTER QUADRANT OF LEFT BREAST IN FEMALE, ESTROGEN RECEPTOR POSITIVE (HCC): Primary | ICD-10-CM

## 2021-01-01 DIAGNOSIS — C50.211 BILATERAL MALIGNANT NEOPLASM OF UPPER INNER QUADRANT OF BREAST IN FEMALE, UNSPECIFIED ESTROGEN RECEPTOR STATUS (HCC): Primary | ICD-10-CM

## 2021-01-01 DIAGNOSIS — Z17.0 MALIGNANT NEOPLASM OF UPPER-OUTER QUADRANT OF LEFT BREAST IN FEMALE, ESTROGEN RECEPTOR POSITIVE (HCC): Primary | ICD-10-CM

## 2021-01-01 DIAGNOSIS — C79.51 BONE METASTASIS (HCC): ICD-10-CM

## 2021-01-01 DIAGNOSIS — F32.1 CURRENT MODERATE EPISODE OF MAJOR DEPRESSIVE DISORDER WITHOUT PRIOR EPISODE (HCC): Primary | ICD-10-CM

## 2021-01-01 DIAGNOSIS — C50.212 BILATERAL MALIGNANT NEOPLASM OF UPPER INNER QUADRANT OF BREAST IN FEMALE, UNSPECIFIED ESTROGEN RECEPTOR STATUS (HCC): ICD-10-CM

## 2021-01-01 DIAGNOSIS — D05.10 DUCTAL CARCINOMA IN SITU (DCIS) OF BREAST, UNSPECIFIED LATERALITY: ICD-10-CM

## 2021-01-01 DIAGNOSIS — C50.212 BILATERAL MALIGNANT NEOPLASM OF UPPER INNER QUADRANT OF BREAST IN FEMALE, UNSPECIFIED ESTROGEN RECEPTOR STATUS (HCC): Primary | ICD-10-CM

## 2021-01-01 DIAGNOSIS — C50.412 MALIGNANT NEOPLASM OF UPPER-OUTER QUADRANT OF LEFT BREAST IN FEMALE, ESTROGEN RECEPTOR POSITIVE (HCC): ICD-10-CM

## 2021-01-01 DIAGNOSIS — C79.51 SECONDARY MALIGNANT NEOPLASM OF BONE (HCC): ICD-10-CM

## 2021-01-01 DIAGNOSIS — C78.7 LIVER METASTASES (HCC): ICD-10-CM

## 2021-01-01 DIAGNOSIS — C50.211 BILATERAL MALIGNANT NEOPLASM OF UPPER INNER QUADRANT OF BREAST IN FEMALE, UNSPECIFIED ESTROGEN RECEPTOR STATUS (HCC): ICD-10-CM

## 2021-01-01 DIAGNOSIS — F32.1 CURRENT MODERATE EPISODE OF MAJOR DEPRESSIVE DISORDER WITHOUT PRIOR EPISODE (HCC): ICD-10-CM

## 2021-01-01 DIAGNOSIS — C50.412 MALIGNANT NEOPLASM OF UPPER-OUTER QUADRANT OF LEFT FEMALE BREAST (HCC): ICD-10-CM

## 2021-01-01 DIAGNOSIS — C79.51 BONE METASTASES (HCC): ICD-10-CM

## 2021-01-01 LAB
ABSOLUTE EOS #: 0 K/UL (ref 0–0.4)
ABSOLUTE EOS #: 0.08 K/UL (ref 0–0.4)
ABSOLUTE EOS #: 0.25 K/UL (ref 0–0.4)
ABSOLUTE IMMATURE GRANULOCYTE: 0 K/UL (ref 0–0.3)
ABSOLUTE IMMATURE GRANULOCYTE: ABNORMAL K/UL (ref 0–0.3)
ABSOLUTE IMMATURE GRANULOCYTE: ABNORMAL K/UL (ref 0–0.3)
ABSOLUTE LYMPH #: 0.28 K/UL (ref 1–4.8)
ABSOLUTE LYMPH #: 0.3 K/UL (ref 1–4.8)
ABSOLUTE LYMPH #: 0.38 K/UL (ref 1–4.8)
ABSOLUTE MONO #: 0.09 K/UL (ref 0.1–0.8)
ABSOLUTE MONO #: 0.13 K/UL (ref 0.1–0.8)
ABSOLUTE MONO #: 0.28 K/UL (ref 0.1–0.8)
ALBUMIN SERPL-MCNC: 3.6 G/DL (ref 3.5–5.2)
ALBUMIN SERPL-MCNC: 3.7 G/DL (ref 3.5–5.2)
ALBUMIN SERPL-MCNC: 3.8 G/DL (ref 3.5–5.2)
ALBUMIN/GLOBULIN RATIO: 1.1 (ref 1–2.5)
ALBUMIN/GLOBULIN RATIO: 1.4 (ref 1–2.5)
ALBUMIN/GLOBULIN RATIO: 1.5 (ref 1–2.5)
ALP BLD-CCNC: 103 U/L (ref 35–104)
ALP BLD-CCNC: 111 U/L (ref 35–104)
ALP BLD-CCNC: 114 U/L (ref 35–104)
ALT SERPL-CCNC: 17 U/L (ref 5–33)
ALT SERPL-CCNC: 21 U/L (ref 5–33)
ALT SERPL-CCNC: 22 U/L (ref 5–33)
ANION GAP SERPL CALCULATED.3IONS-SCNC: 12 MMOL/L (ref 9–17)
ANION GAP SERPL CALCULATED.3IONS-SCNC: 14 MMOL/L (ref 9–17)
ANION GAP SERPL CALCULATED.3IONS-SCNC: 9 MMOL/L (ref 9–17)
AST SERPL-CCNC: 26 U/L
AST SERPL-CCNC: 29 U/L
AST SERPL-CCNC: 31 U/L
BASOPHILS # BLD: 0 % (ref 0–2)
BASOPHILS # BLD: 0 % (ref 0–2)
BASOPHILS # BLD: 1 % (ref 0–2)
BASOPHILS ABSOLUTE: 0 K/UL (ref 0–0.2)
BASOPHILS ABSOLUTE: 0 K/UL (ref 0–0.2)
BASOPHILS ABSOLUTE: 0.02 K/UL (ref 0–0.2)
BILIRUB SERPL-MCNC: 0.37 MG/DL (ref 0.3–1.2)
BILIRUB SERPL-MCNC: 0.4 MG/DL (ref 0.3–1.2)
BILIRUB SERPL-MCNC: 0.6 MG/DL (ref 0.3–1.2)
BUN BLDV-MCNC: 12 MG/DL (ref 6–20)
BUN BLDV-MCNC: 15 MG/DL (ref 6–20)
BUN BLDV-MCNC: 16 MG/DL (ref 6–20)
BUN/CREAT BLD: ABNORMAL (ref 9–20)
CA 27-29: 100 U/ML (ref 0–38)
CA 27-29: 141 U/ML (ref 0–38)
CA 27-29: 170 U/ML (ref 0–38)
CA 27-29: 91 U/ML (ref 0–38)
CALCIUM SERPL-MCNC: 9.2 MG/DL (ref 8.6–10.4)
CALCIUM SERPL-MCNC: 9.5 MG/DL (ref 8.6–10.4)
CALCIUM SERPL-MCNC: 9.5 MG/DL (ref 8.6–10.4)
CHLORIDE BLD-SCNC: 100 MMOL/L (ref 98–107)
CHLORIDE BLD-SCNC: 101 MMOL/L (ref 98–107)
CHLORIDE BLD-SCNC: 106 MMOL/L (ref 98–107)
CO2: 21 MMOL/L (ref 20–31)
CO2: 21 MMOL/L (ref 20–31)
CO2: 24 MMOL/L (ref 20–31)
CREAT SERPL-MCNC: 0.84 MG/DL (ref 0.5–0.9)
CREAT SERPL-MCNC: 1.12 MG/DL (ref 0.5–0.9)
CREAT SERPL-MCNC: 1.14 MG/DL (ref 0.5–0.9)
DIFFERENTIAL TYPE: ABNORMAL
EOSINOPHILS RELATIVE PERCENT: 0 % (ref 1–4)
EOSINOPHILS RELATIVE PERCENT: 4 % (ref 1–4)
EOSINOPHILS RELATIVE PERCENT: 8 % (ref 1–4)
GFR AFRICAN AMERICAN: >60 ML/MIN
GFR NON-AFRICAN AMERICAN: 50 ML/MIN
GFR NON-AFRICAN AMERICAN: 51 ML/MIN
GFR NON-AFRICAN AMERICAN: >60 ML/MIN
GFR SERPL CREATININE-BSD FRML MDRD: ABNORMAL ML/MIN/{1.73_M2}
GLUCOSE BLD-MCNC: 119 MG/DL (ref 70–99)
GLUCOSE BLD-MCNC: 159 MG/DL (ref 70–99)
GLUCOSE BLD-MCNC: 90 MG/DL (ref 65–105)
GLUCOSE BLD-MCNC: 97 MG/DL (ref 70–99)
HCT VFR BLD CALC: 25.7 % (ref 36–46)
HCT VFR BLD CALC: 26.9 % (ref 36.3–47.1)
HCT VFR BLD CALC: 27.8 % (ref 36–46)
HEMOGLOBIN: 8.7 G/DL (ref 11.9–15.1)
HEMOGLOBIN: 8.7 G/DL (ref 12–16)
HEMOGLOBIN: 9.5 G/DL (ref 12–16)
IMMATURE GRANULOCYTES: 0 %
IMMATURE GRANULOCYTES: ABNORMAL %
IMMATURE GRANULOCYTES: ABNORMAL %
INR BLD: 0.9
INR BLD: 2.1
LYMPHOCYTES # BLD: 18 % (ref 24–44)
LYMPHOCYTES # BLD: 20 % (ref 24–44)
LYMPHOCYTES # BLD: 9 % (ref 24–44)
MCH RBC QN AUTO: 35.7 PG (ref 26–34)
MCH RBC QN AUTO: 37.3 PG (ref 25.2–33.5)
MCH RBC QN AUTO: 38.8 PG (ref 26–34)
MCHC RBC AUTO-ENTMCNC: 32.3 G/DL (ref 28.4–34.8)
MCHC RBC AUTO-ENTMCNC: 33.9 G/DL (ref 31–37)
MCHC RBC AUTO-ENTMCNC: 34.2 G/DL (ref 31–37)
MCV RBC AUTO: 104.3 FL (ref 80–100)
MCV RBC AUTO: 114.3 FL (ref 80–100)
MCV RBC AUTO: 115.5 FL (ref 82.6–102.9)
MONOCYTES # BLD: 6 % (ref 1–7)
MONOCYTES # BLD: 6 % (ref 1–7)
MONOCYTES # BLD: 9 % (ref 1–7)
MORPHOLOGY: ABNORMAL
NRBC AUTOMATED: 0 PER 100 WBC
NRBC AUTOMATED: ABNORMAL PER 100 WBC
NRBC AUTOMATED: ABNORMAL PER 100 WBC
PARTIAL THROMBOPLASTIN TIME: 17.2 SEC (ref 20.5–30.5)
PDW BLD-RTO: 14.9 % (ref 11.8–14.4)
PDW BLD-RTO: 15.7 % (ref 12.5–15.4)
PDW BLD-RTO: 17.6 % (ref 12.5–15.4)
PLATELET # BLD: 135 K/UL (ref 138–453)
PLATELET # BLD: 187 K/UL (ref 140–450)
PLATELET # BLD: 69 K/UL (ref 138–453)
PLATELET # BLD: 70 K/UL (ref 140–450)
PLATELET ESTIMATE: ABNORMAL
PMV BLD AUTO: 10 FL (ref 8.1–13.5)
PMV BLD AUTO: 7.2 FL (ref 6–12)
PMV BLD AUTO: 9.6 FL (ref 6–12)
POTASSIUM SERPL-SCNC: 3.7 MMOL/L (ref 3.7–5.3)
POTASSIUM SERPL-SCNC: 3.8 MMOL/L (ref 3.7–5.3)
POTASSIUM SERPL-SCNC: 4 MMOL/L (ref 3.7–5.3)
PROTHROMBIN TIME: 9.4 SEC (ref 9.1–12.3)
PROTIME: 25.4 SECONDS
RBC # BLD: 2.25 M/UL (ref 4–5.2)
RBC # BLD: 2.33 M/UL (ref 3.95–5.11)
RBC # BLD: 2.66 M/UL (ref 4–5.2)
RBC # BLD: ABNORMAL 10*6/UL
SEG NEUTROPHILS: 71 % (ref 36–66)
SEG NEUTROPHILS: 74 % (ref 36–66)
SEG NEUTROPHILS: 74 % (ref 36–66)
SEGMENTED NEUTROPHILS ABSOLUTE COUNT: 1.11 K/UL (ref 1.8–7.7)
SEGMENTED NEUTROPHILS ABSOLUTE COUNT: 1.49 K/UL (ref 1.8–7.7)
SEGMENTED NEUTROPHILS ABSOLUTE COUNT: 2.29 K/UL (ref 1.8–7.7)
SODIUM BLD-SCNC: 134 MMOL/L (ref 135–144)
SODIUM BLD-SCNC: 135 MMOL/L (ref 135–144)
SODIUM BLD-SCNC: 139 MMOL/L (ref 135–144)
SURGICAL PATHOLOGY REPORT: NORMAL
TOTAL PROTEIN: 6.3 G/DL (ref 6.4–8.3)
TOTAL PROTEIN: 6.4 G/DL (ref 6.4–8.3)
TOTAL PROTEIN: 6.8 G/DL (ref 6.4–8.3)
WBC # BLD: 1.5 K/UL (ref 3.5–11)
WBC # BLD: 2.1 K/UL (ref 3.5–11.3)
WBC # BLD: 3.1 K/UL (ref 3.5–11)
WBC # BLD: ABNORMAL 10*3/UL

## 2021-01-01 PROCEDURE — 85730 THROMBOPLASTIN TIME PARTIAL: CPT

## 2021-01-01 PROCEDURE — 99211 OFF/OP EST MAY X REQ PHY/QHP: CPT | Performed by: INTERNAL MEDICINE

## 2021-01-01 PROCEDURE — 78815 PET IMAGE W/CT SKULL-THIGH: CPT

## 2021-01-01 PROCEDURE — 3017F COLORECTAL CA SCREEN DOC REV: CPT | Performed by: INTERNAL MEDICINE

## 2021-01-01 PROCEDURE — G8484 FLU IMMUNIZE NO ADMIN: HCPCS | Performed by: INTERNAL MEDICINE

## 2021-01-01 PROCEDURE — 6360000002 HC RX W HCPCS: Performed by: INTERNAL MEDICINE

## 2021-01-01 PROCEDURE — 2580000003 HC RX 258: Performed by: RADIOLOGY

## 2021-01-01 PROCEDURE — 96372 THER/PROPH/DIAG INJ SC/IM: CPT

## 2021-01-01 PROCEDURE — 1036F TOBACCO NON-USER: CPT | Performed by: INTERNAL MEDICINE

## 2021-01-01 PROCEDURE — 80053 COMPREHEN METABOLIC PANEL: CPT

## 2021-01-01 PROCEDURE — 90837 PSYTX W PT 60 MINUTES: CPT | Performed by: SOCIAL WORKER

## 2021-01-01 PROCEDURE — 36415 COLL VENOUS BLD VENIPUNCTURE: CPT

## 2021-01-01 PROCEDURE — G8427 DOCREV CUR MEDS BY ELIG CLIN: HCPCS | Performed by: INTERNAL MEDICINE

## 2021-01-01 PROCEDURE — 86300 IMMUNOASSAY TUMOR CA 15-3: CPT

## 2021-01-01 PROCEDURE — 99999 PR OFFICE/OUTPT VISIT,PROCEDURE ONLY: CPT | Performed by: SOCIAL WORKER

## 2021-01-01 PROCEDURE — 6360000002 HC RX W HCPCS: Performed by: RADIOLOGY

## 2021-01-01 PROCEDURE — 85025 COMPLETE CBC W/AUTO DIFF WBC: CPT

## 2021-01-01 PROCEDURE — 82947 ASSAY GLUCOSE BLOOD QUANT: CPT

## 2021-01-01 PROCEDURE — 85049 AUTOMATED PLATELET COUNT: CPT

## 2021-01-01 PROCEDURE — 88307 TISSUE EXAM BY PATHOLOGIST: CPT

## 2021-01-01 PROCEDURE — 3430000000 HC RX DIAGNOSTIC RADIOPHARMACEUTICAL: Performed by: RADIOLOGY

## 2021-01-01 PROCEDURE — 88333 PATH CONSLTJ SURG CYTO XM 1: CPT

## 2021-01-01 PROCEDURE — 99214 OFFICE O/P EST MOD 30 MIN: CPT | Performed by: INTERNAL MEDICINE

## 2021-01-01 PROCEDURE — G8417 CALC BMI ABV UP PARAM F/U: HCPCS | Performed by: INTERNAL MEDICINE

## 2021-01-01 PROCEDURE — 99211 OFF/OP EST MAY X REQ PHY/QHP: CPT

## 2021-01-01 PROCEDURE — 2709999900 US BIOPSY LIVER PERCUTANEOUS

## 2021-01-01 PROCEDURE — 6370000000 HC RX 637 (ALT 250 FOR IP): Performed by: PHYSICIAN ASSISTANT

## 2021-01-01 PROCEDURE — 99211 OFF/OP EST MAY X REQ PHY/QHP: CPT | Performed by: RADIOLOGY

## 2021-01-01 PROCEDURE — 7100000010 HC PHASE II RECOVERY - FIRST 15 MIN

## 2021-01-01 PROCEDURE — 85610 PROTHROMBIN TIME: CPT

## 2021-01-01 PROCEDURE — 2709999900 HC NON-CHARGEABLE SUPPLY

## 2021-01-01 PROCEDURE — 96402 CHEMO HORMON ANTINEOPL SQ/IM: CPT

## 2021-01-01 PROCEDURE — 99215 OFFICE O/P EST HI 40 MIN: CPT | Performed by: INTERNAL MEDICINE

## 2021-01-01 PROCEDURE — 90791 PSYCH DIAGNOSTIC EVALUATION: CPT | Performed by: SOCIAL WORKER

## 2021-01-01 PROCEDURE — 99213 OFFICE O/P EST LOW 20 MIN: CPT | Performed by: RADIOLOGY

## 2021-01-01 PROCEDURE — 2580000003 HC RX 258: Performed by: PHYSICIAN ASSISTANT

## 2021-01-01 PROCEDURE — 90834 PSYTX W PT 45 MINUTES: CPT | Performed by: SOCIAL WORKER

## 2021-01-01 PROCEDURE — 96401 CHEMO ANTI-NEOPL SQ/IM: CPT

## 2021-01-01 PROCEDURE — 99999 PR OFFICE/OUTPT VISIT,PROCEDURE ONLY: CPT | Performed by: RADIOLOGY

## 2021-01-01 PROCEDURE — 88334 PATH CONSLTJ SURG CYTO XM EA: CPT

## 2021-01-01 PROCEDURE — 76942 ECHO GUIDE FOR BIOPSY: CPT

## 2021-01-01 PROCEDURE — A9552 F18 FDG: HCPCS | Performed by: RADIOLOGY

## 2021-01-01 PROCEDURE — 7100000011 HC PHASE II RECOVERY - ADDTL 15 MIN

## 2021-01-01 RX ORDER — APIXABAN 5 MG/1
TABLET, FILM COATED ORAL
Qty: 60 TABLET | Refills: 0 | Status: SHIPPED | OUTPATIENT
Start: 2021-01-01 | End: 2021-01-01

## 2021-01-01 RX ORDER — LAMOTRIGINE 25 MG/1
250 TABLET ORAL ONCE
Status: COMPLETED | OUTPATIENT
Start: 2021-01-01 | End: 2021-01-01

## 2021-01-01 RX ORDER — LORAZEPAM 1 MG/1
1 TABLET ORAL EVERY 6 HOURS PRN
Qty: 120 TABLET | Refills: 0 | Status: SHIPPED | OUTPATIENT
Start: 2021-01-01 | End: 2021-01-01

## 2021-01-01 RX ORDER — EPINEPHRINE 1 MG/ML
0.3 INJECTION, SOLUTION, CONCENTRATE INTRAVENOUS PRN
Status: CANCELLED | OUTPATIENT
Start: 2021-01-01

## 2021-01-01 RX ORDER — FENTANYL CITRATE 50 UG/ML
INJECTION, SOLUTION INTRAMUSCULAR; INTRAVENOUS
Status: COMPLETED | OUTPATIENT
Start: 2021-01-01 | End: 2021-01-01

## 2021-01-01 RX ORDER — SODIUM CHLORIDE 9 MG/ML
INJECTION, SOLUTION INTRAVENOUS CONTINUOUS
Status: CANCELLED | OUTPATIENT
Start: 2021-01-01

## 2021-01-01 RX ORDER — ACETAMINOPHEN 325 MG/1
650 TABLET ORAL EVERY 4 HOURS PRN
Status: DISCONTINUED | OUTPATIENT
Start: 2021-01-01 | End: 2021-01-01 | Stop reason: HOSPADM

## 2021-01-01 RX ORDER — OXYCODONE HYDROCHLORIDE 5 MG/1
5 TABLET ORAL EVERY 6 HOURS PRN
Qty: 90 TABLET | Refills: 0 | Status: SHIPPED | OUTPATIENT
Start: 2021-01-01 | End: 2021-01-01

## 2021-01-01 RX ORDER — SODIUM CHLORIDE 9 MG/ML
INJECTION, SOLUTION INTRAVENOUS CONTINUOUS
Status: DISCONTINUED | OUTPATIENT
Start: 2021-01-01 | End: 2021-01-01 | Stop reason: HOSPADM

## 2021-01-01 RX ORDER — MIDAZOLAM HYDROCHLORIDE 2 MG/2ML
INJECTION, SOLUTION INTRAMUSCULAR; INTRAVENOUS
Status: COMPLETED | OUTPATIENT
Start: 2021-01-01 | End: 2021-01-01

## 2021-01-01 RX ORDER — DOCUSATE SODIUM 100 MG/1
100 CAPSULE, LIQUID FILLED ORAL 2 TIMES DAILY
COMMUNITY

## 2021-01-01 RX ORDER — SODIUM CHLORIDE 0.9 % (FLUSH) 0.9 %
10 SYRINGE (ML) INJECTION ONCE
Status: COMPLETED | OUTPATIENT
Start: 2021-01-01 | End: 2021-01-01

## 2021-01-01 RX ORDER — LAMOTRIGINE 25 MG/1
250 TABLET ORAL ONCE
Status: CANCELLED | OUTPATIENT
Start: 2021-01-01 | End: 2021-01-01

## 2021-01-01 RX ORDER — ONDANSETRON 4 MG/1
4 TABLET, ORALLY DISINTEGRATING ORAL EVERY 8 HOURS PRN
Qty: 30 TABLET | Refills: 1 | Status: SHIPPED | OUTPATIENT
Start: 2021-01-01

## 2021-01-01 RX ORDER — CAPECITABINE 500 MG/1
TABLET, FILM COATED ORAL
Qty: 112 TABLET | Refills: 3 | Status: SHIPPED | OUTPATIENT
Start: 2021-01-01 | End: 2021-01-01 | Stop reason: SDUPTHER

## 2021-01-01 RX ORDER — CAPECITABINE 500 MG/1
TABLET, FILM COATED ORAL
Qty: 112 TABLET | Refills: 0 | Status: SHIPPED | OUTPATIENT
Start: 2021-01-01

## 2021-01-01 RX ORDER — METHYLPREDNISOLONE SODIUM SUCCINATE 125 MG/2ML
125 INJECTION, POWDER, LYOPHILIZED, FOR SOLUTION INTRAMUSCULAR; INTRAVENOUS ONCE
Status: CANCELLED | OUTPATIENT
Start: 2021-01-01 | End: 2021-01-01

## 2021-01-01 RX ORDER — DIPHENHYDRAMINE HYDROCHLORIDE 50 MG/ML
50 INJECTION INTRAMUSCULAR; INTRAVENOUS ONCE
Status: CANCELLED | OUTPATIENT
Start: 2021-01-01 | End: 2021-01-01

## 2021-01-01 RX ORDER — OXYCODONE HYDROCHLORIDE 5 MG/1
5 CAPSULE ORAL EVERY 6 HOURS PRN
COMMUNITY
End: 2022-01-01 | Stop reason: SDUPTHER

## 2021-01-01 RX ORDER — ONDANSETRON 4 MG/1
4 TABLET, ORALLY DISINTEGRATING ORAL EVERY 8 HOURS PRN
Qty: 30 TABLET | Refills: 1 | Status: SHIPPED | OUTPATIENT
Start: 2021-01-01 | End: 2021-01-01 | Stop reason: SDUPTHER

## 2021-01-01 RX ORDER — OXYCODONE HYDROCHLORIDE 5 MG/1
5 TABLET ORAL EVERY 6 HOURS PRN
Qty: 90 TABLET | Refills: 0 | Status: SHIPPED | OUTPATIENT
Start: 2021-01-01 | End: 2021-01-01 | Stop reason: SDUPTHER

## 2021-01-01 RX ORDER — LORAZEPAM 1 MG/1
1 TABLET ORAL EVERY 8 HOURS PRN
COMMUNITY
End: 2021-01-01 | Stop reason: SDUPTHER

## 2021-01-01 RX ORDER — DULOXETIN HYDROCHLORIDE 60 MG/1
CAPSULE, DELAYED RELEASE ORAL
Qty: 30 CAPSULE | Refills: 3 | Status: SHIPPED | OUTPATIENT
Start: 2021-01-01 | End: 2022-01-01

## 2021-01-01 RX ORDER — FERROUS SULFATE 325(65) MG
325 TABLET ORAL EVERY OTHER DAY
COMMUNITY

## 2021-01-01 RX ORDER — DIPHENOXYLATE HYDROCHLORIDE AND ATROPINE SULFATE 2.5; .025 MG/1; MG/1
TABLET ORAL
Qty: 120 TABLET | Refills: 0 | Status: SHIPPED | OUTPATIENT
Start: 2021-01-01 | End: 2021-01-01

## 2021-01-01 RX ORDER — LORAZEPAM 2 MG/1
2 TABLET ORAL EVERY 8 HOURS PRN
Qty: 60 TABLET | Refills: 0 | Status: SHIPPED | OUTPATIENT
Start: 2021-01-01 | End: 2022-01-01 | Stop reason: SDUPTHER

## 2021-01-01 RX ORDER — FLUDEOXYGLUCOSE F 18 200 MCI/ML
10 INJECTION, SOLUTION INTRAVENOUS
Status: COMPLETED | OUTPATIENT
Start: 2021-01-01 | End: 2021-01-01

## 2021-01-01 RX ORDER — APIXABAN 5 MG/1
TABLET, FILM COATED ORAL
Qty: 60 TABLET | Refills: 0 | Status: SHIPPED | OUTPATIENT
Start: 2021-01-01 | End: 2022-01-01

## 2021-01-01 RX ORDER — POTASSIUM CHLORIDE 1500 MG/1
TABLET, EXTENDED RELEASE ORAL
Qty: 30 TABLET | Refills: 5 | Status: SHIPPED | OUTPATIENT
Start: 2021-01-01 | End: 2022-01-01

## 2021-01-01 RX ADMIN — FULVESTRANT 250 MG: 50 INJECTION INTRAMUSCULAR at 11:37

## 2021-01-01 RX ADMIN — FENTANYL CITRATE 25 MCG: 50 INJECTION INTRAMUSCULAR; INTRAVENOUS at 10:38

## 2021-01-01 RX ADMIN — FENTANYL CITRATE 50 MCG: 50 INJECTION INTRAMUSCULAR; INTRAVENOUS at 10:33

## 2021-01-01 RX ADMIN — SODIUM CHLORIDE: 9 INJECTION, SOLUTION INTRAVENOUS at 09:08

## 2021-01-01 RX ADMIN — DENOSUMAB 120 MG: 120 INJECTION SUBCUTANEOUS at 11:10

## 2021-01-01 RX ADMIN — MIDAZOLAM HYDROCHLORIDE 0.5 MG: 1 INJECTION, SOLUTION INTRAMUSCULAR; INTRAVENOUS at 10:33

## 2021-01-01 RX ADMIN — FLUDEOXYGLUCOSE F 18 11.47 MILLICURIE: 200 INJECTION, SOLUTION INTRAVENOUS at 09:37

## 2021-01-01 RX ADMIN — DENOSUMAB 120 MG: 120 INJECTION SUBCUTANEOUS at 12:07

## 2021-01-01 RX ADMIN — ACETAMINOPHEN 650 MG: 325 TABLET ORAL at 11:56

## 2021-01-01 RX ADMIN — FULVESTRANT 250 MG: 50 INJECTION INTRAMUSCULAR at 12:09

## 2021-01-01 RX ADMIN — DENOSUMAB 120 MG: 120 INJECTION SUBCUTANEOUS at 11:37

## 2021-01-01 RX ADMIN — DENOSUMAB 120 MG: 120 INJECTION SUBCUTANEOUS at 15:37

## 2021-01-01 RX ADMIN — SODIUM CHLORIDE, PRESERVATIVE FREE 10 ML: 5 INJECTION INTRAVENOUS at 09:46

## 2021-01-01 ASSESSMENT — ANXIETY QUESTIONNAIRES
IF YOU CHECKED OFF ANY PROBLEMS ON THIS QUESTIONNAIRE, HOW DIFFICULT HAVE THESE PROBLEMS MADE IT FOR YOU TO DO YOUR WORK, TAKE CARE OF THINGS AT HOME, OR GET ALONG WITH OTHER PEOPLE: NOT DIFFICULT AT ALL
2. NOT BEING ABLE TO STOP OR CONTROL WORRYING: 0
6. BECOMING EASILY ANNOYED OR IRRITABLE: 3
1. FEELING NERVOUS, ANXIOUS, OR ON EDGE: 1
GAD7 TOTAL SCORE: 7
5. BEING SO RESTLESS THAT IT IS HARD TO SIT STILL: 0
3. WORRYING TOO MUCH ABOUT DIFFERENT THINGS: 1
7. FEELING AFRAID AS IF SOMETHING AWFUL MIGHT HAPPEN: 2
4. TROUBLE RELAXING: 0

## 2021-01-01 ASSESSMENT — PAIN SCALES - GENERAL
PAINLEVEL_OUTOF10: 0
PAINLEVEL_OUTOF10: 5
PAINLEVEL_OUTOF10: 0
PAINLEVEL_OUTOF10: 2
PAINLEVEL_OUTOF10: 3
PAINLEVEL_OUTOF10: 5

## 2021-01-01 ASSESSMENT — PAIN DESCRIPTION - ORIENTATION
ORIENTATION: RIGHT
ORIENTATION: RIGHT
ORIENTATION: MID;LEFT

## 2021-01-01 ASSESSMENT — PAIN DESCRIPTION - PAIN TYPE
TYPE: ACUTE PAIN

## 2021-01-01 ASSESSMENT — PATIENT HEALTH QUESTIONNAIRE - PHQ9
7. TROUBLE CONCENTRATING ON THINGS, SUCH AS READING THE NEWSPAPER OR WATCHING TELEVISION: 3
6. FEELING BAD ABOUT YOURSELF - OR THAT YOU ARE A FAILURE OR HAVE LET YOURSELF OR YOUR FAMILY DOWN: 0
4. FEELING TIRED OR HAVING LITTLE ENERGY: 1
1. LITTLE INTEREST OR PLEASURE IN DOING THINGS: 3
SUM OF ALL RESPONSES TO PHQ QUESTIONS 1-9: 11
SUM OF ALL RESPONSES TO PHQ QUESTIONS 1-9: 11
9. THOUGHTS THAT YOU WOULD BE BETTER OFF DEAD, OR OF HURTING YOURSELF: 0
3. TROUBLE FALLING OR STAYING ASLEEP: 0
SUM OF ALL RESPONSES TO PHQ QUESTIONS 1-9: 11
8. MOVING OR SPEAKING SO SLOWLY THAT OTHER PEOPLE COULD HAVE NOTICED. OR THE OPPOSITE, BEING SO FIGETY OR RESTLESS THAT YOU HAVE BEEN MOVING AROUND A LOT MORE THAN USUAL: 1
10. IF YOU CHECKED OFF ANY PROBLEMS, HOW DIFFICULT HAVE THESE PROBLEMS MADE IT FOR YOU TO DO YOUR WORK, TAKE CARE OF THINGS AT HOME, OR GET ALONG WITH OTHER PEOPLE: 0
2. FEELING DOWN, DEPRESSED OR HOPELESS: 2
5. POOR APPETITE OR OVEREATING: 1
SUM OF ALL RESPONSES TO PHQ9 QUESTIONS 1 & 2: 5

## 2021-01-01 ASSESSMENT — PAIN DESCRIPTION - FREQUENCY: FREQUENCY: INTERMITTENT

## 2021-01-01 ASSESSMENT — PAIN DESCRIPTION - LOCATION
LOCATION: BACK;HIP
LOCATION: ABDOMEN

## 2021-01-01 ASSESSMENT — PAIN DESCRIPTION - DESCRIPTORS
DESCRIPTORS: PRESSURE
DESCRIPTORS: ACHING
DESCRIPTORS: ACHING;DISCOMFORT;CONSTANT
DESCRIPTORS: PRESSURE

## 2021-01-01 ASSESSMENT — PAIN - FUNCTIONAL ASSESSMENT: PAIN_FUNCTIONAL_ASSESSMENT: 0-10

## 2021-01-01 ASSESSMENT — PAIN DESCRIPTION - ONSET: ONSET: GRADUAL

## 2021-01-11 ENCOUNTER — HOSPITAL ENCOUNTER (OUTPATIENT)
Dept: NUCLEAR MEDICINE | Age: 51
Discharge: HOME | End: 2021-01-13
Payer: COMMERCIAL

## 2021-01-11 ENCOUNTER — TELEPHONE (OUTPATIENT)
Dept: ONCOLOGY | Age: 51
End: 2021-01-11

## 2021-01-11 ENCOUNTER — HOSPITAL ENCOUNTER (OUTPATIENT)
Age: 51
Discharge: HOME | End: 2021-01-11
Payer: COMMERCIAL

## 2021-01-11 DIAGNOSIS — C79.51 BONE METASTASIS (HCC): ICD-10-CM

## 2021-01-11 DIAGNOSIS — C50.412 MALIGNANT NEOPLASM OF UPPER-OUTER QUADRANT OF LEFT BREAST IN FEMALE, ESTROGEN RECEPTOR POSITIVE (HCC): ICD-10-CM

## 2021-01-11 DIAGNOSIS — C50.211 BILATERAL MALIGNANT NEOPLASM OF UPPER INNER QUADRANT OF BREAST IN FEMALE, UNSPECIFIED ESTROGEN RECEPTOR STATUS (HCC): ICD-10-CM

## 2021-01-11 DIAGNOSIS — Z17.0 MALIGNANT NEOPLASM OF UPPER-OUTER QUADRANT OF LEFT BREAST IN FEMALE, ESTROGEN RECEPTOR POSITIVE (HCC): ICD-10-CM

## 2021-01-11 DIAGNOSIS — C49.A3 GIST (GASTROINTESTINAL STROMAL TUMOR) OF SMALL BOWEL, MALIGNANT (HCC): ICD-10-CM

## 2021-01-11 DIAGNOSIS — C50.212 BILATERAL MALIGNANT NEOPLASM OF UPPER INNER QUADRANT OF BREAST IN FEMALE, UNSPECIFIED ESTROGEN RECEPTOR STATUS (HCC): ICD-10-CM

## 2021-01-11 LAB
ABSOLUTE EOS #: 0.1 K/UL (ref 0–0.4)
ABSOLUTE IMMATURE GRANULOCYTE: ABNORMAL K/UL (ref 0–0.3)
ABSOLUTE LYMPH #: 0.5 K/UL (ref 1–4.8)
ABSOLUTE MONO #: 0.1 K/UL (ref 0.1–1.2)
ALBUMIN SERPL-MCNC: 3.7 G/DL (ref 3.5–5.2)
ALBUMIN/GLOBULIN RATIO: 1.1 (ref 1–2.5)
ALP BLD-CCNC: 73 U/L (ref 35–104)
ALT SERPL-CCNC: 29 U/L (ref 5–33)
ANION GAP SERPL CALCULATED.3IONS-SCNC: 12 MMOL/L (ref 9–17)
AST SERPL-CCNC: 31 U/L
BASOPHILS # BLD: 1 % (ref 0–2)
BASOPHILS ABSOLUTE: 0 K/UL (ref 0–0.2)
BILIRUB SERPL-MCNC: 0.29 MG/DL (ref 0.3–1.2)
BUN BLDV-MCNC: 13 MG/DL (ref 6–20)
BUN/CREAT BLD: ABNORMAL (ref 9–20)
CALCIUM SERPL-MCNC: 8.7 MG/DL (ref 8.6–10.4)
CHLORIDE BLD-SCNC: 103 MMOL/L (ref 98–107)
CO2: 23 MMOL/L (ref 20–31)
CREAT SERPL-MCNC: 0.88 MG/DL (ref 0.5–0.9)
DIFFERENTIAL TYPE: ABNORMAL
EOSINOPHILS RELATIVE PERCENT: 4 % (ref 1–4)
GFR AFRICAN AMERICAN: >60 ML/MIN
GFR NON-AFRICAN AMERICAN: >60 ML/MIN
GFR SERPL CREATININE-BSD FRML MDRD: ABNORMAL ML/MIN/{1.73_M2}
GFR SERPL CREATININE-BSD FRML MDRD: ABNORMAL ML/MIN/{1.73_M2}
GLUCOSE BLD-MCNC: 100 MG/DL (ref 70–99)
GLUCOSE BLD-MCNC: 97 MG/DL (ref 65–105)
HCT VFR BLD CALC: 35.3 % (ref 36–46)
HEMOGLOBIN: 12 G/DL (ref 12–16)
IMMATURE GRANULOCYTES: ABNORMAL %
LYMPHOCYTES # BLD: 26 % (ref 24–44)
MCH RBC QN AUTO: 33.9 PG (ref 26–34)
MCHC RBC AUTO-ENTMCNC: 34.1 G/DL (ref 31–37)
MCV RBC AUTO: 99.3 FL (ref 80–100)
MONOCYTES # BLD: 8 % (ref 2–11)
NRBC AUTOMATED: ABNORMAL PER 100 WBC
PDW BLD-RTO: 15.9 % (ref 12.5–15.4)
PLATELET # BLD: 228 K/UL (ref 140–450)
PLATELET ESTIMATE: ABNORMAL
PMV BLD AUTO: 7.1 FL (ref 6–12)
POTASSIUM SERPL-SCNC: 3.5 MMOL/L (ref 3.7–5.3)
RBC # BLD: 3.55 M/UL (ref 4–5.2)
RBC # BLD: ABNORMAL 10*6/UL
SEG NEUTROPHILS: 61 % (ref 36–66)
SEGMENTED NEUTROPHILS ABSOLUTE COUNT: 1.1 K/UL (ref 1.8–7.7)
SODIUM BLD-SCNC: 138 MMOL/L (ref 135–144)
TOTAL PROTEIN: 7 G/DL (ref 6.4–8.3)
WBC # BLD: 1.8 K/UL (ref 3.5–11)
WBC # BLD: ABNORMAL 10*3/UL

## 2021-01-11 PROCEDURE — 36415 COLL VENOUS BLD VENIPUNCTURE: CPT

## 2021-01-11 PROCEDURE — 85025 COMPLETE CBC W/AUTO DIFF WBC: CPT

## 2021-01-11 PROCEDURE — 82947 ASSAY GLUCOSE BLOOD QUANT: CPT

## 2021-01-11 PROCEDURE — 86300 IMMUNOASSAY TUMOR CA 15-3: CPT

## 2021-01-11 PROCEDURE — 78815 PET IMAGE W/CT SKULL-THIGH: CPT

## 2021-01-11 PROCEDURE — 80053 COMPREHEN METABOLIC PANEL: CPT

## 2021-01-11 PROCEDURE — 2580000003 HC RX 258: Performed by: INTERNAL MEDICINE

## 2021-01-11 PROCEDURE — A9552 F18 FDG: HCPCS | Performed by: INTERNAL MEDICINE

## 2021-01-11 PROCEDURE — 3430000000 HC RX DIAGNOSTIC RADIOPHARMACEUTICAL: Performed by: INTERNAL MEDICINE

## 2021-01-11 RX ORDER — FLUDEOXYGLUCOSE F 18 200 MCI/ML
10 INJECTION, SOLUTION INTRAVENOUS
Status: COMPLETED | OUTPATIENT
Start: 2021-01-11 | End: 2021-01-11

## 2021-01-11 RX ORDER — SODIUM CHLORIDE 0.9 % (FLUSH) 0.9 %
10 SYRINGE (ML) INJECTION ONCE
Status: COMPLETED | OUTPATIENT
Start: 2021-01-11 | End: 2021-01-11

## 2021-01-11 RX ADMIN — FLUDEOXYGLUCOSE F 18 14 MILLICURIE: 200 INJECTION, SOLUTION INTRAVENOUS at 09:09

## 2021-01-11 RX ADMIN — Medication 10 ML: at 09:08

## 2021-01-11 NOTE — TELEPHONE ENCOUNTER
Left message letting patient know she needs to have labs done for her appointment. Did let her know if she has questions to call the office.

## 2021-01-13 LAB — CA 27-29: 248 U/ML (ref 0–38)

## 2021-01-14 ENCOUNTER — OFFICE VISIT (OUTPATIENT)
Dept: ONCOLOGY | Age: 51
End: 2021-01-14
Payer: COMMERCIAL

## 2021-01-14 ENCOUNTER — HOSPITAL ENCOUNTER (OUTPATIENT)
Dept: INFUSION THERAPY | Age: 51
Discharge: HOME | End: 2021-01-14
Payer: COMMERCIAL

## 2021-01-14 ENCOUNTER — TELEPHONE (OUTPATIENT)
Dept: ONCOLOGY | Age: 51
End: 2021-01-14

## 2021-01-14 VITALS
RESPIRATION RATE: 20 BRPM | TEMPERATURE: 98.4 F | SYSTOLIC BLOOD PRESSURE: 123 MMHG | BODY MASS INDEX: 41.87 KG/M2 | HEART RATE: 81 BPM | DIASTOLIC BLOOD PRESSURE: 81 MMHG | WEIGHT: 283.5 LBS

## 2021-01-14 DIAGNOSIS — C50.212 BILATERAL MALIGNANT NEOPLASM OF UPPER INNER QUADRANT OF BREAST IN FEMALE, UNSPECIFIED ESTROGEN RECEPTOR STATUS (HCC): Primary | ICD-10-CM

## 2021-01-14 DIAGNOSIS — C79.51 BONE METASTASIS (HCC): Primary | ICD-10-CM

## 2021-01-14 DIAGNOSIS — C49.A3 GIST (GASTROINTESTINAL STROMAL TUMOR) OF SMALL BOWEL, MALIGNANT (HCC): ICD-10-CM

## 2021-01-14 DIAGNOSIS — C79.51 BONE METASTASIS (HCC): ICD-10-CM

## 2021-01-14 DIAGNOSIS — Z17.0 MALIGNANT NEOPLASM OF UPPER-OUTER QUADRANT OF LEFT BREAST IN FEMALE, ESTROGEN RECEPTOR POSITIVE (HCC): ICD-10-CM

## 2021-01-14 DIAGNOSIS — C50.211 BILATERAL MALIGNANT NEOPLASM OF UPPER INNER QUADRANT OF BREAST IN FEMALE, UNSPECIFIED ESTROGEN RECEPTOR STATUS (HCC): Primary | ICD-10-CM

## 2021-01-14 DIAGNOSIS — C50.412 MALIGNANT NEOPLASM OF UPPER-OUTER QUADRANT OF LEFT BREAST IN FEMALE, ESTROGEN RECEPTOR POSITIVE (HCC): ICD-10-CM

## 2021-01-14 PROCEDURE — 96401 CHEMO ANTI-NEOPL SQ/IM: CPT | Performed by: NURSE PRACTITIONER

## 2021-01-14 PROCEDURE — 1036F TOBACCO NON-USER: CPT | Performed by: INTERNAL MEDICINE

## 2021-01-14 PROCEDURE — G8427 DOCREV CUR MEDS BY ELIG CLIN: HCPCS | Performed by: INTERNAL MEDICINE

## 2021-01-14 PROCEDURE — 99214 OFFICE O/P EST MOD 30 MIN: CPT | Performed by: INTERNAL MEDICINE

## 2021-01-14 PROCEDURE — G8417 CALC BMI ABV UP PARAM F/U: HCPCS | Performed by: INTERNAL MEDICINE

## 2021-01-14 PROCEDURE — 6360000002 HC RX W HCPCS: Performed by: INTERNAL MEDICINE

## 2021-01-14 PROCEDURE — 3017F COLORECTAL CA SCREEN DOC REV: CPT | Performed by: INTERNAL MEDICINE

## 2021-01-14 PROCEDURE — G8482 FLU IMMUNIZE ORDER/ADMIN: HCPCS | Performed by: INTERNAL MEDICINE

## 2021-01-14 PROCEDURE — 99211 OFF/OP EST MAY X REQ PHY/QHP: CPT | Performed by: INTERNAL MEDICINE

## 2021-01-14 RX ORDER — LORAZEPAM 1 MG/1
1 TABLET ORAL EVERY 6 HOURS PRN
Qty: 120 TABLET | Refills: 0 | Status: SHIPPED | OUTPATIENT
Start: 2021-01-14 | End: 2021-06-03 | Stop reason: SDUPTHER

## 2021-01-14 RX ADMIN — DENOSUMAB 120 MG: 120 INJECTION SUBCUTANEOUS at 10:00

## 2021-01-14 NOTE — PROGRESS NOTES
DIAGNOSIS:   1- T2, N0, M0 ER positive MN positive and HER-2/eve negative invasive ductal carcinoma of the left breast ( inner upper quadrant)  2- Repeated GI bleeding greetings 2 symptomatic anemia, despite extensive GI workup, source of bleeding was never found. 3- finally a small mass was seen in the jejunum. 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 Margins negative   4- compression fractures and bone lesions. Likely metastatic cancer (5/2020)     CURRENT THERAPY:  1- Mastectomy and axillary sampling  2- adjuvant chemotherapy with Taxotere and Cytoxan starting 5/16/2013. Chemotherapy stopped after 3 cycles because of ongoing GI bleeding and symptomatic anemia  3- Conservative management for GI bleeding. Leading completely stopped after the discontinuation of chemotherapy  4- started tamoxifen after chemotherapy was completed. 8/2013. 5- transitioned to Arimidex 6/2014, completed 08/2018  6- GIST resection, resected 9/2017   7-status post kyphoplasty and biopsy of bone metastases 5/2020. The tumor was minimally ER positive at 5% and MN negative. 8- rodding of the left femur completed. Plan for  Radiation  9- Systemic therapy, plan Arimidex plus Ibrance  10 - XRT completed 07/2020  10-current treatment starting 7/30/2020, monthly Xgeva, oral Ibrance and Arimidex    BRIEF CASE HISTORY:   Beatriz Chaney is a very pleasant 45 y.o. who felt a mass in the medial aspect of her left breast, around the 9:00 position. Patient sought medical attention and mammogram and ultrasound were done. Showing an irregular, high-density mass with indistinct margins containing pleomorphic calcifications in the lower inner quadrant of the left breast close to 9:00 location this mass measures are mammogram 2.7 x 2.6 x 2 cm. Subsequently targeted ultrasound was performed showing hypoechoic, irregular, taller than wider, solid mass at 9:00 with posterior acoustic shadowing. The calcifications were visible on ultrasound . On ultrasound the solid mass measured 2.4 x 2.6 x 1 cm. Image guided biopsy of the mass was done and showed invasive ductal carcinoma with surrounding DCIS (cribriform type) the tumor was ER and AR positive and HER-2/eve negative with a fish ratio 1.1. The patient was referred to us and she also was seen by radiation oncology , She  decided on proceeding with total mastectomy and breast reconstruction . Allergy showed 2.5 cm intermediate grade invasive ductal carcinoma, Stevenson lymph node was negative. The tumor was ER/AR positive and HER-2/eve negative. We discussed options of adjuvant therapy including chemotherapy and hormone therapy, The patient decided to proceed with adjuvant chemotherapy without undergone a Oncotype study. TC chemotherapy X4 cycles is planned  After 3 cycles, and presented with persistent GI bleeding leading to symptomatic anemia and syncope, the patient's condition was Serious enough to warrant repeated hospital admissions. Repeated GI workup including endoscopic evaluation and Tagged Red cell scans were negative. We decided to treat her conservatively and chemotherapy was stopped after 3 cycles. After that All her symptoms improved and she was started on tamoxifen. After one year, she was transitioned to anastrozole since she had no periods and hormonal testing showed post menopausal state. 08/2017  She had been having abdominal pain and went to [facility] for consult. She had work up which showed mass in the colon showed subtle changes in the mid-small intestine, CT enterography showed 3.6CM mass in the distal small bowel. She was also hospitalized in the interim with pancreatitis. 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 Margins were negative. Observation was decided, no need for adjuvant therapy  In May/2020, the patient presented with severe back pain. MRI showed multiple bone lesions with evidence of compression fracture. Patient underwent successful kyphoplasty and biopsy and pathology showed breast cancer, it was 5% ER positive and VA was negative. HER-2 was +2 which is equivocal so a fish test was ordered. Plan for radiation, staging PET, and Arimidex. PET scan showed widespread metastatic bony disease including the thoracic spine, the lumbar spine, the left femur and the rib area. She underwent repeated kyphoplasty and rodding of the left femur. She is undergoing radiation to the painful bony areas in thoracic, rib and lumbar areas as well as the left femur. We will likely use Ibrance plus Arimidex. As well as monthly Xgeva to be started after completion of radiation. After completion of radiation, we maintained her on Arimidex plus Ibrance, due to cytopenias, Ibrance dose was decreased to 100 mg/day which was much better tolerated. Also we maintained her on Xgeva monthly. PET scan was done in October and showed essentially stable disease. There was some suspicious activity in the hip but this was asymptomatic and we decided that this might be post radiation or related to physical therapy. We decided to continue current therapy until clear progression. INTERIM HISTORY: She comes in today for follow up for metastatic breast cancer and to receive Xgeva and to discuss PET results. She reports she continues to tolerate treatment well overall with no negative effects. She does feel an increase in anxiety. Her back pain is unchanged. GYNECOLOGICAL HISTORY  Menarche at age 14. He is premenopausal with regular periods. +2. She used birth control minimally. PAST MEDICAL HISTORY: has a past medical history of Anemia, Anxiety, Atrial fibrillation ([facility] [address] Breast cancer ([facility] [address] Carpal tunnel syndrome, Depression, GI bleed, Hypertension, Lymphedema, Neurologic cardiac syncope, Obesity, Pain, Sleep apnea, ANA PAULA (stress urinary incontinence, female), Tachycardia, Varicella, and Varicella without complication. PAST SURGICAL HISTORY: has a past surgical history that includes Shoulder arthroscopy (2008); Colonoscopy (2005); Dilation and curettage of uterus; Knee arthroscopy; fracture surgery; Tunneled venous port placement (Right); Upper gastrointestinal endoscopy (7/6/2013); Colonoscopy (7/6/2013); Mastectomy (Left, 4/8/13); other surgical history (1969); Breast biopsy (Left, 3/21/13); ileoscopy (10/12/05); Cosmetic surgery; Hysterectomy; ednia and bso (cervix removed) (4/23/14); Enterocele repair (4/23/14); Cystoscopy (4/23/14); bladder suspension (4/23/14); Colonoscopy (10/12/2005); Upper gastrointestinal endoscopy (04/23/2015); and tumor removal.     CURRENT MEDICATIONS:  has a current medication list which includes the following prescription(s): ibrance, anastrozole, ondansetron, calcium-vitamin d, potassium chloride, warfarin, diazepam, duloxetine, losartan, omeprazole, hydrochlorothiazide, amlodipine, docusate, and metoprolol tartrate. ALLERGIES:  is allergic to adhesive tape. FAMILY HISTORY: Negative for any hematological or oncological conditions. Specifically no history of breast cancer in the family    SOCIAL HISTORY:  reports that she quit smoking about 2 years ago. Her smoking use included Cigarettes. She smoked About 20 years. She has never used smokeless tobacco. She reports that she does not drink alcohol or use illicit drugs. REVIEW OF SYSTEMS:   General: No fever or night sweats. Weight is stable. +hot flashes, fatigue and exercise intolerance  Eyes: No double or blurred vision.   Ears: No tinnitus or hearing problem, Throat: No dysphagia or sore throat   Respiratory: No chest pain, shortness of breath at rest, no hemoptysis. She has exercise intolerance  Cardiovascular: Denies chest pain, PND or orthopnea. No L E swelling or palpitations. Gastrointestinal: No abdominal pain +constipation and diarrhea -grade 1 and managing with medication; no nausea or vomiting  Genitourinary: Denies dysuria, hematuria, frequency, urgency or incontinence. No vaginal bleeding. Neurological: Denies decreased LOC, no sensory or motor focal deficits. +headaches - unchanged  Musculoskeletal: No arthralgia or joint swelling. +back pain  Skin: There are no rashes or bleeding. +hair thinning  Psychiatric: No anxiety, no depression. Endocrine: No diabetes or thyroid disease. Hematologic: No bleeding, no adenopathy. PHYSICAL EXAM:  The patient is not in acute distress. Vital signs: Blood pressure 123/81, pulse 81, temperature 98.4 °F (36.9 °C), temperature source Oral, resp. rate 20, weight 283 lb 8 oz (128.6 kg), last menstrual period 03/17/2014, not currently breastfeeding. HEENT:  Eyes are normal. Ears, nose is congested, no bleeding. Neck: Supple. No lymph node enlargement. No thyroid enlargement. Trachea is centrally located. Chest:  Clear to auscultation. No wheezes or crepitations. Breast:  Right: No masses, no adenopathy. No skin or nippple abnormalities. Left: left-sided mastectomy, surgery site is healed completely, no adenopathy  Heart: Regular sinus rhythm. Abdomen: Soft, nontender. No hepatosplenomegaly. No masses. Extremities:  With no edema. Lymph Nodes:  No cervical, axillary or inguinal lymph node enlargement. Neurologic: Conscious and oriented. No focal neurological deficits. Psychosocial: No depression, anxiety or stress.  Skin: No rashes, bruises or ecchymoses       REVIEW OF LABORATORY DATA:     Lab Results   Component Value Date    WBC 1.8 (L) 01/11/2021    HGB 12.0 01/11/2021    HCT 35.3 (L) 01/11/2021 MCV 99.3 01/11/2021     01/11/2021     Lab Results   Component Value Date    IRON 30 (L) 11/29/2017    TIBC 403 11/29/2017    FERRITIN 33 04/04/2019     Lab Results   Component Value Date    NEUTROABS 1.10 (L) 01/11/2021         Chemistry        Component Value Date/Time     01/11/2021 1351    K 3.5 (L) 01/11/2021 1351     01/11/2021 1351    CO2 23 01/11/2021 1351    BUN 13 01/11/2021 1351    CREATININE 0.88 01/11/2021 1351        Component Value Date/Time    CALCIUM 8.7 01/11/2021 1351    ALKPHOS 73 01/11/2021 1351    AST 31 01/11/2021 1351    ALT 29 01/11/2021 1351    BILITOT 0.29 (L) 01/11/2021 1351        Results for Antwon Laguna (MRN [id number]) as of 11/19/2020 11:05   Ref. Range 8/30/2017 09:15 7/21/2020 10:42 8/27/2020 10:20 9/24/2020 09:47 10/22/2020 09:24   CA 27-29 Latest Ref Range: 0 - 38 U/mL  164 (H) 88 (H) 95 (H) 129 (H)         REVIEW OF RADIOLOGICAL RESULTS:  PET scan  Impression   PET-CT evidence of mixed response of therapy.       *There appears to be interval decrease in the overall FDG activity identified   involving the osseous metastatic disease particularly within the lumbar spine   and bony pelvis when compared to the prior PET-CT study suggestive of   response to therapy. Balinda Calico is still multiple areas of abnormal FDG activity   identified involving the visualized appendicular and axial skeletons   consistent with residual metastatic disease. *New consolidation involving the left hilar region which appears to be FDG   avid.  Finding may be post treatment in nature.  Attention on follow-up is   recommended. *New focal area of FDG activity identified involving the inferior aspect of   the right lobe of the liver corresponding to a hyperdense area on the   concurrent CT.  There is surrounding appendix steatosis.  New metastatic   disease cannot be excluded complete evaluation with CT or MRI utilizing liver   mass protocol is recommended.        IMPRESSION: 1- Arlene  Has  T2, N0, M0 ER/CO positive and HER-2/eve negative invasive ductal carcinoma of the left breast  2- Chemotherapy: received 3 cycles of TC, stopped due to GI bleeding  3- GI bleeding , related to the GIST tumor. Currently on oral iron and hemoglobin is improving  4- on anastrozole. We will continue, plan 5 years of therapy, completed 08/2018  5- for hot flashes, better on Effexor. 6- Recent pancreatitis, possibly biliary. MRI CT scan did not show any gallbladder stone. We will discuss with her surgeons. 7-   distal small bowel mass measuring 3.6 cm, resection shows low risk GIST. No need for adjuvant therapy, margins were negative. 8-bone metastases with compression fracture diagnosed in May/2020. Status post kyphoplasty and biopsy. 9- S/P radiation 07/2020  10- for systemic therapy, she was started on Arimidex, adding Ibrance. Due to cytopenias, dose was reduced to 100 mg/day      PLAN:   1. We reviewed her in detail her recent PET which shows good response in the osseous mets, infiltration in the lung is stable and likely post radiation,  there is some new uptake in the liver that may be metastases but is nonspecific and I am ordering MRI for further evaluation. 2. I completed toxicity check. 3. Her lab work was reviewed, she is neutropenic and leukopenic but adequate for treatment today, counts and electrolytes are fine otherwise, her tumor marker continues to fluctuate. 4. With controlled disease and good tolerance to treatment we will plan to continue unchanged, she will follow up on refill. 5. We discussed managing her increased anxiety, plan to increase Cymbalta and I am writing for Ativan to be taken as needed. 6. She feels her pain medication is too strong but needs management of back pain, I recommend she try half dose and monitor. 7. Return in 4 weeks.

## 2021-01-14 NOTE — PATIENT INSTRUCTIONS
Continue current therapy without changes  rv in 4 weeks with xgeva and cbc, cmp CA 27-29   Need MRI liver before next visit

## 2021-01-29 ENCOUNTER — HOSPITAL ENCOUNTER (OUTPATIENT)
Dept: MRI IMAGING | Age: 51
Discharge: HOME | End: 2021-01-31
Payer: COMMERCIAL

## 2021-01-29 DIAGNOSIS — C49.A3 GIST (GASTROINTESTINAL STROMAL TUMOR) OF SMALL BOWEL, MALIGNANT (HCC): ICD-10-CM

## 2021-01-29 DIAGNOSIS — C50.412 MALIGNANT NEOPLASM OF UPPER-OUTER QUADRANT OF LEFT BREAST IN FEMALE, ESTROGEN RECEPTOR POSITIVE (HCC): ICD-10-CM

## 2021-01-29 DIAGNOSIS — C79.51 BONE METASTASIS (HCC): ICD-10-CM

## 2021-01-29 DIAGNOSIS — Z17.0 MALIGNANT NEOPLASM OF UPPER-OUTER QUADRANT OF LEFT BREAST IN FEMALE, ESTROGEN RECEPTOR POSITIVE (HCC): ICD-10-CM

## 2021-01-29 PROCEDURE — A9579 GAD-BASE MR CONTRAST NOS,1ML: HCPCS | Performed by: INTERNAL MEDICINE

## 2021-01-29 PROCEDURE — 6360000004 HC RX CONTRAST MEDICATION: Performed by: INTERNAL MEDICINE

## 2021-01-29 PROCEDURE — 74183 MRI ABD W/O CNTR FLWD CNTR: CPT

## 2021-01-29 PROCEDURE — 2580000003 HC RX 258: Performed by: INTERNAL MEDICINE

## 2021-01-29 RX ORDER — SODIUM CHLORIDE 0.9 % (FLUSH) 0.9 %
10 SYRINGE (ML) INJECTION ONCE
Status: COMPLETED | OUTPATIENT
Start: 2021-01-29 | End: 2021-01-29

## 2021-01-29 RX ORDER — 0.9 % SODIUM CHLORIDE 0.9 %
100 INTRAVENOUS SOLUTION INTRAVENOUS ONCE
Status: COMPLETED | OUTPATIENT
Start: 2021-01-29 | End: 2021-01-29

## 2021-01-29 RX ADMIN — GADOTERIDOL 20 ML: 279.3 INJECTION, SOLUTION INTRAVENOUS at 09:19

## 2021-01-29 RX ADMIN — Medication 10 ML: at 09:19

## 2021-01-29 RX ADMIN — SODIUM CHLORIDE 100 ML: 9 INJECTION, SOLUTION INTRAVENOUS at 09:18

## 2021-02-01 ENCOUNTER — TELEPHONE (OUTPATIENT)
Dept: ONCOLOGY | Age: 51
End: 2021-02-01

## 2021-02-01 NOTE — TELEPHONE ENCOUNTER
Call received from patient stating she received MRI abd results Friday evening via [facility]. Patient stated she spent all weekend crying due to MRI abd showing disease progression in right hepatic lobe. Patient c/o pain behind right breast near spine that started 4 days ago. Patient denies any discharge or breast related changes. Patient stated she has f/u with Dr Phoenix Briceno 2/11and prefers to talk to him prior to then, as she is scared and concerned. Writer called and spoke with Dr Phoenix Briceno and he stated he will personally call radiologist to review MRI results and call patient back on 2/2 at [phone] to discuss results with patient. Writer called patient and informed her of above.  Lalit Leslie

## 2021-02-02 ENCOUNTER — TELEPHONE (OUTPATIENT)
Dept: ONCOLOGY | Age: 51
End: 2021-02-02

## 2021-02-02 DIAGNOSIS — C78.7 LIVER METASTASES (HCC): ICD-10-CM

## 2021-02-02 RX ORDER — LAMOTRIGINE 25 MG/1
250 TABLET ORAL ONCE
Status: CANCELLED | OUTPATIENT
Start: 2021-02-25 | End: 2021-02-25

## 2021-02-02 RX ORDER — METHYLPREDNISOLONE SODIUM SUCCINATE 125 MG/2ML
125 INJECTION, POWDER, LYOPHILIZED, FOR SOLUTION INTRAMUSCULAR; INTRAVENOUS ONCE
Status: CANCELLED | OUTPATIENT
Start: 2021-02-25 | End: 2021-02-25

## 2021-02-02 RX ORDER — SODIUM CHLORIDE 9 MG/ML
INJECTION, SOLUTION INTRAVENOUS CONTINUOUS
Status: CANCELLED | OUTPATIENT
Start: 2021-02-25

## 2021-02-02 RX ORDER — LAMOTRIGINE 25 MG/1
250 TABLET ORAL ONCE
Status: CANCELLED | OUTPATIENT
Start: 2021-02-11 | End: 2021-02-11

## 2021-02-02 RX ORDER — EPINEPHRINE 1 MG/ML
0.3 INJECTION, SOLUTION, CONCENTRATE INTRAVENOUS PRN
Status: CANCELLED | OUTPATIENT
Start: 2021-02-11

## 2021-02-02 RX ORDER — SODIUM CHLORIDE 9 MG/ML
INJECTION, SOLUTION INTRAVENOUS CONTINUOUS
Status: CANCELLED | OUTPATIENT
Start: 2021-02-11

## 2021-02-02 RX ORDER — DIPHENHYDRAMINE HYDROCHLORIDE 50 MG/ML
50 INJECTION INTRAMUSCULAR; INTRAVENOUS ONCE
Status: CANCELLED | OUTPATIENT
Start: 2021-02-25 | End: 2021-02-25

## 2021-02-02 RX ORDER — METHYLPREDNISOLONE SODIUM SUCCINATE 125 MG/2ML
125 INJECTION, POWDER, LYOPHILIZED, FOR SOLUTION INTRAMUSCULAR; INTRAVENOUS ONCE
Status: CANCELLED | OUTPATIENT
Start: 2021-02-11 | End: 2021-02-11

## 2021-02-02 RX ORDER — DIPHENHYDRAMINE HYDROCHLORIDE 50 MG/ML
50 INJECTION INTRAMUSCULAR; INTRAVENOUS ONCE
Status: CANCELLED | OUTPATIENT
Start: 2021-02-11 | End: 2021-02-11

## 2021-02-02 RX ORDER — EPINEPHRINE 1 MG/ML
0.3 INJECTION, SOLUTION, CONCENTRATE INTRAVENOUS PRN
Status: CANCELLED | OUTPATIENT
Start: 2021-02-25

## 2021-02-02 NOTE — TELEPHONE ENCOUNTER
Disability form completed and faxed to [facility] at [phone] with confimation. Writer notified pt and she verbalized understanding.

## 2021-02-08 ENCOUNTER — HOSPITAL ENCOUNTER (OUTPATIENT)
Age: 51
Discharge: HOME | End: 2021-02-08
Payer: COMMERCIAL

## 2021-02-08 DIAGNOSIS — C50.212 BILATERAL MALIGNANT NEOPLASM OF UPPER INNER QUADRANT OF BREAST IN FEMALE, UNSPECIFIED ESTROGEN RECEPTOR STATUS (HCC): ICD-10-CM

## 2021-02-08 DIAGNOSIS — Z17.0 MALIGNANT NEOPLASM OF UPPER-OUTER QUADRANT OF LEFT BREAST IN FEMALE, ESTROGEN RECEPTOR POSITIVE (HCC): ICD-10-CM

## 2021-02-08 DIAGNOSIS — C50.412 MALIGNANT NEOPLASM OF UPPER-OUTER QUADRANT OF LEFT BREAST IN FEMALE, ESTROGEN RECEPTOR POSITIVE (HCC): ICD-10-CM

## 2021-02-08 DIAGNOSIS — C79.51 BONE METASTASIS (HCC): ICD-10-CM

## 2021-02-08 DIAGNOSIS — C49.A3 GIST (GASTROINTESTINAL STROMAL TUMOR) OF SMALL BOWEL, MALIGNANT (HCC): ICD-10-CM

## 2021-02-08 DIAGNOSIS — C50.211 BILATERAL MALIGNANT NEOPLASM OF UPPER INNER QUADRANT OF BREAST IN FEMALE, UNSPECIFIED ESTROGEN RECEPTOR STATUS (HCC): ICD-10-CM

## 2021-02-08 LAB
ABSOLUTE EOS #: 0.05 K/UL (ref 0–0.4)
ABSOLUTE IMMATURE GRANULOCYTE: ABNORMAL K/UL (ref 0–0.3)
ABSOLUTE LYMPH #: 0.4 K/UL (ref 1–4.8)
ABSOLUTE MONO #: 0.11 K/UL (ref 0.1–0.8)
ALBUMIN SERPL-MCNC: 3.7 G/DL (ref 3.5–5.2)
ALBUMIN/GLOBULIN RATIO: 1.1 (ref 1–2.5)
ALP BLD-CCNC: 71 U/L (ref 35–104)
ALT SERPL-CCNC: 30 U/L (ref 5–33)
ANION GAP SERPL CALCULATED.3IONS-SCNC: 9 MMOL/L (ref 9–17)
AST SERPL-CCNC: 29 U/L
BASOPHILS # BLD: 0 % (ref 0–2)
BASOPHILS ABSOLUTE: 0 K/UL (ref 0–0.2)
BILIRUB SERPL-MCNC: 0.44 MG/DL (ref 0.3–1.2)
BUN BLDV-MCNC: 13 MG/DL (ref 6–20)
BUN/CREAT BLD: ABNORMAL (ref 9–20)
CALCIUM SERPL-MCNC: 9.4 MG/DL (ref 8.6–10.4)
CHLORIDE BLD-SCNC: 105 MMOL/L (ref 98–107)
CO2: 25 MMOL/L (ref 20–31)
CREAT SERPL-MCNC: 0.78 MG/DL (ref 0.5–0.9)
DIFFERENTIAL TYPE: ABNORMAL
EOSINOPHILS RELATIVE PERCENT: 3 % (ref 1–4)
GFR AFRICAN AMERICAN: >60 ML/MIN
GFR NON-AFRICAN AMERICAN: >60 ML/MIN
GFR SERPL CREATININE-BSD FRML MDRD: ABNORMAL ML/MIN/{1.73_M2}
GFR SERPL CREATININE-BSD FRML MDRD: ABNORMAL ML/MIN/{1.73_M2}
GLUCOSE BLD-MCNC: 91 MG/DL (ref 70–99)
HCT VFR BLD CALC: 36.1 % (ref 36–46)
HEMOGLOBIN: 12.3 G/DL (ref 12–16)
IMMATURE GRANULOCYTES: ABNORMAL %
LYMPHOCYTES # BLD: 22 % (ref 24–44)
MCH RBC QN AUTO: 33.8 PG (ref 26–34)
MCHC RBC AUTO-ENTMCNC: 33.9 G/DL (ref 31–37)
MCV RBC AUTO: 99.7 FL (ref 80–100)
MONOCYTES # BLD: 6 % (ref 1–7)
MORPHOLOGY: NORMAL
NRBC AUTOMATED: ABNORMAL PER 100 WBC
PDW BLD-RTO: 15.5 % (ref 12.5–15.4)
PLATELET # BLD: 207 K/UL (ref 140–450)
PLATELET ESTIMATE: ABNORMAL
PMV BLD AUTO: 7.1 FL (ref 6–12)
POTASSIUM SERPL-SCNC: 3.4 MMOL/L (ref 3.7–5.3)
RBC # BLD: 3.62 M/UL (ref 4–5.2)
RBC # BLD: ABNORMAL 10*6/UL
SEG NEUTROPHILS: 69 % (ref 36–66)
SEGMENTED NEUTROPHILS ABSOLUTE COUNT: 1.24 K/UL (ref 1.8–7.7)
SODIUM BLD-SCNC: 139 MMOL/L (ref 135–144)
TOTAL PROTEIN: 7 G/DL (ref 6.4–8.3)
WBC # BLD: 1.8 K/UL (ref 3.5–11)
WBC # BLD: ABNORMAL 10*3/UL

## 2021-02-08 PROCEDURE — 86300 IMMUNOASSAY TUMOR CA 15-3: CPT

## 2021-02-08 PROCEDURE — 85025 COMPLETE CBC W/AUTO DIFF WBC: CPT

## 2021-02-08 PROCEDURE — 36415 COLL VENOUS BLD VENIPUNCTURE: CPT

## 2021-02-08 PROCEDURE — 80053 COMPREHEN METABOLIC PANEL: CPT

## 2021-02-10 LAB — CA 27-29: 294 U/ML (ref 0–38)

## 2021-02-11 ENCOUNTER — OFFICE VISIT (OUTPATIENT)
Dept: ONCOLOGY | Age: 51
End: 2021-02-11
Payer: COMMERCIAL

## 2021-02-11 ENCOUNTER — TELEPHONE (OUTPATIENT)
Dept: ONCOLOGY | Age: 51
End: 2021-02-11

## 2021-02-11 ENCOUNTER — HOSPITAL ENCOUNTER (OUTPATIENT)
Dept: INFUSION THERAPY | Age: 51
Discharge: HOME | End: 2021-02-11
Payer: COMMERCIAL

## 2021-02-11 ENCOUNTER — HOSPITAL ENCOUNTER (OUTPATIENT)
Dept: RADIATION ONCOLOGY | Age: 51
Discharge: HOME | End: 2021-02-11
Attending: RADIOLOGY
Payer: COMMERCIAL

## 2021-02-11 VITALS
DIASTOLIC BLOOD PRESSURE: 76 MMHG | OXYGEN SATURATION: 97 % | SYSTOLIC BLOOD PRESSURE: 108 MMHG | HEART RATE: 85 BPM | BODY MASS INDEX: 41.35 KG/M2 | TEMPERATURE: 98.2 F | RESPIRATION RATE: 18 BRPM | WEIGHT: 280 LBS

## 2021-02-11 VITALS
BODY MASS INDEX: 41.45 KG/M2 | TEMPERATURE: 98.2 F | WEIGHT: 280.7 LBS | HEART RATE: 85 BPM | SYSTOLIC BLOOD PRESSURE: 108 MMHG | RESPIRATION RATE: 18 BRPM | DIASTOLIC BLOOD PRESSURE: 76 MMHG

## 2021-02-11 DIAGNOSIS — C79.51 BONE METASTASIS (HCC): ICD-10-CM

## 2021-02-11 DIAGNOSIS — D05.10 DUCTAL CARCINOMA IN SITU (DCIS) OF BREAST, UNSPECIFIED LATERALITY: Primary | ICD-10-CM

## 2021-02-11 DIAGNOSIS — C78.7 LIVER METASTASES (HCC): ICD-10-CM

## 2021-02-11 DIAGNOSIS — Z17.0 MALIGNANT NEOPLASM OF UPPER-OUTER QUADRANT OF LEFT BREAST IN FEMALE, ESTROGEN RECEPTOR POSITIVE (HCC): ICD-10-CM

## 2021-02-11 DIAGNOSIS — C50.211 BILATERAL MALIGNANT NEOPLASM OF UPPER INNER QUADRANT OF BREAST IN FEMALE, UNSPECIFIED ESTROGEN RECEPTOR STATUS (HCC): Primary | ICD-10-CM

## 2021-02-11 DIAGNOSIS — C79.51 BONE METASTASIS (HCC): Primary | ICD-10-CM

## 2021-02-11 DIAGNOSIS — C50.412 MALIGNANT NEOPLASM OF UPPER-OUTER QUADRANT OF LEFT BREAST IN FEMALE, ESTROGEN RECEPTOR POSITIVE (HCC): ICD-10-CM

## 2021-02-11 DIAGNOSIS — C50.212 BILATERAL MALIGNANT NEOPLASM OF UPPER INNER QUADRANT OF BREAST IN FEMALE, UNSPECIFIED ESTROGEN RECEPTOR STATUS (HCC): Primary | ICD-10-CM

## 2021-02-11 DIAGNOSIS — D05.10 DUCTAL CARCINOMA IN SITU (DCIS) OF BREAST, UNSPECIFIED LATERALITY: ICD-10-CM

## 2021-02-11 DIAGNOSIS — C49.A3 GIST (GASTROINTESTINAL STROMAL TUMOR) OF SMALL BOWEL, MALIGNANT (HCC): ICD-10-CM

## 2021-02-11 PROCEDURE — 96402 CHEMO HORMON ANTINEOPL SQ/IM: CPT | Performed by: NURSE PRACTITIONER

## 2021-02-11 PROCEDURE — 3017F COLORECTAL CA SCREEN DOC REV: CPT | Performed by: INTERNAL MEDICINE

## 2021-02-11 PROCEDURE — 6360000002 HC RX W HCPCS: Performed by: INTERNAL MEDICINE

## 2021-02-11 PROCEDURE — G8427 DOCREV CUR MEDS BY ELIG CLIN: HCPCS | Performed by: INTERNAL MEDICINE

## 2021-02-11 PROCEDURE — G8417 CALC BMI ABV UP PARAM F/U: HCPCS | Performed by: INTERNAL MEDICINE

## 2021-02-11 PROCEDURE — 96401 CHEMO ANTI-NEOPL SQ/IM: CPT | Performed by: NURSE PRACTITIONER

## 2021-02-11 PROCEDURE — 1036F TOBACCO NON-USER: CPT | Performed by: INTERNAL MEDICINE

## 2021-02-11 PROCEDURE — 77290 THER RAD SIMULAJ FIELD CPLX: CPT | Performed by: RADIOLOGY

## 2021-02-11 PROCEDURE — 99214 OFFICE O/P EST MOD 30 MIN: CPT | Performed by: INTERNAL MEDICINE

## 2021-02-11 PROCEDURE — 77263 THER RADIOLOGY TX PLNG CPLX: CPT | Performed by: RADIOLOGY

## 2021-02-11 PROCEDURE — 99213 OFFICE O/P EST LOW 20 MIN: CPT | Performed by: RADIOLOGY

## 2021-02-11 PROCEDURE — 77334 RADIATION TREATMENT AID(S): CPT | Performed by: RADIOLOGY

## 2021-02-11 PROCEDURE — G8482 FLU IMMUNIZE ORDER/ADMIN: HCPCS | Performed by: INTERNAL MEDICINE

## 2021-02-11 PROCEDURE — 99211 OFF/OP EST MAY X REQ PHY/QHP: CPT | Performed by: INTERNAL MEDICINE

## 2021-02-11 PROCEDURE — 99211 OFF/OP EST MAY X REQ PHY/QHP: CPT | Performed by: RADIOLOGY

## 2021-02-11 RX ORDER — LAMOTRIGINE 25 MG/1
250 TABLET ORAL ONCE
Status: CANCELLED | OUTPATIENT
Start: 2021-03-11 | End: 2021-03-11

## 2021-02-11 RX ORDER — METHYLPREDNISOLONE SODIUM SUCCINATE 125 MG/2ML
125 INJECTION, POWDER, LYOPHILIZED, FOR SOLUTION INTRAMUSCULAR; INTRAVENOUS ONCE
Status: CANCELLED | OUTPATIENT
Start: 2021-03-11 | End: 2021-03-11

## 2021-02-11 RX ORDER — SODIUM CHLORIDE 9 MG/ML
INJECTION, SOLUTION INTRAVENOUS CONTINUOUS
Status: CANCELLED | OUTPATIENT
Start: 2021-03-11

## 2021-02-11 RX ORDER — OXYCODONE HYDROCHLORIDE 5 MG/1
5 TABLET ORAL EVERY 6 HOURS PRN
Qty: 90 TABLET | Refills: 0 | Status: SHIPPED | OUTPATIENT
Start: 2021-02-11 | End: 2021-03-11 | Stop reason: SDUPTHER

## 2021-02-11 RX ORDER — LAMOTRIGINE 25 MG/1
250 TABLET ORAL ONCE
Status: COMPLETED | OUTPATIENT
Start: 2021-02-11 | End: 2021-02-11

## 2021-02-11 RX ORDER — DIPHENHYDRAMINE HYDROCHLORIDE 50 MG/ML
50 INJECTION INTRAMUSCULAR; INTRAVENOUS ONCE
Status: CANCELLED | OUTPATIENT
Start: 2021-03-11 | End: 2021-03-11

## 2021-02-11 RX ORDER — EPINEPHRINE 1 MG/ML
0.3 INJECTION, SOLUTION, CONCENTRATE INTRAVENOUS PRN
Status: CANCELLED | OUTPATIENT
Start: 2021-03-11

## 2021-02-11 RX ADMIN — DENOSUMAB 120 MG: 120 INJECTION SUBCUTANEOUS at 10:35

## 2021-02-11 RX ADMIN — FULVESTRANT 250 MG: 50 INJECTION INTRAMUSCULAR at 10:37

## 2021-02-11 ASSESSMENT — PAIN DESCRIPTION - FREQUENCY: FREQUENCY: CONTINUOUS

## 2021-02-11 ASSESSMENT — PAIN SCALES - GENERAL: PAINLEVEL_OUTOF10: 4

## 2021-02-11 NOTE — PATIENT INSTRUCTIONS
Refer back to Britt Saini need XRt right scapula  Start Faslodex and Xgeva today  rv in 4 weeks with treatment and labs   Please see if Verzinio is covered , plan to start after XRT

## 2021-02-11 NOTE — TELEPHONE ENCOUNTER
AVS from 2/11/21     Refer back to Carmen Akhtar need XRt right scapula  Start Faslodex and Xgeva today  rv in 4 weeks with treatment and labs   Please see if Verzinio is covered , plan to start after XRT     Radiation appt 2/11/21 @ 11am  Continue tx as planned  rv scheduled for 3/11/21 @ 9:15am with injections to follow    Pt was given AVS and appt schedule

## 2021-02-11 NOTE — PROGRESS NOTES
Patient seen by Dr. Pina Born today. See his notes for visit details. Patient started on faslodex today. Patient treated without incident. Stable and ambulatory to radiation.

## 2021-02-11 NOTE — PROGRESS NOTES
Dunia Herrera  2/11/2021  10:54 AM      Vitals:    02/11/21 1015   BP: 108/76   Pulse: 85   Resp: 18   Temp: 98.2 °F (36.8 °C)   SpO2: 97%    :  Patient Currently in Pain: Yes     Pain Level: 4       Wt Readings from Last 1 Encounters:   02/11/21 280 lb (127 kg)                Current Outpatient Medications:     Abemaciclib 150 MG TABS, Take 150 mg by mouth 2 times daily, Disp: 60 tablet, Rfl: 3    oxyCODONE (ROXICODONE) 5 MG immediate release tablet, Take 1 tablet by mouth every 6 hours as needed for Pain for up to 30 days. , Disp: 90 tablet, Rfl: 0    LORazepam (ATIVAN) 1 MG tablet, Take 1 tablet by mouth every 6 hours as needed for Anxiety (Take 1 pill at betime as needed) for up to 30 doses. , Disp: 120 tablet, Rfl: 0    palbociclib (IBRANCE) 100 MG tablet, Take 1 tab po daily for 21 days then off 1 week., Disp: 21 tablet, Rfl: 3    anastrozole (ARIMIDEX) 1 MG tablet, Take 1 tablet by mouth daily, Disp: 30 tablet, Rfl: 6    ondansetron (ZOFRAN-ODT) 4 MG disintegrating tablet, Place 1 tablet under the tongue every 8 hours as needed for Nausea or Vomiting, Disp: 30 tablet, Rfl: 1    CALCIUM-VITAMIN D PO, Take by mouth daily, Disp: , Rfl:     potassium chloride (KLOR-CON M) 20 MEQ extended release tablet, Take 1 tablet by mouth daily, Disp: 30 tablet, Rfl: 5    warfarin (COUMADIN) 5 MG tablet, Take 5 mg by mouth Mon,Wed,Fri 7.5mg other days 5mg, Disp: , Rfl:     diazePAM (VALIUM) 2 MG tablet, TAKE ONE TABLET BY MOUTH FOR ONE DOSE PRIOR TO MRI, Disp: , Rfl:     DULoxetine (CYMBALTA) 30 MG extended release capsule, Take 30 mg by mouth daily, Disp: , Rfl:     losartan (COZAAR) 100 MG tablet, Take 100 mg by mouth daily, Disp: , Rfl:     omeprazole (PRILOSEC) 20 MG delayed release capsule, Take 20 mg by mouth daily, Disp: , Rfl:     hydrochlorothiazide (HYDRODIURIL) 12.5 MG tablet, Take 12.5 mg by mouth daily, Disp: , Rfl:     amLODIPine (NORVASC) 5 MG tablet, Take 10 mg by mouth daily , Disp: , Rfl:   docusate sodium (COLACE, DULCOLAX) 100 MG CAPS, Take 100 mg by mouth 2 times daily, Disp: 30 capsule, Rfl: 0    metoprolol tartrate (LOPRESSOR) 25 MG tablet, TAKE ONE TABLET BY MOUTH TWICE A DAY, Disp: 60 tablet, Rfl: 4        FALLS RISK SCREEN  Instructions:  Assess the patient and enter the appropriate indicators that are present for fall risk identification. Total the numbers entered and assign a fall risk score from Table 2.  Reassess patient at a minimum every 12 weeks or with status change. Assessment   Date  2/11/2021     1. Mental Ability: confusion/cognitively impaired 0     2. Elimination Issues: incontinence, frequency 0       3. Ambulatory: use of assistive devices (walker, cane, off-loading devices),        attached to equipment (IV pole, oxygen) 0     4. Sensory Limitations: dizziness, vertigo, impaired vision 0     5. Age less than 65        0     6. Age 72 or greater 0     7. Medication: diuretics, strong analgesics, hypnotics, sedatives,        antihypertensive agents 3   8. Falls:  recent history of falls within the last 3 months (not to include slipping or        tripping) 0   TOTAL 3    If score of 4 or greater was education given? No           TABLE 2   Risk Score Risk Level Plan of Care   0-3 Little or  No Risk 1. Provide assistance as indicated for ambulation activities  2. Reorient confused/cognitively impaired patient  3. Chair/bed in low position, stretcher/bed with siderails up except when performing patient care activities  5. Educate patient/family/caregiver on falls prevention  6.  Reassess in 12 weeks or with any noted change in patient condition which places them at a risk for a fall   4-6 Moderate Risk 1. Provide assistance as indicated for ambulation activities  2. Reorient confused/cognitively impaired patient  3.   Chair/bed in low position, stretcher/bed with siderails up except when performing patient care activities 4.  Educate patient/family/caregiver on falls prevention     7 or   Higher High Risk 1. Place patient in easily observable treatment room  2. Patient attended at all times by family member or staff  3. Provide assistance as indicated for ambulation activities  4. Reorient confused/cognitively impaired patient  5. Chair/bed in low position, stretcher/bed with siderails up except when performing patient care activities  6. Educate patient/family/caregiver on falls prevention         PLAN: Patient is seen today in follow up for new bone leision. Pt reports pain in her scapula which is painful to the touch. Dr. Mirza Mae notified and examined. Pt signed consent, copy provided . She will have SIM today for palliative treatments.          Laura Nelson

## 2021-02-11 NOTE — PROGRESS NOTES
DIAGNOSIS:   1- T2, N0, M0 ER positive KS positive and HER-2/eve negative invasive ductal carcinoma of the left breast ( inner upper quadrant)  2- Repeated GI bleeding greetings 2 symptomatic anemia, despite extensive GI workup, source of bleeding was never found. 3- finally a small mass was seen in the jejunum. 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 Margins negative   4- compression fractures and bone lesions. Likely metastatic cancer (5/2020)     CURRENT THERAPY:  1- Mastectomy and axillary sampling  2- adjuvant chemotherapy with Taxotere and Cytoxan starting 5/16/2013. Chemotherapy stopped after 3 cycles because of ongoing GI bleeding and symptomatic anemia  3- Conservative management for GI bleeding. Leading completely stopped after the discontinuation of chemotherapy  4- started tamoxifen after chemotherapy was completed. 8/2013. 5- transitioned to Arimidex 6/2014, completed 08/2018  6- GIST resection, resected 9/2017   7-status post kyphoplasty and biopsy of bone metastases 5/2020. The tumor was minimally ER positive at 5% and KS negative. 8- rodding of the left femur completed. Plan for  Radiation  9- Systemic therapy, plan Arimidex plus Ibrance  10 - XRT completed 07/2020  11-current treatment starting 7/30/2020, monthly Xgeva, oral Ibrance and Arimidex  12-2/2021, progression of disease, plan to switch to Faslodex plus CDK inhibitor , continue Xgeva    BRIEF CASE HISTORY:   Ilda Edmonds is a very pleasant 45 y.o. who felt a mass in the medial aspect of her left breast, around the 9:00 position. Patient sought medical attention and mammogram and ultrasound were done. Showing an irregular, high-density mass with indistinct margins containing pleomorphic calcifications in the lower inner quadrant of the left breast close to 9:00 location this mass measures are mammogram 2.7 x 2.6 x 2 cm.    Subsequently targeted ultrasound was performed showing hypoechoic, irregular, taller than wider, solid mass at 9:00 with posterior acoustic shadowing. The calcifications were visible on ultrasound . On ultrasound the solid mass measured 2.4 x 2.6 x 1 cm. Image guided biopsy of the mass was done and showed invasive ductal carcinoma with surrounding DCIS (cribriform type) the tumor was ER and VT positive and HER-2/eve negative with a fish ratio 1.1. The patient was referred to us and she also was seen by radiation oncology , She  decided on proceeding with total mastectomy and breast reconstruction . Allergy showed 2.5 cm intermediate grade invasive ductal carcinoma, [place] lymph node was negative. The tumor was ER/VT positive and HER-2/eve negative. We discussed options of adjuvant therapy including chemotherapy and hormone therapy, The patient decided to proceed with adjuvant chemotherapy without undergone a Oncotype study. TC chemotherapy X4 cycles is planned  After 3 cycles, and presented with persistent GI bleeding leading to symptomatic anemia and syncope, the patient's condition was Serious enough to warrant repeated hospital admissions. Repeated GI workup including endoscopic evaluation and Tagged Red cell scans were negative. We decided to treat her conservatively and chemotherapy was stopped after 3 cycles. After that All her symptoms improved and she was started on tamoxifen. After one year, she was transitioned to anastrozole since she had no periods and hormonal testing showed post menopausal state. 08/2017  She had been having abdominal pain and went to [facility] for consult. She had work up which showed mass in the colon showed subtle changes in the mid-small intestine, CT enterography showed 3.6CM mass in the distal small bowel. She was also hospitalized in the interim with pancreatitis. 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 Margins were negative.  Observation was decided, no need for adjuvant therapy  In May/2020, the patient presented with severe back pain. MRI showed multiple bone lesions with evidence of compression fracture. Patient underwent successful kyphoplasty and biopsy and pathology showed breast cancer, it was 5% ER positive and KY was negative. HER-2 was +2 which is equivocal so a fish test was ordered. Plan for radiation, staging PET, and Arimidex. PET scan showed widespread metastatic bony disease including the thoracic spine, the lumbar spine, the left femur and the rib area. She underwent repeated kyphoplasty and rodding of the left femur. She is undergoing radiation to the painful bony areas in thoracic, rib and lumbar areas as well as the left femur. We will likely use Ibrance plus Arimidex. As well as monthly Xgeva to be started after completion of radiation. After completion of radiation, we maintained her on Arimidex plus Ibrance, due to cytopenias, Ibrance dose was decreased to 100 mg/day which was much better tolerated. Also we maintained her on Xgeva monthly. PET scan was done in October and showed essentially stable disease. There was some suspicious activity in the hip but this was asymptomatic and we decided that this might be post radiation or related to physical therapy. We decided to continue current therapy until clear progression. Further testing in early 2021 showed clear progression, will plan to switch to Faslodex and continue with CDk inhibitor and Xgeva. INTERIM HISTORY: She comes in today for follow up for metastatic breast cancer and to receive Xgeva and to discuss recent imaging results. PET CT scan suggested the lesion in her liver and MRI confirmed that. She obviously has progression of disease. I talked to her on the phone and explained that to her. We talked about switching to a second line hormone agent with Faslodex.  Her back pain is controlled but she has new right shoulder blade pain and behind right breast - they are also controlled with medication. She continues to work through her anxiety around disease progression. GYNECOLOGICAL HISTORY  Menarche at age 14. He is premenopausal with regular periods. +2. She used birth control minimally. PAST MEDICAL HISTORY: has a past medical history of Anemia, Anxiety, Atrial fibrillation ([address] Breast cancer ([facility] [address] Carpal tunnel syndrome, Depression, GI bleed, Hypertension, Liver metastases ([address] Lymphedema, Neurologic cardiac syncope, Obesity, Pain, Sleep apnea, ANA PAULA (stress urinary incontinence, female), Tachycardia, Varicella, and Varicella without complication. PAST SURGICAL HISTORY: has a past surgical history that includes Shoulder arthroscopy (); Colonoscopy (); Dilation and curettage of uterus; Knee arthroscopy; fracture surgery; Tunneled venous port placement (Right); Upper gastrointestinal endoscopy (2013); Colonoscopy (2013); Mastectomy (Left, 13); other surgical history (1969); Breast biopsy (Left, 3/21/13); ileoscopy (10/12/05); Cosmetic surgery; Hysterectomy; denia and bso (cervix removed) (14); Enterocele repair (14); Cystoscopy (14); bladder suspension (14); Colonoscopy (10/12/2005); Upper gastrointestinal endoscopy (2015); and tumor removal.     CURRENT MEDICATIONS:  has a current medication list which includes the following prescription(s): lorazepam, ibrance, anastrozole, ondansetron, calcium-vitamin d, potassium chloride, warfarin, diazepam, duloxetine, losartan, omeprazole, hydrochlorothiazide, amlodipine, docusate, and metoprolol tartrate. ALLERGIES:  is allergic to adhesive tape. FAMILY HISTORY: Negative for any hematological or oncological conditions. Specifically no history of breast cancer in the family    SOCIAL HISTORY:  reports that she quit smoking about 2 years ago. Her smoking use included Cigarettes. She smoked About 20 years.  She has never used smokeless tobacco. She reports that she does not drink alcohol or use illicit drugs. REVIEW OF SYSTEMS:   General: No fever or night sweats. Weight is stable. +hot flashes, fatigue and exercise intolerance  Eyes: No double or blurred vision. Ears: No tinnitus or hearing problem,    Throat: No dysphagia or sore throat   Respiratory: No chest pain, shortness of breath at rest, no hemoptysis. She has exercise intolerance  Cardiovascular: Denies chest pain, PND or orthopnea. No L E swelling or palpitations. Gastrointestinal: No abdominal pain +constipation and diarrhea -grade 1 and managing with medication; no nausea or vomiting  Genitourinary: Denies dysuria, hematuria, frequency, urgency or incontinence. No vaginal bleeding. Neurological: Denies decreased LOC, no sensory or motor focal deficits. +headaches - unchanged  Musculoskeletal: No arthralgia or joint swelling. +back pain, right chest wall pain, and right shoulder blade pain  Skin: There are no rashes or bleeding. +hair thinning  Psychiatric: ++ anxiety, no depression. Endocrine: No diabetes or thyroid disease. Hematologic: No bleeding, no adenopathy. PHYSICAL EXAM:  The patient is not in acute distress. Vital signs: Blood pressure 108/76, pulse 85, temperature 98.2 °F (36.8 °C), temperature source Oral, resp. rate 18, weight 280 lb 11.2 oz (127.3 kg), last menstrual period 03/17/2014, not currently breastfeeding. HEENT:  Eyes are normal. Ears, nose is congested, no bleeding. Neck: Supple. No lymph node enlargement. No thyroid enlargement. Trachea is centrally located. Chest:  Clear to auscultation. No wheezes or crepitations. Breast:  Right: No masses, no adenopathy. No skin or nippple abnormalities. Left: left-sided mastectomy, surgery site is healed completely, no adenopathy  Heart: Regular sinus rhythm. Abdomen: Soft, nontender. No hepatosplenomegaly. No masses. Extremities:  With no edema. Lymph Nodes:  No cervical, axillary or inguinal lymph node enlargement.   Neurologic: Conscious and oriented. No focal neurological deficits. Psychosocial: No depression, anxiety or stress. Skin: No rashes, bruises or ecchymoses       REVIEW OF LABORATORY DATA:     Lab Results   Component Value Date    WBC 1.8 (L) 02/08/2021    HGB 12.3 02/08/2021    HCT 36.1 02/08/2021    MCV 99.7 02/08/2021     02/08/2021     Lab Results   Component Value Date    IRON 30 (L) 11/29/2017    TIBC 403 11/29/2017    FERRITIN 33 04/04/2019     Lab Results   Component Value Date    NEUTROABS 1.24 (L) 02/08/2021         Chemistry        Component Value Date/Time     02/08/2021 1306    K 3.4 (L) 02/08/2021 1306     02/08/2021 1306    CO2 25 02/08/2021 1306    BUN 13 02/08/2021 1306    CREATININE 0.78 02/08/2021 1306        Component Value Date/Time    CALCIUM 9.4 02/08/2021 1306    ALKPHOS 71 02/08/2021 1306    AST 29 02/08/2021 1306    ALT 30 02/08/2021 1306    BILITOT 0.44 02/08/2021 1306         Results for Susanne Helms (MRN [id number]) as of 2/11/2021 09:57   Ref. Range 8/29/2017 00:05 8/30/2017 09:15 7/21/2020 10:42 8/27/2020 10:20 9/24/2020 09:47 10/22/2020 09:24 11/19/2020 10:17 12/14/2020 14:38 1/11/2021 13:51 2/8/2021 13:06   CA 27-29 Latest Ref Range: 0 - 38 U/mL   164 (H) 88 (H) 95 (H) 129 (H) 146 (H) 208 (H) 248 (H) 294 (H)       REVIEW OF RADIOLOGICAL RESULTS:  PET scan  Impression   PET-CT evidence of mixed response of therapy.       *There appears to be interval decrease in the overall FDG activity identified   involving the osseous metastatic disease particularly within the lumbar spine   and bony pelvis when compared to the prior PET-CT study suggestive of   response to therapy. Cinthya Files is still multiple areas of abnormal FDG activity   identified involving the visualized appendicular and axial skeletons   consistent with residual metastatic disease.    *New consolidation involving the left hilar region which appears to be FDG   avid.  Finding may be post treatment in nature.  Attention on follow-up is recommended. *New focal area of FDG activity identified involving the inferior aspect of   the right lobe of the liver corresponding to a hyperdense area on the   concurrent CT.  There is surrounding appendix steatosis.  New metastatic   disease cannot be excluded complete evaluation with CT or MRI utilizing liver   mass protocol is recommended. IMPRESSION:   1- Arlene  Has  T2, N0, M0 ER/LA positive and HER-2/eve negative invasive ductal carcinoma of the left breast  2- Chemotherapy: received 3 cycles of TC, stopped due to GI bleeding  3- GI bleeding , related to the GIST tumor. Currently on oral iron and hemoglobin is improving  4- on anastrozole. We will continue, plan 5 years of therapy, completed 08/2018  5- for hot flashes, better on Effexor. 6- Recent pancreatitis, possibly biliary. MRI CT scan did not show any gallbladder stone. We will discuss with her surgeons. 7-   distal small bowel mass measuring 3.6 cm, resection shows low risk GIST. No need for adjuvant therapy, margins were negative. 8-bone metastases with compression fracture diagnosed in May/2020. Status post kyphoplasty and biopsy. 9- S/P radiation 07/2020  10- for systemic therapy, she was started on Arimidex, adding Ibrance. Due to cytopenias, dose was reduced to 100 mg/day  11-in February/2021, there was signs of progression, will plan to switch to Faslodex       PLAN:   1. We reviewed imaging and treatment plan with progression to switch to Faslodex. 2. I discussed plan for imaging to assess response. 3. Her lab work was reviewed, counts and electrolytes remain stable. 4. Since we had to lower dose of Ibrance with luekopenia we may consider Verzenio per insurance approval as it has lower hemotoxicity. 5. I completed toxicity check. 6. We will proceed with treatment today as per orders. 7. She has new pain, imaging shows correlation in right scapula and I will discuss with rad-onc and refer her.    8. We discussed holding Ibrance around radiation and resume CDK (either back to [employer] [place] or if [employer] approved ) to start after radiation. 9. Return in 4 weeks.

## 2021-02-12 ENCOUNTER — TELEPHONE (OUTPATIENT)
Dept: ONCOLOGY | Age: 51
End: 2021-02-12

## 2021-02-12 ENCOUNTER — HOSPITAL ENCOUNTER (OUTPATIENT)
Dept: RADIATION ONCOLOGY | Age: 51
Discharge: HOME | End: 2021-02-12
Attending: RADIOLOGY
Payer: COMMERCIAL

## 2021-02-12 PROCEDURE — 77307 TELETHX ISODOSE PLAN CPLX: CPT | Performed by: RADIOLOGY

## 2021-02-12 PROCEDURE — 77334 RADIATION TREATMENT AID(S): CPT | Performed by: RADIOLOGY

## 2021-02-12 NOTE — PROGRESS NOTES
[facility] 40            Radiation Oncology          [address] [facility]           [address]        Emely Benson: [phone]        F: [phone]       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 [facility])  Staging form: Breast, AJCC 7th Edition  - Clinical stage from 2020: Stage IV (T2, N0, M1) - Signed by Basilio Arambula MD on 2020  -s/p L MRM SLN 13  -s/p TC x3   -s/p Tamoxifen then Arimedex completing in   RT to new bone mets:   Sternum, L 6th ribs, and T4-T8: 30Gy 30Gy 20  L 10th rib, L4-SI Joints: 30Gy 20  L femur (s/p ORIF): 30Gy 20  -on [facility]    INTERVAL HISTORY:   Ms Connie Rao is a 65-year-old female with a recently diagnosed stage IV breast cancer for which she underwent palliative radiation therapy to multiple sites completing approximately 6 months ago. Patient is here today for a unscheduled follow-up visit after seeing her medical oncologist today with increasing symptoms of right shoulder pain. Patient otherwise is doing well with her other sites of disease and denies any significant issues with walking or movement. She also does go to physical therapy which has helped with her range of motion in her arms and legs. Patient does still use a cane for support just in case to help with her hip pain. She otherwise denies any other areas of discomfort. Patient states that her pain is mostly within the right scapular shoulder blade area. She otherwise has been on her Ibrance monotherapy which she is tolerating well with some mild symptoms of headache and occasional loose stool though she has no complaints.   She denies any symptoms of dizziness, chest pain, shortness of breath, abdominal pain, nausea, diarrhea, dysuria, or any bleeding. She states that her energy is improving as well as her appetite. She did a PET scan on January 11, 2021 which showed interval decrease in size of her previous osseous lesions though there was new consolidation in the left hilar region that was FDG avid as well as an area of uptake in the right lobe of the liver. Patient did have a follow-up MRI in January 22 2021 to look at the liver which showed several areas of metastatic disease. MEDICATIONS:    Current Outpatient Medications:     oxyCODONE (ROXICODONE) 5 MG immediate release tablet, Take 1 tablet by mouth every 6 hours as needed for Pain for up to 30 days. , Disp: 90 tablet, Rfl: 0    Abemaciclib 150 MG TABS, Take 150 mg by mouth 2 times daily, Disp: 60 tablet, Rfl: 3    LORazepam (ATIVAN) 1 MG tablet, Take 1 tablet by mouth every 6 hours as needed for Anxiety (Take 1 pill at betime as needed) for up to 30 doses. , Disp: 120 tablet, Rfl: 0    palbociclib (IBRANCE) 100 MG tablet, Take 1 tab po daily for 21 days then off 1 week., Disp: 21 tablet, Rfl: 3    anastrozole (ARIMIDEX) 1 MG tablet, Take 1 tablet by mouth daily, Disp: 30 tablet, Rfl: 6    ondansetron (ZOFRAN-ODT) 4 MG disintegrating tablet, Place 1 tablet under the tongue every 8 hours as needed for Nausea or Vomiting, Disp: 30 tablet, Rfl: 1    CALCIUM-VITAMIN D PO, Take by mouth daily, Disp: , Rfl:     potassium chloride (KLOR-CON M) 20 MEQ extended release tablet, Take 1 tablet by mouth daily, Disp: 30 tablet, Rfl: 5    warfarin (COUMADIN) 5 MG tablet, Take 5 mg by mouth Mon,Wed,Fri 7.5mg other days 5mg, Disp: , Rfl:     diazePAM (VALIUM) 2 MG tablet, TAKE ONE TABLET BY MOUTH FOR ONE DOSE PRIOR TO MRI, Disp: , Rfl:     DULoxetine (CYMBALTA) 30 MG extended release capsule, Take 30 mg by mouth daily, Disp: , Rfl:     losartan (COZAAR) 100 MG tablet, Take 100 mg by mouth daily, Disp: , Rfl:     omeprazole (PRILOSEC) 20 MG delayed release capsule, Take 20 mg by mouth daily, Disp: , Rfl:     hydrochlorothiazide (HYDRODIURIL) 12.5 MG tablet, Take 12.5 mg by mouth daily, Disp: , Rfl:     amLODIPine (NORVASC) 5 MG tablet, Take 10 mg by mouth daily , Disp: , Rfl:     docusate sodium (COLACE, DULCOLAX) 100 MG CAPS, Take 100 mg by mouth 2 times daily, Disp: 30 capsule, Rfl: 0    metoprolol tartrate (LOPRESSOR) 25 MG tablet, TAKE ONE TABLET BY MOUTH TWICE A DAY, Disp: 60 tablet, Rfl: 4    ALLERGIES:  Allergies   Allergen Reactions    Adhesive Tape Other (See Comments)     Skin breakdown         REVIEW OF SYSTEMS:    A full 14 point review of systems was performed and assessed and found to be negative except as noted above. PHYSICAL EXAMINATION:    CHAPERONE: Family/friend/companieon Present    ECO Symptomatic but completely ambulatory    VITAL SIGNS: /76   Pulse 85   Temp 98.2 °F (36.8 °C)   Resp 18   Wt 280 lb (127 kg)   LMP 2014   SpO2 97%   BMI 41.35 kg/m²   GENERAL:  General appearance is that of a well-nourished, well-developed in no apparent distress. HEENT: Normocephalic, atraumatic, EOMI, moist mucosa, no erythema. NECK:  No adenopathy or a palpable thyroid mass, trachea is midline. HEART:  Regular rate and rhythm, S1, S2, no murmurs. LUNGS:  Clear to auscultation bilaterally with no wheezing or crackles. ABDOMEN:  Soft, nontender, non distended. EXTREMITIES:  No clubbing, cyanosis, or edema. No calf tenderness. MSK: (+) R Scapular tenderness. No spinal tenderness. NEUROLOGICAL: No focal deficits. CN II-XII intact. Strength and sensation intact bilaterally. SKIN: No erythema or desquamation.          LABS:  WBC   Date Value Ref Range Status   2021 1.8 (L) 3.5 - 11.0 k/uL Final     Segs Absolute   Date Value Ref Range Status   2021 1.24 (L) 1.8 - 7.7 k/uL Final     Hemoglobin   Date Value Ref Range Status   2021 12.3 12.0 - 16.0 g/dL Final     Platelet Count   Date Value Ref Range Status   04/26/2012 181 140 - 450 k/uL Final     Platelets   Date Value Ref Range Status   02/08/2021 207 140 - 450 k/uL Final        IMAGING:   PET Scan 10/15/20  Impression   1. The previously identified abnormal FDG activity within the pancreatic body   appears to have resolved.  No new areas of abnormal FDG activity is   identified to suggest progression of disease. 2. Diffuse FDG avid bony metastatic disease similar in distribution to the   prior study from 06/05/2020.   3. Focal areas of abnormal FDG activity is identified involving the superior   segments of the lower lobes corresponding to ill-defined consolidations   within this region.  No definite mass is seen within the region on the   concurrent CT.  Finding is nonspecific and underlying infectious process   cannot be excluded.  CT follow-up is recommended. PET Scan 1/11/21  Impression   PET-CT evidence of mixed response of therapy.       *There appears to be interval decrease in the overall FDG activity identified   involving the osseous metastatic disease particularly within the lumbar spine   and bony pelvis when compared to the prior PET-CT study suggestive of   response to therapy. Samanta Spine is still multiple areas of abnormal FDG activity   identified involving the visualized appendicular and axial skeletons   consistent with residual metastatic disease. *New consolidation involving the left hilar region which appears to be FDG   avid.  Finding may be post treatment in nature.  Attention on follow-up is   recommended. *New focal area of FDG activity identified involving the inferior aspect of   the right lobe of the liver corresponding to a hyperdense area on the   concurrent CT.  There is surrounding appendix steatosis.  New metastatic   disease cannot be excluded complete evaluation with CT or MRI utilizing liver   mass protocol is recommended.          ASSESSMENT AND PLAN: Sangeeta Wheatley is a 45 y.o. 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 ([facility] [address]  Staging form: Breast, AJCC 7th Edition  - Clinical stage from 5/27/2020: Stage IV (T2, N0, M1) - Signed by Chavo Buckley MD on 6/8/2020  -s/p L MRM SLN 4/8/13  -s/p TC x3 2013  -s/p Tamoxifen then Arimedex completing in 2018  RT to new bone mets:  Sternum, L 6th ribs, and T4-T8: 30Gy 30Gy 7/7/20  L 10th rib, L4-SI Joints: 30Gy 7/22/20  L femur (s/p ORIF): 30Gy 7/22/20  On Ibrance and Xgeva     Patient comes in today for unscheduled follow-up visit after reporting increased pain in her right scapula to her medical oncologist at her appointment today. We reviewed with her her imaging which does show some FDG activity in her right scapula which may correlate to her area of discomfort. We discussed the options of treatment, including surgery, chemotherapy, and radiation, and the rationale of why radiation therapy would be indicated for this diagnosis. We also discussed that they have the option to not pursue our recommended treatment as well. Patient has had good relief with her previous treatment courses with palliative radiation and is agreeable with radiation treatment to her right scapula. We reviewed the logistics of treatment planning, including a CT Simulation session, as well as daily treatments for approximately 1 week. With regards to radiation to the right scapula, I discussed the possible short-term side effects of skin irritation (causing redness, dryness, or peeling), tiredness, low blood counts (causing infection or bleeding), inflammation of the lungs (causing shortness of breath and cough). Possible long-term side effects discussed included hyperpigmentation of the skin, scarring of the lungs (causing shortness of breath and cough), or damage to the nerves (causing numbness, weakness, or paralysis).     After ample time to review the patient's questions, an informed consent was obtained to proceed with scheduling a treatment planning session for radiation therapy for later today. I spent greater than 30 minutes counseling and evaluating the different treatment options available to the patient and formulating a plan of action today. Patient was in agreement with my recommendations. All questions were answered to their satisfaction. Patient was advised to contact us anytime should they have any questions or concerns. Electronically signed by Monika Leon MD on 2/12/2021 at 5:01 PM        Medications Prescribed:   New Prescriptions    No medications on file       Orders:   No orders of the defined types were placed in this encounter.       CC:  Patient Care Team:  Rebeca Benson MD as PCP - Jeff To MD as Surgeon (General Surgery)  Chyna Dodge DO as Consulting Physician (Obstetrics & Gynecology)  Jelani Navarro MD as Consulting Physician (Hematology and Oncology)  Rahat Garcia RN as Nurse Navigator (Oncology)  Madai Casas RN as Registered Nurse (Oncology)

## 2021-02-12 NOTE — TELEPHONE ENCOUNTER
Verzenio PA form completed and faxed to [address] [facility] at [phone] with confirmation.

## 2021-02-17 NOTE — TELEPHONE ENCOUNTER
PA approved 2/12/21-2/12/22. Writer called [facility] and verzenio was shipped 2/12 for delivery for today.

## 2021-02-18 ENCOUNTER — HOSPITAL ENCOUNTER (OUTPATIENT)
Dept: RADIATION ONCOLOGY | Age: 51
Discharge: HOME | End: 2021-02-18
Attending: RADIOLOGY
Payer: COMMERCIAL

## 2021-02-18 PROCEDURE — 77280 THER RAD SIMULAJ FIELD SMPL: CPT | Performed by: RADIOLOGY

## 2021-02-18 PROCEDURE — 77412 RADIATION TX DELIVERY LVL 3: CPT | Performed by: RADIOLOGY

## 2021-02-18 PROCEDURE — 77370 RADIATION PHYSICS CONSULT: CPT | Performed by: RADIOLOGY

## 2021-02-19 ENCOUNTER — HOSPITAL ENCOUNTER (OUTPATIENT)
Dept: RADIATION ONCOLOGY | Age: 51
Discharge: HOME | End: 2021-02-19
Attending: RADIOLOGY
Payer: COMMERCIAL

## 2021-02-19 PROCEDURE — G6002 STEREOSCOPIC X-RAY GUIDANCE: HCPCS | Performed by: RADIOLOGY

## 2021-02-19 PROCEDURE — 77412 RADIATION TX DELIVERY LVL 3: CPT | Performed by: RADIOLOGY

## 2021-02-19 PROCEDURE — 77387 GUIDANCE FOR RADJ TX DLVR: CPT | Performed by: RADIOLOGY

## 2021-02-22 ENCOUNTER — HOSPITAL ENCOUNTER (OUTPATIENT)
Dept: RADIATION ONCOLOGY | Age: 51
Discharge: HOME | End: 2021-02-22
Attending: RADIOLOGY
Payer: COMMERCIAL

## 2021-02-22 PROCEDURE — 77387 GUIDANCE FOR RADJ TX DLVR: CPT | Performed by: RADIOLOGY

## 2021-02-22 PROCEDURE — G6002 STEREOSCOPIC X-RAY GUIDANCE: HCPCS | Performed by: RADIOLOGY

## 2021-02-22 PROCEDURE — 77412 RADIATION TX DELIVERY LVL 3: CPT | Performed by: RADIOLOGY

## 2021-02-23 ENCOUNTER — TELEPHONE (OUTPATIENT)
Dept: ONCOLOGY | Age: 51
End: 2021-02-23

## 2021-02-23 ENCOUNTER — HOSPITAL ENCOUNTER (OUTPATIENT)
Dept: RADIATION ONCOLOGY | Age: 51
Discharge: HOME | End: 2021-02-23
Attending: RADIOLOGY
Payer: COMMERCIAL

## 2021-02-23 PROCEDURE — 77387 GUIDANCE FOR RADJ TX DLVR: CPT | Performed by: RADIOLOGY

## 2021-02-23 PROCEDURE — G6002 STEREOSCOPIC X-RAY GUIDANCE: HCPCS | Performed by: RADIOLOGY

## 2021-02-23 PROCEDURE — 77412 RADIATION TX DELIVERY LVL 3: CPT | Performed by: RADIOLOGY

## 2021-02-23 NOTE — TELEPHONE ENCOUNTER
Called Javed Jorge  and left message. Reminded her that I am her oncology nurse navigator at [facility] ([facility]). Let her know that I wanted to check in with her, see how she is doing. Explained that I wanted to make sure she had the new oral medication from the specialty pharmacy. It should have come through the mail. xrt should be completing tomorrow. Reminded her that I am here as a resource, support, and to help facilitate care. Encouraged her to call me. Left my name and contact information.

## 2021-02-24 ENCOUNTER — HOSPITAL ENCOUNTER (OUTPATIENT)
Dept: RADIATION ONCOLOGY | Age: 51
Discharge: HOME | End: 2021-02-24
Attending: RADIOLOGY
Payer: COMMERCIAL

## 2021-02-24 ENCOUNTER — TELEPHONE (OUTPATIENT)
Dept: ONCOLOGY | Age: 51
End: 2021-02-24

## 2021-02-24 VITALS
WEIGHT: 281 LBS | BODY MASS INDEX: 41.5 KG/M2 | OXYGEN SATURATION: 98 % | HEART RATE: 77 BPM | TEMPERATURE: 98 F | DIASTOLIC BLOOD PRESSURE: 71 MMHG | RESPIRATION RATE: 14 BRPM | SYSTOLIC BLOOD PRESSURE: 111 MMHG

## 2021-02-24 PROCEDURE — G6002 STEREOSCOPIC X-RAY GUIDANCE: HCPCS | Performed by: RADIOLOGY

## 2021-02-24 PROCEDURE — 77427 RADIATION TX MANAGEMENT X5: CPT | Performed by: RADIOLOGY

## 2021-02-24 PROCEDURE — 77387 GUIDANCE FOR RADJ TX DLVR: CPT | Performed by: RADIOLOGY

## 2021-02-24 PROCEDURE — 77412 RADIATION TX DELIVERY LVL 3: CPT | Performed by: RADIOLOGY

## 2021-02-24 ASSESSMENT — PAIN DESCRIPTION - PAIN TYPE: TYPE: ACUTE PAIN

## 2021-02-24 ASSESSMENT — PAIN SCALES - GENERAL: PAINLEVEL_OUTOF10: 5

## 2021-02-24 ASSESSMENT — PAIN DESCRIPTION - LOCATION: LOCATION: SHOULDER

## 2021-02-24 NOTE — TELEPHONE ENCOUNTER
Patient came into office questioning when she is supposed to start Verzenio rx. Dr Saucedo's progress note states to start [facility] after completion of radiation. Triage RN to discuss when patient is to start Verzenio when Dr Tawny Santiago is in the office 2/25 and call patient back. Writer informed Magdalena Gordillo, oral chemo compliance RN, of above and Mirna to contact patient to schedule teach.  Evelin Baltazar

## 2021-02-24 NOTE — PROGRESS NOTES
Deneenbebeto Peraza  2/24/2021  Wt Readings from Last 3 Encounters:   02/24/21 281 lb (127.5 kg)   02/11/21 280 lb (127 kg)   02/11/21 280 lb 11.2 oz (127.3 kg)     Body mass index is 41.5 kg/m². Treatment Area:shoulder     Patient was seen today for weekly visit. Comfort Alteration  Fatigue: Moderate      Nutritional Alteration  Anorexia: No   Nausea: No   Vomiting: No     Elimination Alterations  Constipation: no  Diarrhea:  no  Bowel Incontinence: No  Urinary Incontinence: No    Skin Alteration   Sensation:intact     Radiation Dermatitis:  Intact [x]     Erythema  []     Discoloration  []     Rash []     Dry desquamation  []     Moist desquamation []       Emotional  Coping: somewhat effective      Injury, potential bleeding or infection: n/a     Lab Results   Component Value Date    WBC 1.8 (L) 02/08/2021     02/08/2021         /71   Pulse 77   Temp 98 °F (36.7 °C)   Resp 14   Wt 281 lb (127.5 kg)   LMP 03/17/2014   SpO2 98%   BMI 41.50 kg/m²   Patient Currently in Pain: Yes     Pain Level: 5           Assessment/Plan: Patient was seen today for weekly visit. Dr Asa Cameron notified and examined pt.       Inder Ruff

## 2021-02-24 NOTE — PROGRESS NOTES
[facility] 40            Radiation Oncology          [address] [facility]          [address]        Laura Alt: [phone]        F: [phone]       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 She states that her pain in the scapula has improved however she got her Covid vaccine last Friday which is caused significant pain in her right arm. Patient is unable to get injections on the contralateral arm because that is the site of her mastectomy. She otherwise states that her energy and appetite are stable she denies any other areas of bony pain. She does state that she is not able to ambulate without using her cane and states that her stamina has improved. .    OBJECTIVE:   CHAPERONE: Not Required    ECO Symptomatic but completely ambulatory    VITAL SIGNS: /71   Pulse 77   Temp 98 °F (36.7 °C)   Resp 14   Wt 281 lb (127.5 kg)   LMP 2014   SpO2 98%   BMI 41.50 kg/m²   Wt Readings from Last 5 Encounters:   21 281 lb (127.5 kg)   21 280 lb (127 kg)   21 280 lb 11.2 oz (127.3 kg)   21 283 lb 8 oz (128.6 kg)   20 283 lb 9.6 oz (128.6 kg)     GENERAL:  General appearance is that of a well-nourished, well-developed in no apparent distress. EXTREMITIES:  (+) R shoulder/arm pain. Trude Roers NEUROLOGICAL: No focal deficits.  CN II-XII intact. Strength and sensation intact bilaterally. SKIN: No erythema or desquamation. LABS:  WBC   Date Value Ref Range Status   02/08/2021 1.8 (L) 3.5 - 11.0 k/uL Final   01/11/2021 1.8 (L) 3.5 - 11.0 k/uL Final   12/14/2020 2.0 (L) 3.5 - 11.0 k/uL Final     Segs Absolute   Date Value Ref Range Status   02/08/2021 1.24 (L) 1.8 - 7.7 k/uL Final   01/11/2021 1.10 (L) 1.8 - 7.7 k/uL Final   12/14/2020 1.34 (L) 1.8 - 7.7 k/uL Final     Hemoglobin   Date Value Ref Range Status   02/08/2021 12.3 12.0 - 16.0 g/dL Final   01/11/2021 12.0 12.0 - 16.0 g/dL Final   12/14/2020 11.9 (L) 12.0 - 16.0 g/dL Final     Platelet Count   Date Value Ref Range Status   04/26/2012 181 140 - 450 k/uL Final     Platelets   Date Value Ref Range Status   02/08/2021 207 140 - 450 k/uL Final   01/11/2021 228 140 - 450 k/uL Final   12/14/2020 249 140 - 450 k/uL Final     CREATININE   Date Value Ref Range Status   02/08/2021 0.78 0.50 - 0.90 mg/dL Final   01/11/2021 0.88 0.50 - 0.90 mg/dL Final   12/14/2020 0.81 0.50 - 0.90 mg/dL Final       MEDICATIONS:    Current Outpatient Medications:     oxyCODONE (ROXICODONE) 5 MG immediate release tablet, Take 1 tablet by mouth every 6 hours as needed for Pain for up to 30 days. , Disp: 90 tablet, Rfl: 0    Abemaciclib 150 MG TABS, Take 150 mg by mouth 2 times daily, Disp: 60 tablet, Rfl: 3    palbociclib (IBRANCE) 100 MG tablet, Take 1 tab po daily for 21 days then off 1 week., Disp: 21 tablet, Rfl: 3    anastrozole (ARIMIDEX) 1 MG tablet, Take 1 tablet by mouth daily, Disp: 30 tablet, Rfl: 6    ondansetron (ZOFRAN-ODT) 4 MG disintegrating tablet, Place 1 tablet under the tongue every 8 hours as needed for Nausea or Vomiting, Disp: 30 tablet, Rfl: 1    CALCIUM-VITAMIN D PO, Take by mouth daily, Disp: , Rfl:     potassium chloride (KLOR-CON M) 20 MEQ extended release tablet, Take 1 tablet by mouth daily, Disp: 30 tablet, Rfl: 5    warfarin (COUMADIN) 5 MG tablet, Take 5 mg by mouth Mon,Wed,Fri 7.5mg other days 5mg, Disp: , Rfl:     diazePAM (VALIUM) 2 MG tablet, TAKE ONE TABLET BY MOUTH FOR ONE DOSE PRIOR TO MRI, Disp: , Rfl:     DULoxetine (CYMBALTA) 30 MG extended release capsule, Take 30 mg by mouth daily, Disp: , Rfl:     losartan (COZAAR) 100 MG tablet, Take 100 mg by mouth daily, Disp: , Rfl:     omeprazole (PRILOSEC) 20 MG delayed release capsule, Take 20 mg by mouth daily, Disp: , Rfl:     hydrochlorothiazide (HYDRODIURIL) 12.5 MG tablet, Take 12.5 mg by mouth daily, Disp: , Rfl:     amLODIPine (NORVASC) 5 MG tablet, Take 10 mg by mouth daily , Disp: , Rfl:     docusate sodium (COLACE, DULCOLAX) 100 MG CAPS, Take 100 mg by mouth 2 times daily, Disp: 30 capsule, Rfl: 0    metoprolol tartrate (LOPRESSOR) 25 MG tablet, TAKE ONE TABLET BY MOUTH TWICE A DAY, Disp: 60 tablet, Rfl: 4      ASSESSMENT PLAN:   Treatment setup and plan reviewed. Port images/CBCT images reviewed. Appropriate laboratory work was reviewed. Treatment side effects and toxicities reviewed with the patient, and appropriate management was advised. Patient completed radiation treatment as planned, and recommend patient contact us if they have any questions or concerns. We will follow up with us on an as-needed basis and is recommended to continue seeing medical oncology as scheduled for her systemic treatments. Patient was advised to premedicate with Tylenol prior to her next Covid vaccine in 2 use it afterwards as well to help minimize discomfort. Patient may need to postpone her systemic treatments as well as she will be getting her next vaccine dose in 3 weeks. Electronically signed by Nader Garcia MD on 2/24/2021 at 1:56 PM      Drugs Prescribed:  New Prescriptions    No medications on file       Other Orders Placed:  No orders of the defined types were placed in this encounter.

## 2021-02-25 ENCOUNTER — HOSPITAL ENCOUNTER (OUTPATIENT)
Dept: INFUSION THERAPY | Age: 51
Discharge: HOME | End: 2021-02-25
Payer: COMMERCIAL

## 2021-02-25 VITALS
DIASTOLIC BLOOD PRESSURE: 80 MMHG | HEART RATE: 73 BPM | RESPIRATION RATE: 18 BRPM | TEMPERATURE: 99.1 F | SYSTOLIC BLOOD PRESSURE: 119 MMHG

## 2021-02-25 DIAGNOSIS — C78.7 LIVER METASTASES (HCC): ICD-10-CM

## 2021-02-25 DIAGNOSIS — C50.212 BILATERAL MALIGNANT NEOPLASM OF UPPER INNER QUADRANT OF BREAST IN FEMALE, UNSPECIFIED ESTROGEN RECEPTOR STATUS (HCC): Primary | ICD-10-CM

## 2021-02-25 DIAGNOSIS — C50.211 BILATERAL MALIGNANT NEOPLASM OF UPPER INNER QUADRANT OF BREAST IN FEMALE, UNSPECIFIED ESTROGEN RECEPTOR STATUS (HCC): Primary | ICD-10-CM

## 2021-02-25 DIAGNOSIS — C79.51 BONE METASTASIS (HCC): ICD-10-CM

## 2021-02-25 PROCEDURE — 96402 CHEMO HORMON ANTINEOPL SQ/IM: CPT

## 2021-02-25 PROCEDURE — 6360000002 HC RX W HCPCS: Performed by: INTERNAL MEDICINE

## 2021-02-25 RX ORDER — LAMOTRIGINE 25 MG/1
250 TABLET ORAL ONCE
Status: COMPLETED | OUTPATIENT
Start: 2021-02-25 | End: 2021-02-25

## 2021-02-25 RX ADMIN — FULVESTRANT 250 MG: 50 INJECTION INTRAMUSCULAR at 10:36

## 2021-02-25 ASSESSMENT — PAIN DESCRIPTION - PAIN TYPE: TYPE: CHRONIC PAIN

## 2021-02-25 ASSESSMENT — PAIN DESCRIPTION - LOCATION: LOCATION: BACK

## 2021-02-25 ASSESSMENT — PAIN DESCRIPTION - DESCRIPTORS: DESCRIPTORS: ACHING

## 2021-02-25 ASSESSMENT — PAIN DESCRIPTION - FREQUENCY: FREQUENCY: CONTINUOUS

## 2021-02-25 ASSESSMENT — PAIN - FUNCTIONAL ASSESSMENT: PAIN_FUNCTIONAL_ASSESSMENT: ACTIVITIES ARE NOT PREVENTED

## 2021-02-25 ASSESSMENT — PAIN DESCRIPTION - ORIENTATION: ORIENTATION: LOWER;UPPER

## 2021-02-25 ASSESSMENT — PAIN DESCRIPTION - ONSET: ONSET: ON-GOING

## 2021-02-25 ASSESSMENT — PAIN SCALES - GENERAL: PAINLEVEL_OUTOF10: 4

## 2021-02-25 ASSESSMENT — PAIN DESCRIPTION - PROGRESSION: CLINICAL_PROGRESSION: NOT CHANGED

## 2021-02-25 NOTE — TELEPHONE ENCOUNTER
Dr Marcelino Hanley stated for patient to start Verzenio on Monday, 3/1. Patient informed of above and communicated understanding. Mirna to contact patient to schedule Verzenio teach over the phone.  Isabell Kidd

## 2021-02-25 NOTE — PROGRESS NOTES
Pt here for C1D15. Faslodex. Denies any new problems. Tolerates very well. Will return 3/11 for C2D1 Faslodex, xgeva and Dr visit. Discharged in stable condition.

## 2021-02-26 ENCOUNTER — TELEPHONE (OUTPATIENT)
Dept: ONCOLOGY | Age: 51
End: 2021-02-26

## 2021-02-26 PROCEDURE — 77336 RADIATION PHYSICS CONSULT: CPT | Performed by: RADIOLOGY

## 2021-02-26 NOTE — TELEPHONE ENCOUNTER
Fax received from [facility] inquiring further information and appeal for Ibrance. Writer faxed paperwork back with a cover sheet stating as of 2/11, Stewart Neal is d/c and patient will be proceeding with Verzenio. Confirmation fax received.  Quang Mcdonald

## 2021-03-01 NOTE — TELEPHONE ENCOUNTER
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    Barriers ; coming off Ibrance. Currently having some constipation. Consultation for verzenio    What is target therapy   Drug action [x]   Method of Administration [x]   Handouts given [x]     Side Effects  Nausea/vomiting [x]   Diarrhea [x]   Fatigue [x]   Signs / Symptoms of infection [x]   Neutropenia [x]   Thrombocytopenia [x]   Alopecia [x]   neuropathy [x]   Coal diet &  the importance of fluids [x]       Micellaneous  Importance of nutrition [x]   Importance of oral hygiene [x]   When to call the MD [x]   Monitoring labs [x]   Use of supportive services [x]     Explanation of Drug Regimen / Frequency  verzenio 150 mg tablet, take by mouth 2 times daily. Comments  Verbalized understanding to drug,action,side effects and when to call MD. [facility] Patient Education Folder with [facility] teaching sheet mailed to patient. Questions answered to patient's satisfaction. Handouts provided, [facility] cancer Covid-19 outreach. Support group and community rescource information mailed. Next follow up is scheduled on 3/11/, will monitor.
equal bilaterally/clear
Minesh Mcclendon (daughter)

## 2021-03-03 ENCOUNTER — TELEPHONE (OUTPATIENT)
Dept: ONCOLOGY | Age: 51
End: 2021-03-03

## 2021-03-03 NOTE — TELEPHONE ENCOUNTER
ASSISTING Grace Pena, RN NAVIGATOR. I CALLED TO CHECK IN WITH PATIENT TO SEE HOW SHE IS DOING. LEFT MESSAGE AS TO WHY I CALLED AND REMINDED HER THAT SHE CAN CALL NEELAM AT ANY TIME. LEFT NEELAM'S NUMBER AS A CALL BACK.

## 2021-03-08 ENCOUNTER — HOSPITAL ENCOUNTER (OUTPATIENT)
Age: 51
Discharge: HOME | End: 2021-03-08
Payer: COMMERCIAL

## 2021-03-08 DIAGNOSIS — C49.A3 GIST (GASTROINTESTINAL STROMAL TUMOR) OF SMALL BOWEL, MALIGNANT (HCC): ICD-10-CM

## 2021-03-08 DIAGNOSIS — Z17.0 MALIGNANT NEOPLASM OF UPPER-OUTER QUADRANT OF LEFT BREAST IN FEMALE, ESTROGEN RECEPTOR POSITIVE (HCC): ICD-10-CM

## 2021-03-08 DIAGNOSIS — C50.412 MALIGNANT NEOPLASM OF UPPER-OUTER QUADRANT OF LEFT BREAST IN FEMALE, ESTROGEN RECEPTOR POSITIVE (HCC): ICD-10-CM

## 2021-03-08 DIAGNOSIS — C50.211 BILATERAL MALIGNANT NEOPLASM OF UPPER INNER QUADRANT OF BREAST IN FEMALE, UNSPECIFIED ESTROGEN RECEPTOR STATUS (HCC): ICD-10-CM

## 2021-03-08 DIAGNOSIS — C50.212 BILATERAL MALIGNANT NEOPLASM OF UPPER INNER QUADRANT OF BREAST IN FEMALE, UNSPECIFIED ESTROGEN RECEPTOR STATUS (HCC): ICD-10-CM

## 2021-03-08 DIAGNOSIS — C79.51 BONE METASTASIS (HCC): ICD-10-CM

## 2021-03-08 LAB
ABSOLUTE EOS #: 0.06 K/UL (ref 0–0.4)
ABSOLUTE IMMATURE GRANULOCYTE: ABNORMAL K/UL (ref 0–0.3)
ABSOLUTE LYMPH #: 0.34 K/UL (ref 1–4.8)
ABSOLUTE MONO #: 0.11 K/UL (ref 0.1–0.8)
ALBUMIN SERPL-MCNC: 3.8 G/DL (ref 3.5–5.2)
ALBUMIN/GLOBULIN RATIO: 1.3 (ref 1–2.5)
ALP BLD-CCNC: 88 U/L (ref 35–104)
ALT SERPL-CCNC: 96 U/L (ref 5–33)
ANION GAP SERPL CALCULATED.3IONS-SCNC: 7 MMOL/L (ref 9–17)
AST SERPL-CCNC: 88 U/L
BASOPHILS # BLD: 1 % (ref 0–2)
BASOPHILS ABSOLUTE: 0.02 K/UL (ref 0–0.2)
BILIRUB SERPL-MCNC: 0.4 MG/DL (ref 0.3–1.2)
BUN BLDV-MCNC: 13 MG/DL (ref 6–20)
BUN/CREAT BLD: ABNORMAL (ref 9–20)
CALCIUM SERPL-MCNC: 9 MG/DL (ref 8.6–10.4)
CHLORIDE BLD-SCNC: 101 MMOL/L (ref 98–107)
CO2: 26 MMOL/L (ref 20–31)
CREAT SERPL-MCNC: 0.97 MG/DL (ref 0.5–0.9)
DIFFERENTIAL TYPE: ABNORMAL
EOSINOPHILS RELATIVE PERCENT: 3 % (ref 1–4)
GFR AFRICAN AMERICAN: >60 ML/MIN
GFR NON-AFRICAN AMERICAN: >60 ML/MIN
GFR SERPL CREATININE-BSD FRML MDRD: ABNORMAL ML/MIN/{1.73_M2}
GFR SERPL CREATININE-BSD FRML MDRD: ABNORMAL ML/MIN/{1.73_M2}
GLUCOSE BLD-MCNC: 92 MG/DL (ref 70–99)
HCT VFR BLD CALC: 34.3 % (ref 36–46)
HEMOGLOBIN: 11.5 G/DL (ref 12–16)
IMMATURE GRANULOCYTES: ABNORMAL %
LYMPHOCYTES # BLD: 16 % (ref 24–44)
MCH RBC QN AUTO: 33.6 PG (ref 26–34)
MCHC RBC AUTO-ENTMCNC: 33.5 G/DL (ref 31–37)
MCV RBC AUTO: 100.5 FL (ref 80–100)
MONOCYTES # BLD: 5 % (ref 1–7)
MORPHOLOGY: NORMAL
NRBC AUTOMATED: ABNORMAL PER 100 WBC
PDW BLD-RTO: 15.9 % (ref 12.5–15.4)
PLATELET # BLD: 221 K/UL (ref 140–450)
PLATELET ESTIMATE: ABNORMAL
PMV BLD AUTO: 7.3 FL (ref 6–12)
POTASSIUM SERPL-SCNC: 4.4 MMOL/L (ref 3.7–5.3)
RBC # BLD: 3.42 M/UL (ref 4–5.2)
RBC # BLD: ABNORMAL 10*6/UL
SEG NEUTROPHILS: 75 % (ref 36–66)
SEGMENTED NEUTROPHILS ABSOLUTE COUNT: 1.57 K/UL (ref 1.8–7.7)
SODIUM BLD-SCNC: 134 MMOL/L (ref 135–144)
TOTAL PROTEIN: 6.8 G/DL (ref 6.4–8.3)
WBC # BLD: 2.1 K/UL (ref 3.5–11)
WBC # BLD: ABNORMAL 10*3/UL

## 2021-03-08 PROCEDURE — 80053 COMPREHEN METABOLIC PANEL: CPT

## 2021-03-08 PROCEDURE — 86300 IMMUNOASSAY TUMOR CA 15-3: CPT

## 2021-03-08 PROCEDURE — 36415 COLL VENOUS BLD VENIPUNCTURE: CPT

## 2021-03-08 PROCEDURE — 85025 COMPLETE CBC W/AUTO DIFF WBC: CPT

## 2021-03-08 NOTE — PROGRESS NOTES
[facility] 40            Radiation Oncology          [address] [facility]          [address]        Kori Rahman: [phone]        F: [phone]       [employer]         Dear Dr Glenda Finn: Thank you for referring Elis Lozano to me for evaluation and treatment. Below is a summary of the patient's recently completed radiation course. If you have questions, please do not hesitate to call me. I look forward to following this patient along with you. Sincerely,  Electronically signed by Mar Robert MD on 3/8/21 at 8:35 AM EST      CC: Patient Care Team:  Bibiana Hughes MD as PCP - General  Val Mays MD as Surgeon (General Surgery)  Alex Morton DO as Consulting Physician (Obstetrics & Gynecology)  Ernst Shaffer MD as Consulting Physician (Hematology and Oncology)  Ed Lopez RN as Nurse Navigator (Oncology)  Sommer Boateng RN as Registered Nurse (Oncology)  ------------------------------------------------------------------------------------------------------------------------------------------------------------------------------------------        Date of Service: 2021     Location:  [facility] Radiation Oncology,   [address] [place] [address]., Liz Mariee   [phone]        RADIATION ONCOLOGY END OF TREATMENT SUMMARY:    Patient ID:   Elis Lozano  : 1970   MRN: [id number]    DIAGNOSIS:  Cancer Staging  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    R Scapula  2,000 cGy x10/15  2021   6  5                    Concurrent Chemotherapy: NA CLINICAL COURSE:    Patient completed the treatment as prescribed and tolerated treatment as expected. Patient developed some relief in her back pain. Patient was advised to continue close follow up with medical oncology to assess treatment toxicity and response and will see us as needed since she will be continued on her systemic treatments. Patient does have our contact information in case they have any questions or concerns in the future.     Electronically signed by Mann Sanchez MD on 3/8/2021 at 8:35 AM

## 2021-03-10 LAB — CA 27-29: 356 U/ML (ref 0–38)

## 2021-03-11 ENCOUNTER — TELEPHONE (OUTPATIENT)
Dept: ONCOLOGY | Age: 51
End: 2021-03-11

## 2021-03-11 ENCOUNTER — HOSPITAL ENCOUNTER (OUTPATIENT)
Dept: INFUSION THERAPY | Age: 51
Discharge: HOME | End: 2021-03-11
Payer: COMMERCIAL

## 2021-03-11 ENCOUNTER — OFFICE VISIT (OUTPATIENT)
Dept: ONCOLOGY | Age: 51
End: 2021-03-11
Payer: COMMERCIAL

## 2021-03-11 VITALS
TEMPERATURE: 98 F | DIASTOLIC BLOOD PRESSURE: 77 MMHG | WEIGHT: 277.5 LBS | SYSTOLIC BLOOD PRESSURE: 115 MMHG | HEART RATE: 95 BPM | BODY MASS INDEX: 40.98 KG/M2 | OXYGEN SATURATION: 98 %

## 2021-03-11 DIAGNOSIS — C50.211 BILATERAL MALIGNANT NEOPLASM OF UPPER INNER QUADRANT OF BREAST IN FEMALE, UNSPECIFIED ESTROGEN RECEPTOR STATUS (HCC): Primary | ICD-10-CM

## 2021-03-11 DIAGNOSIS — C50.212 BILATERAL MALIGNANT NEOPLASM OF UPPER INNER QUADRANT OF BREAST IN FEMALE, UNSPECIFIED ESTROGEN RECEPTOR STATUS (HCC): Primary | ICD-10-CM

## 2021-03-11 DIAGNOSIS — C79.51 BONE METASTASIS (HCC): ICD-10-CM

## 2021-03-11 DIAGNOSIS — Z17.0 MALIGNANT NEOPLASM OF UPPER-OUTER QUADRANT OF LEFT BREAST IN FEMALE, ESTROGEN RECEPTOR POSITIVE (HCC): ICD-10-CM

## 2021-03-11 DIAGNOSIS — C50.412 MALIGNANT NEOPLASM OF UPPER-OUTER QUADRANT OF LEFT BREAST IN FEMALE, ESTROGEN RECEPTOR POSITIVE (HCC): ICD-10-CM

## 2021-03-11 DIAGNOSIS — C78.7 LIVER METASTASES (HCC): ICD-10-CM

## 2021-03-11 PROCEDURE — 96401 CHEMO ANTI-NEOPL SQ/IM: CPT

## 2021-03-11 PROCEDURE — G8417 CALC BMI ABV UP PARAM F/U: HCPCS | Performed by: INTERNAL MEDICINE

## 2021-03-11 PROCEDURE — G8482 FLU IMMUNIZE ORDER/ADMIN: HCPCS | Performed by: INTERNAL MEDICINE

## 2021-03-11 PROCEDURE — 99211 OFF/OP EST MAY X REQ PHY/QHP: CPT | Performed by: INTERNAL MEDICINE

## 2021-03-11 PROCEDURE — 1036F TOBACCO NON-USER: CPT | Performed by: INTERNAL MEDICINE

## 2021-03-11 PROCEDURE — 99214 OFFICE O/P EST MOD 30 MIN: CPT | Performed by: INTERNAL MEDICINE

## 2021-03-11 PROCEDURE — 6360000002 HC RX W HCPCS: Performed by: INTERNAL MEDICINE

## 2021-03-11 PROCEDURE — G8427 DOCREV CUR MEDS BY ELIG CLIN: HCPCS | Performed by: INTERNAL MEDICINE

## 2021-03-11 PROCEDURE — 96402 CHEMO HORMON ANTINEOPL SQ/IM: CPT

## 2021-03-11 PROCEDURE — 3017F COLORECTAL CA SCREEN DOC REV: CPT | Performed by: INTERNAL MEDICINE

## 2021-03-11 RX ORDER — LAMOTRIGINE 25 MG/1
250 TABLET ORAL ONCE
Status: COMPLETED | OUTPATIENT
Start: 2021-03-11 | End: 2021-03-11

## 2021-03-11 RX ORDER — METHYLPREDNISOLONE SODIUM SUCCINATE 125 MG/2ML
125 INJECTION, POWDER, LYOPHILIZED, FOR SOLUTION INTRAMUSCULAR; INTRAVENOUS ONCE
Status: CANCELLED | OUTPATIENT
Start: 2021-04-08 | End: 2021-04-08

## 2021-03-11 RX ORDER — EPINEPHRINE 1 MG/ML
0.3 INJECTION, SOLUTION, CONCENTRATE INTRAVENOUS PRN
Status: CANCELLED | OUTPATIENT
Start: 2021-04-08

## 2021-03-11 RX ORDER — LAMOTRIGINE 25 MG/1
250 TABLET ORAL ONCE
Status: CANCELLED | OUTPATIENT
Start: 2021-04-08 | End: 2021-04-08

## 2021-03-11 RX ORDER — DULOXETIN HYDROCHLORIDE 60 MG/1
60 CAPSULE, DELAYED RELEASE ORAL DAILY
Qty: 30 CAPSULE | Refills: 3 | Status: SHIPPED | OUTPATIENT
Start: 2021-03-11 | End: 2021-07-02

## 2021-03-11 RX ORDER — OXYCODONE HYDROCHLORIDE 5 MG/1
5 TABLET ORAL EVERY 6 HOURS PRN
Qty: 90 TABLET | Refills: 0 | Status: SHIPPED | OUTPATIENT
Start: 2021-03-11 | End: 2021-04-08 | Stop reason: SDUPTHER

## 2021-03-11 RX ORDER — SODIUM CHLORIDE 9 MG/ML
INJECTION, SOLUTION INTRAVENOUS CONTINUOUS
Status: CANCELLED | OUTPATIENT
Start: 2021-04-08

## 2021-03-11 RX ORDER — DIPHENHYDRAMINE HYDROCHLORIDE 50 MG/ML
50 INJECTION INTRAMUSCULAR; INTRAVENOUS ONCE
Status: CANCELLED | OUTPATIENT
Start: 2021-04-08 | End: 2021-04-08

## 2021-03-11 RX ADMIN — FULVESTRANT 250 MG: 50 INJECTION INTRAMUSCULAR at 11:21

## 2021-03-11 RX ADMIN — DENOSUMAB 120 MG: 120 INJECTION SUBCUTANEOUS at 11:19

## 2021-03-11 RX ADMIN — FULVESTRANT 250 MG: 50 INJECTION INTRAMUSCULAR at 11:20

## 2021-03-11 NOTE — PROGRESS NOTES
Pt here for monthly Faslodex + Xgeva. Arrives ambulatory by self. Lab results reviewed. Pt was seen by Dr. Salvatore Upton, order rec'd to proceed with tx. Injections complete without incident. Pt d/c'd in stable condition. Returns 4/8/21 for f/u with Dr. Florence Zabala and Umu Arnold.

## 2021-03-11 NOTE — PATIENT INSTRUCTIONS
Proceed with treatment per orders, return in 4 weeks with treatment and labs including CBC, CMP and CA 27-29

## 2021-03-11 NOTE — TELEPHONE ENCOUNTER
AVS from 3/11/21     Proceed with treatment per orders, return in 4 weeks with treatment and labs including CBC, CMP and CA 27-29    Tx today as scheduled    RV scheduled 4/8/21 @ 9:45am with tx to follow    PT was given AVS and an appt schedule    Electronically signed by Tristan Chavez on 3/11/2021 at 11:24 AM

## 2021-03-11 NOTE — PROGRESS NOTES
DIAGNOSIS:   1- T2, N0, M0 ER positive ID positive and HER-2/eve negative invasive ductal carcinoma of the left breast ( inner upper quadrant)  2- Repeated GI bleeding greetings 2 symptomatic anemia, despite extensive GI workup, source of bleeding was never found. 3- finally a small mass was seen in the jejunum. 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 Margins negative   4- compression fractures and bone lesions. Likely metastatic cancer (5/2020)     CURRENT THERAPY:  1- Mastectomy and axillary sampling  2- adjuvant chemotherapy with Taxotere and Cytoxan starting 5/16/2013. Chemotherapy stopped after 3 cycles because of ongoing GI bleeding and symptomatic anemia  3- Conservative management for GI bleeding. Leading completely stopped after the discontinuation of chemotherapy  4- started tamoxifen after chemotherapy was completed. 8/2013. 5- transitioned to Arimidex 6/2014, completed 08/2018  6- GIST resection, resected 9/2017   7-status post kyphoplasty and biopsy of bone metastases 5/2020. The tumor was minimally ER positive at 5% and ID negative. 8- rodding of the left femur completed. Plan for  Radiation  9- Systemic therapy, plan Arimidex plus Ibrance  10 - XRT completed 07/2020  11-current treatment starting 7/30/2020, monthly Xgeva, oral Ibrance and Arimidex  12-2/2021, progression of disease, plan to switch to Faslodex plus CDK inhibitor abemaciclib, continue Xgeva    BRIEF CASE HISTORY:   Kristen Ramos is a very pleasant 45 y.o. who felt a mass in the medial aspect of her left breast, around the 9:00 position. Patient sought medical attention and mammogram and ultrasound were done. Showing an irregular, high-density mass with indistinct margins containing pleomorphic calcifications in the lower inner quadrant of the left breast close to 9:00 location this mass measures are mammogram 2.7 x 2.6 x 2 cm.    Subsequently targeted ultrasound was performed showing hypoechoic, irregular, taller than wider, adjuvant therapy  In May/2020, the patient presented with severe back pain. MRI showed multiple bone lesions with evidence of compression fracture. Patient underwent successful kyphoplasty and biopsy and pathology showed breast cancer, it was 5% ER positive and IN was negative. HER-2 was +2 which is equivocal so a fish test was ordered. Plan for radiation, staging PET, and Arimidex. PET scan showed widespread metastatic bony disease including the thoracic spine, the lumbar spine, the left femur and the rib area. She underwent repeated kyphoplasty and rodding of the left femur. She is undergoing radiation to the painful bony areas in thoracic, rib and lumbar areas as well as the left femur. We will likely use Ibrance plus Arimidex. As well as monthly Xgeva to be started after completion of radiation. After completion of radiation, we maintained her on Arimidex plus Ibrance, due to cytopenias, Ibrance dose was decreased to 100 mg/day which was much better tolerated. Also we maintained her on Xgeva monthly. PET scan was done in October and showed essentially stable disease. There was some suspicious activity in the hip but this was asymptomatic and we decided that this might be post radiation or related to physical therapy. We decided to continue current therapy until clear progression. Further testing in early 2021 showed clear progression, will plan to switch to Faslodex and continue with CDk inhibitor(switch to [employer])  and Xgeva. INTERIM HISTORY: She comes in today for follow up for metastatic breast cancer and to receive Xgeva . She has been taking Verzenio for a month and no issues. She has mild diarrhea controlled without any medication. She has been tolerating the Faslodex as well very well. Most of her issues are related to back pain. Is controlled with medication. But she has been overwhelmed psychologically with the diagnosis. She is crying constantly.   She has reached out to her therapist but this is not helping much. She is on duloxetine 30 mg and we talked about escalating the dose to 60 mg. She has no other symptoms, namely no nausea or vomiting, no chest pain or shortness of breath. GYNECOLOGICAL HISTORY  Menarche at age 14. He is premenopausal with regular periods. +2. She used birth control minimally. PAST MEDICAL HISTORY: has a past medical history of Anemia, Anxiety, Atrial fibrillation ([address] Breast cancer ([address] Carpal tunnel syndrome, Depression, GI bleed, Hypertension, Liver metastases ([address] Lymphedema, Neurologic cardiac syncope, Obesity, Pain, Sleep apnea, ANA PAULA (stress urinary incontinence, female), Tachycardia, Varicella, and Varicella without complication. PAST SURGICAL HISTORY: has a past surgical history that includes Shoulder arthroscopy (); Colonoscopy (); Dilation and curettage of uterus; Knee arthroscopy; fracture surgery; Tunneled venous port placement (Right); Upper gastrointestinal endoscopy (2013); Colonoscopy (2013); Mastectomy (Left, 13); other surgical history (1969); Breast biopsy (Left, 3/21/13); ileoscopy (10/12/05); Cosmetic surgery; Hysterectomy; denia and bso (cervix removed) (14); Enterocele repair (14); Cystoscopy (14); bladder suspension (14); Colonoscopy (10/12/2005); Upper gastrointestinal endoscopy (2015); and tumor removal.     CURRENT MEDICATIONS:  has a current medication list which includes the following prescription(s): oxycodone, abemaciclib, anastrozole, ondansetron, calcium-vitamin d, potassium chloride, warfarin, diazepam, duloxetine, losartan, omeprazole, hydrochlorothiazide, amlodipine, docusate, and metoprolol tartrate. ALLERGIES:  is allergic to adhesive tape. FAMILY HISTORY: Negative for any hematological or oncological conditions. Specifically no history of breast cancer in the family    SOCIAL HISTORY:  reports that she quit smoking about 2 years ago.  Her smoking use included Cigarettes. She smoked About 20 years. She has never used smokeless tobacco. She reports that she does not drink alcohol or use illicit drugs. REVIEW OF SYSTEMS:   General: No fever or night sweats. Weight is stable. Hot flashes are under good control  Eyes: No double or blurred vision. Ears: No tinnitus or hearing problem,    Throat: No dysphagia or sore throat   Respiratory: No chest pain, shortness of breath at rest, no hemoptysis. She has exercise intolerance  Cardiovascular: Denies chest pain, PND or orthopnea. No L E swelling or palpitations. Gastrointestinal: No abdominal pain +constipation and diarrhea -grade 1 and managing with medication; no nausea or vomiting  Genitourinary: Denies dysuria, hematuria, frequency, urgency or incontinence. No vaginal bleeding. Neurological: Denies decreased LOC, no sensory or motor focal deficits. +headaches - unchanged  Musculoskeletal: No arthralgia or joint swelling. +back pain, right chest wall pain, and right shoulder blade pain  Skin: There are no rashes or bleeding. +hair thinning  Psychiatric: ++ anxiety, depression as discussed. Endocrine: No diabetes or thyroid disease. Hematologic: No bleeding, no adenopathy. PHYSICAL EXAM:  The patient is not in acute distress. Vital signs: Blood pressure 115/77, pulse 95, temperature 98 °F (36.7 °C), temperature source Oral, weight 277 lb 8 oz (125.9 kg), last menstrual period 03/17/2014, SpO2 98 %, not currently breastfeeding. HEENT:  Eyes are normal. Ears, nose is congested, no bleeding. Neck: Supple. No lymph node enlargement. No thyroid enlargement. Trachea is centrally located. Chest:  Clear to auscultation. No wheezes or crepitations. Breast:  Right: No masses, no adenopathy. No skin or nippple abnormalities. Left: left-sided mastectomy, surgery site is healed completely, no adenopathy  Heart: Regular sinus rhythm. Abdomen: Soft, nontender. No hepatosplenomegaly.   No masses. Extremities:  With no edema. Lymph Nodes:  No cervical, axillary or inguinal lymph node enlargement. Neurologic: Conscious and oriented. No focal neurological deficits. Psychosocial: No depression, anxiety or stress. Skin: No rashes, bruises or ecchymoses       REVIEW OF LABORATORY DATA:     Lab Results   Component Value Date    WBC 2.1 (L) 03/08/2021    HGB 11.5 (L) 03/08/2021    HCT 34.3 (L) 03/08/2021    .5 (H) 03/08/2021     03/08/2021     Lab Results   Component Value Date    IRON 30 (L) 11/29/2017    TIBC 403 11/29/2017    FERRITIN 33 04/04/2019     Lab Results   Component Value Date    NEUTROABS 1.57 (L) 03/08/2021         Chemistry        Component Value Date/Time     (L) 03/08/2021 1211    K 4.4 03/08/2021 1211     03/08/2021 1211    CO2 26 03/08/2021 1211    BUN 13 03/08/2021 1211    CREATININE 0.97 (H) 03/08/2021 1211        Component Value Date/Time    CALCIUM 9.0 03/08/2021 1211    ALKPHOS 88 03/08/2021 1211    AST 88 (H) 03/08/2021 1211    ALT 96 (H) 03/08/2021 1211    BILITOT 0.40 03/08/2021 1211         Results for Kath Malhotra (MRN [id number]) as of 2/11/2021 09:57   Ref.  Range 8/29/2017 00:05 8/30/2017 09:15 7/21/2020 10:42 8/27/2020 10:20 9/24/2020 09:47 10/22/2020 09:24 11/19/2020 10:17 12/14/2020 14:38 1/11/2021 13:51 2/8/2021 13:06   CA 27-29 Latest Ref Range: 0 - 38 U/mL   164 (H) 88 (H) 95 (H) 129 (H) 146 (H) 208 (H) 248 (H) 294 (H)       REVIEW OF RADIOLOGICAL RESULTS:  PET scan  Impression   PET-CT evidence of mixed response of therapy.       *There appears to be interval decrease in the overall FDG activity identified   involving the osseous metastatic disease particularly within the lumbar spine   and bony pelvis when compared to the prior PET-CT study suggestive of   response to therapy. Korey Lapping is still multiple areas of abnormal FDG activity   identified involving the visualized appendicular and axial skeletons   consistent with residual metastatic disease. *New consolidation involving the left hilar region which appears to be FDG   avid.  Finding may be post treatment in nature.  Attention on follow-up is   recommended. *New focal area of FDG activity identified involving the inferior aspect of   the right lobe of the liver corresponding to a hyperdense area on the   concurrent CT.  There is surrounding appendix steatosis.  New metastatic   disease cannot be excluded complete evaluation with CT or MRI utilizing liver   mass protocol is recommended. IMPRESSION:   1- Arlene  Has  T2, N0, M0 ER/GA positive and HER-2/eve negative invasive ductal carcinoma of the left breast  2- Chemotherapy: received 3 cycles of TC, stopped due to GI bleeding  3- GI bleeding , related to the GIST tumor. Currently on oral iron and hemoglobin is improving  4- on anastrozole. We will continue, plan 5 years of therapy, completed 08/2018  5- for hot flashes, better on Effexor. 6- Recent pancreatitis, possibly biliary. MRI CT scan did not show any gallbladder stone. We will discuss with her surgeons. 7-   distal small bowel mass measuring 3.6 cm, resection shows low risk GIST. No need for adjuvant therapy, margins were negative. 8-bone metastases with compression fracture diagnosed in May/2020. Status post kyphoplasty and biopsy. 9- S/P radiation 07/2020  10- for systemic therapy, she was started on Arimidex, adding Ibrance. Due to cytopenias, dose was reduced to 100 mg/day  11-in February/2021, there was signs of progression, will plan to switch to Faslodex       PLAN:   1. Jazlyn Katz is handling treatment fairly well. Am happy with her excellent tolerance to medication  2. Her tumor marker continues to rise but it is too soon to check for response  3. I think she is overwhelmed with the diagnosis. Will increase her duloxetine to 60 mg discussed  4. We will continue with pain medication  5. We will plan to finish 3 months of therapy and then reevaluate.   If she has any progression, will switch to a different agent likely Xeloda

## 2021-03-11 NOTE — TELEPHONE ENCOUNTER
AVS from 3/11/21     Proceed with treatment per orders, return in 4 weeks with treatment and labs including CBC, CMP and CA 27-29    Tx today as scheduled    RV scheduled 4/8/21 @ 9:45am    PT was given AVS and an appt schedule    Electronically signed by Justice Renee on 3/11/2021 at 10:42 AM

## 2021-03-15 DIAGNOSIS — C50.412 MALIGNANT NEOPLASM OF UPPER-OUTER QUADRANT OF LEFT BREAST IN FEMALE, ESTROGEN RECEPTOR POSITIVE (HCC): Primary | ICD-10-CM

## 2021-03-15 DIAGNOSIS — Z17.0 MALIGNANT NEOPLASM OF UPPER-OUTER QUADRANT OF LEFT BREAST IN FEMALE, ESTROGEN RECEPTOR POSITIVE (HCC): Primary | ICD-10-CM

## 2021-03-15 RX ORDER — POTASSIUM CHLORIDE 1500 MG/1
TABLET, EXTENDED RELEASE ORAL
Qty: 30 TABLET | Refills: 5 | Status: SHIPPED | OUTPATIENT
Start: 2021-03-15 | End: 2021-01-01

## 2021-03-16 ENCOUNTER — TELEPHONE (OUTPATIENT)
Dept: INFUSION THERAPY | Age: 51
End: 2021-03-16

## 2021-03-16 NOTE — TELEPHONE ENCOUNTER
Received call from [facility] stating that [employer] needs a peer to peer review for luis at [phone]. Called and spoke with Kristen Tatum. Informed her a PA was done 2/12/21 with [facility] and approved for 1 year. Kristen Tatum states the PA should have been done with [facility]. PA done with Kristen Tatum and approved through 3/15/2024. PA # [id number]. Spoke with Alea Man at [facility] and she states the [employer] changed. States she will forward script to [facility] and let pt know. PA # given to her. States she will let us know if there is any problems.

## 2021-03-19 ENCOUNTER — TELEPHONE (OUTPATIENT)
Dept: ONCOLOGY | Age: 51
End: 2021-03-19

## 2021-03-19 NOTE — TELEPHONE ENCOUNTER
Voicemail received from patient stating she wants to talk to oral chemo RN, Naresh Marin, regarding side effect from [employer]. Writer instructed Naresh Marin to call patient back at [phone]. Naresh Marin stated she will call patient.  Ashley Reinoso

## 2021-03-22 ENCOUNTER — TELEPHONE (OUTPATIENT)
Dept: ONCOLOGY | Age: 51
End: 2021-03-22

## 2021-03-22 ENCOUNTER — HOSPITAL ENCOUNTER (OUTPATIENT)
Age: 51
Discharge: HOME | End: 2021-03-22
Payer: COMMERCIAL

## 2021-03-22 DIAGNOSIS — C50.212 BILATERAL MALIGNANT NEOPLASM OF UPPER INNER QUADRANT OF BREAST IN FEMALE, UNSPECIFIED ESTROGEN RECEPTOR STATUS (HCC): ICD-10-CM

## 2021-03-22 DIAGNOSIS — C50.211 BILATERAL MALIGNANT NEOPLASM OF UPPER INNER QUADRANT OF BREAST IN FEMALE, UNSPECIFIED ESTROGEN RECEPTOR STATUS (HCC): ICD-10-CM

## 2021-03-22 LAB
ABSOLUTE EOS #: 0.11 K/UL (ref 0–0.4)
ABSOLUTE IMMATURE GRANULOCYTE: ABNORMAL K/UL (ref 0–0.3)
ABSOLUTE LYMPH #: 0.41 K/UL (ref 1–4.8)
ABSOLUTE MONO #: 0.13 K/UL (ref 0.1–0.8)
ALBUMIN SERPL-MCNC: 4.2 G/DL (ref 3.5–5.2)
ALBUMIN/GLOBULIN RATIO: 1.4 (ref 1–2.5)
ALP BLD-CCNC: 161 U/L (ref 35–104)
ALT SERPL-CCNC: 148 U/L (ref 5–33)
ANION GAP SERPL CALCULATED.3IONS-SCNC: 11 MMOL/L (ref 9–17)
AST SERPL-CCNC: 163 U/L
BASOPHILS # BLD: 0 % (ref 0–2)
BASOPHILS ABSOLUTE: 0 K/UL (ref 0–0.2)
BILIRUB SERPL-MCNC: 0.45 MG/DL (ref 0.3–1.2)
BUN BLDV-MCNC: 15 MG/DL (ref 6–20)
BUN/CREAT BLD: ABNORMAL (ref 9–20)
CALCIUM SERPL-MCNC: 9.4 MG/DL (ref 8.6–10.4)
CHLORIDE BLD-SCNC: 104 MMOL/L (ref 98–107)
CO2: 24 MMOL/L (ref 20–31)
CREAT SERPL-MCNC: 1.24 MG/DL (ref 0.5–0.9)
DIFFERENTIAL TYPE: ABNORMAL
EOSINOPHILS RELATIVE PERCENT: 6 % (ref 1–4)
GFR AFRICAN AMERICAN: 55 ML/MIN
GFR NON-AFRICAN AMERICAN: 46 ML/MIN
GFR SERPL CREATININE-BSD FRML MDRD: ABNORMAL ML/MIN/{1.73_M2}
GFR SERPL CREATININE-BSD FRML MDRD: ABNORMAL ML/MIN/{1.73_M2}
GLUCOSE BLD-MCNC: 95 MG/DL (ref 70–99)
HCT VFR BLD CALC: 35.1 % (ref 36–46)
HEMOGLOBIN: 12 G/DL (ref 12–16)
IMMATURE GRANULOCYTES: ABNORMAL %
LYMPHOCYTES # BLD: 23 % (ref 24–44)
MCH RBC QN AUTO: 34.1 PG (ref 26–34)
MCHC RBC AUTO-ENTMCNC: 34.2 G/DL (ref 31–37)
MCV RBC AUTO: 99.7 FL (ref 80–100)
MONOCYTES # BLD: 7 % (ref 1–7)
MORPHOLOGY: NORMAL
NRBC AUTOMATED: ABNORMAL PER 100 WBC
PDW BLD-RTO: 14.1 % (ref 12.5–15.4)
PLATELET # BLD: 122 K/UL (ref 140–450)
PLATELET ESTIMATE: ABNORMAL
PMV BLD AUTO: 9 FL (ref 8–14)
POTASSIUM SERPL-SCNC: 3.3 MMOL/L (ref 3.7–5.3)
RBC # BLD: 3.52 M/UL (ref 4–5.2)
RBC # BLD: ABNORMAL 10*6/UL
SEG NEUTROPHILS: 64 % (ref 36–66)
SEGMENTED NEUTROPHILS ABSOLUTE COUNT: 1.15 K/UL (ref 1.8–7.7)
SODIUM BLD-SCNC: 139 MMOL/L (ref 135–144)
TOTAL PROTEIN: 7.2 G/DL (ref 6.4–8.3)
WBC # BLD: 1.8 K/UL (ref 3.5–11)
WBC # BLD: ABNORMAL 10*3/UL

## 2021-03-22 PROCEDURE — 80053 COMPREHEN METABOLIC PANEL: CPT

## 2021-03-22 PROCEDURE — 85025 COMPLETE CBC W/AUTO DIFF WBC: CPT

## 2021-03-22 PROCEDURE — 36415 COLL VENOUS BLD VENIPUNCTURE: CPT

## 2021-03-22 NOTE — TELEPHONE ENCOUNTER
Levi Weston calls writer today with some side effects of diarrhea. The onset started 1 week ago. Levi Weston says that she has been using the imodium as instructed, starting with 4 mg and than taking the 2 mg up to 16 mg. Levi Weston is currently have 5 to 6 stools daily, with some abdominal cramping, it usually happens after eating. Writer recommended that Levi Weston take the imodium every 4 hours for 24 hours. If no relief will try something stronger. Fluids encouraged with the brat diet. Levi Weston says that she only had lab work completed 1 week after starting wants to know if she should get lab work today. Levi Weston says she has an appointment today. Writer encouraged [place] to obtain lab work today. Writer reviewed above with Dr Atilio Nash. Lab work reviewed with Dr Atilio Nash and he would like for Levi Weston to increase her fluids and eat foods higher in potassium. Writer returned call to [place] concerning her lab work. Recommended if tolerated foods higher in potasium. Encouraged Gatorade and a bland diet or brat diet for now. Understanding voiced, will monitor in 24 hours.

## 2021-03-23 ENCOUNTER — TELEPHONE (OUTPATIENT)
Dept: ONCOLOGY | Age: 51
End: 2021-03-23

## 2021-03-23 NOTE — TELEPHONE ENCOUNTER
Jere Dallas returned the phone call, and says that the diarrhea is better. Jere Dallas took her last dose at 6 am this morning. There was a diarrhea stool at noon with stomach cramping. Jere Dallas says that she has not eaten today. Encouraged Jere Dallas to take imodium 30 to 45 min prior to eating today, at bedtime and again in the AM. Continue to replace fluids. Understanding voiced. Will monitor in 24 hours.

## 2021-03-24 NOTE — TELEPHONE ENCOUNTER
Writer spoke with Sophia Chapman today. Sophia Chapman says that so far she has not had a bowel movement today. Sophia Chapman took imodium prior to eating breakfast and will take one prior to eating this evening. Writer encouraged Sophia Chapman to hydrate with fluids and to take imodium daily in the morning until no stool x 24 hrs. Will monitor.

## 2021-04-05 ENCOUNTER — TELEPHONE (OUTPATIENT)
Dept: ONCOLOGY | Age: 51
End: 2021-04-05

## 2021-04-07 ENCOUNTER — HOSPITAL ENCOUNTER (OUTPATIENT)
Age: 51
Discharge: HOME | End: 2021-04-07
Payer: COMMERCIAL

## 2021-04-07 DIAGNOSIS — C49.A3 GIST (GASTROINTESTINAL STROMAL TUMOR) OF SMALL BOWEL, MALIGNANT (HCC): ICD-10-CM

## 2021-04-07 DIAGNOSIS — C50.211 BILATERAL MALIGNANT NEOPLASM OF UPPER INNER QUADRANT OF BREAST IN FEMALE, UNSPECIFIED ESTROGEN RECEPTOR STATUS (HCC): ICD-10-CM

## 2021-04-07 DIAGNOSIS — C50.212 BILATERAL MALIGNANT NEOPLASM OF UPPER INNER QUADRANT OF BREAST IN FEMALE, UNSPECIFIED ESTROGEN RECEPTOR STATUS (HCC): ICD-10-CM

## 2021-04-07 DIAGNOSIS — Z17.0 MALIGNANT NEOPLASM OF UPPER-OUTER QUADRANT OF LEFT BREAST IN FEMALE, ESTROGEN RECEPTOR POSITIVE (HCC): ICD-10-CM

## 2021-04-07 DIAGNOSIS — C50.412 MALIGNANT NEOPLASM OF UPPER-OUTER QUADRANT OF LEFT BREAST IN FEMALE, ESTROGEN RECEPTOR POSITIVE (HCC): ICD-10-CM

## 2021-04-07 DIAGNOSIS — C79.51 BONE METASTASIS (HCC): ICD-10-CM

## 2021-04-07 LAB
ABSOLUTE EOS #: 0.04 K/UL (ref 0–0.4)
ABSOLUTE IMMATURE GRANULOCYTE: ABNORMAL K/UL (ref 0–0.3)
ABSOLUTE LYMPH #: 0.34 K/UL (ref 1–4.8)
ABSOLUTE MONO #: 0.19 K/UL (ref 0.1–1.2)
ALBUMIN SERPL-MCNC: 4.1 G/DL (ref 3.5–5.2)
ALBUMIN/GLOBULIN RATIO: 1.3 (ref 1–2.5)
ALP BLD-CCNC: 97 U/L (ref 35–104)
ALT SERPL-CCNC: 29 U/L (ref 5–33)
ANION GAP SERPL CALCULATED.3IONS-SCNC: 10 MMOL/L (ref 9–17)
AST SERPL-CCNC: 26 U/L
BASOPHILS # BLD: 1 % (ref 0–2)
BASOPHILS ABSOLUTE: 0.02 K/UL (ref 0–0.2)
BILIRUB SERPL-MCNC: 0.38 MG/DL (ref 0.3–1.2)
BUN BLDV-MCNC: 15 MG/DL (ref 6–20)
BUN/CREAT BLD: ABNORMAL (ref 9–20)
CALCIUM SERPL-MCNC: 9.8 MG/DL (ref 8.6–10.4)
CHLORIDE BLD-SCNC: 104 MMOL/L (ref 98–107)
CO2: 24 MMOL/L (ref 20–31)
CREAT SERPL-MCNC: 1.07 MG/DL (ref 0.5–0.9)
DIFFERENTIAL TYPE: ABNORMAL
EOSINOPHILS RELATIVE PERCENT: 2 % (ref 1–4)
GFR AFRICAN AMERICAN: >60 ML/MIN
GFR NON-AFRICAN AMERICAN: 54 ML/MIN
GFR SERPL CREATININE-BSD FRML MDRD: ABNORMAL ML/MIN/{1.73_M2}
GFR SERPL CREATININE-BSD FRML MDRD: ABNORMAL ML/MIN/{1.73_M2}
GLUCOSE BLD-MCNC: 92 MG/DL (ref 70–99)
HCT VFR BLD CALC: 33.2 % (ref 36–46)
HEMOGLOBIN: 11.3 G/DL (ref 12–16)
IMMATURE GRANULOCYTES: ABNORMAL %
LYMPHOCYTES # BLD: 18 % (ref 24–44)
MCH RBC QN AUTO: 34 PG (ref 26–34)
MCHC RBC AUTO-ENTMCNC: 34.1 G/DL (ref 31–37)
MCV RBC AUTO: 99.9 FL (ref 80–100)
MONOCYTES # BLD: 10 % (ref 2–11)
MORPHOLOGY: NORMAL
NRBC AUTOMATED: ABNORMAL PER 100 WBC
PDW BLD-RTO: 16.6 % (ref 12.5–15.4)
PLATELET # BLD: 121 K/UL (ref 140–450)
PLATELET ESTIMATE: ABNORMAL
PMV BLD AUTO: 7.9 FL (ref 6–12)
POTASSIUM SERPL-SCNC: 3.6 MMOL/L (ref 3.7–5.3)
RBC # BLD: 3.32 M/UL (ref 4–5.2)
RBC # BLD: ABNORMAL 10*6/UL
SEG NEUTROPHILS: 69 % (ref 36–66)
SEGMENTED NEUTROPHILS ABSOLUTE COUNT: 1.31 K/UL (ref 1.8–7.7)
SODIUM BLD-SCNC: 138 MMOL/L (ref 135–144)
TOTAL PROTEIN: 7.3 G/DL (ref 6.4–8.3)
WBC # BLD: 1.9 K/UL (ref 3.5–11)
WBC # BLD: ABNORMAL 10*3/UL

## 2021-04-07 PROCEDURE — 86300 IMMUNOASSAY TUMOR CA 15-3: CPT

## 2021-04-07 PROCEDURE — 36415 COLL VENOUS BLD VENIPUNCTURE: CPT

## 2021-04-07 PROCEDURE — 85025 COMPLETE CBC W/AUTO DIFF WBC: CPT

## 2021-04-07 PROCEDURE — 80053 COMPREHEN METABOLIC PANEL: CPT

## 2021-04-08 ENCOUNTER — OFFICE VISIT (OUTPATIENT)
Dept: ONCOLOGY | Age: 51
End: 2021-04-08
Payer: COMMERCIAL

## 2021-04-08 ENCOUNTER — HOSPITAL ENCOUNTER (OUTPATIENT)
Dept: INFUSION THERAPY | Age: 51
Discharge: HOME | End: 2021-04-08
Payer: COMMERCIAL

## 2021-04-08 ENCOUNTER — TELEPHONE (OUTPATIENT)
Dept: ONCOLOGY | Age: 51
End: 2021-04-08

## 2021-04-08 VITALS
HEART RATE: 96 BPM | WEIGHT: 271.5 LBS | SYSTOLIC BLOOD PRESSURE: 116 MMHG | DIASTOLIC BLOOD PRESSURE: 79 MMHG | OXYGEN SATURATION: 97 % | TEMPERATURE: 98.2 F | BODY MASS INDEX: 40.09 KG/M2

## 2021-04-08 DIAGNOSIS — C50.212 BILATERAL MALIGNANT NEOPLASM OF UPPER INNER QUADRANT OF BREAST IN FEMALE, UNSPECIFIED ESTROGEN RECEPTOR STATUS (HCC): Primary | ICD-10-CM

## 2021-04-08 DIAGNOSIS — C50.412 MALIGNANT NEOPLASM OF UPPER-OUTER QUADRANT OF LEFT BREAST IN FEMALE, ESTROGEN RECEPTOR POSITIVE (HCC): ICD-10-CM

## 2021-04-08 DIAGNOSIS — Z17.0 MALIGNANT NEOPLASM OF UPPER-OUTER QUADRANT OF LEFT BREAST IN FEMALE, ESTROGEN RECEPTOR POSITIVE (HCC): ICD-10-CM

## 2021-04-08 DIAGNOSIS — C50.211 BILATERAL MALIGNANT NEOPLASM OF UPPER INNER QUADRANT OF BREAST IN FEMALE, UNSPECIFIED ESTROGEN RECEPTOR STATUS (HCC): Primary | ICD-10-CM

## 2021-04-08 DIAGNOSIS — C78.7 LIVER METASTASES (HCC): ICD-10-CM

## 2021-04-08 DIAGNOSIS — C79.51 BONE METASTASIS (HCC): ICD-10-CM

## 2021-04-08 PROCEDURE — 99214 OFFICE O/P EST MOD 30 MIN: CPT | Performed by: INTERNAL MEDICINE

## 2021-04-08 PROCEDURE — 96402 CHEMO HORMON ANTINEOPL SQ/IM: CPT

## 2021-04-08 PROCEDURE — 99211 OFF/OP EST MAY X REQ PHY/QHP: CPT | Performed by: INTERNAL MEDICINE

## 2021-04-08 PROCEDURE — 96401 CHEMO ANTI-NEOPL SQ/IM: CPT

## 2021-04-08 PROCEDURE — 6360000002 HC RX W HCPCS: Performed by: INTERNAL MEDICINE

## 2021-04-08 PROCEDURE — 1036F TOBACCO NON-USER: CPT | Performed by: INTERNAL MEDICINE

## 2021-04-08 PROCEDURE — 3017F COLORECTAL CA SCREEN DOC REV: CPT | Performed by: INTERNAL MEDICINE

## 2021-04-08 PROCEDURE — G8417 CALC BMI ABV UP PARAM F/U: HCPCS | Performed by: INTERNAL MEDICINE

## 2021-04-08 PROCEDURE — G8427 DOCREV CUR MEDS BY ELIG CLIN: HCPCS | Performed by: INTERNAL MEDICINE

## 2021-04-08 RX ORDER — LAMOTRIGINE 25 MG/1
250 TABLET ORAL ONCE
Status: CANCELLED | OUTPATIENT
Start: 2021-06-03 | End: 2021-06-03

## 2021-04-08 RX ORDER — SODIUM CHLORIDE 9 MG/ML
INJECTION, SOLUTION INTRAVENOUS CONTINUOUS
Status: CANCELLED | OUTPATIENT
Start: 2021-06-03

## 2021-04-08 RX ORDER — LAMOTRIGINE 25 MG/1
250 TABLET ORAL ONCE
Status: CANCELLED | OUTPATIENT
Start: 2021-05-06 | End: 2021-05-06

## 2021-04-08 RX ORDER — LAMOTRIGINE 25 MG/1
250 TABLET ORAL ONCE
Status: COMPLETED | OUTPATIENT
Start: 2021-04-08 | End: 2021-04-08

## 2021-04-08 RX ORDER — DIPHENHYDRAMINE HYDROCHLORIDE 50 MG/ML
50 INJECTION INTRAMUSCULAR; INTRAVENOUS ONCE
Status: CANCELLED | OUTPATIENT
Start: 2021-06-03 | End: 2021-06-03

## 2021-04-08 RX ORDER — DIPHENHYDRAMINE HYDROCHLORIDE 50 MG/ML
50 INJECTION INTRAMUSCULAR; INTRAVENOUS ONCE
Status: CANCELLED | OUTPATIENT
Start: 2021-05-06 | End: 2021-05-06

## 2021-04-08 RX ORDER — EPINEPHRINE 1 MG/ML
0.3 INJECTION, SOLUTION, CONCENTRATE INTRAVENOUS PRN
Status: CANCELLED | OUTPATIENT
Start: 2021-05-06

## 2021-04-08 RX ORDER — SODIUM CHLORIDE 9 MG/ML
INJECTION, SOLUTION INTRAVENOUS CONTINUOUS
Status: CANCELLED | OUTPATIENT
Start: 2021-05-06

## 2021-04-08 RX ORDER — METHYLPREDNISOLONE SODIUM SUCCINATE 125 MG/2ML
125 INJECTION, POWDER, LYOPHILIZED, FOR SOLUTION INTRAMUSCULAR; INTRAVENOUS ONCE
Status: CANCELLED | OUTPATIENT
Start: 2021-06-03 | End: 2021-06-03

## 2021-04-08 RX ORDER — METHYLPREDNISOLONE SODIUM SUCCINATE 125 MG/2ML
125 INJECTION, POWDER, LYOPHILIZED, FOR SOLUTION INTRAMUSCULAR; INTRAVENOUS ONCE
Status: CANCELLED | OUTPATIENT
Start: 2021-05-06 | End: 2021-05-06

## 2021-04-08 RX ORDER — OXYCODONE HYDROCHLORIDE 5 MG/1
5 TABLET ORAL EVERY 6 HOURS PRN
Qty: 90 TABLET | Refills: 0 | Status: SHIPPED | OUTPATIENT
Start: 2021-04-08 | End: 2021-01-01 | Stop reason: SDUPTHER

## 2021-04-08 RX ORDER — EPINEPHRINE 1 MG/ML
0.3 INJECTION, SOLUTION, CONCENTRATE INTRAVENOUS PRN
Status: CANCELLED | OUTPATIENT
Start: 2021-06-03

## 2021-04-08 RX ADMIN — DENOSUMAB 120 MG: 120 INJECTION SUBCUTANEOUS at 10:38

## 2021-04-08 RX ADMIN — FULVESTRANT 250 MG: 50 INJECTION INTRAMUSCULAR at 10:38

## 2021-04-08 NOTE — TELEPHONE ENCOUNTER
AVS from 4/8/21     Continue on with treatment per orders, return in 4 weeks with treatment and labs prior to next visit including CBC, CMP, CA 27/29    Continue tx as planned  rv scheduled for 5/6/21 @ 9:45am with tx to follow  Pt will have labs done a few days before    Pt was given AVS and appt schedule

## 2021-04-08 NOTE — PROGRESS NOTES
DIAGNOSIS:   1- T2, N0, M0 ER positive NV positive and HER-2/eve negative invasive ductal carcinoma of the left breast ( inner upper quadrant)  2- Repeated GI bleeding greetings 2 symptomatic anemia, despite extensive GI workup, source of bleeding was never found. 3- finally a small mass was seen in the jejunum. 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 Margins negative   4- compression fractures and bone lesions. Likely metastatic cancer (5/2020)     CURRENT THERAPY:  1- Mastectomy and axillary sampling  2- adjuvant chemotherapy with Taxotere and Cytoxan starting 5/16/2013. Chemotherapy stopped after 3 cycles because of ongoing GI bleeding and symptomatic anemia  3- Conservative management for GI bleeding. Leading completely stopped after the discontinuation of chemotherapy  4- started tamoxifen after chemotherapy was completed. 8/2013. 5- transitioned to Arimidex 6/2014, completed 08/2018  6- GIST resection, resected 9/2017   7-status post kyphoplasty and biopsy of bone metastases 5/2020. The tumor was minimally ER positive at 5% and NV negative. 8- rodding of the left femur completed. Plan for  Radiation  9- Systemic therapy, plan Arimidex plus Ibrance  10 - XRT completed 07/2020  11-current treatment starting 7/30/2020, monthly Xgeva, oral Ibrance and Arimidex  12-2/2021, progression of disease, plan to switch to Faslodex plus CDK inhibitor abemaciclib, continue Xgeva    BRIEF CASE HISTORY:   Elray Saint is a very pleasant 45 y.o. who felt a mass in the medial aspect of her left breast, around the 9:00 position. Patient sought medical attention and mammogram and ultrasound were done. Showing an irregular, high-density mass with indistinct margins containing pleomorphic calcifications in the lower inner quadrant of the left breast close to 9:00 location this mass measures are mammogram 2.7 x 2.6 x 2 cm.    Subsequently targeted ultrasound was performed showing hypoechoic, irregular, taller than wider, adjuvant therapy  In May/2020, the patient presented with severe back pain. MRI showed multiple bone lesions with evidence of compression fracture. Patient underwent successful kyphoplasty and biopsy and pathology showed breast cancer, it was 5% ER positive and MO was negative. HER-2 was +2 which is equivocal so a fish test was ordered. Plan for radiation, staging PET, and Arimidex. PET scan showed widespread metastatic bony disease including the thoracic spine, the lumbar spine, the left femur and the rib area. She underwent repeated kyphoplasty and rodding of the left femur. She is undergoing radiation to the painful bony areas in thoracic, rib and lumbar areas as well as the left femur. We will likely use Ibrance plus Arimidex. As well as monthly Xgeva to be started after completion of radiation. After completion of radiation, we maintained her on Arimidex plus Ibrance, due to cytopenias, Ibrance dose was decreased to 100 mg/day which was much better tolerated. Also we maintained her on Xgeva monthly. PET scan was done in October and showed essentially stable disease. There was some suspicious activity in the hip but this was asymptomatic and we decided that this might be post radiation or related to physical therapy. We decided to continue current therapy until clear progression. Further testing in early 2021 showed clear progression, will plan to switch to Faslodex and continue with CDk inhibitor(switch to [employer])  and Xgeva. INTERIM HISTORY: She comes in today for follow up for metastatic breast cancer and to receive Xgeva. She reports her pain is currently well controlled, no new pain. She has been having frequent diarrhea with the Vereznio with some cramping that resolves with bowel movement, she is taking Imodium to try and manage. She has had very minor headache. She has leg redness after sun exposure. GYNECOLOGICAL HISTORY  Menarche at age 14.  He is premenopausal with regular periods. +2. She used birth control minimally. PAST MEDICAL HISTORY: has a past medical history of Anemia, Anxiety, Atrial fibrillation ([facility] [address] Breast cancer ([address] Carpal tunnel syndrome, Depression, GI bleed, Hypertension, Liver metastases ([address] Lymphedema, Neurologic cardiac syncope, Obesity, Pain, Sleep apnea, ANA PAULA (stress urinary incontinence, female), Tachycardia, Varicella, and Varicella without complication. PAST SURGICAL HISTORY: has a past surgical history that includes Shoulder arthroscopy (); Colonoscopy (); Dilation and curettage of uterus; Knee arthroscopy; fracture surgery; Tunneled venous port placement (Right); Upper gastrointestinal endoscopy (2013); Colonoscopy (2013); Mastectomy (Left, 13); other surgical history (1969); Breast biopsy (Left, 3/21/13); ileoscopy (10/12/05); Cosmetic surgery; Hysterectomy; denia and bso (cervix removed) (14); Enterocele repair (14); Cystoscopy (14); bladder suspension (14); Colonoscopy (10/12/2005); Upper gastrointestinal endoscopy (2015); and tumor removal.     CURRENT MEDICATIONS:  has a current medication list which includes the following prescription(s): klor-con m20, oxycodone, duloxetine, abemaciclib, ondansetron, calcium-vitamin d, warfarin, diazepam, losartan, omeprazole, hydrochlorothiazide, amlodipine, docusate, and metoprolol tartrate. ALLERGIES:  is allergic to adhesive tape. FAMILY HISTORY: Negative for any hematological or oncological conditions. Specifically no history of breast cancer in the family    SOCIAL HISTORY:  reports that she quit smoking about 2 years ago. Her smoking use included Cigarettes. She smoked About 20 years. She has never used smokeless tobacco. She reports that she does not drink alcohol or use illicit drugs. REVIEW OF SYSTEMS:   General: No fever or night sweats. Weight is stable. Hot flashes are under good control  Eyes: No double or blurred vision.   Ears: No tinnitus or hearing problem,    Throat: No dysphagia or sore throat   Respiratory: No chest pain, shortness of breath at rest, no hemoptysis. She has exercise intolerance  Cardiovascular: Denies chest pain, PND or orthopnea. No L E swelling or palpitations. Gastrointestinal: No nausea or vomiting; +freuqent diarrhea with cramping  Genitourinary: Denies dysuria, hematuria, frequency, urgency or incontinence. No vaginal bleeding. Neurological: Denies decreased LOC, no sensory or motor focal deficits. +headaches - unchanged, mild  Musculoskeletal: No arthralgia or joint swelling. +back pain, right chest wall pain, and right shoulder blade pain  Skin: There are no rashes or bleeding. +hair thinning  Psychiatric: ++ anxiety, depression as discussed. Endocrine: No diabetes or thyroid disease. Hematologic: No bleeding, no adenopathy. PHYSICAL EXAM:  The patient is not in acute distress. Vital signs: Blood pressure 116/79, pulse 96, temperature 98.2 °F (36.8 °C), temperature source Oral, weight 271 lb 8 oz (123.2 kg), last menstrual period 03/17/2014, SpO2 97 %, not currently breastfeeding. HEENT:  Eyes are normal. Ears, nose is congested, no bleeding. Neck: Supple. No lymph node enlargement. No thyroid enlargement. Trachea is centrally located. Chest:  Clear to auscultation. No wheezes or crepitations. Breast:  Right: No masses, no adenopathy. No skin or nippple abnormalities. Left: left-sided mastectomy, surgery site is healed completely, no adenopathy  Heart: Regular sinus rhythm. Abdomen: Soft, nontender. No hepatosplenomegaly. No masses. Extremities:  With no edema. Lymph Nodes:  No cervical, axillary or inguinal lymph node enlargement. Neurologic: Conscious and oriented. No focal neurological deficits. Psychosocial: No depression, anxiety or stress.  Skin: No rashes, bruises or ecchymoses, +redness on legs post sun exposure      REVIEW OF LABORATORY DATA:     Lab Results   Component she was started on Arimidex, adding Ibrance. Due to cytopenias, dose was reduced to 100 mg/day  11-in February/2021, there was signs of progression, will plan to switch to Faslodex       PLAN:   1. Her lab work was reviewed and discussed, her cancer marker is pending. 2. I completed toxicity check   3. We discussed at length her diarrhea with Verzenio, she feels it is currently manageable with Imodium, I discussed option to add Lomotil if becomes poorly controlled and we will continue to monitor we also discussed other treatment options. 4. She developed some photosensitivity in the sun, I explained the medication exacerbates and advised sunscreen. 5. Her pain is well controlled. 6. We will continue with treatment unchanged. 7. Return in 4 weeks.

## 2021-04-08 NOTE — PROGRESS NOTES
Pt here for C3D1 Faslodex and Xgeva injection. Pt seen by Dr Shahram Teague prior to treatment, refer to his note. Labs drawn and results reviewed. Pt was treated without incident and d/c'd in stable condition. Pt will return on 5-6-21 for C4D1 and Xgeva.

## 2021-04-11 LAB — CA 27-29: 158 U/ML (ref 0–38)

## 2021-04-14 ENCOUNTER — TELEPHONE (OUTPATIENT)
Dept: ONCOLOGY | Age: 51
End: 2021-04-14

## 2021-04-22 ENCOUNTER — TELEPHONE (OUTPATIENT)
Dept: ONCOLOGY | Age: 51
End: 2021-04-22

## 2021-04-22 NOTE — TELEPHONE ENCOUNTER
ASSISTING  Raul Carter RN NAVIGATOR. I CALLED TO CHECK IN WITH PATIENT TO SEE HOW SHE IS DOING. I SPOKE WITH PATIENT AND SHE STATES THAT SHE IS DOING WELL. SHE STATES AT THIS TIME SHE DOESN'T HAVE ANY QUESTIONS OR CONCERNS. I REMINDED HER THAT SHE CAN CALL NEELAM AT ANY TIME.   SHE THANKED ME FOR CALLING AND WE HUNG UP.

## 2021-05-03 ENCOUNTER — HOSPITAL ENCOUNTER (OUTPATIENT)
Age: 51
Discharge: HOME | End: 2021-05-03
Payer: COMMERCIAL

## 2021-05-03 DIAGNOSIS — C49.A3 GIST (GASTROINTESTINAL STROMAL TUMOR) OF SMALL BOWEL, MALIGNANT (HCC): ICD-10-CM

## 2021-05-03 DIAGNOSIS — C50.211 BILATERAL MALIGNANT NEOPLASM OF UPPER INNER QUADRANT OF BREAST IN FEMALE, UNSPECIFIED ESTROGEN RECEPTOR STATUS (HCC): ICD-10-CM

## 2021-05-03 DIAGNOSIS — C50.212 BILATERAL MALIGNANT NEOPLASM OF UPPER INNER QUADRANT OF BREAST IN FEMALE, UNSPECIFIED ESTROGEN RECEPTOR STATUS (HCC): ICD-10-CM

## 2021-05-03 DIAGNOSIS — C79.51 BONE METASTASIS (HCC): ICD-10-CM

## 2021-05-03 DIAGNOSIS — C50.412 MALIGNANT NEOPLASM OF UPPER-OUTER QUADRANT OF LEFT BREAST IN FEMALE, ESTROGEN RECEPTOR POSITIVE (HCC): ICD-10-CM

## 2021-05-03 DIAGNOSIS — Z17.0 MALIGNANT NEOPLASM OF UPPER-OUTER QUADRANT OF LEFT BREAST IN FEMALE, ESTROGEN RECEPTOR POSITIVE (HCC): ICD-10-CM

## 2021-05-03 LAB
ABSOLUTE EOS #: 0.04 K/UL (ref 0–0.4)
ABSOLUTE IMMATURE GRANULOCYTE: ABNORMAL K/UL (ref 0–0.3)
ABSOLUTE LYMPH #: 0.68 K/UL (ref 1–4.8)
ABSOLUTE MONO #: 0.15 K/UL (ref 0.1–0.8)
ALBUMIN SERPL-MCNC: 4 G/DL (ref 3.5–5.2)
ALBUMIN/GLOBULIN RATIO: 1.3 (ref 1–2.5)
ALP BLD-CCNC: 86 U/L (ref 35–104)
ALT SERPL-CCNC: 24 U/L (ref 5–33)
ANION GAP SERPL CALCULATED.3IONS-SCNC: 12 MMOL/L (ref 9–17)
AST SERPL-CCNC: 25 U/L
BASOPHILS # BLD: 0 % (ref 0–2)
BASOPHILS ABSOLUTE: 0 K/UL (ref 0–0.2)
BILIRUB SERPL-MCNC: 0.46 MG/DL (ref 0.3–1.2)
BUN BLDV-MCNC: 14 MG/DL (ref 6–20)
BUN/CREAT BLD: ABNORMAL (ref 9–20)
CALCIUM SERPL-MCNC: 9.1 MG/DL (ref 8.6–10.4)
CHLORIDE BLD-SCNC: 102 MMOL/L (ref 98–107)
CO2: 22 MMOL/L (ref 20–31)
CREAT SERPL-MCNC: 1.12 MG/DL (ref 0.5–0.9)
DIFFERENTIAL TYPE: ABNORMAL
EOSINOPHILS RELATIVE PERCENT: 2 % (ref 1–4)
GFR AFRICAN AMERICAN: >60 ML/MIN
GFR NON-AFRICAN AMERICAN: 51 ML/MIN
GFR SERPL CREATININE-BSD FRML MDRD: ABNORMAL ML/MIN/{1.73_M2}
GFR SERPL CREATININE-BSD FRML MDRD: ABNORMAL ML/MIN/{1.73_M2}
GLUCOSE BLD-MCNC: 93 MG/DL (ref 70–99)
HCT VFR BLD CALC: 29.7 % (ref 36–46)
HEMOGLOBIN: 10.3 G/DL (ref 12–16)
IMMATURE GRANULOCYTES: ABNORMAL %
LYMPHOCYTES # BLD: 36 % (ref 24–44)
MCH RBC QN AUTO: 35.6 PG (ref 26–34)
MCHC RBC AUTO-ENTMCNC: 34.7 G/DL (ref 31–37)
MCV RBC AUTO: 102.8 FL (ref 80–100)
MONOCYTES # BLD: 8 % (ref 1–7)
MORPHOLOGY: NORMAL
NRBC AUTOMATED: ABNORMAL PER 100 WBC
PDW BLD-RTO: 17.7 % (ref 12.5–15.4)
PLATELET # BLD: 120 K/UL (ref 140–450)
PLATELET ESTIMATE: ABNORMAL
PMV BLD AUTO: 7.3 FL (ref 6–12)
POTASSIUM SERPL-SCNC: 3.3 MMOL/L (ref 3.7–5.3)
RBC # BLD: 2.89 M/UL (ref 4–5.2)
RBC # BLD: ABNORMAL 10*6/UL
SEG NEUTROPHILS: 54 % (ref 36–66)
SEGMENTED NEUTROPHILS ABSOLUTE COUNT: 1.03 K/UL (ref 1.8–7.7)
SODIUM BLD-SCNC: 136 MMOL/L (ref 135–144)
TOTAL PROTEIN: 7 G/DL (ref 6.4–8.3)
WBC # BLD: 1.9 K/UL (ref 3.5–11)
WBC # BLD: ABNORMAL 10*3/UL

## 2021-05-03 PROCEDURE — 36415 COLL VENOUS BLD VENIPUNCTURE: CPT

## 2021-05-03 PROCEDURE — 80053 COMPREHEN METABOLIC PANEL: CPT

## 2021-05-03 PROCEDURE — 85025 COMPLETE CBC W/AUTO DIFF WBC: CPT

## 2021-05-03 PROCEDURE — 86300 IMMUNOASSAY TUMOR CA 15-3: CPT

## 2021-05-05 LAB — CA 27-29: 88 U/ML (ref 0–38)

## 2021-05-06 ENCOUNTER — OFFICE VISIT (OUTPATIENT)
Dept: ONCOLOGY | Age: 51
End: 2021-05-06
Payer: COMMERCIAL

## 2021-05-06 ENCOUNTER — HOSPITAL ENCOUNTER (OUTPATIENT)
Dept: INFUSION THERAPY | Age: 51
Discharge: HOME | End: 2021-05-06
Payer: COMMERCIAL

## 2021-05-06 ENCOUNTER — TELEPHONE (OUTPATIENT)
Dept: ONCOLOGY | Age: 51
End: 2021-05-06

## 2021-05-06 VITALS
TEMPERATURE: 96.4 F | WEIGHT: 271.2 LBS | HEART RATE: 80 BPM | DIASTOLIC BLOOD PRESSURE: 76 MMHG | SYSTOLIC BLOOD PRESSURE: 119 MMHG | BODY MASS INDEX: 40.05 KG/M2

## 2021-05-06 DIAGNOSIS — Z17.0 MALIGNANT NEOPLASM OF UPPER-OUTER QUADRANT OF LEFT BREAST IN FEMALE, ESTROGEN RECEPTOR POSITIVE (HCC): Primary | ICD-10-CM

## 2021-05-06 DIAGNOSIS — D05.10 DUCTAL CARCINOMA IN SITU (DCIS) OF BREAST, UNSPECIFIED LATERALITY: ICD-10-CM

## 2021-05-06 DIAGNOSIS — C79.51 BONE METASTASIS (HCC): ICD-10-CM

## 2021-05-06 DIAGNOSIS — C78.7 LIVER METASTASES (HCC): ICD-10-CM

## 2021-05-06 DIAGNOSIS — C50.412 MALIGNANT NEOPLASM OF UPPER-OUTER QUADRANT OF LEFT BREAST IN FEMALE, ESTROGEN RECEPTOR POSITIVE (HCC): Primary | ICD-10-CM

## 2021-05-06 DIAGNOSIS — C50.211 BILATERAL MALIGNANT NEOPLASM OF UPPER INNER QUADRANT OF BREAST IN FEMALE, UNSPECIFIED ESTROGEN RECEPTOR STATUS (HCC): Primary | ICD-10-CM

## 2021-05-06 DIAGNOSIS — C50.212 BILATERAL MALIGNANT NEOPLASM OF UPPER INNER QUADRANT OF BREAST IN FEMALE, UNSPECIFIED ESTROGEN RECEPTOR STATUS (HCC): Primary | ICD-10-CM

## 2021-05-06 PROCEDURE — 6360000002 HC RX W HCPCS: Performed by: INTERNAL MEDICINE

## 2021-05-06 PROCEDURE — 96361 HYDRATE IV INFUSION ADD-ON: CPT

## 2021-05-06 PROCEDURE — 96360 HYDRATION IV INFUSION INIT: CPT

## 2021-05-06 PROCEDURE — 96401 CHEMO ANTI-NEOPL SQ/IM: CPT

## 2021-05-06 PROCEDURE — 6370000000 HC RX 637 (ALT 250 FOR IP): Performed by: INTERNAL MEDICINE

## 2021-05-06 PROCEDURE — 3017F COLORECTAL CA SCREEN DOC REV: CPT | Performed by: INTERNAL MEDICINE

## 2021-05-06 PROCEDURE — 99214 OFFICE O/P EST MOD 30 MIN: CPT | Performed by: INTERNAL MEDICINE

## 2021-05-06 PROCEDURE — 96402 CHEMO HORMON ANTINEOPL SQ/IM: CPT

## 2021-05-06 PROCEDURE — 2580000003 HC RX 258: Performed by: INTERNAL MEDICINE

## 2021-05-06 PROCEDURE — 1036F TOBACCO NON-USER: CPT | Performed by: INTERNAL MEDICINE

## 2021-05-06 PROCEDURE — G8417 CALC BMI ABV UP PARAM F/U: HCPCS | Performed by: INTERNAL MEDICINE

## 2021-05-06 PROCEDURE — 99211 OFF/OP EST MAY X REQ PHY/QHP: CPT | Performed by: INTERNAL MEDICINE

## 2021-05-06 PROCEDURE — G8427 DOCREV CUR MEDS BY ELIG CLIN: HCPCS | Performed by: INTERNAL MEDICINE

## 2021-05-06 PROCEDURE — 96372 THER/PROPH/DIAG INJ SC/IM: CPT

## 2021-05-06 RX ORDER — LAMOTRIGINE 25 MG/1
250 TABLET ORAL ONCE
Status: COMPLETED | OUTPATIENT
Start: 2021-05-06 | End: 2021-05-06

## 2021-05-06 RX ORDER — POTASSIUM CHLORIDE 20 MEQ/1
40 TABLET, EXTENDED RELEASE ORAL ONCE
Status: COMPLETED | OUTPATIENT
Start: 2021-05-06 | End: 2021-05-06

## 2021-05-06 RX ORDER — 0.9 % SODIUM CHLORIDE 0.9 %
500 INTRAVENOUS SOLUTION INTRAVENOUS ONCE
Status: COMPLETED | OUTPATIENT
Start: 2021-05-06 | End: 2021-05-06

## 2021-05-06 RX ORDER — 0.9 % SODIUM CHLORIDE 0.9 %
500 INTRAVENOUS SOLUTION INTRAVENOUS ONCE
Status: SHIPPED | OUTPATIENT
Start: 2021-05-06

## 2021-05-06 RX ORDER — POTASSIUM CHLORIDE 20 MEQ/1
20 TABLET, EXTENDED RELEASE ORAL ONCE
Status: SHIPPED | OUTPATIENT
Start: 2021-05-06

## 2021-05-06 RX ADMIN — DENOSUMAB 120 MG: 120 INJECTION SUBCUTANEOUS at 11:09

## 2021-05-06 RX ADMIN — POTASSIUM CHLORIDE 40 MEQ: 1500 TABLET, EXTENDED RELEASE ORAL at 11:08

## 2021-05-06 RX ADMIN — FULVESTRANT 250 MG: 50 INJECTION INTRAMUSCULAR at 12:13

## 2021-05-06 RX ADMIN — SODIUM CHLORIDE 500 ML: 9 INJECTION, SOLUTION INTRAVENOUS at 11:01

## 2021-05-06 NOTE — TELEPHONE ENCOUNTER
AVS from 5/6/21     Continue current therapy without changes, please give 500 ML of normal saline, and 20 MEQ of KCL po today. XGeva and Faslodex today   rv in 4 weeks.    Need PET scan prior to RV     Tx today as scheduled, hydration added    RV scheduled 6/3/21 @ 9:30am    PET scheduled 5/25/21 @ 12pm    PT was given AVS and an appt schedule    Electronically signed by Jacki Horton on 5/6/2021 at 10:51 AM

## 2021-05-06 NOTE — PROGRESS NOTES
DIAGNOSIS:   1- T2, N0, M0 ER positive PA positive and HER-2/eve negative invasive ductal carcinoma of the left breast ( inner upper quadrant)  2- Repeated GI bleeding greetings 2 symptomatic anemia, despite extensive GI workup, source of bleeding was never found. 3- finally a small mass was seen in the jejunum. 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 Margins negative   4- compression fractures and bone lesions. Likely metastatic cancer (5/2020)     CURRENT THERAPY:  1- Mastectomy and axillary sampling  2- adjuvant chemotherapy with Taxotere and Cytoxan starting 5/16/2013. Chemotherapy stopped after 3 cycles because of ongoing GI bleeding and symptomatic anemia  3- Conservative management for GI bleeding. Leading completely stopped after the discontinuation of chemotherapy  4- started tamoxifen after chemotherapy was completed. 8/2013. 5- transitioned to Arimidex 6/2014, completed 08/2018  6- GIST resection, resected 9/2017   7-status post kyphoplasty and biopsy of bone metastases 5/2020. The tumor was minimally ER positive at 5% and PA negative. 8- rodding of the left femur completed. Plan for  Radiation  9- Systemic therapy, plan Arimidex plus Ibrance  10 - XRT completed 07/2020  11-current treatment starting 7/30/2020, monthly Xgeva, oral Ibrance and Arimidex  12-2/2021, progression of disease, plan to switch to Faslodex plus CDK inhibitor abemaciclib, continue Xgeva    BRIEF CASE HISTORY:   Taras Isaac is a very pleasant 45 y.o. who felt a mass in the medial aspect of her left breast, around the 9:00 position. Patient sought medical attention and mammogram and ultrasound were done. Showing an irregular, high-density mass with indistinct margins containing pleomorphic calcifications in the lower inner quadrant of the left breast close to 9:00 location this mass measures are mammogram 2.7 x 2.6 x 2 cm.    Subsequently targeted ultrasound was performed showing hypoechoic, irregular, taller than wider, solid mass at 9:00 with posterior acoustic shadowing. The calcifications were visible on ultrasound . On ultrasound the solid mass measured 2.4 x 2.6 x 1 cm. Image guided biopsy of the mass was done and showed invasive ductal carcinoma with surrounding DCIS (cribriform type) the tumor was ER and GA positive and HER-2/eve negative with a fish ratio 1.1. The patient was referred to us and she also was seen by radiation oncology , She  decided on proceeding with total mastectomy and breast reconstruction . Allergy showed 2.5 cm intermediate grade invasive ductal carcinoma, [place] lymph node was negative. The tumor was ER/GA positive and HER-2/eve negative. We discussed options of adjuvant therapy including chemotherapy and hormone therapy, The patient decided to proceed with adjuvant chemotherapy without undergone a Oncotype study. TC chemotherapy X4 cycles is planned  After 3 cycles, and presented with persistent GI bleeding leading to symptomatic anemia and syncope, the patient's condition was Serious enough to warrant repeated hospital admissions. Repeated GI workup including endoscopic evaluation and Tagged Red cell scans were negative. We decided to treat her conservatively and chemotherapy was stopped after 3 cycles. After that All her symptoms improved and she was started on tamoxifen. After one year, she was transitioned to anastrozole since she had no periods and hormonal testing showed post menopausal state. 08/2017  She had been having abdominal pain and went to [facility] for consult. She had work up which showed mass in the colon showed subtle changes in the mid-small intestine, CT enterography showed 3.6CM mass in the distal small bowel. She was also hospitalized in the interim with pancreatitis. 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 Margins were negative.  Observation was decided, no need for adjuvant therapy  In May/2020, the patient presented with severe back pain. MRI showed multiple bone lesions with evidence of compression fracture. Patient underwent successful kyphoplasty and biopsy and pathology showed breast cancer, it was 5% ER positive and HI was negative. HER-2 was +2 which is equivocal so a fish test was ordered. Plan for radiation, staging PET, and Arimidex. PET scan showed widespread metastatic bony disease including the thoracic spine, the lumbar spine, the left femur and the rib area. She underwent repeated kyphoplasty and rodding of the left femur. She is undergoing radiation to the painful bony areas in thoracic, rib and lumbar areas as well as the left femur. We will likely use Ibrance plus Arimidex. As well as monthly Xgeva to be started after completion of radiation. After completion of radiation, we maintained her on Arimidex plus Ibrance, due to cytopenias, Ibrance dose was decreased to 100 mg/day which was much better tolerated. Also we maintained her on Xgeva monthly. PET scan was done in October and showed essentially stable disease. There was some suspicious activity in the hip but this was asymptomatic and we decided that this might be post radiation or related to physical therapy. We decided to continue current therapy until clear progression. Further testing in early 2021 showed clear progression, will plan to switch to Faslodex and continue with CDk inhibitor(switch to [employer])  and Xgeva. INTERIM HISTORY: She comes in today for follow up for metastatic breast cancer and to receive Xgeva. She is concerned about dehydration based on lab work, but reports she is taking in lots of fluids but has had diarrhea and her urine has been dark. Her diarrhea has become unmanageable the past few days. Her back pain has improved and rarely needs medication to manage. She is taking potassium daily. GYNECOLOGICAL HISTORY  Menarche at age 14.  He is premenopausal with regular periods. +2. She used birth control minimally. PAST MEDICAL HISTORY: has a past medical history of Anemia, Anxiety, Atrial fibrillation ([address] Breast cancer ([address] Carpal tunnel syndrome, Depression, GI bleed, Hypertension, Liver metastases ([address] Lymphedema, Neurologic cardiac syncope, Obesity, Pain, Sleep apnea, ANA PAULA (stress urinary incontinence, female), Tachycardia, Varicella, and Varicella without complication. PAST SURGICAL HISTORY: has a past surgical history that includes Shoulder arthroscopy (); Colonoscopy (); Dilation and curettage of uterus; Knee arthroscopy; fracture surgery; Tunneled venous port placement (Right); Upper gastrointestinal endoscopy (2013); Colonoscopy (2013); Mastectomy (Left, 13); other surgical history (1969); Breast biopsy (Left, 3/21/13); ileoscopy (10/12/05); Cosmetic surgery; Hysterectomy; denia and bso (cervix removed) (14); Enterocele repair (14); Cystoscopy (14); bladder suspension (14); Colonoscopy (10/12/2005); Upper gastrointestinal endoscopy (2015); and tumor removal.     CURRENT MEDICATIONS:  has a current medication list which includes the following prescription(s): oxycodone, klor-con m20, duloxetine, abemaciclib, ondansetron, calcium-vitamin d, warfarin, diazepam, losartan, omeprazole, hydrochlorothiazide, amlodipine, docusate, and metoprolol tartrate. ALLERGIES:  is allergic to adhesive tape. FAMILY HISTORY: Negative for any hematological or oncological conditions. Specifically no history of breast cancer in the family    SOCIAL HISTORY:  reports that she quit smoking about 2 years ago. Her smoking use included Cigarettes. She smoked About 20 years. She has never used smokeless tobacco. She reports that she does not drink alcohol or use illicit drugs. REVIEW OF SYSTEMS:   General: No fever or night sweats. Weight is stable.   Hot flashes are under good control +dehydration  Eyes: No double or blurred vision. Ears: No tinnitus or hearing problem,    Throat: No dysphagia or sore throat   Respiratory: No chest pain, shortness of breath at rest, no hemoptysis. She has exercise intolerance  Cardiovascular: Denies chest pain, PND or orthopnea. No L E swelling or palpitations. Gastrointestinal: No nausea or vomiting; +freuqent diarrhea with cramping - worse  Genitourinary: Denies dysuria, hematuria, frequency, urgency or incontinence. No vaginal bleeding. +dark urine  Neurological: Denies decreased LOC, no sensory or motor focal deficits. +headaches - unchanged, mild  Musculoskeletal: No arthralgia or joint swelling. +back pain, right chest wall pain, and right shoulder blade pain  Skin: There are no rashes or bleeding. +hair thinning  Psychiatric: ++ anxiety, depression as discussed. Endocrine: No diabetes or thyroid disease. Hematologic: No bleeding, no adenopathy. PHYSICAL EXAM:  The patient is not in acute distress. Vital signs: Blood pressure 119/76, pulse 80, temperature 96.4 °F (35.8 °C), temperature source Temporal, weight 271 lb 3.2 oz (123 kg), last menstrual period 03/17/2014, not currently breastfeeding. HEENT:  Eyes are normal. Ears, nose is congested, no bleeding. Neck: Supple. No lymph node enlargement. No thyroid enlargement. Trachea is centrally located. Chest:  Clear to auscultation. No wheezes or crepitations. Breast:  Right: No masses, no adenopathy. No skin or nippple abnormalities. Left: left-sided mastectomy, surgery site is healed completely, no adenopathy  Heart: Regular sinus rhythm. Abdomen: Soft, nontender. No hepatosplenomegaly. No masses. Extremities:  With no edema. Lymph Nodes:  No cervical, axillary or inguinal lymph node enlargement. Neurologic: Conscious and oriented. No focal neurological deficits. Psychosocial: No depression, anxiety or stress.  Skin: No rashes, bruises or ecchymoses, +redness on legs post sun exposure      REVIEW OF LABORATORY DATA:     Lab Results   Component Value Date    WBC 1.9 (L) 05/03/2021    HGB 10.3 (L) 05/03/2021    HCT 29.7 (L) 05/03/2021    .8 (H) 05/03/2021     (L) 05/03/2021     Lab Results   Component Value Date    IRON 30 (L) 11/29/2017    TIBC 403 11/29/2017    FERRITIN 33 04/04/2019     Lab Results   Component Value Date    NEUTROABS 1.03 (L) 05/03/2021         Chemistry        Component Value Date/Time     05/03/2021 1534    K 3.3 (L) 05/03/2021 1534     05/03/2021 1534    CO2 22 05/03/2021 1534    BUN 14 05/03/2021 1534    CREATININE 1.12 (H) 05/03/2021 1534        Component Value Date/Time    CALCIUM 9.1 05/03/2021 1534    ALKPHOS 86 05/03/2021 1534    AST 25 05/03/2021 1534    ALT 24 05/03/2021 1534    BILITOT 0.46 05/03/2021 1534         Results for Дмитрий Robin (MRN [id number]) as of 2/11/2021 09:57   Ref. Range 8/29/2017 00:05 8/30/2017 09:15 7/21/2020 10:42 8/27/2020 10:20 9/24/2020 09:47 10/22/2020 09:24 11/19/2020 10:17 12/14/2020 14:38 1/11/2021 13:51 2/8/2021 13:06   CA 27-29 Latest Ref Range: 0 - 38 U/mL   164 (H) 88 (H) 95 (H) 129 (H) 146 (H) 208 (H) 248 (H) 294 (H)       REVIEW OF RADIOLOGICAL RESULTS:      IMPRESSION:   1- Arlene  Has  T2, N0, M0 ER/CT positive and HER-2/vee negative invasive ductal carcinoma of the left breast  2- Chemotherapy: received 3 cycles of TC, stopped due to GI bleeding  3- GI bleeding , related to the GIST tumor. Currently on oral iron and hemoglobin is improving  4- on anastrozole. We will continue, plan 5 years of therapy, completed 08/2018  5- for hot flashes, better on Effexor. 6- Recent pancreatitis, possibly biliary. MRI CT scan did not show any gallbladder stone. We will discuss with her surgeons. 7-   distal small bowel mass measuring 3.6 cm, resection shows low risk GIST. No need for adjuvant therapy, margins were negative. 8-bone metastases with compression fracture diagnosed in May/2020. Status post kyphoplasty and biopsy.   9- S/P radiation 07/2020  10- for systemic therapy, she was started on Arimidex, adding Ibrance. Due to cytopenias, dose was reduced to 100 mg/day  11-in February/2021, there was signs of progression, will plan to switch to Faslodex       PLAN:   1. Her lab work was reviewed and discussed, her cancer marker is showing very good response, some hypokalemia, counts are stable. 2. I completed toxicity check. 3. We discussed her dehydration secondary to treatment induced diarrhea, we will hold Ibrance for a few days and discussed schedule for resuming. 4. I am ordering hydration for today with oral potassium and she will continue with potassium at home unchanged. 5. Return in 4 weeks.

## 2021-05-06 NOTE — PATIENT INSTRUCTIONS
Continue current therapy without changes, please give 500 ML of normal saline, and 20 MEQ of KCL po today. XGeva and Faslodex today   rv in 4 weeks.    Need PET scan prior to RV

## 2021-05-06 NOTE — PROGRESS NOTES
Pt here for treatment. Faslodex and xgeva. Had Dr appt prior to treatment. Potassium 3.3. ANC 1.03. Instructed to give 40meq of Potassium po and 500ml NS. Continue with Xgeva and Faslodex. Pt denies any problems. Tolerates very well. Will return 6/3 for Dr visit and treatment. Discharged in stable condition.

## 2021-05-12 ENCOUNTER — TELEPHONE (OUTPATIENT)
Dept: ONCOLOGY | Age: 51
End: 2021-05-12

## 2021-05-12 NOTE — TELEPHONE ENCOUNTER
Pt called back and states daughter would like \"a couple days\" off a month for appointments. Will complete paperwork and will call pt's daughter when completed.  Pt's daughter added to pt's HIPAA contacts per pt's request.

## 2021-05-12 NOTE — TELEPHONE ENCOUNTER
Disability paperwork received for pt's daughter. Left VM for pt to call office with her daughter's contact info to discuss paperwork.

## 2021-05-20 DIAGNOSIS — C78.7 LIVER METASTASES (HCC): ICD-10-CM

## 2021-05-20 DIAGNOSIS — D05.10 DUCTAL CARCINOMA IN SITU (DCIS) OF BREAST, UNSPECIFIED LATERALITY: ICD-10-CM

## 2021-05-20 DIAGNOSIS — C79.51 BONE METASTASIS (HCC): ICD-10-CM

## 2021-05-25 ENCOUNTER — HOSPITAL ENCOUNTER (OUTPATIENT)
Dept: NUCLEAR MEDICINE | Age: 51
Discharge: HOME | End: 2021-05-27
Payer: COMMERCIAL

## 2021-05-25 VITALS — WEIGHT: 270 LBS | BODY MASS INDEX: 39.99 KG/M2 | HEIGHT: 69 IN

## 2021-05-25 DIAGNOSIS — C78.7 LIVER METASTASES (HCC): ICD-10-CM

## 2021-05-25 DIAGNOSIS — C79.51 BONE METASTASIS (HCC): ICD-10-CM

## 2021-05-25 DIAGNOSIS — Z17.0 MALIGNANT NEOPLASM OF UPPER-OUTER QUADRANT OF LEFT BREAST IN FEMALE, ESTROGEN RECEPTOR POSITIVE (HCC): ICD-10-CM

## 2021-05-25 DIAGNOSIS — C50.412 MALIGNANT NEOPLASM OF UPPER-OUTER QUADRANT OF LEFT BREAST IN FEMALE, ESTROGEN RECEPTOR POSITIVE (HCC): ICD-10-CM

## 2021-05-25 LAB — GLUCOSE BLD-MCNC: 104 MG/DL (ref 65–105)

## 2021-05-25 PROCEDURE — 3430000000 HC RX DIAGNOSTIC RADIOPHARMACEUTICAL: Performed by: INTERNAL MEDICINE

## 2021-05-25 PROCEDURE — 82947 ASSAY GLUCOSE BLOOD QUANT: CPT

## 2021-05-25 PROCEDURE — 78815 PET IMAGE W/CT SKULL-THIGH: CPT

## 2021-05-25 PROCEDURE — 2580000003 HC RX 258: Performed by: INTERNAL MEDICINE

## 2021-05-25 PROCEDURE — A9552 F18 FDG: HCPCS | Performed by: INTERNAL MEDICINE

## 2021-05-25 RX ORDER — SODIUM CHLORIDE 0.9 % (FLUSH) 0.9 %
10 SYRINGE (ML) INJECTION ONCE
Status: COMPLETED | OUTPATIENT
Start: 2021-05-25 | End: 2021-05-25

## 2021-05-25 RX ORDER — FLUDEOXYGLUCOSE F 18 200 MCI/ML
10 INJECTION, SOLUTION INTRAVENOUS
Status: COMPLETED | OUTPATIENT
Start: 2021-05-25 | End: 2021-05-25

## 2021-05-25 RX ADMIN — FLUDEOXYGLUCOSE F 18 12.14 MILLICURIE: 200 INJECTION, SOLUTION INTRAVENOUS at 12:29

## 2021-05-25 RX ADMIN — SODIUM CHLORIDE, PRESERVATIVE FREE 10 ML: 5 INJECTION INTRAVENOUS at 12:29

## 2021-05-27 ENCOUNTER — TELEPHONE (OUTPATIENT)
Dept: ONCOLOGY | Age: 51
End: 2021-05-27

## 2021-06-01 ENCOUNTER — HOSPITAL ENCOUNTER (OUTPATIENT)
Age: 51
Discharge: HOME | End: 2021-06-01
Payer: COMMERCIAL

## 2021-06-01 DIAGNOSIS — C49.A3 GIST (GASTROINTESTINAL STROMAL TUMOR) OF SMALL BOWEL, MALIGNANT (HCC): ICD-10-CM

## 2021-06-01 DIAGNOSIS — C50.412 MALIGNANT NEOPLASM OF UPPER-OUTER QUADRANT OF LEFT BREAST IN FEMALE, ESTROGEN RECEPTOR POSITIVE (HCC): ICD-10-CM

## 2021-06-01 DIAGNOSIS — C50.211 BILATERAL MALIGNANT NEOPLASM OF UPPER INNER QUADRANT OF BREAST IN FEMALE, UNSPECIFIED ESTROGEN RECEPTOR STATUS (HCC): ICD-10-CM

## 2021-06-01 DIAGNOSIS — Z17.0 MALIGNANT NEOPLASM OF UPPER-OUTER QUADRANT OF LEFT BREAST IN FEMALE, ESTROGEN RECEPTOR POSITIVE (HCC): ICD-10-CM

## 2021-06-01 DIAGNOSIS — C50.212 BILATERAL MALIGNANT NEOPLASM OF UPPER INNER QUADRANT OF BREAST IN FEMALE, UNSPECIFIED ESTROGEN RECEPTOR STATUS (HCC): ICD-10-CM

## 2021-06-01 DIAGNOSIS — C79.51 BONE METASTASIS (HCC): ICD-10-CM

## 2021-06-01 LAB
ABSOLUTE EOS #: 0.04 K/UL (ref 0–0.4)
ABSOLUTE IMMATURE GRANULOCYTE: ABNORMAL K/UL (ref 0–0.3)
ABSOLUTE LYMPH #: 0.38 K/UL (ref 1–4.8)
ABSOLUTE MONO #: 0.07 K/UL (ref 0.1–0.8)
ALBUMIN SERPL-MCNC: 3.8 G/DL (ref 3.5–5.2)
ALBUMIN/GLOBULIN RATIO: 1.6 (ref 1–2.5)
ALP BLD-CCNC: 113 U/L (ref 35–104)
ALT SERPL-CCNC: 92 U/L (ref 5–33)
ANION GAP SERPL CALCULATED.3IONS-SCNC: 8 MMOL/L (ref 9–17)
AST SERPL-CCNC: 48 U/L
BASOPHILS # BLD: 1 % (ref 0–2)
BASOPHILS ABSOLUTE: 0.02 K/UL (ref 0–0.2)
BILIRUB SERPL-MCNC: 0.35 MG/DL (ref 0.3–1.2)
BUN BLDV-MCNC: 10 MG/DL (ref 6–20)
BUN/CREAT BLD: ABNORMAL (ref 9–20)
CALCIUM SERPL-MCNC: 8.6 MG/DL (ref 8.6–10.4)
CHLORIDE BLD-SCNC: 105 MMOL/L (ref 98–107)
CO2: 26 MMOL/L (ref 20–31)
CREAT SERPL-MCNC: 0.98 MG/DL (ref 0.5–0.9)
DIFFERENTIAL TYPE: ABNORMAL
EOSINOPHILS RELATIVE PERCENT: 2 % (ref 1–4)
GFR AFRICAN AMERICAN: >60 ML/MIN
GFR NON-AFRICAN AMERICAN: 60 ML/MIN
GFR SERPL CREATININE-BSD FRML MDRD: ABNORMAL ML/MIN/{1.73_M2}
GFR SERPL CREATININE-BSD FRML MDRD: ABNORMAL ML/MIN/{1.73_M2}
GLUCOSE BLD-MCNC: 99 MG/DL (ref 70–99)
HCT VFR BLD CALC: 28.6 % (ref 36–46)
HEMOGLOBIN: 9.9 G/DL (ref 12–16)
IMMATURE GRANULOCYTES: ABNORMAL %
LYMPHOCYTES # BLD: 21 % (ref 24–44)
MCH RBC QN AUTO: 36.6 PG (ref 26–34)
MCHC RBC AUTO-ENTMCNC: 34.6 G/DL (ref 31–37)
MCV RBC AUTO: 105.9 FL (ref 80–100)
MONOCYTES # BLD: 4 % (ref 1–7)
MORPHOLOGY: ABNORMAL
MORPHOLOGY: ABNORMAL
NRBC AUTOMATED: ABNORMAL PER 100 WBC
PDW BLD-RTO: 15.9 % (ref 12.5–15.4)
PLATELET # BLD: 141 K/UL (ref 140–450)
PLATELET ESTIMATE: ABNORMAL
PMV BLD AUTO: 6.5 FL (ref 6–12)
POTASSIUM SERPL-SCNC: 3.7 MMOL/L (ref 3.7–5.3)
RBC # BLD: 2.7 M/UL (ref 4–5.2)
RBC # BLD: ABNORMAL 10*6/UL
SEG NEUTROPHILS: 72 % (ref 36–66)
SEGMENTED NEUTROPHILS ABSOLUTE COUNT: 1.29 K/UL (ref 1.8–7.7)
SODIUM BLD-SCNC: 139 MMOL/L (ref 135–144)
TOTAL PROTEIN: 6.2 G/DL (ref 6.4–8.3)
WBC # BLD: 1.8 K/UL (ref 3.5–11)
WBC # BLD: ABNORMAL 10*3/UL

## 2021-06-01 PROCEDURE — 80053 COMPREHEN METABOLIC PANEL: CPT

## 2021-06-01 PROCEDURE — 86300 IMMUNOASSAY TUMOR CA 15-3: CPT

## 2021-06-01 PROCEDURE — 36415 COLL VENOUS BLD VENIPUNCTURE: CPT

## 2021-06-01 PROCEDURE — 85025 COMPLETE CBC W/AUTO DIFF WBC: CPT

## 2021-06-02 LAB — CA 27-29: 72 U/ML (ref 0–38)

## 2021-06-03 ENCOUNTER — HOSPITAL ENCOUNTER (OUTPATIENT)
Dept: INFUSION THERAPY | Age: 51
Discharge: HOME | End: 2021-06-03
Payer: COMMERCIAL

## 2021-06-03 ENCOUNTER — OFFICE VISIT (OUTPATIENT)
Dept: ONCOLOGY | Age: 51
End: 2021-06-03
Payer: COMMERCIAL

## 2021-06-03 ENCOUNTER — TELEPHONE (OUTPATIENT)
Dept: ONCOLOGY | Age: 51
End: 2021-06-03

## 2021-06-03 VITALS
DIASTOLIC BLOOD PRESSURE: 72 MMHG | TEMPERATURE: 95.2 F | WEIGHT: 273.4 LBS | HEART RATE: 86 BPM | OXYGEN SATURATION: 100 % | BODY MASS INDEX: 40.37 KG/M2 | SYSTOLIC BLOOD PRESSURE: 106 MMHG

## 2021-06-03 DIAGNOSIS — C50.211 BILATERAL MALIGNANT NEOPLASM OF UPPER INNER QUADRANT OF BREAST IN FEMALE, UNSPECIFIED ESTROGEN RECEPTOR STATUS (HCC): Primary | ICD-10-CM

## 2021-06-03 DIAGNOSIS — C79.51 BONE METASTASIS (HCC): ICD-10-CM

## 2021-06-03 DIAGNOSIS — C50.412 MALIGNANT NEOPLASM OF UPPER-OUTER QUADRANT OF LEFT BREAST IN FEMALE, ESTROGEN RECEPTOR POSITIVE (HCC): ICD-10-CM

## 2021-06-03 DIAGNOSIS — C49.A3 GIST (GASTROINTESTINAL STROMAL TUMOR) OF SMALL BOWEL, MALIGNANT (HCC): ICD-10-CM

## 2021-06-03 DIAGNOSIS — Z17.0 MALIGNANT NEOPLASM OF UPPER-OUTER QUADRANT OF LEFT BREAST IN FEMALE, ESTROGEN RECEPTOR POSITIVE (HCC): ICD-10-CM

## 2021-06-03 DIAGNOSIS — C78.7 LIVER METASTASES (HCC): ICD-10-CM

## 2021-06-03 DIAGNOSIS — C50.212 BILATERAL MALIGNANT NEOPLASM OF UPPER INNER QUADRANT OF BREAST IN FEMALE, UNSPECIFIED ESTROGEN RECEPTOR STATUS (HCC): Primary | ICD-10-CM

## 2021-06-03 PROCEDURE — 6360000002 HC RX W HCPCS: Performed by: INTERNAL MEDICINE

## 2021-06-03 PROCEDURE — 96401 CHEMO ANTI-NEOPL SQ/IM: CPT

## 2021-06-03 PROCEDURE — 99211 OFF/OP EST MAY X REQ PHY/QHP: CPT | Performed by: INTERNAL MEDICINE

## 2021-06-03 PROCEDURE — 96402 CHEMO HORMON ANTINEOPL SQ/IM: CPT

## 2021-06-03 PROCEDURE — 3017F COLORECTAL CA SCREEN DOC REV: CPT | Performed by: INTERNAL MEDICINE

## 2021-06-03 PROCEDURE — 99214 OFFICE O/P EST MOD 30 MIN: CPT | Performed by: INTERNAL MEDICINE

## 2021-06-03 PROCEDURE — G8417 CALC BMI ABV UP PARAM F/U: HCPCS | Performed by: INTERNAL MEDICINE

## 2021-06-03 PROCEDURE — G8427 DOCREV CUR MEDS BY ELIG CLIN: HCPCS | Performed by: INTERNAL MEDICINE

## 2021-06-03 PROCEDURE — 1036F TOBACCO NON-USER: CPT | Performed by: INTERNAL MEDICINE

## 2021-06-03 RX ORDER — LAMOTRIGINE 25 MG/1
250 TABLET ORAL ONCE
Status: COMPLETED | OUTPATIENT
Start: 2021-06-03 | End: 2021-06-03

## 2021-06-03 RX ORDER — LORAZEPAM 1 MG/1
1 TABLET ORAL EVERY 6 HOURS PRN
Qty: 120 TABLET | Refills: 0 | Status: SHIPPED | OUTPATIENT
Start: 2021-06-03 | End: 2021-01-01 | Stop reason: SDUPTHER

## 2021-06-03 RX ORDER — DIPHENOXYLATE HYDROCHLORIDE AND ATROPINE SULFATE 2.5; .025 MG/1; MG/1
1 TABLET ORAL 4 TIMES DAILY PRN
Qty: 120 TABLET | Refills: 0 | Status: SHIPPED | OUTPATIENT
Start: 2021-06-03 | End: 2021-06-13

## 2021-06-03 RX ADMIN — FULVESTRANT 250 MG: 50 INJECTION INTRAMUSCULAR at 10:35

## 2021-06-03 RX ADMIN — FULVESTRANT 250 MG: 50 INJECTION INTRAMUSCULAR at 10:34

## 2021-06-03 RX ADMIN — DENOSUMAB 120 MG: 120 INJECTION SUBCUTANEOUS at 10:33

## 2021-06-03 NOTE — PROGRESS NOTES
Pt here for Faslodex and Xgeva injections. Pt seen by Dr Kim Garza prior to treatment, refer to his note. Ptwas treated without incident and discharged in stable condition. Pt will return on 7-1-21 for MD follow up and next Faslodex and Xgeva.

## 2021-06-03 NOTE — PROGRESS NOTES
Chief Complaint   Patient presents with    Follow-up     review status of disease    Results     go over PET scan     Other     Been seeing big spots when she closes her eye, they come and go       DIAGNOSIS:   1- T2, N0, M0 ER positive LA positive and HER-2/eve negative invasive ductal carcinoma of the left breast ( inner upper quadrant)  2- Repeated GI bleeding greetings 2 symptomatic anemia, despite extensive GI workup, source of bleeding was never found. 3- finally a small mass was seen in the jejunum. 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 Margins negative   4- compression fractures and bone lesions. Likely metastatic cancer (5/2020)     CURRENT THERAPY:  1- Mastectomy and axillary sampling  2- adjuvant chemotherapy with Taxotere and Cytoxan starting 5/16/2013. Chemotherapy stopped after 3 cycles because of ongoing GI bleeding and symptomatic anemia  3- Conservative management for GI bleeding. Leading completely stopped after the discontinuation of chemotherapy  4- started tamoxifen after chemotherapy was completed. 8/2013. 5- transitioned to Arimidex 6/2014, completed 08/2018  6- GIST resection, resected 9/2017   7-status post kyphoplasty and biopsy of bone metastases 5/2020. The tumor was minimally ER positive at 5% and LA negative. 8- rodding of the left femur completed. Plan for  Radiation  9- Systemic therapy, plan Arimidex plus Ibrance  10 - XRT completed 07/2020  11-current treatment starting 7/30/2020, monthly Xgeva, oral Ibrance and Arimidex  12-2/2021, progression of disease, plan to switch to Faslodex plus CDK inhibitor abemaciclib, continue Xgeva    BRIEF CASE HISTORY:   Victorina Rubin is a very pleasant 45 y.o. who felt a mass in the medial aspect of her left breast, around the 9:00 position. Patient sought medical attention and mammogram and ultrasound were done.  Showing an irregular, high-density mass with indistinct margins containing pleomorphic calcifications in the lower inner quadrant of the left breast close to 9:00 location this mass measures are mammogram 2.7 x 2.6 x 2 cm. Subsequently targeted ultrasound was performed showing hypoechoic, irregular, taller than wider, solid mass at 9:00 with posterior acoustic shadowing. The calcifications were visible on ultrasound . On ultrasound the solid mass measured 2.4 x 2.6 x 1 cm. Image guided biopsy of the mass was done and showed invasive ductal carcinoma with surrounding DCIS (cribriform type) the tumor was ER and NE positive and HER-2/eve negative with a fish ratio 1.1. The patient was referred to us and she also was seen by radiation oncology , She  decided on proceeding with total mastectomy and breast reconstruction . Allergy showed 2.5 cm intermediate grade invasive ductal carcinoma, [place] lymph node was negative. The tumor was ER/NE positive and HER-2/eve negative. We discussed options of adjuvant therapy including chemotherapy and hormone therapy, The patient decided to proceed with adjuvant chemotherapy without undergone a Oncotype study. TC chemotherapy X4 cycles is planned  After 3 cycles, and presented with persistent GI bleeding leading to symptomatic anemia and syncope, the patient's condition was Serious enough to warrant repeated hospital admissions. Repeated GI workup including endoscopic evaluation and Tagged Red cell scans were negative. We decided to treat her conservatively and chemotherapy was stopped after 3 cycles. After that All her symptoms improved and she was started on tamoxifen. After one year, she was transitioned to anastrozole since she had no periods and hormonal testing showed post menopausal state. 08/2017  She had been having abdominal pain and went to [facility] for consult. She had work up which showed mass in the colon showed subtle changes in the mid-small intestine, CT enterography showed 3.6CM mass in the distal small bowel.  She was also hospitalized in the interim with pancreatitis. 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 Margins were negative. Observation was decided, no need for adjuvant therapy  In May/2020, the patient presented with severe back pain. MRI showed multiple bone lesions with evidence of compression fracture. Patient underwent successful kyphoplasty and biopsy and pathology showed breast cancer, it was 5% ER positive and AR was negative. HER-2 was +2 which is equivocal so a fish test was ordered. Plan for radiation, staging PET, and Arimidex. PET scan showed widespread metastatic bony disease including the thoracic spine, the lumbar spine, the left femur and the rib area. She underwent repeated kyphoplasty and rodding of the left femur. She is undergoing radiation to the painful bony areas in thoracic, rib and lumbar areas as well as the left femur. We will likely use Ibrance plus Arimidex. As well as monthly Xgeva to be started after completion of radiation. After completion of radiation, we maintained her on Arimidex plus Ibrance, due to cytopenias, Ibrance dose was decreased to 100 mg/day which was much better tolerated. Also we maintained her on Xgeva monthly. PET scan was done in October and showed essentially stable disease. There was some suspicious activity in the hip but this was asymptomatic and we decided that this might be post radiation or related to physical therapy. We decided to continue current therapy until clear progression. Further testing in early 2021 showed clear progression, will plan to switch to Faslodex and continue with CDk inhibitor(switch to [employer])  and Xgeva. INTERIM HISTORY: She comes in today for follow up for metastatic breast cancer, to review PET and to receive Xgeva. She has some soreness at injection site and her diarrhea persists with 4 episodes daily, somewhat controlled with daily Imodium.  She does report about 5 episodes of brightness as if she's been exposed to very bright ligh with eyes closed at night, no changes in vision. Her watery eyes and post nasal drip persists. She reports swelling in left leg towards the end of the day. She has had difficulty falling asleep. GYNECOLOGICAL HISTORY  Menarche at age 14. He is premenopausal with regular periods. +2. She used birth control minimally. PAST MEDICAL HISTORY: has a past medical history of Anemia, Anxiety, Atrial fibrillation ([address] Breast cancer ([address] Carpal tunnel syndrome, Depression, GI bleed, Hypertension, Liver metastases ([address] Lymphedema, Neurologic cardiac syncope, Obesity, Pain, Sleep apnea, ANA PAULA (stress urinary incontinence, female), Tachycardia, Varicella, and Varicella without complication. PAST SURGICAL HISTORY: has a past surgical history that includes Shoulder arthroscopy (); Colonoscopy (); Dilation and curettage of uterus; Knee arthroscopy; fracture surgery; Tunneled venous port placement (Right); Upper gastrointestinal endoscopy (2013); Colonoscopy (2013); Mastectomy (Left, 13); other surgical history (1969); Breast biopsy (Left, 3/21/13); ileoscopy (10/12/05); Cosmetic surgery; Hysterectomy; denia and bso (cervix removed) (14); Enterocele repair (14); Cystoscopy (14); bladder suspension (14); Colonoscopy (10/12/2005); Upper gastrointestinal endoscopy (2015); and tumor removal.     CURRENT MEDICATIONS:  has a current medication list which includes the following prescription(s): abemaciclib, klor-con m20, duloxetine, ondansetron, calcium-vitamin d, warfarin, diazepam, losartan, omeprazole, hydrochlorothiazide, amlodipine, docusate, and metoprolol tartrate, and the following Facility-Administered Medications: fulvestrant, fulvestrant, denosumab, sodium chloride, and potassium chloride. ALLERGIES:  is allergic to adhesive tape.     FAMILY HISTORY: Negative for any hematological or oncological conditions. Specifically no history of breast cancer in the family    SOCIAL HISTORY:  reports that she quit smoking about 2 years ago. Her smoking use included Cigarettes. She smoked About 20 years. She has never used smokeless tobacco. She reports that she does not drink alcohol or use illicit drugs. REVIEW OF SYSTEMS:   General: No fever or night sweats. Weight is stable. Difficulty falling asleep  Eyes: No double or blurred vision. +brightness as noted above +watery eyes  Ears: No tinnitus or hearing problem,    Throat: No dysphagia or sore throat   Respiratory: No chest pain, shortness of breath at rest, no hemoptysis. She has exercise intolerance   Cardiovascular: Denies chest pain, PND or orthopnea. No L E swelling or palpitations. Gastrointestinal: No nausea or vomiting; +freuqent diarrhea with cramping - not controlled  Genitourinary: Denies dysuria, hematuria, frequency, urgency or incontinence. No vaginal bleeding. Neurological: Denies decreased LOC, no sensory or motor focal deficits. +headaches - unchanged, mild  Musculoskeletal: No arthralgia or joint swelling. +back pain, right chest wall pain, and right shoulder blade pain; +sorenss at injection site  Skin: There are no rashes or bleeding. +hair thinning  Psychiatric: ++ anxiety, depression as discussed. Endocrine: No diabetes or thyroid disease. Hematologic: No bleeding, no adenopathy. PHYSICAL EXAM:  The patient is not in acute distress. Vital signs: Blood pressure 106/72, pulse 86, temperature 95.2 °F (35.1 °C), temperature source Temporal, weight 273 lb 6.4 oz (124 kg), last menstrual period 03/17/2014, SpO2 100 %, not currently breastfeeding. HEENT:  Eyes are normal. Ears, nose is congested, no bleeding. Neck: Supple. No lymph node enlargement. No thyroid enlargement. Trachea is centrally located. Chest:  Clear to auscultation. No wheezes or crepitations. Breast:  Right: No masses, no adenopathy.  No skin or nippple abnormalities. Left: left-sided mastectomy, surgery site is healed completely, no adenopathy  Heart: Regular sinus rhythm. Abdomen: Soft, nontender. No hepatosplenomegaly. No masses. Extremities:  With no edema. Lymph Nodes:  No cervical, axillary or inguinal lymph node enlargement. Neurologic: Conscious and oriented. No focal neurological deficits. Psychosocial: No depression, anxiety or stress. Skin: No rashes, bruises or ecchymoses      REVIEW OF LABORATORY DATA:     Lab Results   Component Value Date    WBC 1.8 (L) 06/01/2021    HGB 9.9 (L) 06/01/2021    HCT 28.6 (L) 06/01/2021    .9 (H) 06/01/2021     06/01/2021     Lab Results   Component Value Date    IRON 30 (L) 11/29/2017    TIBC 403 11/29/2017    FERRITIN 33 04/04/2019     Lab Results   Component Value Date    NEUTROABS 1.29 (L) 06/01/2021         Chemistry        Component Value Date/Time     06/01/2021 1315    K 3.7 06/01/2021 1315     06/01/2021 1315    CO2 26 06/01/2021 1315    BUN 10 06/01/2021 1315    CREATININE 0.98 (H) 06/01/2021 1315        Component Value Date/Time    CALCIUM 8.6 06/01/2021 1315    ALKPHOS 113 (H) 06/01/2021 1315    AST 48 (H) 06/01/2021 1315    ALT 92 (H) 06/01/2021 1315    BILITOT 0.35 06/01/2021 1315         Results for Shonna Mcmanus (MRN [id number]) as of 2/11/2021 09:57   Ref. Range 8/29/2017 00:05 8/30/2017 09:15 7/21/2020 10:42 8/27/2020 10:20 9/24/2020 09:47 10/22/2020 09:24 11/19/2020 10:17 12/14/2020 14:38 1/11/2021 13:51 2/8/2021 13:06   CA 27-29 Latest Ref Range: 0 - 38 U/mL   164 (H) 88 (H) 95 (H) 129 (H) 146 (H) 208 (H) 248 (H) 294 (H)       REVIEW OF RADIOLOGICAL RESULTS:  PET scan   Impression   PET-CT evidence of mixed response of therapy when compared to the prior   PET-CT study.       *Interval improvement of the FDG activity involving the osseous metastatic   disease.    *Interval resolution the FDG activity and improvement of the consolidative   findings of the left lung when compared to the prior PET-CT.   *Interval increase in FDG activity involving the patient's known liver   metastatic disease.  No new FDG avid lesion is seen within the liver. IMPRESSION:   1- Arlene  Has  T2, N0, M0 ER/OR positive and HER-2/eve negative invasive ductal carcinoma of the left breast  2- Chemotherapy: received 3 cycles of TC, stopped due to GI bleeding  3- GI bleeding , related to the GIST tumor. Currently on oral iron and hemoglobin is improving  4- on anastrozole. We will continue, plan 5 years of therapy, completed 08/2018  5- for hot flashes, better on Effexor. 6- Recent pancreatitis, possibly biliary. MRI CT scan did not show any gallbladder stone. We will discuss with her surgeons. 7-   distal small bowel mass measuring 3.6 cm, resection shows low risk GIST. No need for adjuvant therapy, margins were negative. 8-bone metastases with compression fracture diagnosed in May/2020. Status post kyphoplasty and biopsy. 9- S/P radiation 07/2020  10- for systemic therapy, she was started on Arimidex, adding Ibrance. Due to cytopenias, dose was reduced to 100 mg/day  11-in February/2021, there was signs of progression, will plan to switch to Faslodex       PLAN:   1. We reviewed in detail her PET which shows mixed response to treatment with resolution and improvement of left lung activity, improvemenet in osseous mets, and increase in liver met. 2. She had a great response to the combination and on her metastatic disease except there was a discordant growth in the isolated liver metastases. 3. I think the best approach is to continue current therapy and consider SBRT to the discordantly progressive liver metastases  4. I explained plan for focal radiation on the liver and continue with treatment unchanged. 5. I completed toxicity check. 6. For her uncontrolled diarrhea I am ordering for Lomotil to be taken 1-2 times daily and use Imodium as needed.    7. Her lab work was reviewed and discussed, kidney function is improved, elevated liver function with disease, counts are adequate, and electrolytes are in range. 8. I am referring her back to rad-onc. 9. We will continue with treatment as per orders. 10. I am refilling her Ativan, I recommend she take nightly to manage insomnia. 11. I am ordering doppler US of the left leg to rule out any recurrent thromboembolism. 12. Return in 4 weeks.

## 2021-06-03 NOTE — PATIENT INSTRUCTIONS
Proceed with treatment per orders.  With faslodex and Xgeva   Refer back to Dr Lizzeth Rm for SBRT of liver lesion  Venous doppler left leg  rv in 4 weeks with labs and treatment

## 2021-06-04 ENCOUNTER — TELEPHONE (OUTPATIENT)
Dept: ONCOLOGY | Age: 51
End: 2021-06-04

## 2021-06-08 ENCOUNTER — HOSPITAL ENCOUNTER (OUTPATIENT)
Dept: ULTRASOUND IMAGING | Age: 51
Discharge: HOME | End: 2021-06-10
Payer: COMMERCIAL

## 2021-06-08 DIAGNOSIS — C50.412 MALIGNANT NEOPLASM OF UPPER-OUTER QUADRANT OF LEFT BREAST IN FEMALE, ESTROGEN RECEPTOR POSITIVE (HCC): ICD-10-CM

## 2021-06-08 DIAGNOSIS — Z17.0 MALIGNANT NEOPLASM OF UPPER-OUTER QUADRANT OF LEFT BREAST IN FEMALE, ESTROGEN RECEPTOR POSITIVE (HCC): ICD-10-CM

## 2021-06-08 DIAGNOSIS — C79.51 BONE METASTASIS (HCC): ICD-10-CM

## 2021-06-08 PROCEDURE — 93971 EXTREMITY STUDY: CPT

## 2021-06-14 ENCOUNTER — HOSPITAL ENCOUNTER (OUTPATIENT)
Dept: RADIATION ONCOLOGY | Age: 51
Discharge: HOME | End: 2021-06-14
Payer: COMMERCIAL

## 2021-06-14 VITALS
TEMPERATURE: 98 F | DIASTOLIC BLOOD PRESSURE: 78 MMHG | HEART RATE: 73 BPM | RESPIRATION RATE: 18 BRPM | SYSTOLIC BLOOD PRESSURE: 119 MMHG | OXYGEN SATURATION: 98 %

## 2021-06-14 DIAGNOSIS — C78.7 LIVER METASTASES (HCC): Primary | ICD-10-CM

## 2021-06-14 DIAGNOSIS — Z17.0 MALIGNANT NEOPLASM OF UPPER-OUTER QUADRANT OF LEFT BREAST IN FEMALE, ESTROGEN RECEPTOR POSITIVE (HCC): ICD-10-CM

## 2021-06-14 DIAGNOSIS — C50.412 MALIGNANT NEOPLASM OF UPPER-OUTER QUADRANT OF LEFT BREAST IN FEMALE, ESTROGEN RECEPTOR POSITIVE (HCC): ICD-10-CM

## 2021-06-14 PROCEDURE — 99212 OFFICE O/P EST SF 10 MIN: CPT | Performed by: RADIOLOGY

## 2021-06-14 PROCEDURE — 99214 OFFICE O/P EST MOD 30 MIN: CPT | Performed by: RADIOLOGY

## 2021-06-14 ASSESSMENT — PAIN SCALES - GENERAL: PAINLEVEL_OUTOF10: 1

## 2021-06-14 ASSESSMENT — PAIN DESCRIPTION - LOCATION: LOCATION: BACK

## 2021-06-14 NOTE — PROGRESS NOTES
potassium chloride (KLOR-CON M) extended release tablet 20 mEq, 20 mEq, Oral, Once, Anne Marie Hayes MD      FALLS RISK SCREEN  Instructions:  Assess the patient and enter the appropriate indicators that are present for fall risk identification. Total the numbers entered and assign a fall risk score from Table 2.  Reassess patient at a minimum every 12 weeks or with status change. Assessment   Date  6/14/2021     1. Mental Ability: confusion/cognitively impaired 0     2. Elimination Issues: incontinence, frequency 0       3. Ambulatory: use of assistive devices (walker, cane, off-loading devices),        attached to equipment (IV pole, oxygen) 2     4. Sensory Limitations: dizziness, vertigo, impaired vision 3     5. Age less than 65        0     6. Age 72 or greater 0     7. Medication: diuretics, strong analgesics, hypnotics, sedatives,        antihypertensive agents 3   8. Falls:  recent history of falls within the last 3 months (not to include slipping or        tripping) 0   TOTAL 8    If score of 4 or greater was education given? Yes           TABLE 2   Risk Score Risk Level Plan of Care   0-3 Little or  No Risk 1. Provide assistance as indicated for ambulation activities  2. Reorient confused/cognitively impaired patient  3. Chair/bed in low position, stretcher/bed with siderails up except when performing patient care activities  5. Educate patient/family/caregiver on falls prevention  6.  Reassess in 12 weeks or with any noted change in patient condition which places them at a risk for a fall   4-6 Moderate Risk 1. Provide assistance as indicated for ambulation activities  2. Reorient confused/cognitively impaired patient  3. Chair/bed in low position, stretcher/bed with siderails up except when performing patient care activities  4. Educate patient/family/caregiver on falls prevention     7 or   Higher High Risk 1. Place patient in easily observable treatment room  2.   Patient attended at all times by family member or staff  3. Provide assistance as indicated for ambulation activities  4. Reorient confused/cognitively impaired patient  5. Chair/bed in low position, stretcher/bed with siderails up except when performing patient care activities  6. Educate patient/family/caregiver on falls prevention         PLAN: Patient is seen today in follow up. SHe reports mild lower back pain and dizziness/fatigue. VS obtained. Dr Jacquelyn Godinez notified and examined pt. Pt will need a MRI and fiducial markers prior to returning for [facility] for SBRT to liver.  Placed pt on pending list.         Amparo Garcia RN

## 2021-06-15 NOTE — PROGRESS NOTES
[address]            Radiation Oncology          [address] [facility]          [place]        Alecia Frees: [phone]        F: [phone]       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 [facility])  Staging form: Breast, AJCC 7th Edition  - Clinical stage from 2020: Stage IV (T2, N0, M1) - Signed by Shelia Whatley MD on 2020  -s/p L MRM SLN 13  -s/p TC x3   -s/p Tamoxifen then Arimedex completing in   -s/p RT to new bone mets:   Sternum, L 6th ribs, and T4-T8: 30Gy 30Gy 20  L 10th rib, L4-SI Joints: 30Gy 20  L femur (s/p ORIF): 30Gy 20  R Scapula: 20 Gy 21  -s/p Leigha Sia and Pedro Savory  -on Faslodex Xgeva Verzenio    INTERVAL HISTORY:   Ms Geetha King is a 48-year-old female with a recently diagnosed stage IV breast cancer for which she underwent palliative radiation therapy to multiple sites completing approximately 6 months ago. Patient is here today for a scheduled follow-up visit after seeing her medical oncologist earlier this month after having a repeat PET scan. Her PET scan on May 25, 2021 showed mixed response with interval improvement in her osseous lesions though there was an increase in the FDG avid liver metastasis. Patient denies any pain at this time and states that her other 20 sites where she was previously radiated are better. She however has had increase in the size of her liver lesion over her previous bout in January. She denies any symptoms of dizziness, chest pain, shortness of breath, abdominal pain, nausea, diarrhea, dysuria, or any bleeding.       MEDICATIONS:    Current Outpatient Medications:     LORazepam (ATIVAN) 1 MG tablet, Take 1 tablet by mouth every 6 hours as needed for Anxiety (Take 1 pill at betime as needed) for up to 30 doses. , Disp: 120 tablet, Rfl: 0    Abemaciclib 150 MG TABS, Take 150 mg by mouth 2 times daily, Disp: 60 tablet, Rfl: 3    KLOR-CON M20 20 MEQ extended release tablet, TAKE 1 TABLET BY MOUTH EVERY DAY, Disp: 30 tablet, Rfl: 5    DULoxetine (CYMBALTA) 60 MG extended release capsule, Take 1 capsule by mouth daily, Disp: 30 capsule, Rfl: 3    ondansetron (ZOFRAN-ODT) 4 MG disintegrating tablet, Place 1 tablet under the tongue every 8 hours as needed for Nausea or Vomiting, Disp: 30 tablet, Rfl: 1    CALCIUM-VITAMIN D PO, Take by mouth daily, Disp: , Rfl:     warfarin (COUMADIN) 5 MG tablet, Take 5 mg by mouth Mon,Wed,Fri 7.5mg other days 5mg, Disp: , Rfl:     diazePAM (VALIUM) 2 MG tablet, TAKE ONE TABLET BY MOUTH FOR ONE DOSE PRIOR TO MRI, Disp: , Rfl:     losartan (COZAAR) 100 MG tablet, Take 100 mg by mouth daily, Disp: , Rfl:     omeprazole (PRILOSEC) 20 MG delayed release capsule, Take 20 mg by mouth daily, Disp: , Rfl:     hydrochlorothiazide (HYDRODIURIL) 12.5 MG tablet, Take 12.5 mg by mouth daily, Disp: , Rfl:     amLODIPine (NORVASC) 5 MG tablet, Take 10 mg by mouth daily , Disp: , Rfl:     docusate sodium (COLACE, DULCOLAX) 100 MG CAPS, Take 100 mg by mouth 2 times daily, Disp: 30 capsule, Rfl: 0    metoprolol tartrate (LOPRESSOR) 25 MG tablet, TAKE ONE TABLET BY MOUTH TWICE A DAY, Disp: 60 tablet, Rfl: 4    Current Facility-Administered Medications:     0.9 % sodium chloride bolus, 500 mL, Intravenous, Once, Jorene Crigler, MD    potassium chloride (KLOR-CON M) extended release tablet 20 mEq, 20 mEq, Oral, Once, Mulu Anderson MD    ALLERGIES:  Allergies   Allergen Reactions    Adhesive Tape Other (See Comments)     Skin breakdown         REVIEW OF SYSTEMS:    A full 14 point review of systems was performed and assessed and found to be negative except as noted above. PHYSICAL EXAMINATION:    CHAPERONE: Family/friend/companieon Present    ECO Symptomatic but completely ambulatory    VITAL SIGNS: /78   Pulse 73   Temp 98 °F (36.7 °C)   Resp 18   LMP 2014   SpO2 98%   GENERAL:  General appearance is that of a well-nourished, well-developed in no apparent distress. HEENT: Normocephalic, atraumatic, EOMI, moist mucosa, no erythema. NECK:  No adenopathy or a palpable thyroid mass, trachea is midline. HEART:  Regular rate and rhythm, S1, S2, no murmurs. LUNGS:  Clear to auscultation bilaterally with no wheezing or crackles. ABDOMEN:  Soft, nontender, non distended. EXTREMITIES:  No clubbing, cyanosis, or edema. No calf tenderness. MSK: No joint tenderness. No spinal tenderness. NEUROLOGICAL: No focal deficits. CN II-XII intact. Strength and sensation intact bilaterally. SKIN: No erythema or desquamation. LABS:  WBC   Date Value Ref Range Status   2021 1.8 (L) 3.5 - 11.0 k/uL Final     Segs Absolute   Date Value Ref Range Status   2021 1.29 (L) 1.8 - 7.7 k/uL Final     Hemoglobin   Date Value Ref Range Status   2021 9.9 (L) 12.0 - 16.0 g/dL Final     Platelet Count   Date Value Ref Range Status   2012 181 140 - 450 k/uL Final     Platelets   Date Value Ref Range Status   2021 141 140 - 450 k/uL Final        IMAGING:   PET Scan 10/15/20  Impression   1. The previously identified abnormal FDG activity within the pancreatic body   appears to have resolved.  No new areas of abnormal FDG activity is   identified to suggest progression of disease.    2. Diffuse FDG avid bony metastatic disease similar in distribution to the   prior study from 2020.   3. Focal areas of abnormal FDG activity is identified involving the superior   segments of the lower lobes corresponding to ill-defined consolidations   within this region.  No definite mass is seen within the region on the   concurrent CT.  Finding is nonspecific and underlying infectious process   cannot be excluded.  CT follow-up is recommended. PET Scan 1/11/21  Impression   PET-CT evidence of mixed response of therapy.       *There appears to be interval decrease in the overall FDG activity identified   involving the osseous metastatic disease particularly within the lumbar spine   and bony pelvis when compared to the prior PET-CT study suggestive of   response to therapy. Mirnahuan Delarosa is still multiple areas of abnormal FDG activity   identified involving the visualized appendicular and axial skeletons   consistent with residual metastatic disease. *New consolidation involving the left hilar region which appears to be FDG   avid.  Finding may be post treatment in nature.  Attention on follow-up is   recommended. *New focal area of FDG activity identified involving the inferior aspect of   the right lobe of the liver corresponding to a hyperdense area on the   concurrent CT.  There is surrounding appendix steatosis.  New metastatic   disease cannot be excluded complete evaluation with CT or MRI utilizing liver   mass protocol is recommended. ASSESSMENT AND PLAN:  Sujata Clark is a 45 y.o. 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 ([facility] [address]  Staging form: Breast, AJCC 7th Edition  - Clinical stage from 5/27/2020: Stage IV (T2, N0, M1) - Signed by Patti Delarosa MD on 6/8/2020  -s/p L MRM SLN 4/8/13  -s/p TC x3 2013  -s/p Tamoxifen then Arimedex completing in 2018  - s/p RT to new bone mets:  Sternum, L 6th ribs, and T4-T8: 30Gy 30Gy 7/7/20  L 10th rib, L4-SI Joints: 30Gy 7/22/20  L femur (s/p ORIF): 30Gy 7/22/20  R Scapula: 20 Gy 2/24/21  -s/p Izzy Callie and Xgeva  -on Faslodex Xgeva Verzenio       Patient comes in today for follow-up visit after her most recent PET scan showed progression of her liver metastasis.   Given that her other sites of disease have been responding well to her systemic therapy, patient has been recommended to undergo stereotactic body radiation therapy (SBRT) to her liver lesion. Patient was explained the rationale of why the treatment would be beneficial as well as given the option to not pursue it as well. Patient is however agreeable as she has had good results with her radiation treatment in the past.    We explained that to this treatment we would require fiducial markers to be placed in the liver around the lesion followed by a MRI of the liver for localization. We reviewed the logistics of treatment planning, including a CT Simulation session, as well as 5 treatments for approximately 2 weeks. With regards to radiation to the abdomen, I discussed the possible short-term side effects which included skin irritation (causing redness, dryness or peeling), hair loss in treated area, tiredness, low blood counts (causing infection or bleeding), abdominal pain, nausea/vomiting and diarrhea. Possible long-term side effects discussed included hyperpigmentation of the skin, damage to the bowels or intestines (resulting in bleeding or obstruction that may require surgery), damage to the kidneys (resulting in decreased function), damage to the liver (resulting in decreased function) and damage to the nerves (causing numbness, weakness or paralysis). After ample time to review the patient's questions, an informed consent was obtained to proceed with scheduling fiducial marker placement, MRI scan, and a treatment planning session for radiation therapy. I spent greater than 45 minutes counseling and evaluating the different treatment options available to the patient and formulating a plan of action today. Patient was in agreement with my recommendations. All questions were answered to their satisfaction. Patient was advised to contact us anytime should they have any questions or concerns.      Electronically signed by Onelia Perez MD on 6/15/2021 at 2:23 PM        Medications Prescribed:   New Prescriptions    No medications on file       Orders:   Orders Placed This Encounter   Procedures    IR PROCEDURAL REQUEST    MRI ABDOMEN W WO CONTRAST       CC:  Patient Care Team:  Kenzie Baird MD as PCP - William Mensah MD as Surgeon (General Surgery)  Justo Winn DO as Consulting Physician (Obstetrics & Gynecology)  Hilario Chino MD as Consulting Physician (Hematology and Oncology)  John Plasencia RN as Nurse Navigator (Oncology)  Gurmeet Uriarte RN as Registered Nurse (Oncology)

## 2021-06-17 ENCOUNTER — TELEPHONE (OUTPATIENT)
Dept: ONCOLOGY | Age: 51
End: 2021-06-17

## 2021-06-17 NOTE — TELEPHONE ENCOUNTER
Called [place] and left message. Reminded her that I am her oncology nurse navigator at [facility] ([facility]). Let her know that I wanted to check in with her, see how she is doing. Let her know I wanted to see if she has any questions regarding the imaging coming up and the radiation. Reminded her that I am here as a resource, support, and to help facilitate care. Encouraged her to call me. Left my name and contact information.

## 2021-06-18 RX ORDER — SODIUM CHLORIDE 9 MG/ML
INJECTION, SOLUTION INTRAVENOUS CONTINUOUS
Status: CANCELLED | OUTPATIENT
Start: 2021-06-18

## 2021-06-21 ENCOUNTER — HOSPITAL ENCOUNTER (OUTPATIENT)
Dept: ULTRASOUND IMAGING | Age: 51
Discharge: HOME | End: 2021-06-23
Payer: COMMERCIAL

## 2021-06-21 ENCOUNTER — HOSPITAL ENCOUNTER (OUTPATIENT)
Dept: CT IMAGING | Age: 51
Discharge: HOME | End: 2021-06-23
Payer: COMMERCIAL

## 2021-06-21 VITALS
TEMPERATURE: 97.1 F | DIASTOLIC BLOOD PRESSURE: 70 MMHG | HEART RATE: 75 BPM | OXYGEN SATURATION: 97 % | RESPIRATION RATE: 18 BRPM | SYSTOLIC BLOOD PRESSURE: 134 MMHG

## 2021-06-21 VITALS
OXYGEN SATURATION: 97 % | RESPIRATION RATE: 16 BRPM | HEART RATE: 69 BPM | SYSTOLIC BLOOD PRESSURE: 129 MMHG | TEMPERATURE: 97.3 F | DIASTOLIC BLOOD PRESSURE: 72 MMHG

## 2021-06-21 DIAGNOSIS — R16.0 LIVER MASS: ICD-10-CM

## 2021-06-21 DIAGNOSIS — C78.7 LIVER METASTASES (HCC): ICD-10-CM

## 2021-06-21 DIAGNOSIS — Z17.0 MALIGNANT NEOPLASM OF UPPER-OUTER QUADRANT OF LEFT BREAST IN FEMALE, ESTROGEN RECEPTOR POSITIVE (HCC): ICD-10-CM

## 2021-06-21 DIAGNOSIS — C50.412 MALIGNANT NEOPLASM OF UPPER-OUTER QUADRANT OF LEFT BREAST IN FEMALE, ESTROGEN RECEPTOR POSITIVE (HCC): ICD-10-CM

## 2021-06-21 LAB
INR BLD: 1
PARTIAL THROMBOPLASTIN TIME: 24.9 SEC (ref 24–36)
PLATELET # BLD: 104 K/UL (ref 150–450)
PROTHROMBIN TIME: 12.9 SEC (ref 11.8–14.6)

## 2021-06-21 PROCEDURE — 2580000003 HC RX 258: Performed by: RADIOLOGY

## 2021-06-21 PROCEDURE — 85049 AUTOMATED PLATELET COUNT: CPT

## 2021-06-21 PROCEDURE — 7100000031 HC ASPR PHASE II RECOVERY - ADDTL 15 MIN

## 2021-06-21 PROCEDURE — 76965 ECHO GUIDANCE RADIOTHERAPY: CPT

## 2021-06-21 PROCEDURE — 6360000002 HC RX W HCPCS: Performed by: RADIOLOGY

## 2021-06-21 PROCEDURE — 85610 PROTHROMBIN TIME: CPT

## 2021-06-21 PROCEDURE — 85730 THROMBOPLASTIN TIME PARTIAL: CPT

## 2021-06-21 PROCEDURE — 2709999900 CT ABDOMEN WO CONTRAST

## 2021-06-21 PROCEDURE — 7100000030 HC ASPR PHASE II RECOVERY - FIRST 15 MIN

## 2021-06-21 PROCEDURE — 36415 COLL VENOUS BLD VENIPUNCTURE: CPT

## 2021-06-21 RX ORDER — MIDAZOLAM HYDROCHLORIDE 1 MG/ML
INJECTION INTRAMUSCULAR; INTRAVENOUS
Status: COMPLETED | OUTPATIENT
Start: 2021-06-21 | End: 2021-06-21

## 2021-06-21 RX ORDER — SODIUM CHLORIDE 9 MG/ML
INJECTION, SOLUTION INTRAVENOUS CONTINUOUS PRN
Status: COMPLETED | OUTPATIENT
Start: 2021-06-21 | End: 2021-06-21

## 2021-06-21 RX ORDER — FENTANYL CITRATE 50 UG/ML
INJECTION, SOLUTION INTRAMUSCULAR; INTRAVENOUS
Status: COMPLETED | OUTPATIENT
Start: 2021-06-21 | End: 2021-06-21

## 2021-06-21 RX ORDER — ACETAMINOPHEN 325 MG/1
650 TABLET ORAL EVERY 4 HOURS PRN
Status: DISCONTINUED | OUTPATIENT
Start: 2021-06-21 | End: 2021-06-24 | Stop reason: HOSPADM

## 2021-06-21 RX ORDER — SODIUM CHLORIDE 9 MG/ML
INJECTION, SOLUTION INTRAVENOUS CONTINUOUS
Status: DISCONTINUED | OUTPATIENT
Start: 2021-06-21 | End: 2021-06-24 | Stop reason: HOSPADM

## 2021-06-21 RX ADMIN — Medication 50 MCG: at 14:30

## 2021-06-21 RX ADMIN — Medication 50 MCG: at 14:11

## 2021-06-21 RX ADMIN — MIDAZOLAM 1 MG: 1 INJECTION INTRAMUSCULAR; INTRAVENOUS at 14:11

## 2021-06-21 RX ADMIN — SODIUM CHLORIDE: 9 INJECTION, SOLUTION INTRAVENOUS at 11:44

## 2021-06-21 RX ADMIN — SODIUM CHLORIDE 999 ML: 9 INJECTION, SOLUTION INTRAVENOUS at 14:46

## 2021-06-21 RX ADMIN — SODIUM CHLORIDE 999 ML: 9 INJECTION, SOLUTION INTRAVENOUS at 14:57

## 2021-06-21 ASSESSMENT — ENCOUNTER SYMPTOMS
DIARRHEA: 1
RESPIRATORY NEGATIVE: 1
EYES NEGATIVE: 1

## 2021-06-21 ASSESSMENT — PAIN SCALES - GENERAL
PAINLEVEL_OUTOF10: 3
PAINLEVEL_OUTOF10: 6
PAINLEVEL_OUTOF10: 0

## 2021-06-21 ASSESSMENT — PAIN DESCRIPTION - FREQUENCY
FREQUENCY: INTERMITTENT
FREQUENCY: INTERMITTENT

## 2021-06-21 ASSESSMENT — PAIN DESCRIPTION - PAIN TYPE: TYPE: ACUTE PAIN

## 2021-06-21 ASSESSMENT — PAIN DESCRIPTION - DESCRIPTORS: DESCRIPTORS: ACHING

## 2021-06-21 NOTE — PRE SEDATION
Admission medications    Medication Sig Start Date End Date Taking? Authorizing Provider   LORazepam (ATIVAN) 1 MG tablet Take 1 tablet by mouth every 6 hours as needed for Anxiety (Take 1 pill at betime as needed) for up to 30 doses. 6/3/21 7/3/21  Mulu Anderson MD   Abemaciclib 150 MG TABS Take 150 mg by mouth 2 times daily 5/20/21   Mulu Anderson MD   KLOR-CON M20 20 MEQ extended release tablet TAKE 1 TABLET BY MOUTH EVERY DAY 3/15/21   Mulu Anderson MD   DULoxetine (CYMBALTA) 60 MG extended release capsule Take 1 capsule by mouth daily 3/11/21   Chelsie Anderson MD   ondansetron (ZOFRAN-ODT) 4 MG disintegrating tablet Place 1 tablet under the tongue every 8 hours as needed for Nausea or Vomiting 9/24/20   Alda Rosado MD   CALCIUM-VITAMIN D PO Take by mouth daily    Historical Provider, MD   warfarin (COUMADIN) 5 MG tablet Take 5 mg by mouth Mon,Wed,Fri 7.5mg other days 5mg    Historical Provider, MD   diazePAM (VALIUM) 2 MG tablet TAKE ONE TABLET BY MOUTH FOR ONE DOSE PRIOR TO MRI 5/6/20   Historical Provider, MD   losartan (COZAAR) 100 MG tablet Take 100 mg by mouth daily    Historical Provider, MD   omeprazole (PRILOSEC) 20 MG delayed release capsule Take 20 mg by mouth daily    Historical Provider, MD   hydrochlorothiazide (HYDRODIURIL) 12.5 MG tablet Take 12.5 mg by mouth daily    Historical Provider, MD   amLODIPine (NORVASC) 5 MG tablet Take 10 mg by mouth daily     Historical Provider, MD   metoprolol tartrate (LOPRESSOR) 25 MG tablet TAKE ONE TABLET BY MOUTH TWICE A DAY 9/26/16   Gilbert Saravia MD     Coumadin Use Last 7 Days:  no  Antiplatelet drug therapy use last 7 days: no  Other anticoagulant use last 7 days: no  Additional Medication Information:  na      Pre-Sedation Documentation and Exam:   Vital signs have been reviewed (see flow sheet for vitals).     Mallampati Airway Assessment:  normal    Prior History of Anesthesia Complications:   none    ASA Classification:  Class 2 - A normal healthy patient with mild systemic disease    Sedation/ Anesthesia Plan:   intravenous sedation    Medications Planned:   midazolam (Versed) intravenously and fentanyl intravenously    Patient is an appropriate candidate for plan of sedation: yes    Electronically signed by Kadi Herron MD on 6/21/2021 at 3:15 PM

## 2021-06-21 NOTE — OP NOTE
Brief Postoperative Note    Damaris Martinez  YOB: 1970  [id number]    Pre-operative Diagnosis: liver lesion    Post-operative Diagnosis: Same    Procedure: US/CT guided fiducial marker placement    Anesthesia: Local with sedation    Surgeons/Assistants: Uriel Lassiter MD     Estimated Blood Loss: minimal    Complications: none immediate    Specimens: were not obtained      Electronically signed by Uriel Lassiter MD on 6/21/2021 at 3:16 PM

## 2021-06-21 NOTE — H&P
HISTORY and [facility] [id number]       NAME:  Lisa Aguirre  MRN: [id number]   YOB: 1970   Date: 6/21/2021   Age: 45 y.o. Gender: female       COMPLAINT AND PRESENT HISTORY:     Lisa Aguirre is 45 y.o.,  female, here for [facility] IR MISC PROC W CT    Pre diagnosis: Liver metastases   HPI:   H&P from [address] [place] [address] on 6/14/21. Ms Wiley Rodriguez is a 65-year-old female with a recently diagnosed stage IV breast cancer for which she underwent palliative radiation therapy to multiple sites completing approximately 6 months ago. Patient is here today for a scheduled follow-up visit after seeing her medical oncologist earlier this month after having a repeat PET scan. Her PET scan on May 25, 2021 showed mixed response with interval improvement in her osseous lesions though there was an increase in the FDG avid liver metastasis. Patient denies any pain at this time and states that her other 20 sites where she was previously radiated are better. She however has had increase in the size of her liver lesion over her previous bout in January. Pt present today with her mother for fiducial markers to be placed in the lever around the lesion. Today pt still has diarrhea with 4 episodes daily,  Due to  chemo medication (xgeva) . She denies any other symptoms such as dizziness, chest pain, shortness of breath, abdominal pain, nausea, dysuria, or any bleeding.       IMAGING:   PET Scan 10/15/20  Impression   1. The previously identified abnormal FDG activity within the pancreatic body   appears to have resolved.  No new areas of abnormal FDG activity is   identified to suggest progression of disease.    2. Diffuse FDG avid bony metastatic disease similar in distribution to the   prior study from 06/05/2020.   3. Focal areas of abnormal FDG activity is identified involving the superior   segments of the lower lobes corresponding to ill-defined consolidations   within this region. [facility] definite mass is seen within the region on the   concurrent CT.  Finding is nonspecific and underlying infectious process   cannot be excluded.  CT follow-up is recommended.      PET Scan 1/11/21  Impression   PET-CT evidence of mixed response of therapy.       *There appears to be interval decrease in the overall FDG activity identified   involving the osseous metastatic disease particularly within the lumbar spine   and bony pelvis when compared to the prior PET-CT study suggestive of   response to therapy. [place] is still multiple areas of abnormal FDG activity   identified involving the visualized appendicular and axial skeletons   consistent with residual metastatic disease. *New consolidation involving the left hilar region which appears to be FDG   avid.  Finding may be post treatment in nature.  Attention on follow-up is   recommended. *New focal area of FDG activity identified involving the inferior aspect of   the right lobe of the liver corresponding to a hyperdense area on the   concurrent CT.  There is surrounding appendix steatosis.  New metastatic   disease cannot be excluded complete evaluation with CT or MRI utilizing liver   mass protocol is recommended.           Review of additional significant medical hx:   has a past medical history of Anemia, Anxiety, Atrial fibrillation ([address] Breast cancer ([address] Carpal tunnel syndrome, Depression, GI bleed, Hypertension, Liver metastases ([address] Lymphedema, Neurologic cardiac syncope, Obesity, Pain, Sleep apnea, ANA PAULA (stress urinary incontinence, female), Tachycardia, Varicella, and Varicella without complication. NPO status: pt NPO since the past midnight. Medications taken TODAY (with sip of water): pt took  losartan, omeprazole, hydrochlorothiazide, amlodipine ,and xgeva with sip of water   Pt has history of A-fib , Anticoagulation status:  pt is on  Warfarin . the last dose she had  last Monday . Denies personal hx of MRSA infection.   Denies any personal or family hx of previous complications w/anesthesia.     PAST MEDICAL HISTORY     Past Medical History:   Diagnosis Date    Anemia     Anxiety     Atrial fibrillation ([facility] [address] [address] 3 28 12    dr. Emani Gomez Breast cancer [facility])     lt./chemo 6/2013    Carpal tunnel syndrome     Depression     post partum    GI bleed     History of blood transfusion     Hx of blood clots     Hypertension     Liver metastases ([facility] [address] 2/2/2021    Lymphedema     Neurologic cardiac syncope     Obesity     Pain     pelvic    Sleep apnea     ANA PAULA (stress urinary incontinence, female) 3/4/2014    Tachycardia     Varicella     Age 4, severe    Varicella without complication 9/0/0089       SURGICAL HISTORY       Past Surgical History:   Procedure Laterality Date    BACK SURGERY      kyphoplasty    BLADDER SUSPENSION  4/23/14    midurethral    BREAST BIOPSY Left 3/21/13    CHOLECYSTECTOMY      COLONOSCOPY  2005    normal    COLONOSCOPY  7/6/2013    normal, fair prep    COLONOSCOPY  10/12/2005    normal    COSMETIC SURGERY      facial due to accident    CYSTOSCOPY  4/23/14    DILATION AND CURETTAGE OF UTERUS      ENTEROCELE REPAIR  4/23/14    FIXATION KYPHOPLASTY      FRACTURE SURGERY      facial due to accident    HYSTERECTOMY      ILEOSCOPY  10/12/05    KNEE ARTHROSCOPY      both knees    MASTECTOMY Left 4/8/13    with Left SLN biopsy    ORIF FEMUR DECOMPRESSION Left     Keo placed in femur due to cancer    OTHER SURGICAL HISTORY  1969    mild inflammation in duodenum, ?diverticulum in probable 3rd part of duodenum, several areas in small bowel multiple erosions    SHOULDER ARTHROSCOPY  2008    left    HOANG AND BSO  4/23/14    TUMOR REMOVAL      in bowel    TUNNELED VENOUS PORT PLACEMENT Right     infusa port, power port    UPPER GASTROINTESTINAL ENDOSCOPY  7/6/2013    normal    UPPER GASTROINTESTINAL ENDOSCOPY  04/23/2015    MILD DISTAL ESOPHAGITIS       FAMILY HISTORY       Family History Problem Relation Age of Onset    High Blood Pressure Mother     High Blood Pressure Father     High Blood Pressure Brother     High Blood Pressure Brother        SOCIAL HISTORY       Social History     Socioeconomic History    Marital status:      Spouse name: Not on file    Number of children: Not on file    Years of education: Not on file    Highest education level: Not on file   Occupational History    Not on file   Tobacco Use    Smoking status: Former Smoker     Types: Cigarettes     Quit date: 11/26/2010     Years since quitting: 10.5    Smokeless tobacco: Never Used   Vaping Use    Vaping Use: Never used   Substance and Sexual Activity    Alcohol use: No     Alcohol/week: 0.0 standard drinks    Drug use: No    Sexual activity: Yes     Partners: Male     Birth control/protection: Surgical   Other Topics Concern    Not on file   Social History Narrative    Not on file     Social Determinants of Health     Financial Resource Strain:     Difficulty of Paying Living Expenses:    Food Insecurity:     Worried About Running Out of Food in the Last Year:     Ran Out of Food in the Last Year:    Transportation Needs:     Lack of Transportation (Medical):      Lack of Transportation (Non-Medical):    Physical Activity:     Days of Exercise per Week:     Minutes of Exercise per Session:    Stress:     Feeling of Stress :    Social Connections:     Frequency of Communication with Friends and Family:     Frequency of Social Gatherings with Friends and Family:     Attends Taoist Services:     Active Member of Clubs or Organizations:     Attends Club or Organization Meetings:     Marital Status:    Intimate Partner Violence:     Fear of Current or Ex-Partner:     Emotionally Abused:     Physically Abused:     Sexually Abused:            REVIEW OF SYSTEMS      Allergies   Allergen Reactions    Adhesive Tape Other (See Comments)     Skin breakdown       Current Outpatient Medications on File Prior to Encounter   Medication Sig Dispense Refill    LORazepam (ATIVAN) 1 MG tablet Take 1 tablet by mouth every 6 hours as needed for Anxiety (Take 1 pill at betime as needed) for up to 30 doses. 120 tablet 0    Abemaciclib 150 MG TABS Take 150 mg by mouth 2 times daily 60 tablet 3    KLOR-CON M20 20 MEQ extended release tablet TAKE 1 TABLET BY MOUTH EVERY DAY 30 tablet 5    DULoxetine (CYMBALTA) 60 MG extended release capsule Take 1 capsule by mouth daily 30 capsule 3    ondansetron (ZOFRAN-ODT) 4 MG disintegrating tablet Place 1 tablet under the tongue every 8 hours as needed for Nausea or Vomiting 30 tablet 1    CALCIUM-VITAMIN D PO Take by mouth daily      warfarin (COUMADIN) 5 MG tablet Take 5 mg by mouth Mon,Wed,Fri 7.5mg other days 5mg      diazePAM (VALIUM) 2 MG tablet TAKE ONE TABLET BY MOUTH FOR ONE DOSE PRIOR TO MRI      losartan (COZAAR) 100 MG tablet Take 100 mg by mouth daily      omeprazole (PRILOSEC) 20 MG delayed release capsule Take 20 mg by mouth daily      hydrochlorothiazide (HYDRODIURIL) 12.5 MG tablet Take 12.5 mg by mouth daily      amLODIPine (NORVASC) 5 MG tablet Take 10 mg by mouth daily       metoprolol tartrate (LOPRESSOR) 25 MG tablet TAKE ONE TABLET BY MOUTH TWICE A DAY 60 tablet 4     Current Facility-Administered Medications on File Prior to Encounter   Medication Dose Route Frequency Provider Last Rate Last Admin    0.9 % sodium chloride bolus  500 mL Intravenous Once Geeta Pang MD        potassium chloride (KLOR-CON M) extended release tablet 20 mEq  20 mEq Oral Once Geeta Pang MD           Review of Systems   Constitutional: Negative. HENT: Negative. Eyes: Negative. Respiratory: Negative. Cardiovascular: Negative. Gastrointestinal: Positive for diarrhea. Genitourinary: Negative. Musculoskeletal: Negative. Skin: Negative. Neurological: Negative. Psychiatric/Behavioral: Negative.           GENERAL PHYSICAL EXAM     Vitals: see nursing flow sheet for vital sings     GENERAL APPEARANCE:   Travis Patten is 45 y.o.,  female, , nourished, conscious, alert. Does not appear to be distress or pain at this time. Physical Exam  Constitutional:       Appearance: Normal appearance. HENT:      Right Ear: External ear normal.      Left Ear: External ear normal.      Mouth/Throat:      Mouth: Mucous membranes are moist.   Eyes:      General:         Right eye: No discharge. Left eye: No discharge. Pupils: Pupils are equal, round, and reactive to light. Cardiovascular:      Rate and Rhythm: Normal rate and regular rhythm. Pulses: Normal pulses. Heart sounds: Normal heart sounds. Pulmonary:      Effort: Pulmonary effort is normal.      Breath sounds: Normal breath sounds. Chest:      Comments: Breast:  Right: No masses, no adenopathy. No skin or nippple abnormalities. Left: left-sided mastectomy, surgery site is healed completely, no adenopathy   Abdominal:      General: Bowel sounds are normal.      Palpations: There is no hepatomegaly. Tenderness: There is no abdominal tenderness. Musculoskeletal:      Cervical back: Normal range of motion and neck supple. Skin:     General: Skin is warm. Neurological:      General: No focal deficit present. Mental Status: She is alert and oriented to person, place, and time.    Psychiatric:         Mood and Affect: Mood normal.         Behavior: Behavior normal.                   PROVISIONAL DIAGNOSES / SURGERY:      Liver metastases   [facility] IR MISC PROC W CT      Patient Active Problem List    Diagnosis Date Noted    Estrogen receptor positive tumor status 03/04/2014    Ovarian cyst, left 03/04/2014    H/O transfusion of packed red blood cells ( 10 units) 02/04/2014    Personal history of breast cancer     DCIS (ductal carcinoma in situ) of breast     Tachycardia     Lymphedema 06/03/2013    Malignant neoplasm of upper-inner quadrant of female breast ([facility] [address] [address] 05/08/2013    Status post HOANG-BSO 06/12/2014    BRCA1 negative 03/04/2014    BRCA2 negative 03/04/2014    GI bleeding 08/01/2013    Carpal tunnel syndrome     Neurocardiogenic syncope 01/30/2014    Liver metastases ([address] 02/02/2021    Bone metastasis ([address] 07/02/2020    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, malignant ([facility] 07/02/2020    Malignant neoplasm of upper-outer quadrant of left breast in female, estrogen receptor positive ([facility] [facility] 07/02/2020    Small bowel mass 09/12/2017    Biliary dyskinesia 08/29/2017    Idiopathic acute pancreatitis without infection or necrosis 08/27/2017    Iron deficiency anemia due to chronic blood loss 06/08/2017    Varicella without complication 02/54/9896    Essential hypertension 10/05/2015    GI (gastrointestinal bleed) 04/22/2015    Abdominal pain     Anemia     Obesity            WILMA Saini - CNP on 6/21/2021 at 1:47 PM

## 2021-06-25 ENCOUNTER — TELEPHONE (OUTPATIENT)
Dept: INFUSION THERAPY | Age: 51
End: 2021-06-25

## 2021-06-25 RX ORDER — EPINEPHRINE 1 MG/ML
0.3 INJECTION, SOLUTION, CONCENTRATE INTRAVENOUS PRN
Status: CANCELLED | OUTPATIENT
Start: 2021-07-01

## 2021-06-25 RX ORDER — LAMOTRIGINE 25 MG/1
250 TABLET ORAL ONCE
Status: CANCELLED | OUTPATIENT
Start: 2021-07-01 | End: 2021-07-01

## 2021-06-25 RX ORDER — METHYLPREDNISOLONE SODIUM SUCCINATE 125 MG/2ML
125 INJECTION, POWDER, LYOPHILIZED, FOR SOLUTION INTRAMUSCULAR; INTRAVENOUS ONCE
Status: CANCELLED | OUTPATIENT
Start: 2021-07-29 | End: 2021-07-29

## 2021-06-25 RX ORDER — SODIUM CHLORIDE 9 MG/ML
INJECTION, SOLUTION INTRAVENOUS CONTINUOUS
Status: CANCELLED | OUTPATIENT
Start: 2021-07-29

## 2021-06-25 RX ORDER — METHYLPREDNISOLONE SODIUM SUCCINATE 125 MG/2ML
125 INJECTION, POWDER, LYOPHILIZED, FOR SOLUTION INTRAMUSCULAR; INTRAVENOUS ONCE
Status: CANCELLED | OUTPATIENT
Start: 2021-07-01 | End: 2021-07-01

## 2021-06-25 RX ORDER — DIPHENHYDRAMINE HYDROCHLORIDE 50 MG/ML
50 INJECTION INTRAMUSCULAR; INTRAVENOUS ONCE
Status: CANCELLED | OUTPATIENT
Start: 2021-07-01 | End: 2021-07-01

## 2021-06-25 RX ORDER — SODIUM CHLORIDE 9 MG/ML
INJECTION, SOLUTION INTRAVENOUS CONTINUOUS
Status: CANCELLED | OUTPATIENT
Start: 2021-07-01

## 2021-06-25 RX ORDER — DIPHENHYDRAMINE HYDROCHLORIDE 50 MG/ML
50 INJECTION INTRAMUSCULAR; INTRAVENOUS ONCE
Status: CANCELLED | OUTPATIENT
Start: 2021-07-29 | End: 2021-07-29

## 2021-06-25 RX ORDER — EPINEPHRINE 1 MG/ML
0.3 INJECTION, SOLUTION, CONCENTRATE INTRAVENOUS PRN
Status: CANCELLED | OUTPATIENT
Start: 2021-07-29

## 2021-06-25 RX ORDER — LAMOTRIGINE 25 MG/1
250 TABLET ORAL ONCE
Status: CANCELLED | OUTPATIENT
Start: 2021-07-29 | End: 2021-07-29

## 2021-06-25 NOTE — TELEPHONE ENCOUNTER
We received a fax from [phone]. They were requesting forms to be filled out regarding the patient's coumadin management. Dr. Yamilka Guerrero informed me he has never managed the patients coumadin. He believed her pcp was her [facility] and the one managing her anticoagulant therapy. I called the [facility] and spoke with Janell Obrien. She verbalized understanding of the above and would take care of it.

## 2021-06-28 ENCOUNTER — HOSPITAL ENCOUNTER (OUTPATIENT)
Age: 51
Discharge: HOME | End: 2021-06-28
Payer: COMMERCIAL

## 2021-06-28 DIAGNOSIS — C50.212 BILATERAL MALIGNANT NEOPLASM OF UPPER INNER QUADRANT OF BREAST IN FEMALE, UNSPECIFIED ESTROGEN RECEPTOR STATUS (HCC): ICD-10-CM

## 2021-06-28 DIAGNOSIS — C50.211 BILATERAL MALIGNANT NEOPLASM OF UPPER INNER QUADRANT OF BREAST IN FEMALE, UNSPECIFIED ESTROGEN RECEPTOR STATUS (HCC): ICD-10-CM

## 2021-06-28 LAB
ABSOLUTE EOS #: 0 K/UL (ref 0–0.4)
ABSOLUTE IMMATURE GRANULOCYTE: ABNORMAL K/UL (ref 0–0.3)
ABSOLUTE LYMPH #: 0.42 K/UL (ref 1–4.8)
ABSOLUTE MONO #: 0.12 K/UL (ref 0.1–0.8)
ALBUMIN SERPL-MCNC: 3.9 G/DL (ref 3.5–5.2)
ALBUMIN/GLOBULIN RATIO: 1.4 (ref 1–2.5)
ALP BLD-CCNC: 97 U/L (ref 35–104)
ALT SERPL-CCNC: 21 U/L (ref 5–33)
ANION GAP SERPL CALCULATED.3IONS-SCNC: 9 MMOL/L (ref 9–17)
AST SERPL-CCNC: 21 U/L
BASOPHILS # BLD: 0 % (ref 0–2)
BASOPHILS ABSOLUTE: 0 K/UL (ref 0–0.2)
BILIRUB SERPL-MCNC: 0.36 MG/DL (ref 0.3–1.2)
BUN BLDV-MCNC: 15 MG/DL (ref 6–20)
BUN/CREAT BLD: ABNORMAL (ref 9–20)
CALCIUM SERPL-MCNC: 9.5 MG/DL (ref 8.6–10.4)
CHLORIDE BLD-SCNC: 105 MMOL/L (ref 98–107)
CO2: 25 MMOL/L (ref 20–31)
CREAT SERPL-MCNC: 1.21 MG/DL (ref 0.5–0.9)
DIFFERENTIAL TYPE: ABNORMAL
EOSINOPHILS RELATIVE PERCENT: 0 % (ref 1–4)
GFR AFRICAN AMERICAN: 57 ML/MIN
GFR NON-AFRICAN AMERICAN: 47 ML/MIN
GFR SERPL CREATININE-BSD FRML MDRD: ABNORMAL ML/MIN/{1.73_M2}
GFR SERPL CREATININE-BSD FRML MDRD: ABNORMAL ML/MIN/{1.73_M2}
GLUCOSE BLD-MCNC: 106 MG/DL (ref 70–99)
HCT VFR BLD CALC: 29.2 % (ref 36–46)
HEMOGLOBIN: 10 G/DL (ref 12–16)
IMMATURE GRANULOCYTES: ABNORMAL %
LYMPHOCYTES # BLD: 21 % (ref 24–44)
MCH RBC QN AUTO: 37 PG (ref 26–34)
MCHC RBC AUTO-ENTMCNC: 34.3 G/DL (ref 31–37)
MCV RBC AUTO: 107.8 FL (ref 80–100)
MONOCYTES # BLD: 6 % (ref 1–7)
MORPHOLOGY: NORMAL
NRBC AUTOMATED: ABNORMAL PER 100 WBC
PDW BLD-RTO: 14.2 % (ref 12.5–15.4)
PLATELET # BLD: 128 K/UL (ref 140–450)
PLATELET ESTIMATE: ABNORMAL
PMV BLD AUTO: 8.1 FL (ref 6–12)
POTASSIUM SERPL-SCNC: 3.3 MMOL/L (ref 3.7–5.3)
RBC # BLD: 2.71 M/UL (ref 4–5.2)
RBC # BLD: ABNORMAL 10*6/UL
SEG NEUTROPHILS: 73 % (ref 36–66)
SEGMENTED NEUTROPHILS ABSOLUTE COUNT: 1.46 K/UL (ref 1.8–7.7)
SODIUM BLD-SCNC: 139 MMOL/L (ref 135–144)
TOTAL PROTEIN: 6.6 G/DL (ref 6.4–8.3)
WBC # BLD: 2 K/UL (ref 3.5–11)
WBC # BLD: ABNORMAL 10*3/UL

## 2021-06-28 PROCEDURE — 85025 COMPLETE CBC W/AUTO DIFF WBC: CPT

## 2021-06-28 PROCEDURE — 80053 COMPREHEN METABOLIC PANEL: CPT

## 2021-06-28 PROCEDURE — 86300 IMMUNOASSAY TUMOR CA 15-3: CPT

## 2021-06-28 PROCEDURE — 36415 COLL VENOUS BLD VENIPUNCTURE: CPT

## 2021-06-30 LAB — CA 27-29: 72 U/ML (ref 0–38)

## 2021-07-01 ENCOUNTER — HOSPITAL ENCOUNTER (OUTPATIENT)
Dept: INFUSION THERAPY | Age: 51
Discharge: HOME | End: 2021-07-01
Payer: COMMERCIAL

## 2021-07-01 ENCOUNTER — TELEPHONE (OUTPATIENT)
Dept: ONCOLOGY | Age: 51
End: 2021-07-01

## 2021-07-01 ENCOUNTER — HOSPITAL ENCOUNTER (OUTPATIENT)
Dept: MRI IMAGING | Age: 51
Discharge: HOME | End: 2021-07-03
Payer: COMMERCIAL

## 2021-07-01 ENCOUNTER — HOSPITAL ENCOUNTER (OUTPATIENT)
Dept: RADIATION ONCOLOGY | Age: 51
Discharge: HOME | End: 2021-07-01
Attending: RADIOLOGY
Payer: COMMERCIAL

## 2021-07-01 ENCOUNTER — OFFICE VISIT (OUTPATIENT)
Dept: ONCOLOGY | Age: 51
End: 2021-07-01
Payer: COMMERCIAL

## 2021-07-01 VITALS
HEART RATE: 69 BPM | DIASTOLIC BLOOD PRESSURE: 73 MMHG | WEIGHT: 274.1 LBS | SYSTOLIC BLOOD PRESSURE: 108 MMHG | BODY MASS INDEX: 40.48 KG/M2 | TEMPERATURE: 98.1 F | RESPIRATION RATE: 18 BRPM

## 2021-07-01 DIAGNOSIS — C50.211 BILATERAL MALIGNANT NEOPLASM OF UPPER INNER QUADRANT OF BREAST IN FEMALE, UNSPECIFIED ESTROGEN RECEPTOR STATUS (HCC): Primary | ICD-10-CM

## 2021-07-01 DIAGNOSIS — Z17.0 MALIGNANT NEOPLASM OF UPPER-OUTER QUADRANT OF LEFT BREAST IN FEMALE, ESTROGEN RECEPTOR POSITIVE (HCC): ICD-10-CM

## 2021-07-01 DIAGNOSIS — C50.412 MALIGNANT NEOPLASM OF UPPER-OUTER QUADRANT OF LEFT BREAST IN FEMALE, ESTROGEN RECEPTOR POSITIVE (HCC): ICD-10-CM

## 2021-07-01 DIAGNOSIS — Z17.0 MALIGNANT NEOPLASM OF UPPER-OUTER QUADRANT OF LEFT BREAST IN FEMALE, ESTROGEN RECEPTOR POSITIVE (HCC): Primary | ICD-10-CM

## 2021-07-01 DIAGNOSIS — C49.A3 GIST (GASTROINTESTINAL STROMAL TUMOR) OF SMALL BOWEL, MALIGNANT (HCC): ICD-10-CM

## 2021-07-01 DIAGNOSIS — C50.212 BILATERAL MALIGNANT NEOPLASM OF UPPER INNER QUADRANT OF BREAST IN FEMALE, UNSPECIFIED ESTROGEN RECEPTOR STATUS (HCC): Primary | ICD-10-CM

## 2021-07-01 DIAGNOSIS — C79.51 BONE METASTASIS (HCC): ICD-10-CM

## 2021-07-01 DIAGNOSIS — C78.7 LIVER METASTASES (HCC): ICD-10-CM

## 2021-07-01 DIAGNOSIS — C50.412 MALIGNANT NEOPLASM OF UPPER-OUTER QUADRANT OF LEFT BREAST IN FEMALE, ESTROGEN RECEPTOR POSITIVE (HCC): Primary | ICD-10-CM

## 2021-07-01 PROCEDURE — A9579 GAD-BASE MR CONTRAST NOS,1ML: HCPCS | Performed by: RADIOLOGY

## 2021-07-01 PROCEDURE — G8427 DOCREV CUR MEDS BY ELIG CLIN: HCPCS | Performed by: INTERNAL MEDICINE

## 2021-07-01 PROCEDURE — 99211 OFF/OP EST MAY X REQ PHY/QHP: CPT | Performed by: INTERNAL MEDICINE

## 2021-07-01 PROCEDURE — 96402 CHEMO HORMON ANTINEOPL SQ/IM: CPT

## 2021-07-01 PROCEDURE — 74183 MRI ABD W/O CNTR FLWD CNTR: CPT

## 2021-07-01 PROCEDURE — 6360000002 HC RX W HCPCS: Performed by: INTERNAL MEDICINE

## 2021-07-01 PROCEDURE — 1036F TOBACCO NON-USER: CPT | Performed by: INTERNAL MEDICINE

## 2021-07-01 PROCEDURE — 99214 OFFICE O/P EST MOD 30 MIN: CPT | Performed by: INTERNAL MEDICINE

## 2021-07-01 PROCEDURE — 3017F COLORECTAL CA SCREEN DOC REV: CPT | Performed by: INTERNAL MEDICINE

## 2021-07-01 PROCEDURE — 77334 RADIATION TREATMENT AID(S): CPT | Performed by: RADIOLOGY

## 2021-07-01 PROCEDURE — 77332 RADIATION TREATMENT AID(S): CPT | Performed by: RADIOLOGY

## 2021-07-01 PROCEDURE — 77263 THER RADIOLOGY TX PLNG CPLX: CPT | Performed by: RADIOLOGY

## 2021-07-01 PROCEDURE — 2580000003 HC RX 258: Performed by: RADIOLOGY

## 2021-07-01 PROCEDURE — 6360000004 HC RX CONTRAST MEDICATION: Performed by: RADIOLOGY

## 2021-07-01 PROCEDURE — 96401 CHEMO ANTI-NEOPL SQ/IM: CPT

## 2021-07-01 PROCEDURE — G8417 CALC BMI ABV UP PARAM F/U: HCPCS | Performed by: INTERNAL MEDICINE

## 2021-07-01 RX ORDER — LAMOTRIGINE 25 MG/1
250 TABLET ORAL ONCE
Status: COMPLETED | OUTPATIENT
Start: 2021-07-01 | End: 2021-07-01

## 2021-07-01 RX ORDER — 0.9 % SODIUM CHLORIDE 0.9 %
60 INTRAVENOUS SOLUTION INTRAVENOUS ONCE
Status: COMPLETED | OUTPATIENT
Start: 2021-07-01 | End: 2021-07-01

## 2021-07-01 RX ORDER — SODIUM CHLORIDE 0.9 % (FLUSH) 0.9 %
10 SYRINGE (ML) INJECTION PRN
Status: DISCONTINUED | OUTPATIENT
Start: 2021-07-01 | End: 2021-07-04 | Stop reason: HOSPADM

## 2021-07-01 RX ADMIN — SODIUM CHLORIDE, PRESERVATIVE FREE 10 ML: 5 INJECTION INTRAVENOUS at 14:24

## 2021-07-01 RX ADMIN — SODIUM CHLORIDE 60 ML: 9 INJECTION, SOLUTION INTRAVENOUS at 14:23

## 2021-07-01 RX ADMIN — FULVESTRANT 250 MG: 50 INJECTION INTRAMUSCULAR at 11:04

## 2021-07-01 RX ADMIN — GADOTERIDOL 20 ML: 279.3 INJECTION, SOLUTION INTRAVENOUS at 14:22

## 2021-07-01 RX ADMIN — DENOSUMAB 120 MG: 120 INJECTION SUBCUTANEOUS at 10:59

## 2021-07-01 NOTE — PROGRESS NOTES
Chief Complaint   Patient presents with    Follow-up     review status of disease    Diarrhea    Fatigue       DIAGNOSIS:   1- T2, N0, M0 ER positive ME positive and HER-2/eve negative invasive ductal carcinoma of the left breast ( inner upper quadrant)  2- Repeated GI bleeding greetings 2 symptomatic anemia, despite extensive GI workup, source of bleeding was never found. 3- finally a small mass was seen in the jejunum. 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 Margins negative   4- compression fractures and bone lesions. Likely metastatic cancer (5/2020)     CURRENT THERAPY:  1- Mastectomy and axillary sampling  2- adjuvant chemotherapy with Taxotere and Cytoxan starting 5/16/2013. Chemotherapy stopped after 3 cycles because of ongoing GI bleeding and symptomatic anemia  3- Conservative management for GI bleeding. Leading completely stopped after the discontinuation of chemotherapy  4- started tamoxifen after chemotherapy was completed. 8/2013. 5- transitioned to Arimidex 6/2014, completed 08/2018  6- GIST resection, resected 9/2017   7-status post kyphoplasty and biopsy of bone metastases 5/2020. The tumor was minimally ER positive at 5% and ME negative. 8- rodding of the left femur completed. Plan for  Radiation  9- Systemic therapy, plan Arimidex plus Ibrance  10 - XRT completed 07/2020  11-current treatment starting 7/30/2020, monthly Xgeva, oral Ibrance and Arimidex  12-2/2021, progression of disease, plan to switch to Faslodex plus CDK inhibitor abemaciclib, continue Xgeva    BRIEF CASE HISTORY:   Freedom Munroe is a very pleasant 45 y.o. who felt a mass in the medial aspect of her left breast, around the 9:00 position. Patient sought medical attention and mammogram and ultrasound were done.  Showing an irregular, high-density mass with indistinct margins containing pleomorphic calcifications in the lower inner quadrant of the left breast close to 9:00 location this mass measures are mammogram 2.7 x 2.6 x 2 cm. Subsequently targeted ultrasound was performed showing hypoechoic, irregular, taller than wider, solid mass at 9:00 with posterior acoustic shadowing. The calcifications were visible on ultrasound . On ultrasound the solid mass measured 2.4 x 2.6 x 1 cm. Image guided biopsy of the mass was done and showed invasive ductal carcinoma with surrounding DCIS (cribriform type) the tumor was ER and DC positive and HER-2/eve negative with a fish ratio 1.1. The patient was referred to us and she also was seen by radiation oncology , She  decided on proceeding with total mastectomy and breast reconstruction . Allergy showed 2.5 cm intermediate grade invasive ductal carcinoma, [place] lymph node was negative. The tumor was ER/DC positive and HER-2/eve negative. We discussed options of adjuvant therapy including chemotherapy and hormone therapy, The patient decided to proceed with adjuvant chemotherapy without undergone a Oncotype study. TC chemotherapy X4 cycles is planned  After 3 cycles, and presented with persistent GI bleeding leading to symptomatic anemia and syncope, the patient's condition was Serious enough to warrant repeated hospital admissions. Repeated GI workup including endoscopic evaluation and Tagged Red cell scans were negative. We decided to treat her conservatively and chemotherapy was stopped after 3 cycles. After that All her symptoms improved and she was started on tamoxifen. After one year, she was transitioned to anastrozole since she had no periods and hormonal testing showed post menopausal state. 08/2017  She had been having abdominal pain and went to [facility] for consult. She had work up which showed mass in the colon showed subtle changes in the mid-small intestine, CT enterography showed 3.6CM mass in the distal small bowel. She was also hospitalized in the interim with pancreatitis.    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 Margins were negative. Observation was decided, no need for adjuvant therapy  In May/2020, the patient presented with severe back pain. MRI showed multiple bone lesions with evidence of compression fracture. Patient underwent successful kyphoplasty and biopsy and pathology showed breast cancer, it was 5% ER positive and OK was negative. HER-2 was +2 which is equivocal so a fish test was ordered. Plan for radiation, staging PET, and Arimidex. PET scan showed widespread metastatic bony disease including the thoracic spine, the lumbar spine, the left femur and the rib area. She underwent repeated kyphoplasty and rodding of the left femur. She is undergoing radiation to the painful bony areas in thoracic, rib and lumbar areas as well as the left femur. We will likely use Ibrance plus Arimidex. As well as monthly Xgeva to be started after completion of radiation. After completion of radiation, we maintained her on Arimidex plus Ibrance, due to cytopenias, Ibrance dose was decreased to 100 mg/day which was much better tolerated. Also we maintained her on Xgeva monthly. PET scan was done in October and showed essentially stable disease. There was some suspicious activity in the hip but this was asymptomatic and we decided that this might be post radiation or related to physical therapy. We decided to continue current therapy until clear progression. Further testing in early 2021 showed clear progression, will plan to switch to Faslodex and continue with CDk inhibitor(switch to [employer])  and Xgeva. INTERIM HISTORY: She comes in today for follow up for metastatic breast cancer and discuss further treatment plan. She has started process to start radiation, marker have been put in place, and mapping is planned for next week. She is doing well with treatment, her bowel fluctuations persist with 8 episodes of diarrhea some days and then 4 days of constipation.  She is trying to manage with Imodium but has not tried Metamucil. Her leg swelling has improved and more manageable. She has some mouth sores that are related to recent dental plate. GYNECOLOGICAL HISTORY  Menarche at age 14. He is premenopausal with regular periods. +2. She used birth control minimally. PAST MEDICAL HISTORY: has a past medical history of Anemia, Anxiety, Atrial fibrillation ([address] Breast cancer ([address] Carpal tunnel syndrome, Depression, GI bleed, History of blood transfusion, Hx of blood clots, Hypertension, Liver metastases ([address] Lymphedema, Neurologic cardiac syncope, Obesity, Pain, Sleep apnea, ANA PAULA (stress urinary incontinence, female), Tachycardia, Varicella, and Varicella without complication. PAST SURGICAL HISTORY: has a past surgical history that includes Shoulder arthroscopy (); Colonoscopy (); Dilation and curettage of uterus; Knee arthroscopy; fracture surgery; Tunneled venous port placement (Right); Upper gastrointestinal endoscopy (2013); Colonoscopy (2013); Mastectomy (Left, 13); other surgical history (1969); Breast biopsy (Left, 3/21/13); ileoscopy (10/12/05); Hysterectomy; denia and bso (cervix removed) (14); Enterocele repair (14); Cystoscopy (14); bladder suspension (14); Colonoscopy (10/12/2005); Upper gastrointestinal endoscopy (2015); tumor removal; Cosmetic surgery; Cholecystectomy; orif femur decompression (Left); Fixation Kyphoplasty; and back surgery. CURRENT MEDICATIONS:  has a current medication list which includes the following prescription(s): lorazepam, abemaciclib, klor-con m20, duloxetine, ondansetron, calcium-vitamin d, warfarin, diazepam, losartan, omeprazole, hydrochlorothiazide, amlodipine, and metoprolol tartrate, and the following Facility-Administered Medications: sodium chloride and potassium chloride. ALLERGIES:  is allergic to adhesive tape.     FAMILY HISTORY: Negative for any hematological or oncological conditions. Specifically no history of breast cancer in the family    SOCIAL HISTORY:  reports that she quit smoking about 2 years ago. Her smoking use included Cigarettes. She smoked About 20 years. She has never used smokeless tobacco. She reports that she does not drink alcohol or use illicit drugs. REVIEW OF SYSTEMS:   General: No fever or night sweats. Weight is stable. +mouth sores as noted  Eyes: No double or blurred vision. Ears: No tinnitus or hearing problem,    Throat: No dysphagia or sore throat   Respiratory: No chest pain, shortness of breath at rest, no hemoptysis. She has exercise intolerance   Cardiovascular: Denies chest pain, PND or orthopnea, or palpitations. +LE edema improved  Gastrointestinal: No nausea or vomiting; +freuqent diarrhea with cramping and constipation  Genitourinary: Denies dysuria, hematuria, frequency, urgency or incontinence. No vaginal bleeding. Neurological: Denies decreased LOC, no sensory or motor focal deficits. +headaches - unchanged, mild  Musculoskeletal: No arthralgia or joint swelling. +back pain, right chest wall pain, and right shoulder blade pain; +sorenss at injection site  Skin: There are no rashes or bleeding. +hair thinning  Psychiatric: ++ anxiety, depression as discussed. Endocrine: No diabetes or thyroid disease. Hematologic: No bleeding, no adenopathy. PHYSICAL EXAM:  The patient is not in acute distress. Vital signs: Blood pressure 108/73, pulse 69, temperature 98.1 °F (36.7 °C), temperature source Oral, resp. rate 18, weight 274 lb 1.6 oz (124.3 kg), last menstrual period 03/17/2014, not currently breastfeeding. HEENT:  Eyes are normal. Ears, nose is congested, no bleeding. Neck: Supple. No lymph node enlargement. No thyroid enlargement. Trachea is centrally located. Chest:  Clear to auscultation. No wheezes or crepitations. Breast:  Right: No masses, no adenopathy. No skin or nippple abnormalities.  Left: left-sided mastectomy, surgery site is healed completely, no adenopathy  Heart: Regular sinus rhythm. Abdomen: Soft, nontender. No hepatosplenomegaly. No masses. Extremities:  Mild edema right side. Lymph Nodes:  No cervical, axillary or inguinal lymph node enlargement. Neurologic: Conscious and oriented. No focal neurological deficits. Psychosocial: No depression, anxiety or stress. Skin: No rashes, bruises or ecchymoses      REVIEW OF LABORATORY DATA:     Lab Results   Component Value Date    WBC 2.0 (L) 06/28/2021    HGB 10.0 (L) 06/28/2021    HCT 29.2 (L) 06/28/2021    .8 (H) 06/28/2021     (L) 06/28/2021     Lab Results   Component Value Date    IRON 30 (L) 11/29/2017    TIBC 403 11/29/2017    FERRITIN 33 04/04/2019     Lab Results   Component Value Date    NEUTROABS 1.46 (L) 06/28/2021         Chemistry        Component Value Date/Time     06/28/2021 1347    K 3.3 (L) 06/28/2021 1347     06/28/2021 1347    CO2 25 06/28/2021 1347    BUN 15 06/28/2021 1347    CREATININE 1.21 (H) 06/28/2021 1347        Component Value Date/Time    CALCIUM 9.5 06/28/2021 1347    ALKPHOS 97 06/28/2021 1347    AST 21 06/28/2021 1347    ALT 21 06/28/2021 1347    BILITOT 0.36 06/28/2021 1347         Results for Tamela Sorenson (MRN [id number]) as of 2/11/2021 09:57   Ref. Range 8/29/2017 00:05 8/30/2017 09:15 7/21/2020 10:42 8/27/2020 10:20 9/24/2020 09:47 10/22/2020 09:24 11/19/2020 10:17 12/14/2020 14:38 1/11/2021 13:51 2/8/2021 13:06   CA 27-29 Latest Ref Range: 0 - 38 U/mL   164 (H) 88 (H) 95 (H) 129 (H) 146 (H) 208 (H) 248 (H) 294 (H)       REVIEW OF RADIOLOGICAL RESULTS:        IMPRESSION:   1- Arlene  Has  T2, N0, M0 ER/MD positive and HER-2/eve negative invasive ductal carcinoma of the left breast  2- Chemotherapy: received 3 cycles of TC, stopped due to GI bleeding  3- GI bleeding , related to the GIST tumor. Currently on oral iron and hemoglobin is improving  4- on anastrozole.  We will continue, plan 5 years of therapy, completed 08/2018  5- for hot flashes, better on Effexor. 6- Recent pancreatitis, possibly biliary. MRI CT scan did not show any gallbladder stone. We will discuss with her surgeons. 7-   distal small bowel mass measuring 3.6 cm, resection shows low risk GIST. No need for adjuvant therapy, margins were negative. 8-bone metastases with compression fracture diagnosed in May/2020. Status post kyphoplasty and biopsy. 9- S/P radiation 07/2020  10- for systemic therapy, she was started on Arimidex, adding Ibrance. Due to cytopenias, dose was reduced to 100 mg/day  11-in February/2021, there was signs of progression, will plan to switch to Faslodex   12-June/2021, good response to treatment but discordant growth in one of the liver lesion, plan SBRT to the growing lesion and continue with systemic therapy      PLAN:   1. Her lab work was reviewed. 2. We discussed her doppler which was negative for thromboembolism, leg swelling has improved and managed. 3. I completed toxicity check. 4. Her bowel fluctuations persist and I recommend she try Metamucil to regulate. 5. We reviewed plan for radiation. 6. We will continue with current plan unchanged to progression. 7. We discussed plan for continued Coumadin and I will refer for monitoring at this facility. 8. Return in 4 weeks.

## 2021-07-01 NOTE — TELEPHONE ENCOUNTER
AVS from 7/1/21     Proceed with treatment per orders, return in 4 weeks with treatment and labs.   Please refer to Coumadin clinic in [facility] for possible    Tx today as scheduled    RV scheduled 7/29/21 @ 9:45am    Referral sent to coumadin    PT was given AVS and an appt schedule    Electronically signed by Derrick Sher on 7/1/2021 at 10:50 AM

## 2021-07-01 NOTE — PROGRESS NOTES
Pt here for treatment and Dr visit. Denies any problems. Tolerates very well. Will return 7/29 for Dr visit and treatment. Discharged in stable condition.

## 2021-07-01 NOTE — PATIENT INSTRUCTIONS
Proceed with treatment per orders, return in 4 weeks with treatment and labs.   Please refer to Coumadin clinic in [facility] for possible

## 2021-07-02 ENCOUNTER — APPOINTMENT (OUTPATIENT)
Dept: RADIATION ONCOLOGY | Age: 51
End: 2021-07-02
Attending: RADIOLOGY
Payer: COMMERCIAL

## 2021-07-02 ENCOUNTER — ANTI-COAG VISIT (OUTPATIENT)
Dept: PHARMACY | Age: 51
End: 2021-07-02

## 2021-07-02 RX ORDER — DULOXETIN HYDROCHLORIDE 60 MG/1
CAPSULE, DELAYED RELEASE ORAL
Qty: 30 CAPSULE | Refills: 3 | Status: SHIPPED | OUTPATIENT
Start: 2021-07-02 | End: 2021-01-01

## 2021-07-02 NOTE — PROGRESS NOTES
New referral, previously seen at [facility] Medication Management. She was seen today and the INR was 1.5. She reports the dose was adjusted and we scheduled an appointment for 7/12/21. Current warfarin dosing is 2.5 mg Monday and 5 mg on all other days.

## 2021-07-12 ENCOUNTER — HOSPITAL ENCOUNTER (OUTPATIENT)
Dept: RADIATION ONCOLOGY | Age: 51
Discharge: HOME | End: 2021-07-12
Attending: RADIOLOGY
Payer: COMMERCIAL

## 2021-07-12 ENCOUNTER — HOSPITAL ENCOUNTER (OUTPATIENT)
Dept: PHARMACY | Age: 51
Setting detail: THERAPIES SERIES
Discharge: HOME | End: 2021-07-12
Payer: COMMERCIAL

## 2021-07-12 LAB
INR BLD: 2.7
PROTIME: 32.5 SECONDS

## 2021-07-12 PROCEDURE — 77300 RADIATION THERAPY DOSE PLAN: CPT | Performed by: RADIOLOGY

## 2021-07-12 PROCEDURE — 77338 DESIGN MLC DEVICE FOR IMRT: CPT | Performed by: RADIOLOGY

## 2021-07-12 PROCEDURE — 77301 RADIOTHERAPY DOSE PLAN IMRT: CPT | Performed by: RADIOLOGY

## 2021-07-12 PROCEDURE — 85610 PROTHROMBIN TIME: CPT

## 2021-07-12 PROCEDURE — 99211 OFF/OP EST MAY X REQ PHY/QHP: CPT

## 2021-07-12 NOTE — PROGRESS NOTES
Medication Management Service, Warfarin Management  [facility] Medication Management, [phone]  Visit Date: 7/12/2021   Subjective:   Gregory Patel is a 45 y.o. female who presents to clinic today for anticoagulation monitoring and adjustment. Patient was referred for warfarin management due to  Indication:   DVT. INR goal: of 2.0-3.0. Duration of therapy: indefinite. Patient reports the following:   Adherent with regimen:  Yes  Missed or extra doses:  None   Bleeding or thromboembolic side effects: The patient has bruising, but she knows how she got the bruises. The patient reports that bruises do improve with time generally. Significant medication, dietary, alcohol, or tobacco changes: The patient started lomotil at night. Significant recent illness, disease state changes, or hospitalization:  The patient had a procedure a couple weeks ago, but nothing since. Upcoming surgeries or procedures: Will start radiation soon. Assessment and PLAN   PT/INR done in office per protocol. INR today is 2.7, therapeutic. Plan:  Instructed the patient to continue the current warfarin regimen of 2.5 mg Mon and 5 mg on all other days. Using warfarin 5 mg tablets. Recheck INR in 2 week(s). Patient verbalized understanding of dosing directions and information discussed. Dosing schedule given to patient. Progress note sent to referring office. Patient acknowledges working in consult agreement with pharmacist as referred by his/her physician.       Electronically signed by SREEDAHR [facility] on 7/12/21 at 10:51 AM EDT    For Pharmacy Admin Tracking Only     Intervention Detail:    Total # of Interventions Recommended: 0   Total # of Interventions Accepted: 0   Time Spent (min): 15

## 2021-07-19 ENCOUNTER — APPOINTMENT (OUTPATIENT)
Dept: RADIATION ONCOLOGY | Age: 51
End: 2021-07-19
Attending: RADIOLOGY
Payer: COMMERCIAL

## 2021-07-21 ENCOUNTER — HOSPITAL ENCOUNTER (OUTPATIENT)
Dept: RADIATION ONCOLOGY | Age: 51
Discharge: HOME | End: 2021-07-21
Attending: RADIOLOGY
Payer: COMMERCIAL

## 2021-07-21 PROCEDURE — 77336 RADIATION PHYSICS CONSULT: CPT | Performed by: STUDENT IN AN ORGANIZED HEALTH CARE EDUCATION/TRAINING PROGRAM

## 2021-07-21 PROCEDURE — 77386 HC NTSTY MODUL RAD TX DLVR CPLX: CPT | Performed by: RADIOLOGY

## 2021-07-21 PROCEDURE — 77014 PR CT GUIDANCE PLACEMENT RAD THERAPY FIELDS: CPT | Performed by: RADIOLOGY

## 2021-07-23 ENCOUNTER — HOSPITAL ENCOUNTER (OUTPATIENT)
Dept: RADIATION ONCOLOGY | Age: 51
Discharge: HOME | End: 2021-07-23
Attending: RADIOLOGY
Payer: COMMERCIAL

## 2021-07-23 PROCEDURE — 77386 HC NTSTY MODUL RAD TX DLVR CPLX: CPT | Performed by: RADIOLOGY

## 2021-07-23 PROCEDURE — 77014 PR CT GUIDANCE PLACEMENT RAD THERAPY FIELDS: CPT | Performed by: RADIOLOGY

## 2021-07-26 ENCOUNTER — HOSPITAL ENCOUNTER (OUTPATIENT)
Dept: RADIATION ONCOLOGY | Age: 51
Discharge: HOME | End: 2021-07-26
Attending: STUDENT IN AN ORGANIZED HEALTH CARE EDUCATION/TRAINING PROGRAM
Payer: COMMERCIAL

## 2021-07-26 ENCOUNTER — HOSPITAL ENCOUNTER (OUTPATIENT)
Age: 51
Discharge: HOME | End: 2021-07-26
Payer: COMMERCIAL

## 2021-07-26 ENCOUNTER — HOSPITAL ENCOUNTER (OUTPATIENT)
Dept: PHARMACY | Age: 51
Setting detail: THERAPIES SERIES
Discharge: HOME | End: 2021-07-26
Payer: COMMERCIAL

## 2021-07-26 DIAGNOSIS — C50.211 BILATERAL MALIGNANT NEOPLASM OF UPPER INNER QUADRANT OF BREAST IN FEMALE, UNSPECIFIED ESTROGEN RECEPTOR STATUS (HCC): ICD-10-CM

## 2021-07-26 DIAGNOSIS — Z86.718 HISTORY OF DVT (DEEP VEIN THROMBOSIS): Primary | ICD-10-CM

## 2021-07-26 DIAGNOSIS — C50.212 BILATERAL MALIGNANT NEOPLASM OF UPPER INNER QUADRANT OF BREAST IN FEMALE, UNSPECIFIED ESTROGEN RECEPTOR STATUS (HCC): ICD-10-CM

## 2021-07-26 LAB
ABSOLUTE EOS #: 0.02 K/UL (ref 0–0.4)
ABSOLUTE IMMATURE GRANULOCYTE: ABNORMAL K/UL (ref 0–0.3)
ABSOLUTE LYMPH #: 0.4 K/UL (ref 1–4.8)
ABSOLUTE MONO #: 0.09 K/UL (ref 0.1–0.8)
ALBUMIN SERPL-MCNC: 4.1 G/DL (ref 3.5–5.2)
ALBUMIN/GLOBULIN RATIO: 1.5 (ref 1–2.5)
ALP BLD-CCNC: 105 U/L (ref 35–104)
ALT SERPL-CCNC: 23 U/L (ref 5–33)
ANION GAP SERPL CALCULATED.3IONS-SCNC: 11 MMOL/L (ref 9–17)
AST SERPL-CCNC: 27 U/L
BASOPHILS # BLD: 1 % (ref 0–2)
BASOPHILS ABSOLUTE: 0.02 K/UL (ref 0–0.2)
BILIRUB SERPL-MCNC: 0.48 MG/DL (ref 0.3–1.2)
BUN BLDV-MCNC: 10 MG/DL (ref 6–20)
BUN/CREAT BLD: ABNORMAL (ref 9–20)
CALCIUM SERPL-MCNC: 9.1 MG/DL (ref 8.6–10.4)
CHLORIDE BLD-SCNC: 104 MMOL/L (ref 98–107)
CO2: 22 MMOL/L (ref 20–31)
CREAT SERPL-MCNC: 1 MG/DL (ref 0.5–0.9)
DIFFERENTIAL TYPE: ABNORMAL
EOSINOPHILS RELATIVE PERCENT: 1 % (ref 1–4)
GFR AFRICAN AMERICAN: >60 ML/MIN
GFR NON-AFRICAN AMERICAN: 58 ML/MIN
GFR SERPL CREATININE-BSD FRML MDRD: ABNORMAL ML/MIN/{1.73_M2}
GFR SERPL CREATININE-BSD FRML MDRD: ABNORMAL ML/MIN/{1.73_M2}
GLUCOSE BLD-MCNC: 115 MG/DL (ref 70–99)
HCT VFR BLD CALC: 31.4 % (ref 36–46)
HEMOGLOBIN: 10.8 G/DL (ref 12–16)
IMMATURE GRANULOCYTES: ABNORMAL %
INR BLD: 3.2
LYMPHOCYTES # BLD: 18 % (ref 24–44)
MCH RBC QN AUTO: 37 PG (ref 26–34)
MCHC RBC AUTO-ENTMCNC: 34.5 G/DL (ref 31–37)
MCV RBC AUTO: 107.3 FL (ref 80–100)
MONOCYTES # BLD: 4 % (ref 1–7)
MORPHOLOGY: NORMAL
NRBC AUTOMATED: ABNORMAL PER 100 WBC
PDW BLD-RTO: 14.4 % (ref 12.5–15.4)
PLATELET # BLD: 126 K/UL (ref 140–450)
PLATELET ESTIMATE: ABNORMAL
PMV BLD AUTO: 7.6 FL (ref 6–12)
POTASSIUM SERPL-SCNC: 3.5 MMOL/L (ref 3.7–5.3)
PROTIME: 38.5 SECONDS
RBC # BLD: 2.93 M/UL (ref 4–5.2)
RBC # BLD: ABNORMAL 10*6/UL
SEG NEUTROPHILS: 76 % (ref 36–66)
SEGMENTED NEUTROPHILS ABSOLUTE COUNT: 1.67 K/UL (ref 1.8–7.7)
SODIUM BLD-SCNC: 137 MMOL/L (ref 135–144)
TOTAL PROTEIN: 6.8 G/DL (ref 6.4–8.3)
WBC # BLD: 2.2 K/UL (ref 3.5–11)
WBC # BLD: ABNORMAL 10*3/UL

## 2021-07-26 PROCEDURE — 77014 PR CT GUIDANCE PLACEMENT RAD THERAPY FIELDS: CPT | Performed by: STUDENT IN AN ORGANIZED HEALTH CARE EDUCATION/TRAINING PROGRAM

## 2021-07-26 PROCEDURE — 85610 PROTHROMBIN TIME: CPT

## 2021-07-26 PROCEDURE — 36415 COLL VENOUS BLD VENIPUNCTURE: CPT

## 2021-07-26 PROCEDURE — 99212 OFFICE O/P EST SF 10 MIN: CPT

## 2021-07-26 PROCEDURE — 80053 COMPREHEN METABOLIC PANEL: CPT

## 2021-07-26 PROCEDURE — 77386 HC NTSTY MODUL RAD TX DLVR CPLX: CPT | Performed by: STUDENT IN AN ORGANIZED HEALTH CARE EDUCATION/TRAINING PROGRAM

## 2021-07-26 PROCEDURE — 85025 COMPLETE CBC W/AUTO DIFF WBC: CPT

## 2021-07-26 PROCEDURE — 86300 IMMUNOASSAY TUMOR CA 15-3: CPT

## 2021-07-26 NOTE — PROGRESS NOTES
Medication Management Service, Warfarin Management  [facility] Medication Management, [phone]  Visit Date: 7/26/2021   Subjective:   Gayatri Helm is a 45 y.o. female who presents to clinic today for anticoagulation monitoring and adjustment. Patient was referred for warfarin management due to  Indication:   DVT. INR goal: of 2.0-3.0. Duration of therapy: indefinite. Patient reports the following:   Adherent with regimen:  Yes  Missed or extra doses:  None   Bleeding or thromboembolic side effects:  None  Significant medication, dietary, alcohol, or tobacco changes:  None  Significant recent illness, disease state changes, or hospitalization:  The patient is feeling tired since starting radiation. Upcoming surgeries or procedures:  None           Assessment and PLAN   PT/INR done in office per protocol. INR today is 3.2, supratherapeutic. The patient reports she has had serious diarrhea related to her chemo. The patient does take anti-diarrheal medication, but these last couple days have been worse. Plan:  Instructed the patient to take a decreased dose of 2.5 mg today as normal and tomorrow, then continue with the current warfarin regimen of 2.5 mg Mon and 5 mg on all other days. Using warfarin 5 mg tablets. Recheck INR in 2 week(s). Patient verbalized understanding of dosing directions and information discussed. Dosing schedule given to patient. Progress note sent to referring office. Patient acknowledges working in consult agreement with pharmacist as referred by his/her physician.       Electronically signed by Kenyon Mike [address] [place] [address] on 7/26/21 at 10:12 AM EDT    For Pharmacy Admin Tracking Only     Intervention Detail: Dose Adjustment: 1, reason: Therapy Optimization   Total # of Interventions Recommended: 1   Total # of Interventions Accepted: 1   Time Spent (min): 15

## 2021-07-28 ENCOUNTER — HOSPITAL ENCOUNTER (OUTPATIENT)
Dept: RADIATION ONCOLOGY | Age: 51
Discharge: HOME | End: 2021-07-28
Attending: RADIOLOGY
Payer: COMMERCIAL

## 2021-07-28 ENCOUNTER — HOSPITAL ENCOUNTER (OUTPATIENT)
Dept: RADIATION ONCOLOGY | Age: 51
Discharge: HOME | End: 2021-07-28
Attending: STUDENT IN AN ORGANIZED HEALTH CARE EDUCATION/TRAINING PROGRAM
Payer: COMMERCIAL

## 2021-07-28 VITALS
SYSTOLIC BLOOD PRESSURE: 121 MMHG | HEART RATE: 72 BPM | DIASTOLIC BLOOD PRESSURE: 79 MMHG | TEMPERATURE: 97.8 F | RESPIRATION RATE: 18 BRPM | OXYGEN SATURATION: 97 %

## 2021-07-28 DIAGNOSIS — Z17.0 MALIGNANT NEOPLASM OF UPPER-OUTER QUADRANT OF LEFT BREAST IN FEMALE, ESTROGEN RECEPTOR POSITIVE (HCC): Primary | ICD-10-CM

## 2021-07-28 DIAGNOSIS — C78.7 LIVER METASTASES (HCC): ICD-10-CM

## 2021-07-28 DIAGNOSIS — C50.412 MALIGNANT NEOPLASM OF UPPER-OUTER QUADRANT OF LEFT BREAST IN FEMALE, ESTROGEN RECEPTOR POSITIVE (HCC): Primary | ICD-10-CM

## 2021-07-28 LAB — CA 27-29: 89 U/ML (ref 0–38)

## 2021-07-28 PROCEDURE — 77336 RADIATION PHYSICS CONSULT: CPT | Performed by: RADIOLOGY

## 2021-07-28 PROCEDURE — 77386 HC NTSTY MODUL RAD TX DLVR CPLX: CPT | Performed by: STUDENT IN AN ORGANIZED HEALTH CARE EDUCATION/TRAINING PROGRAM

## 2021-07-28 PROCEDURE — 77014 PR CT GUIDANCE PLACEMENT RAD THERAPY FIELDS: CPT | Performed by: STUDENT IN AN ORGANIZED HEALTH CARE EDUCATION/TRAINING PROGRAM

## 2021-07-28 NOTE — PROGRESS NOTES
Gennette Landau  7/28/2021  Wt Readings from Last 3 Encounters:   07/01/21 274 lb 1.6 oz (124.3 kg)   06/03/21 273 lb 6.4 oz (124 kg)   05/25/21 270 lb (122.5 kg)     There is no height or weight on file to calculate BMI. Treatment Area:liver     Patient was seen today for weekly visit. Comfort Alteration  Fatigue: Moderate      Nutritional Alteration  Anorexia: No   Nausea: Yes   Vomiting: No     Elimination Alterations  Constipation: no  Diarrhea:  yes  Bowel Incontinence: No  Urinary Incontinence: No    Skin Alteration   Sensation:intact     Radiation Dermatitis:  Intact [x]     Erythema  []     Discoloration  []     Rash []     Dry desquamation  []     Moist desquamation []       Emotional  Coping: somewhat effective      Injury, potential bleeding or infection: n/a    Lab Results   Component Value Date    WBC 2.2 (L) 07/26/2021     (L) 07/26/2021         /79   Pulse 72   Temp 97.8 °F (36.6 °C)   Resp 18   LMP 03/17/2014   SpO2 97%   Patient Currently in Pain: Denies                 Assessment/Plan: Patient was seen today for weekly visit. Dr Jd Hobbs notified and examined pt.  Pt will f/u with RO in 1 month       Ana Jamison RN

## 2021-07-28 NOTE — PROGRESS NOTES
Radiation Oncology On-Treatment Visit:     Fraction # 4 / 5 (40 Gy)    Diagnosis:  Ms. Rowena Lainez is a 45 y.o. female with stage IV metastatic breast carcinoma with liver metastasis     Cancer Staging  Malignant neoplasm of upper-inner quadrant of female breast [facility])  Staging form: Breast, AJCC 7th Edition  - Clinical stage from 2020: Stage IV (T2, N0, M1) - Signed by Neda Kate MD on 2020    Treatment course: SBRT to liver metastsis    Progress note:  Ms. Rowena Lainez was seen and evaluated. Patient notes intermittent nausea and diarrhea. She is otherwise doing well. Physical exam:   VITAL SIGNS: /79   Pulse 72   Temp 97.8 °F (36.6 °C)   Resp 18   LMP 2014   SpO2 97%   ECO Symptomatic but completely ambulatory   General: Middle aged female answering questions appropriately. Plan:  · Continue radiation therapy as planned. Post treatment visit in 1 month after the completion of radiation Friday. · Nausea/diahrea - advised patient to use zofran a few days back, which is helping both the nausea and diarrhea. · I have checked the imaging performed to date, which confirm appropriate positioning.

## 2021-07-29 ENCOUNTER — TELEPHONE (OUTPATIENT)
Dept: ONCOLOGY | Age: 51
End: 2021-07-29

## 2021-07-29 ENCOUNTER — HOSPITAL ENCOUNTER (OUTPATIENT)
Dept: INFUSION THERAPY | Age: 51
Discharge: HOME | End: 2021-07-29
Payer: COMMERCIAL

## 2021-07-29 ENCOUNTER — OFFICE VISIT (OUTPATIENT)
Dept: ONCOLOGY | Age: 51
End: 2021-07-29
Payer: COMMERCIAL

## 2021-07-29 VITALS
DIASTOLIC BLOOD PRESSURE: 64 MMHG | TEMPERATURE: 97.9 F | SYSTOLIC BLOOD PRESSURE: 96 MMHG | WEIGHT: 273.6 LBS | BODY MASS INDEX: 40.4 KG/M2 | HEART RATE: 76 BPM

## 2021-07-29 DIAGNOSIS — Z17.0 MALIGNANT NEOPLASM OF UPPER-OUTER QUADRANT OF LEFT BREAST IN FEMALE, ESTROGEN RECEPTOR POSITIVE (HCC): Primary | ICD-10-CM

## 2021-07-29 DIAGNOSIS — C79.51 BONE METASTASIS (HCC): ICD-10-CM

## 2021-07-29 DIAGNOSIS — C50.212 BILATERAL MALIGNANT NEOPLASM OF UPPER INNER QUADRANT OF BREAST IN FEMALE, UNSPECIFIED ESTROGEN RECEPTOR STATUS (HCC): Primary | ICD-10-CM

## 2021-07-29 DIAGNOSIS — C50.211 BILATERAL MALIGNANT NEOPLASM OF UPPER INNER QUADRANT OF BREAST IN FEMALE, UNSPECIFIED ESTROGEN RECEPTOR STATUS (HCC): Primary | ICD-10-CM

## 2021-07-29 DIAGNOSIS — C78.7 LIVER METASTASES (HCC): ICD-10-CM

## 2021-07-29 DIAGNOSIS — C49.A3 GIST (GASTROINTESTINAL STROMAL TUMOR) OF SMALL BOWEL, MALIGNANT (HCC): ICD-10-CM

## 2021-07-29 DIAGNOSIS — C50.412 MALIGNANT NEOPLASM OF UPPER-OUTER QUADRANT OF LEFT BREAST IN FEMALE, ESTROGEN RECEPTOR POSITIVE (HCC): Primary | ICD-10-CM

## 2021-07-29 PROCEDURE — G8417 CALC BMI ABV UP PARAM F/U: HCPCS | Performed by: INTERNAL MEDICINE

## 2021-07-29 PROCEDURE — 99211 OFF/OP EST MAY X REQ PHY/QHP: CPT | Performed by: INTERNAL MEDICINE

## 2021-07-29 PROCEDURE — 96401 CHEMO ANTI-NEOPL SQ/IM: CPT

## 2021-07-29 PROCEDURE — 96402 CHEMO HORMON ANTINEOPL SQ/IM: CPT

## 2021-07-29 PROCEDURE — 3017F COLORECTAL CA SCREEN DOC REV: CPT | Performed by: INTERNAL MEDICINE

## 2021-07-29 PROCEDURE — 96372 THER/PROPH/DIAG INJ SC/IM: CPT

## 2021-07-29 PROCEDURE — 6360000002 HC RX W HCPCS: Performed by: INTERNAL MEDICINE

## 2021-07-29 PROCEDURE — 1036F TOBACCO NON-USER: CPT | Performed by: INTERNAL MEDICINE

## 2021-07-29 PROCEDURE — G8427 DOCREV CUR MEDS BY ELIG CLIN: HCPCS | Performed by: INTERNAL MEDICINE

## 2021-07-29 PROCEDURE — 99214 OFFICE O/P EST MOD 30 MIN: CPT | Performed by: INTERNAL MEDICINE

## 2021-07-29 RX ORDER — LAMOTRIGINE 25 MG/1
250 TABLET ORAL ONCE
Status: COMPLETED | OUTPATIENT
Start: 2021-07-29 | End: 2021-07-29

## 2021-07-29 RX ORDER — ONDANSETRON 4 MG/1
4 TABLET, ORALLY DISINTEGRATING ORAL EVERY 8 HOURS PRN
Qty: 30 TABLET | Refills: 1 | Status: SHIPPED | OUTPATIENT
Start: 2021-07-29 | End: 2021-01-01

## 2021-07-29 RX ADMIN — FULVESTRANT 250 MG: 50 INJECTION INTRAMUSCULAR at 11:14

## 2021-07-29 RX ADMIN — DENOSUMAB 120 MG: 120 INJECTION SUBCUTANEOUS at 11:15

## 2021-07-29 NOTE — PROGRESS NOTES
Chief Complaint   Patient presents with    Follow-up     review status of disease    Back Pain       DIAGNOSIS:   1- T2, N0, M0 ER positive OK positive and HER-2/eve negative invasive ductal carcinoma of the left breast ( inner upper quadrant)  2- Repeated GI bleeding greetings 2 symptomatic anemia, despite extensive GI workup, source of bleeding was never found. 3- finally a small mass was seen in the jejunum. 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 Margins negative   4- compression fractures and bone lesions. Likely metastatic cancer (5/2020)     CURRENT THERAPY:  1- Mastectomy and axillary sampling  2- adjuvant chemotherapy with Taxotere and Cytoxan starting 5/16/2013. Chemotherapy stopped after 3 cycles because of ongoing GI bleeding and symptomatic anemia  3- Conservative management for GI bleeding. Leading completely stopped after the discontinuation of chemotherapy  4- started tamoxifen after chemotherapy was completed. 8/2013. 5- transitioned to Arimidex 6/2014, completed 08/2018  6- GIST resection, resected 9/2017   7-status post kyphoplasty and biopsy of bone metastases 5/2020. The tumor was minimally ER positive at 5% and OK negative. 8- rodding of the left femur completed. Plan for  Radiation  9- Systemic therapy, plan Arimidex plus Ibrance  10 - XRT completed 07/2020  11-current treatment starting 7/30/2020, monthly Xgeva, oral Ibrance and Arimidex  12-2/2021, progression of disease, plan to switch to Faslodex plus CDK inhibitor abemaciclib, continue Xgeva    BRIEF CASE HISTORY:   Aliyah Sky is a very pleasant 45 y.o. who felt a mass in the medial aspect of her left breast, around the 9:00 position. Patient sought medical attention and mammogram and ultrasound were done. Showing an irregular, high-density mass with indistinct margins containing pleomorphic calcifications in the lower inner quadrant of the left breast close to 9:00 location this mass measures are mammogram 2.7 x 2.6 x 2 cm. Subsequently targeted ultrasound was performed showing hypoechoic, irregular, taller than wider, solid mass at 9:00 with posterior acoustic shadowing. The calcifications were visible on ultrasound . On ultrasound the solid mass measured 2.4 x 2.6 x 1 cm. Image guided biopsy of the mass was done and showed invasive ductal carcinoma with surrounding DCIS (cribriform type) the tumor was ER and DC positive and HER-2/eve negative with a fish ratio 1.1. The patient was referred to us and she also was seen by radiation oncology , She  decided on proceeding with total mastectomy and breast reconstruction . Allergy showed 2.5 cm intermediate grade invasive ductal carcinoma, [place] lymph node was negative. The tumor was ER/DC positive and HER-2/eve negative. We discussed options of adjuvant therapy including chemotherapy and hormone therapy, The patient decided to proceed with adjuvant chemotherapy without undergone a Oncotype study. TC chemotherapy X4 cycles is planned  After 3 cycles, and presented with persistent GI bleeding leading to symptomatic anemia and syncope, the patient's condition was Serious enough to warrant repeated hospital admissions. Repeated GI workup including endoscopic evaluation and Tagged Red cell scans were negative. We decided to treat her conservatively and chemotherapy was stopped after 3 cycles. After that All her symptoms improved and she was started on tamoxifen. After one year, she was transitioned to anastrozole since she had no periods and hormonal testing showed post menopausal state. 08/2017  She had been having abdominal pain and went to [facility] for consult. She had work up which showed mass in the colon showed subtle changes in the mid-small intestine, CT enterography showed 3.6CM mass in the distal small bowel. She was also hospitalized in the interim with pancreatitis.    The patient underwent resection of jejunal mass and pathology showed low risk GIST measuring complete. She has new pain in the right shoulder blade. She thinks that this is from the radiation table but we will exclude progressive metastatic disease. She has diffuse abdominal discomfort, especially epigastric pain or heartburn. She has ongoing diarrhea related to Verzenio unchanged from before. She also has heartburn. She has been using Zofran with PPI and the combination has been helping    GYNECOLOGICAL HISTORY  Menarche at age 14. He is premenopausal with regular periods. +2. She used birth control minimally. PAST MEDICAL HISTORY: has a past medical history of Anemia, Anxiety, Atrial fibrillation ([address] Breast cancer ([address] Carpal tunnel syndrome, Depression, GI bleed, History of blood transfusion, Hx of blood clots, Hypertension, Liver metastases ([address] Lymphedema, Neurologic cardiac syncope, Obesity, Pain, Sleep apnea, ANA PAULA (stress urinary incontinence, female), Tachycardia, Varicella, and Varicella without complication. PAST SURGICAL HISTORY: has a past surgical history that includes Shoulder arthroscopy (); Colonoscopy (); Dilation and curettage of uterus; Knee arthroscopy; fracture surgery; Tunneled venous port placement (Right); Upper gastrointestinal endoscopy (2013); Colonoscopy (2013); Mastectomy (Left, 13); other surgical history (1969); Breast biopsy (Left, 3/21/13); ileoscopy (10/12/05); Hysterectomy; denia and bso (cervix removed) (14); Enterocele repair (14); Cystoscopy (14); bladder suspension (14); Colonoscopy (10/12/2005); Upper gastrointestinal endoscopy (2015); tumor removal; Cosmetic surgery; Cholecystectomy; orif femur decompression (Left); Fixation Kyphoplasty; and back surgery.      CURRENT MEDICATIONS:  has a current medication list which includes the following prescription(s): duloxetine, abemaciclib, klor-con m20, ondansetron, calcium-vitamin d, warfarin, diazepam, losartan, omeprazole, hydrochlorothiazide, amlodipine, and metoprolol tartrate, and the following Facility-Administered Medications: fulvestrant, fulvestrant, denosumab, sodium chloride, and potassium chloride. ALLERGIES:  is allergic to adhesive tape. FAMILY HISTORY: Negative for any hematological or oncological conditions. Specifically no history of breast cancer in the family    SOCIAL HISTORY:  reports that she quit smoking about 2 years ago. Her smoking use included Cigarettes. She smoked About 20 years. She has never used smokeless tobacco. She reports that she does not drink alcohol or use illicit drugs. REVIEW OF SYSTEMS:   General: No fever or night sweats. Weight is stable. No mouth sores  Eyes: No double or blurred vision. Ears: No tinnitus or hearing problem,    Throat: No dysphagia or sore throat   Respiratory: No chest pain, shortness of breath at rest, no hemoptysis. She has exercise intolerance   Cardiovascular: Denies chest pain, PND or orthopnea, or palpitations. +LE edema improved  Gastrointestinal: No nausea or vomiting;+ epigastric pain and heartburn, +freuqent diarrhea with cramping and constipation  Genitourinary: Denies dysuria, hematuria, frequency, urgency or incontinence. No vaginal bleeding. Neurological: Denies decreased LOC, no sensory or motor focal deficits. +headaches - unchanged, mild  Musculoskeletal: No arthralgia or joint swelling. +back pain, right chest wall pain, and right shoulder blade pain   Skin: There are no rashes or bleeding. +hair thinning  Psychiatric: ++ anxiety, depression as discussed. Endocrine: No diabetes or thyroid disease. Hematologic: No bleeding, no adenopathy. PHYSICAL EXAM:  The patient is not in acute distress. Vital signs: Blood pressure 96/64, pulse 76, temperature 97.9 °F (36.6 °C), temperature source Oral, weight 273 lb 9.6 oz (124.1 kg), last menstrual period 03/17/2014, not currently breastfeeding. HEENT:  Eyes are normal. Ears, nose is congested, no bleeding. Neck: Supple. No lymph node enlargement. No thyroid enlargement. Trachea is centrally located. Chest:  Clear to auscultation. No wheezes or crepitations. Breast:  Right: No masses, no adenopathy. No skin or nippple abnormalities. Left: left-sided mastectomy, surgery site is healed completely, no adenopathy  Heart: Regular sinus rhythm. Abdomen: Soft, nontender. No hepatosplenomegaly. No masses. Extremities:  Mild edema right side. Lymph Nodes:  No cervical, axillary or inguinal lymph node enlargement. Neurologic: Conscious and oriented. No focal neurological deficits. Psychosocial: No depression, anxiety or stress. Skin: No rashes, bruises or ecchymoses      REVIEW OF LABORATORY DATA:     Lab Results   Component Value Date    WBC 2.2 (L) 07/26/2021    HGB 10.8 (L) 07/26/2021    HCT 31.4 (L) 07/26/2021    .3 (H) 07/26/2021     (L) 07/26/2021     Lab Results   Component Value Date    IRON 30 (L) 11/29/2017    TIBC 403 11/29/2017    FERRITIN 33 04/04/2019     Lab Results   Component Value Date    NEUTROABS 1.67 (L) 07/26/2021         Chemistry        Component Value Date/Time     07/26/2021 1005    K 3.5 (L) 07/26/2021 1005     07/26/2021 1005    CO2 22 07/26/2021 1005    BUN 10 07/26/2021 1005    CREATININE 1.00 (H) 07/26/2021 1005        Component Value Date/Time    CALCIUM 9.1 07/26/2021 1005    ALKPHOS 105 (H) 07/26/2021 1005    AST 27 07/26/2021 1005    ALT 23 07/26/2021 1005    BILITOT 0.48 07/26/2021 1005         Results for Annie Rordigues (MRN [id number]) as of 2/11/2021 09:57   Ref.  Range 8/29/2017 00:05 8/30/2017 09:15 7/21/2020 10:42 8/27/2020 10:20 9/24/2020 09:47 10/22/2020 09:24 11/19/2020 10:17 12/14/2020 14:38 1/11/2021 13:51 2/8/2021 13:06   CA 27-29 Latest Ref Range: 0 - 38 U/mL   164 (H) 88 (H) 95 (H) 129 (H) 146 (H) 208 (H) 248 (H) 294 (H)       REVIEW OF RADIOLOGICAL RESULTS:        IMPRESSION:   1- Arlene  Has  T2, N0, M0 ER/AL positive and HER-2/eve negative invasive ductal carcinoma of the left breast  2- Chemotherapy: received 3 cycles of TC, stopped due to GI bleeding  3- GI bleeding , related to the GIST tumor. Currently on oral iron and hemoglobin is improving  4- on anastrozole. We will continue, plan 5 years of therapy, completed 08/2018  5- for hot flashes, better on Effexor. 6- Recent pancreatitis, possibly biliary. MRI CT scan did not show any gallbladder stone. We will discuss with her surgeons. 7-   distal small bowel mass measuring 3.6 cm, resection shows low risk GIST. No need for adjuvant therapy, margins were negative. 8-bone metastases with compression fracture diagnosed in May/2020. Status post kyphoplasty and biopsy. 9- S/P radiation 07/2020  10- for systemic therapy, she was started on Arimidex, adding Ibrance. Due to cytopenias, dose was reduced to 100 mg/day  11-in February/2021, there was signs of progression, will plan to switch to Faslodex   12-June/2021, good response to treatment but discordant growth in one of the liver lesion, plan SBRT to the growing lesion and continue with systemic therapy      PLAN:   1. The patient is handling radiation fairly well  2. We will obtain an x-ray of the right shoulder area  3. We will plan to continue on with therapy without any changes, will reevaluate her in 4 weeks.   Plan treatment until progression

## 2021-07-29 NOTE — PROGRESS NOTES
Pt here for C7D1 Faslodex and Xgeva injection. Pt seen by Dr Edita Conner prior to treatment, refer to his note. . Pt was treated without incident and discharged in stable condition. Pt will return in 1 month for next Faslodex and Xgeva.

## 2021-07-29 NOTE — PATIENT INSTRUCTIONS
Continue with fulvestrant and denosumab every 4 weeks.   Return in 4 weeks with treatment and labs, CBC, CMP and CA 27-29  Okay to double book this patient  Need  x-ray of the right shoulder tomorrow please

## 2021-07-30 ENCOUNTER — HOSPITAL ENCOUNTER (OUTPATIENT)
Age: 51
Discharge: HOME | End: 2021-08-01
Payer: COMMERCIAL

## 2021-07-30 ENCOUNTER — HOSPITAL ENCOUNTER (OUTPATIENT)
Dept: RADIATION ONCOLOGY | Age: 51
Discharge: HOME | End: 2021-07-30
Attending: STUDENT IN AN ORGANIZED HEALTH CARE EDUCATION/TRAINING PROGRAM
Payer: COMMERCIAL

## 2021-07-30 ENCOUNTER — HOSPITAL ENCOUNTER (OUTPATIENT)
Dept: GENERAL RADIOLOGY | Age: 51
Discharge: HOME | End: 2021-08-01
Payer: COMMERCIAL

## 2021-07-30 DIAGNOSIS — C50.412 MALIGNANT NEOPLASM OF UPPER-OUTER QUADRANT OF LEFT BREAST IN FEMALE, ESTROGEN RECEPTOR POSITIVE (HCC): ICD-10-CM

## 2021-07-30 DIAGNOSIS — C79.51 BONE METASTASIS (HCC): ICD-10-CM

## 2021-07-30 DIAGNOSIS — Z17.0 MALIGNANT NEOPLASM OF UPPER-OUTER QUADRANT OF LEFT BREAST IN FEMALE, ESTROGEN RECEPTOR POSITIVE (HCC): ICD-10-CM

## 2021-07-30 PROCEDURE — 77014 PR CT GUIDANCE PLACEMENT RAD THERAPY FIELDS: CPT | Performed by: STUDENT IN AN ORGANIZED HEALTH CARE EDUCATION/TRAINING PROGRAM

## 2021-07-30 PROCEDURE — 77427 RADIATION TX MANAGEMENT X5: CPT | Performed by: STUDENT IN AN ORGANIZED HEALTH CARE EDUCATION/TRAINING PROGRAM

## 2021-07-30 PROCEDURE — 77386 HC NTSTY MODUL RAD TX DLVR CPLX: CPT | Performed by: STUDENT IN AN ORGANIZED HEALTH CARE EDUCATION/TRAINING PROGRAM

## 2021-07-30 PROCEDURE — 73010 X-RAY EXAM OF SHOULDER BLADE: CPT

## 2021-08-05 ENCOUNTER — TELEPHONE (OUTPATIENT)
Dept: ONCOLOGY | Age: 51
End: 2021-08-05

## 2021-08-05 NOTE — TELEPHONE ENCOUNTER
Called and spoke with Joaquín Alcantara, reminded her who I am and checked in on her. She relates that latest radiation therapy is completed, she is having some pain but not too bad. Her scapula xray does not show anything acute. She relates she is fatigued. Denied any needs at present. She relates that she believes triage is working on [employer]. I will check with triage regarding Tracey Atkinson. Encouraged Joaquín Alcantara to call as needed.

## 2021-08-09 ENCOUNTER — HOSPITAL ENCOUNTER (OUTPATIENT)
Dept: PHARMACY | Age: 51
Setting detail: THERAPIES SERIES
Discharge: HOME | End: 2021-08-09
Payer: COMMERCIAL

## 2021-08-09 DIAGNOSIS — Z86.718 HISTORY OF DVT (DEEP VEIN THROMBOSIS): Primary | ICD-10-CM

## 2021-08-09 LAB
INR BLD: 3.3
PROTIME: 39.3 SECONDS

## 2021-08-09 PROCEDURE — 99212 OFFICE O/P EST SF 10 MIN: CPT

## 2021-08-09 PROCEDURE — 85610 PROTHROMBIN TIME: CPT

## 2021-08-09 NOTE — PROGRESS NOTES
Medication Management Service, Warfarin Management  [facility] Medication Management, [phone]  Visit Date: 8/9/2021   Subjective:   Satish Meadows is a 45 y.o. female who presents to clinic today for anticoagulation monitoring and adjustment. Patient was referred for warfarin management due to  Indication:   DVT. INR goal: of 2.0-3.0. Duration of therapy: indefinite. Patient reports the following:   Adherent with regimen:  Yes  Missed or extra doses:  None   Bleeding or thromboembolic side effects: The patient reports she has some bruising, which is common. The bruise on her right arm does appear to be fading. Significant medication, dietary, alcohol, or tobacco changes:  None  Significant recent illness, disease state changes, or hospitalization:  None  Upcoming surgeries or procedures:  None           Assessment and PLAN   PT/INR done in office per protocol. INR today is 3.3, supratherapeutic. Plan:  Instructed the patient to start a decreased warfarin regimen of 2.5 mg Mon, Thur and 5 mg on all other days. Using warfarin 5 mg tablets. Recheck INR in 2 week(s). Patient verbalized understanding of dosing directions and information discussed. Dosing schedule given to patient. Progress note sent to referring office. Patient acknowledges working in consult agreement with pharmacist as referred by his/her physician.       Electronically signed by SREEDHAR Ibrahim [facility] on 8/9/21 at 10:05 AM EDT    For Pharmacy Admin Tracking Only     Intervention Detail: Dose Adjustment: 1, reason: Therapy Optimization   Total # of Interventions Recommended: 1   Total # of Interventions Accepted: 1   Time Spent (min): 20

## 2021-08-13 RX ORDER — LAMOTRIGINE 25 MG/1
250 TABLET ORAL ONCE
Status: CANCELLED | OUTPATIENT
Start: 2021-01-01 | End: 2021-01-01

## 2021-08-13 RX ORDER — DIPHENHYDRAMINE HYDROCHLORIDE 50 MG/ML
50 INJECTION INTRAMUSCULAR; INTRAVENOUS ONCE
Status: CANCELLED | OUTPATIENT
Start: 2021-01-01 | End: 2021-01-01

## 2021-08-13 RX ORDER — METHYLPREDNISOLONE SODIUM SUCCINATE 125 MG/2ML
125 INJECTION, POWDER, LYOPHILIZED, FOR SOLUTION INTRAMUSCULAR; INTRAVENOUS ONCE
Status: CANCELLED | OUTPATIENT
Start: 2021-01-01 | End: 2021-01-01

## 2021-08-13 RX ORDER — SODIUM CHLORIDE 9 MG/ML
INJECTION, SOLUTION INTRAVENOUS CONTINUOUS
Status: CANCELLED | OUTPATIENT
Start: 2021-01-01

## 2021-08-13 RX ORDER — EPINEPHRINE 1 MG/ML
0.3 INJECTION, SOLUTION, CONCENTRATE INTRAVENOUS PRN
Status: CANCELLED | OUTPATIENT
Start: 2021-01-01

## 2021-08-14 ENCOUNTER — APPOINTMENT (OUTPATIENT)
Dept: GENERAL RADIOLOGY | Age: 51
End: 2021-08-14
Payer: COMMERCIAL

## 2021-08-14 ENCOUNTER — HOSPITAL ENCOUNTER (EMERGENCY)
Age: 51
Discharge: HOME | End: 2021-08-14
Attending: EMERGENCY MEDICINE
Payer: COMMERCIAL

## 2021-08-14 VITALS
TEMPERATURE: 98.2 F | DIASTOLIC BLOOD PRESSURE: 66 MMHG | RESPIRATION RATE: 16 BRPM | OXYGEN SATURATION: 97 % | SYSTOLIC BLOOD PRESSURE: 121 MMHG | HEIGHT: 69 IN | HEART RATE: 66 BPM | BODY MASS INDEX: 40.73 KG/M2 | WEIGHT: 275 LBS

## 2021-08-14 DIAGNOSIS — D61.818 PANCYTOPENIA (HCC): ICD-10-CM

## 2021-08-14 DIAGNOSIS — S50.11XA CONTUSION OF RIGHT FOREARM, INITIAL ENCOUNTER: Primary | ICD-10-CM

## 2021-08-14 LAB
ABSOLUTE EOS #: 0.09 K/UL (ref 0–0.4)
ABSOLUTE IMMATURE GRANULOCYTE: ABNORMAL K/UL (ref 0–0.3)
ABSOLUTE LYMPH #: 0.25 K/UL (ref 1–4.8)
ABSOLUTE MONO #: 0.44 K/UL (ref 0.1–1.2)
ANION GAP SERPL CALCULATED.3IONS-SCNC: 13 MMOL/L (ref 9–17)
BASOPHILS # BLD: 2 % (ref 0–2)
BASOPHILS ABSOLUTE: 0.05 K/UL (ref 0–0.2)
BUN BLDV-MCNC: 17 MG/DL (ref 6–20)
BUN/CREAT BLD: ABNORMAL (ref 9–20)
CALCIUM SERPL-MCNC: 8.9 MG/DL (ref 8.6–10.4)
CHLORIDE BLD-SCNC: 101 MMOL/L (ref 98–107)
CO2: 20 MMOL/L (ref 20–31)
CREAT SERPL-MCNC: 1.15 MG/DL (ref 0.5–0.9)
DIFFERENTIAL TYPE: ABNORMAL
EOSINOPHILS RELATIVE PERCENT: 4 % (ref 1–4)
GFR AFRICAN AMERICAN: >60 ML/MIN
GFR NON-AFRICAN AMERICAN: 50 ML/MIN
GFR SERPL CREATININE-BSD FRML MDRD: ABNORMAL ML/MIN/{1.73_M2}
GFR SERPL CREATININE-BSD FRML MDRD: ABNORMAL ML/MIN/{1.73_M2}
GLUCOSE BLD-MCNC: 100 MG/DL (ref 70–99)
HCT VFR BLD CALC: 25.9 % (ref 36–46)
HEMOGLOBIN: 8.7 G/DL (ref 12–16)
IMMATURE GRANULOCYTES: ABNORMAL %
LYMPHOCYTES # BLD: 11 % (ref 24–44)
MCH RBC QN AUTO: 36.6 PG (ref 26–34)
MCHC RBC AUTO-ENTMCNC: 33.5 G/DL (ref 31–37)
MCV RBC AUTO: 109.3 FL (ref 80–100)
MONOCYTES # BLD: 19 % (ref 2–11)
MORPHOLOGY: ABNORMAL
MORPHOLOGY: ABNORMAL
NRBC AUTOMATED: ABNORMAL PER 100 WBC
PDW BLD-RTO: 14.8 % (ref 12.5–15.4)
PLATELET # BLD: 93 K/UL (ref 140–450)
PLATELET ESTIMATE: ABNORMAL
PMV BLD AUTO: 9.2 FL (ref 6–12)
POTASSIUM SERPL-SCNC: 3.9 MMOL/L (ref 3.7–5.3)
RBC # BLD: 2.37 M/UL (ref 4–5.2)
RBC # BLD: ABNORMAL 10*6/UL
SEG NEUTROPHILS: 64 % (ref 36–66)
SEGMENTED NEUTROPHILS ABSOLUTE COUNT: 1.47 K/UL (ref 1.8–7.7)
SODIUM BLD-SCNC: 134 MMOL/L (ref 135–144)
TROPONIN INTERP: NORMAL
TROPONIN T: NORMAL NG/ML
TROPONIN, HIGH SENSITIVITY: <6 NG/L (ref 0–14)
WBC # BLD: 2.3 K/UL (ref 3.5–11)
WBC # BLD: ABNORMAL 10*3/UL

## 2021-08-14 PROCEDURE — 99285 EMERGENCY DEPT VISIT HI MDM: CPT

## 2021-08-14 PROCEDURE — 73110 X-RAY EXAM OF WRIST: CPT

## 2021-08-14 PROCEDURE — 36415 COLL VENOUS BLD VENIPUNCTURE: CPT

## 2021-08-14 PROCEDURE — 85025 COMPLETE CBC W/AUTO DIFF WBC: CPT

## 2021-08-14 PROCEDURE — 80048 BASIC METABOLIC PNL TOTAL CA: CPT

## 2021-08-14 PROCEDURE — 84484 ASSAY OF TROPONIN QUANT: CPT

## 2021-08-14 PROCEDURE — 93005 ELECTROCARDIOGRAM TRACING: CPT | Performed by: EMERGENCY MEDICINE

## 2021-08-14 RX ORDER — DIPHENOXYLATE HYDROCHLORIDE AND ATROPINE SULFATE 2.5; .025 MG/1; MG/1
1 TABLET ORAL 4 TIMES DAILY PRN
COMMUNITY
End: 2021-01-01

## 2021-08-14 ASSESSMENT — PAIN DESCRIPTION - LOCATION: LOCATION: WRIST

## 2021-08-14 ASSESSMENT — ENCOUNTER SYMPTOMS
ABDOMINAL PAIN: 1
PHOTOPHOBIA: 0
BACK PAIN: 1
VOMITING: 0
SHORTNESS OF BREATH: 0
COUGH: 0
RHINORRHEA: 0
SORE THROAT: 0
NAUSEA: 1

## 2021-08-14 ASSESSMENT — PAIN DESCRIPTION - ORIENTATION: ORIENTATION: RIGHT

## 2021-08-14 ASSESSMENT — PAIN DESCRIPTION - DESCRIPTORS: DESCRIPTORS: SHARP;THROBBING

## 2021-08-14 ASSESSMENT — PAIN DESCRIPTION - FREQUENCY: FREQUENCY: CONTINUOUS

## 2021-08-14 ASSESSMENT — PAIN SCALES - GENERAL: PAINLEVEL_OUTOF10: 6

## 2021-08-14 ASSESSMENT — PAIN DESCRIPTION - PAIN TYPE: TYPE: ACUTE PAIN

## 2021-08-14 NOTE — ED PROVIDER NOTES
[id number] [facility]  [address] [facility] [address]  Phone: [phone]  Fax: [phone]        Pt Name: Feliciano Lopez  MRN: [id number]  [place] 1970  Date of evaluation: 8/14/21      CHIEF COMPLAINT     Chief Complaint   Patient presents with    Wrist Injury     right         HISTORY OF PRESENT ILLNESS  (Location/Symptom, Timing/Onset, Context/Setting, Quality, Duration, Modifying Factors, Severity.)    Feliciano Lopez is a 45 y.o. female with past medical history significant for metastatic breast cancer, atrial fibrillation, GI bleed, neurocardiogenic syncope, and carpal tunnel syndrome presents with right wrist pain. Patient states that she was at [employer], when a shelf fell and landed on her right wrist.  Patient states that after that happened, she started to feel lightheaded and nauseated. Her daughter gave her Zofran, and the nausea improved. No chest pain or shortness of breath. Patient did not pass out. She did not strike her head. No loss of consciousness. Patient was riding in a scooter at the time. No neck pain. Patient has back pain, which is chronic and related to metastatic disease. REVIEW OF SYSTEMS    (2-9 systems for level 4, 10 or more for level 5)     Review of Systems   Constitutional: Negative for chills and fever. HENT: Negative for congestion, rhinorrhea and sore throat. Eyes: Negative for photophobia and visual disturbance. Respiratory: Negative for cough and shortness of breath. Cardiovascular: Negative for chest pain and palpitations. Gastrointestinal: Positive for abdominal pain and nausea. Negative for vomiting. Chronic abdominal pain   Musculoskeletal: Positive for back pain. Negative for neck pain. Skin: Negative for rash and wound. Neurological: Positive for dizziness and light-headedness.        PAST MEDICAL HISTORY    has a past medical history of Anemia, Anxiety, Atrial fibrillation ([address] Breast cancer ([address] Carpal tunnel syndrome, Depression, GI bleed, History of blood transfusion, Hx of blood clots, Hypertension, Liver metastases ([facility] [address] Lymphedema, Neurologic cardiac syncope, Obesity, Pain, Sleep apnea, ANA PAULA (stress urinary incontinence, female), Tachycardia, Varicella, and Varicella without complication. SURGICAL HISTORY      has a past surgical history that includes Shoulder arthroscopy (2008); Colonoscopy (2005); Dilation and curettage of uterus; Knee arthroscopy; fracture surgery; Tunneled venous port placement (Right); Upper gastrointestinal endoscopy (7/6/2013); Colonoscopy (7/6/2013); Mastectomy (Left, 4/8/13); other surgical history (1969); Breast biopsy (Left, 3/21/13); ileoscopy (10/12/05); Hysterectomy; denia and bso (cervix removed) (4/23/14); Enterocele repair (4/23/14); Cystoscopy (4/23/14); bladder suspension (4/23/14); Colonoscopy (10/12/2005); Upper gastrointestinal endoscopy (04/23/2015); tumor removal; Cosmetic surgery; Cholecystectomy; orif femur decompression (Left); Fixation Kyphoplasty; and back surgery. CURRENTMEDICATIONS       Previous Medications    ABEMACICLIB 150 MG TABS    Take 150 mg by mouth 2 times daily    AMLODIPINE (NORVASC) 5 MG TABLET    Take 10 mg by mouth daily     CALCIUM-VITAMIN D PO    Take by mouth daily    DIAZEPAM (VALIUM) 2 MG TABLET    TAKE ONE TABLET BY MOUTH FOR ONE DOSE PRIOR TO MRI    DIPHENOXYLATE-ATROPINE (LOMOTIL) 2.5-0.025 MG PER TABLET    Take 1 tablet by mouth 4 times daily as needed for Diarrhea.     DULOXETINE (CYMBALTA) 60 MG EXTENDED RELEASE CAPSULE    TAKE 1 CAPSULE BY MOUTH EVERY DAY    HYDROCHLOROTHIAZIDE (HYDRODIURIL) 12.5 MG TABLET    Take 12.5 mg by mouth daily    KLOR-CON M20 20 MEQ EXTENDED RELEASE TABLET    TAKE 1 TABLET BY MOUTH EVERY DAY    LOSARTAN (COZAAR) 100 MG TABLET    Take 100 mg by mouth daily    METOPROLOL TARTRATE (LOPRESSOR) 25 MG TABLET    TAKE ONE TABLET BY MOUTH TWICE A DAY    OMEPRAZOLE (PRILOSEC) 20 MG DELAYED RELEASE CAPSULE Swelling, tenderness and bony tenderness present. No deformity, effusion, snuff box tenderness or crepitus. Decreased range of motion. Normal pulse. Left wrist: Normal.      Cervical back: Normal range of motion and neck supple. Comments: Tenderness over the distal radius with swelling and superficial abrasions   Skin:     General: Skin is warm and dry. Capillary Refill: Capillary refill takes less than 2 seconds. Findings: Bruising present. Neurological:      Mental Status: She is alert and oriented to person, place, and time. Mental status is at baseline. Psychiatric:         Mood and Affect: Mood normal.         Behavior: Behavior normal.         DIFFERENTIAL DIAGNOSIS/ MDM:     34-year-old female here with a right wrist contusion. X-ray was negative for acute injury. A splint was placed. I did check some basic blood work on the patient because she felt a little lightheaded after her wrist was injured, and found that she is pancytopenic. Patient is currently on chemotherapy for metastatic breast cancer. I did discuss the patient's labs with oncology as documented below. The patient will continue her medications and follow-up with oncology. DIAGNOSTIC RESULTS     EKG: All EKG's are interpreted by the Emergency Department Physician who either signs or Co-signs this chart in the absence of a cardiologist.    EKG Interpretation    Interpreted by emergency department physician    Rhythm: normal sinus   Rate: normal  Axis: normal  Ectopy: none  Conduction: normal  ST Segments: normal  T Waves: normal  Q Waves: none    Clinical Impression: normal EKG    Lea Rondon MD      RADIOLOGY:      Interpretation per the Radiologist below, if available at the time of this note:    XR SCAPULA RIGHT (COMPLETE)    Result Date: 7/30/2021  EXAMINATION: TWO XRAY VIEWS OF THE RIGHT SCAPULA 7/30/2021 4:32 pm COMPARISON: None.  HISTORY: ORDERING SYSTEM PROVIDED HISTORY: Malignant neoplasm of upper-outer quadrant of left breast in female, estrogen receptor positive ([facility] [address] TECHNOLOGIST PROVIDED HISTORY: Known metastatic breast cancer, received radiation to the right shoulder previously. Having new pain in the right shoulder especially right scapula, please compare to previous Reason for Exam: Known metastatic breast cancer, received radiation to the right shoulder previously. Having new pain in the right shoulder especially right scapula pt states pain is sporadic, please compare to previous Dx: Malignant neoplasm of upper-outer quadrant of left breast in female, estrogen receptor positive Acuity: Unknown Type of Exam: Unknown FINDINGS: No displaced fracture. No dislocation. Mild narrowing in the acromioclavicular and glenohumeral joints. No destructive osseous lesion identified. No acute osseous abnormality by radiograph. Mild degenerative change of the right shoulder. Known osseous metastatic disease is not well visualized. XR WRIST RIGHT (MIN 3 VIEWS)    Result Date: 8/14/2021  EXAMINATION: 4 XRAY VIEWS OF THE RIGHT WRIST 8/14/2021 6:25 pm COMPARISON: None. HISTORY: ORDERING SYSTEM PROVIDED HISTORY: writs pain TECHNOLOGIST PROVIDED HISTORY: writs pain Reason for Exam: Pt c/o RT wrist pain and swelling after injury (cabinet fell on it) Acuity: Acute Type of Exam: Initial FINDINGS: Frontal, lateral, scaphoid, and oblique view radiographs of the right wrist were obtained. Bone mineralization is normal. The osseous structures are intact without acute or healing fracture or destructive abnormality. Carpal joint relationships are maintained. No significant degenerative findings. Mild diffuse soft tissue swelling is present. Soft tissue swelling. Otherwise, unremarkable right wrist.  No acute fracture or dislocation.        LABS:  Results for orders placed or performed during the hospital encounter of 60/19/21   Basic Metabolic Panel   Result Value Ref Range    Glucose 100 (H) 70 - 99 mg/dL    BUN 17 08/14/21 1917 08/14/21 2115   BP: (!) 101/59 121/66   Pulse: 71 66   Resp: 16    Temp: 98.2 °F (36.8 °C)    TempSrc: Oral    SpO2: 100% 97%   Weight: 124.7 kg (275 lb)    Height: 5' 9\" (1.753 m)      -------------------------  BP: 121/66, Temp: 98.2 °F (36.8 °C), Pulse: 66, Resp: 16      RE-EVALUATION:  9:52 PM EDT  Patient resting. Brace applied by nursing. Updated on results and plans to discuss her CBC with Oncology. CONSULTS:  Oncology  I discussed the patient with Dr Rex Martinez. He states that myelosuppression is a known side effect of the Verzenio that the patient is taking, and that these counts are not low enough to discontinue the medication. She can continue with her normal doses and follow up with Dr Rudolph Kumar:  None    FINAL IMPRESSION      1. Contusion of right forearm, initial encounter    2.  Pancytopenia ([facility] [address]          DISPOSITION/PLAN   DISPOSITION        CONDITION ON DISPOSITION:   Stable     PATIENT REFERRED TO:  Shanthi Bates, [facility] [address]  [phone]    Schedule an appointment as soon as possible for a visit in 2 days        DISCHARGE MEDICATIONS:  New Prescriptions    No medications on file       (Please note that portions of this note were completed with a voicerecognition program.  Efforts were made to edit the dictations but occasionally words are mis-transcribed.)    Liyah Barnes MD   Attending Emergency Medicine Physician        Liyah Barnes MD  08/14/21 [id number]

## 2021-08-16 ENCOUNTER — TELEPHONE (OUTPATIENT)
Dept: INFUSION THERAPY | Age: 51
End: 2021-08-16

## 2021-08-16 LAB
EKG ATRIAL RATE: 66 BPM
EKG P AXIS: 26 DEGREES
EKG P-R INTERVAL: 158 MS
EKG Q-T INTERVAL: 454 MS
EKG QRS DURATION: 86 MS
EKG QTC CALCULATION (BAZETT): 475 MS
EKG R AXIS: 19 DEGREES
EKG T AXIS: 38 DEGREES
EKG VENTRICULAR RATE: 66 BPM

## 2021-08-16 NOTE — TELEPHONE ENCOUNTER
Patient had been seen in the ER for a wrist injury. They alcides labs and plt 93. She wanted to know if she should have repeat labs. I spoke to her. She see's Dr. Sorenson Go next week with labs and as long as she does not have any active bleeding that should be sufficient. She denied any active bleeding. She verbalized understanding and will be her on the 26th for her appt.

## 2021-08-23 ENCOUNTER — HOSPITAL ENCOUNTER (OUTPATIENT)
Age: 51
Discharge: HOME | End: 2021-08-23
Payer: COMMERCIAL

## 2021-08-23 DIAGNOSIS — C50.211 BILATERAL MALIGNANT NEOPLASM OF UPPER INNER QUADRANT OF BREAST IN FEMALE, UNSPECIFIED ESTROGEN RECEPTOR STATUS (HCC): Primary | ICD-10-CM

## 2021-08-23 DIAGNOSIS — C50.211 BILATERAL MALIGNANT NEOPLASM OF UPPER INNER QUADRANT OF BREAST IN FEMALE, UNSPECIFIED ESTROGEN RECEPTOR STATUS (HCC): ICD-10-CM

## 2021-08-23 DIAGNOSIS — C50.212 BILATERAL MALIGNANT NEOPLASM OF UPPER INNER QUADRANT OF BREAST IN FEMALE, UNSPECIFIED ESTROGEN RECEPTOR STATUS (HCC): ICD-10-CM

## 2021-08-23 DIAGNOSIS — C50.212 BILATERAL MALIGNANT NEOPLASM OF UPPER INNER QUADRANT OF BREAST IN FEMALE, UNSPECIFIED ESTROGEN RECEPTOR STATUS (HCC): Primary | ICD-10-CM

## 2021-08-23 LAB
ABSOLUTE EOS #: 0.07 K/UL (ref 0–0.4)
ABSOLUTE IMMATURE GRANULOCYTE: ABNORMAL K/UL (ref 0–0.3)
ABSOLUTE LYMPH #: 0.22 K/UL (ref 1–4.8)
ABSOLUTE MONO #: 0.25 K/UL (ref 0.1–0.8)
ALBUMIN SERPL-MCNC: 3.6 G/DL (ref 3.5–5.2)
ALBUMIN/GLOBULIN RATIO: 1.3 (ref 1–2.5)
ALP BLD-CCNC: 93 U/L (ref 35–104)
ALT SERPL-CCNC: 17 U/L (ref 5–33)
ANION GAP SERPL CALCULATED.3IONS-SCNC: 9 MMOL/L (ref 9–17)
AST SERPL-CCNC: 21 U/L
BASOPHILS # BLD: 1 % (ref 0–2)
BASOPHILS ABSOLUTE: 0.02 K/UL (ref 0–0.2)
BILIRUB SERPL-MCNC: 0.39 MG/DL (ref 0.3–1.2)
BUN BLDV-MCNC: 19 MG/DL (ref 6–20)
BUN/CREAT BLD: ABNORMAL (ref 9–20)
CALCIUM SERPL-MCNC: 9.6 MG/DL (ref 8.6–10.4)
CHLORIDE BLD-SCNC: 101 MMOL/L (ref 98–107)
CO2: 25 MMOL/L (ref 20–31)
CREAT SERPL-MCNC: 1.09 MG/DL (ref 0.5–0.9)
DIFFERENTIAL TYPE: ABNORMAL
EOSINOPHILS RELATIVE PERCENT: 4 % (ref 1–4)
GFR AFRICAN AMERICAN: >60 ML/MIN
GFR NON-AFRICAN AMERICAN: 53 ML/MIN
GFR SERPL CREATININE-BSD FRML MDRD: ABNORMAL ML/MIN/{1.73_M2}
GFR SERPL CREATININE-BSD FRML MDRD: ABNORMAL ML/MIN/{1.73_M2}
GLUCOSE BLD-MCNC: 111 MG/DL (ref 70–99)
HCT VFR BLD CALC: 26.9 % (ref 36–46)
HEMOGLOBIN: 9.2 G/DL (ref 12–16)
IMMATURE GRANULOCYTES: ABNORMAL %
LYMPHOCYTES # BLD: 12 % (ref 24–44)
MCH RBC QN AUTO: 36.8 PG (ref 26–34)
MCHC RBC AUTO-ENTMCNC: 34.1 G/DL (ref 31–37)
MCV RBC AUTO: 108 FL (ref 80–100)
MONOCYTES # BLD: 14 % (ref 1–7)
MORPHOLOGY: ABNORMAL
MORPHOLOGY: ABNORMAL
NRBC AUTOMATED: ABNORMAL PER 100 WBC
PDW BLD-RTO: 14.6 % (ref 12.5–15.4)
PLATELET # BLD: 83 K/UL (ref 140–450)
PLATELET ESTIMATE: ABNORMAL
PMV BLD AUTO: 8.1 FL (ref 6–12)
POTASSIUM SERPL-SCNC: 3.9 MMOL/L (ref 3.7–5.3)
RBC # BLD: 2.49 M/UL (ref 4–5.2)
RBC # BLD: ABNORMAL 10*6/UL
SEG NEUTROPHILS: 69 % (ref 36–66)
SEGMENTED NEUTROPHILS ABSOLUTE COUNT: 1.24 K/UL (ref 1.8–7.7)
SODIUM BLD-SCNC: 135 MMOL/L (ref 135–144)
TOTAL PROTEIN: 6.3 G/DL (ref 6.4–8.3)
WBC # BLD: 1.8 K/UL (ref 3.5–11)
WBC # BLD: ABNORMAL 10*3/UL

## 2021-08-23 PROCEDURE — 85025 COMPLETE CBC W/AUTO DIFF WBC: CPT

## 2021-08-23 PROCEDURE — 80053 COMPREHEN METABOLIC PANEL: CPT

## 2021-08-23 PROCEDURE — 36415 COLL VENOUS BLD VENIPUNCTURE: CPT

## 2021-08-23 PROCEDURE — 86300 IMMUNOASSAY TUMOR CA 15-3: CPT

## 2021-08-26 NOTE — PROGRESS NOTES
Jaleesa Huertas  8/26/2021  9:44 AM      Vitals:    08/26/21 0930   BP: 116/75   Pulse: 90   Resp: 16   Temp: 98.6 °F (37 °C)   SpO2: 98%    :  Patient Currently in Pain: Yes             Wt Readings from Last 1 Encounters:   08/26/21 273 lb (123.8 kg)                Current Outpatient Medications:     diphenoxylate-atropine (LOMOTIL) 2.5-0.025 MG per tablet, Take 1 tablet by mouth 4 times daily as needed for Diarrhea., Disp: , Rfl:     ondansetron (ZOFRAN-ODT) 4 MG disintegrating tablet, Place 1 tablet under the tongue every 8 hours as needed for Nausea or Vomiting, Disp: 30 tablet, Rfl: 1    DULoxetine (CYMBALTA) 60 MG extended release capsule, TAKE 1 CAPSULE BY MOUTH EVERY DAY, Disp: 30 capsule, Rfl: 3    Abemaciclib 150 MG TABS, Take 150 mg by mouth 2 times daily, Disp: 60 tablet, Rfl: 3    KLOR-CON M20 20 MEQ extended release tablet, TAKE 1 TABLET BY MOUTH EVERY DAY, Disp: 30 tablet, Rfl: 5    CALCIUM-VITAMIN D PO, Take by mouth daily, Disp: , Rfl:     warfarin (COUMADIN) 5 MG tablet, Take 5 mg by mouth Mon,Wed,Fri 7.5mg other days 5mg, Disp: , Rfl:     diazePAM (VALIUM) 2 MG tablet, TAKE ONE TABLET BY MOUTH FOR ONE DOSE PRIOR TO MRI, Disp: , Rfl:     losartan (COZAAR) 100 MG tablet, Take 100 mg by mouth daily, Disp: , Rfl:     omeprazole (PRILOSEC) 20 MG delayed release capsule, Take 20 mg by mouth daily, Disp: , Rfl:     hydrochlorothiazide (HYDRODIURIL) 12.5 MG tablet, Take 12.5 mg by mouth daily, Disp: , Rfl:     amLODIPine (NORVASC) 5 MG tablet, Take 10 mg by mouth daily , Disp: , Rfl:     metoprolol tartrate (LOPRESSOR) 25 MG tablet, TAKE ONE TABLET BY MOUTH TWICE A DAY, Disp: 60 tablet, Rfl: 4    Current Facility-Administered Medications:     0.9 % sodium chloride bolus, 500 mL, IntraVENous, Once, Jose Keenan MD    potassium chloride (KLOR-CON M) extended release tablet 20 mEq, 20 mEq, Oral, Once, Jose Keenan MD         *BREAST Patient only:    Lymphedema Eval:   [] left arm      [] right arm  Location:     Measurement (cm)    Upper Bicep :    Lower Bicep :         FALLS RISK SCREEN  Instructions:  Assess the patient and enter the appropriate indicators that are present for fall risk identification. Total the numbers entered and assign a fall risk score from Table 2.  Reassess patient at a minimum every 12 weeks or with status change. Assessment   Date  8/26/2021     1. Mental Ability: confusion/cognitively impaired 0     2. Elimination Issues: incontinence, frequency 0       3. Ambulatory: use of assistive devices (walker, cane, off-loading devices),        attached to equipment (IV pole, oxygen) 0     4. Sensory Limitations: dizziness, vertigo, impaired vision 0     5. Age less than 65        0     6. Age 72 or greater 0     7. Medication: diuretics, strong analgesics, hypnotics, sedatives,        antihypertensive agents 3   8. Falls:  recent history of falls within the last 3 months (not to include slipping or        tripping) 0   TOTAL 3    If score of 4 or greater was education given? No           TABLE 2   Risk Score Risk Level Plan of Care   0-3 Little or  No Risk 1. Provide assistance as indicated for ambulation activities  2. Reorient confused/cognitively impaired patient  3. Chair/bed in low position, stretcher/bed with siderails up except when performing patient care activities  5. Educate patient/family/caregiver on falls prevention  6.  Reassess in 12 weeks or with any noted change in patient condition which places them at a risk for a fall   4-6 Moderate Risk 1. Provide assistance as indicated for ambulation activities  2. Reorient confused/cognitively impaired patient  3. Chair/bed in low position, stretcher/bed with siderails up except when performing patient care activities  4. Educate patient/family/caregiver on falls prevention     7 or   Higher High Risk 1. Place patient in easily observable treatment room  2.   Patient attended at all times by family member

## 2021-08-26 NOTE — PROGRESS NOTES
Chief Complaint   Patient presents with    Follow-up     review status of disease    Discuss Labs    Other     Discuss a different blood thinner        DIAGNOSIS:   1- T2, N0, M0 ER positive [place] positive and HER-2/eev negative invasive ductal carcinoma of the left breast ( inner upper quadrant)  2- Repeated GI bleeding greetings 2 symptomatic anemia, despite extensive GI workup, source of bleeding was never found. 3- finally a small mass was seen in the jejunum. 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 Margins negative   4- compression fractures and bone lesions. Likely metastatic cancer (5/2020)     CURRENT THERAPY:  1- Mastectomy and axillary sampling  2- adjuvant chemotherapy with Taxotere and Cytoxan starting 5/16/2013. Chemotherapy stopped after 3 cycles because of ongoing GI bleeding and symptomatic anemia  3- Conservative management for GI bleeding. Leading completely stopped after the discontinuation of chemotherapy  4- started tamoxifen after chemotherapy was completed. 8/2013. 5- transitioned to Arimidex 6/2014, completed 08/2018  6- GIST resection, resected 9/2017   7-status post kyphoplasty and biopsy of bone metastases 5/2020. The tumor was minimally ER positive at 5% and NY negative. 8- rodding of the left femur completed. Plan for  Radiation  9- Systemic therapy, plan Arimidex plus Ibrance  10 - XRT completed 07/2020  11-current treatment starting 7/30/2020, monthly Xgeva, oral Ibrance and Arimidex  12-2/2021, progression of disease, plan to switch to Faslodex plus CDK inhibitor abemaciclib, continue Xgeva    BRIEF CASE HISTORY:   Carmen Ratliff is a very pleasant 45 y.o. who felt a mass in the medial aspect of her left breast, around the 9:00 position. Patient sought medical attention and mammogram and ultrasound were done.  Showing an irregular, high-density mass with indistinct margins containing pleomorphic calcifications in the lower inner quadrant of the left breast close to 9:00 location this mass measures are mammogram 2.7 x 2.6 x 2 cm. Subsequently targeted ultrasound was performed showing hypoechoic, irregular, taller than wider, solid mass at 9:00 with posterior acoustic shadowing. The calcifications were visible on ultrasound . On ultrasound the solid mass measured 2.4 x 2.6 x 1 cm. Image guided biopsy of the mass was done and showed invasive ductal carcinoma with surrounding DCIS (cribriform type) the tumor was ER and VA positive and HER-2/eve negative with a fish ratio 1.1. The patient was referred to us and she also was seen by radiation oncology , She  decided on proceeding with total mastectomy and breast reconstruction . Allergy showed 2.5 cm intermediate grade invasive ductal carcinoma, [place] lymph node was negative. The tumor was ER/VA positive and HER-2/eve negative. We discussed options of adjuvant therapy including chemotherapy and hormone therapy, The patient decided to proceed with adjuvant chemotherapy without undergone a Oncotype study. TC chemotherapy X4 cycles is planned  After 3 cycles, and presented with persistent GI bleeding leading to symptomatic anemia and syncope, the patient's condition was Serious enough to warrant repeated hospital admissions. Repeated GI workup including endoscopic evaluation and Tagged Red cell scans were negative. We decided to treat her conservatively and chemotherapy was stopped after 3 cycles. After that All her symptoms improved and she was started on tamoxifen. After one year, she was transitioned to anastrozole since she had no periods and hormonal testing showed post menopausal state. 08/2017  She had been having abdominal pain and went to [facility] [place] for consult. She had work up which showed mass in the colon showed subtle changes in the mid-small intestine, CT enterography showed 3.6CM mass in the distal small bowel. She was also hospitalized in the interim with pancreatitis.    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 Margins were negative. Observation was decided, no need for adjuvant therapy  In May/2020, the patient presented with severe back pain. MRI showed multiple bone lesions with evidence of compression fracture. Patient underwent successful kyphoplasty and biopsy and pathology showed breast cancer, it was 5% ER positive and NV was negative. HER-2 was +2 which is equivocal so a fish test was ordered. Plan for radiation, staging PET, and Arimidex. PET scan showed widespread metastatic bony disease including the thoracic spine, the lumbar spine, the left femur and the rib area. She underwent repeated kyphoplasty and rodding of the left femur. She is undergoing radiation to the painful bony areas in thoracic, rib and lumbar areas as well as the left femur. We will likely use Ibrance plus Arimidex. As well as monthly Xgeva to be started after completion of radiation. After completion of radiation, we maintained her on Arimidex plus Ibrance, due to cytopenias, Ibrance dose was decreased to 100 mg/day which was much better tolerated. Also we maintained her on Xgeva monthly. PET scan was done in October and showed essentially stable disease. There was some suspicious activity in the hip but this was asymptomatic and we decided that this might be post radiation or related to physical therapy. We decided to continue current therapy until clear progression. Further testing in early 2021 showed clear progression, will plan to switch to Faslodex and continue with CDk inhibitor(switch to [employer])  and Xgeva. Further testing showed essentially stable bone metastases but new liver lesion that was discordant. We decided to offer SBRT to the liver lesion we will continue with systemic therapy. INTERIM HISTORY: She comes in today for follow up for metastatic breast cancer and discuss further treatment plan.   She finished radiation  She developed significant epigastric pain and nausea that lasted about 3 weeks and subsided only a few days ago. She is very happy now as her symptoms are improved. GYNECOLOGICAL HISTORY  Menarche at age 14. He is premenopausal with regular periods. +2. She used birth control minimally. PAST MEDICAL HISTORY: has a past medical history of Anemia, Anxiety, Atrial fibrillation ([address] Breast cancer ([address] Carpal tunnel syndrome, Depression, GI bleed, History of blood transfusion, Hx of blood clots, Hypertension, Liver metastases ([address] Lymphedema, Neurologic cardiac syncope, Obesity, Pain, Sleep apnea, ANA PAULA (stress urinary incontinence, female), Tachycardia, Varicella, and Varicella without complication. PAST SURGICAL HISTORY: has a past surgical history that includes Shoulder arthroscopy (); Colonoscopy (); Dilation and curettage of uterus; Knee arthroscopy; fracture surgery; Tunneled venous port placement (Right); Upper gastrointestinal endoscopy (2013); Colonoscopy (2013); Mastectomy (Left, 13); other surgical history (1969); Breast biopsy (Left, 3/21/13); ileoscopy (10/12/05); Hysterectomy; denia and bso (cervix removed) (14); Enterocele repair (14); Cystoscopy (14); bladder suspension (14); Colonoscopy (10/12/2005); Upper gastrointestinal endoscopy (2015); tumor removal; Cosmetic surgery; Cholecystectomy; orif femur decompression (Left); Fixation Kyphoplasty; and back surgery. CURRENT MEDICATIONS:  has a current medication list which includes the following prescription(s): lorazepam, oxycodone, apixaban, diphenoxylate-atropine, ondansetron, duloxetine, abemaciclib, klor-con m20, calcium-vitamin d, diazepam, losartan, omeprazole, hydrochlorothiazide, amlodipine, and metoprolol tartrate, and the following Facility-Administered Medications: fulvestrant, fulvestrant, denosumab, sodium chloride, and potassium chloride.     ALLERGIES:  is allergic to adhesive tape.    FAMILY HISTORY: Negative for any hematological or oncological conditions. Specifically no history of breast cancer in the family    SOCIAL HISTORY:  reports that she quit smoking about 2 years ago. Her smoking use included Cigarettes. She smoked About 20 years. She has never used smokeless tobacco. She reports that she does not drink alcohol or use illicit drugs. REVIEW OF SYSTEMS:   General: No fever or night sweats. Weight is stable. No mouth sores  Eyes: No double or blurred vision. Ears: No tinnitus or hearing problem,    Throat: No dysphagia or sore throat   Respiratory: No chest pain, shortness of breath at rest, no hemoptysis. She has exercise intolerance   Cardiovascular: Denies chest pain, PND or orthopnea, or palpitations. +LE edema improved  Gastrointestinal: No nausea or vomiting;+ epigastric pain and heartburn,  nausea have subsided, and frequent diarrhea. Genitourinary: Denies dysuria, hematuria, frequency, urgency or incontinence. No vaginal bleeding. Neurological: Denies decreased LOC, no sensory or motor focal deficits. +headaches - unchanged, mild  Musculoskeletal: No arthralgia or joint swelling. +back pain, right chest wall pain, and right shoulder blade pain   Skin: There are no rashes or bleeding. +hair thinning  Psychiatric: ++ anxiety, depression as discussed. Endocrine: No diabetes or thyroid disease. Hematologic: No bleeding, no adenopathy. PHYSICAL EXAM:  The patient is not in acute distress. Vital signs: Blood pressure 121/74, pulse 80, temperature 96.3 °F (35.7 °C), temperature source Temporal, weight 274 lb 1.6 oz (124.3 kg), last menstrual period 03/17/2014, not currently breastfeeding. HEENT:  Eyes are normal. Ears, nose is congested, no bleeding. Neck: Supple. No lymph node enlargement. No thyroid enlargement. Trachea is centrally located. Chest:  Clear to auscultation. No wheezes or crepitations. Breast:  Right: No masses, no adenopathy.  No skin or nippple abnormalities. Left: left-sided mastectomy, surgery site is healed completely, no adenopathy  Heart: Regular sinus rhythm. Abdomen: Soft, nontender. No hepatosplenomegaly. No masses. Extremities:  Mild edema right side. Lymph Nodes:  No cervical, axillary or inguinal lymph node enlargement. Neurologic: Conscious and oriented. No focal neurological deficits. Psychosocial: No depression, anxiety or stress. Skin: No rashes, bruises or ecchymoses      REVIEW OF LABORATORY DATA:     Lab Results   Component Value Date    WBC 1.8 (L) 08/23/2021    HGB 9.2 (L) 08/23/2021    HCT 26.9 (L) 08/23/2021    .0 (H) 08/23/2021    PLT 83 (L) 08/23/2021     Lab Results   Component Value Date    IRON 30 (L) 11/29/2017    TIBC 403 11/29/2017    FERRITIN 33 04/04/2019     Lab Results   Component Value Date    NEUTROABS 1.24 (L) 08/23/2021         Chemistry        Component Value Date/Time     08/23/2021 1150    K 3.9 08/23/2021 1150     08/23/2021 1150    CO2 25 08/23/2021 1150    BUN 19 08/23/2021 1150    CREATININE 1.09 (H) 08/23/2021 1150        Component Value Date/Time    CALCIUM 9.6 08/23/2021 1150    ALKPHOS 93 08/23/2021 1150    AST 21 08/23/2021 1150    ALT 17 08/23/2021 1150    BILITOT 0.39 08/23/2021 1150         Results for Елена Fix (MRN [id number]) as of 2/11/2021 09:57   Ref. Range 8/29/2017 00:05 8/30/2017 09:15 7/21/2020 10:42 8/27/2020 10:20 9/24/2020 09:47 10/22/2020 09:24 11/19/2020 10:17 12/14/2020 14:38 1/11/2021 13:51 2/8/2021 13:06   CA 27-29 Latest Ref Range: 0 - 38 U/mL   164 (H) 88 (H) 95 (H) 129 (H) 146 (H) 208 (H) 248 (H) 294 (H)       REVIEW OF RADIOLOGICAL RESULTS:        IMPRESSION:   1- Arlene  Has  T2, N0, M0 ER/NE positive and HER-2/eve negative invasive ductal carcinoma of the left breast  2- Chemotherapy: received 3 cycles of TC, stopped due to GI bleeding  3- GI bleeding , related to the GIST tumor.  Currently on oral iron and hemoglobin is improving  4- on anastrozole. We will continue, plan 5 years of therapy, completed 08/2018  5- for hot flashes, better on Effexor. 6- Recent pancreatitis, possibly biliary. MRI CT scan did not show any gallbladder stone. We will discuss with her surgeons. 7-   distal small bowel mass measuring 3.6 cm, resection shows low risk GIST. No need for adjuvant therapy, margins were negative. 8-bone metastases with compression fracture diagnosed in May/2020. Status post kyphoplasty and biopsy. 9- S/P radiation 07/2020  10- for systemic therapy, she was started on Arimidex, adding Ibrance. Due to cytopenias, dose was reduced to 100 mg/day  11-in February/2021, there was signs of progression, will plan to switch to Faslodex   12-June/2021, good response to treatment but discordant growth in one of the liver lesion, plan SBRT to the growing lesion and continue with systemic therapy      PLAN:   1. The patient is handling radiation fairly well  2. Continue current therapy without any changes  3. We will continue on with systemic therapy.   4. We will plan to restage her in October

## 2021-08-26 NOTE — PROGRESS NOTES
Medication Management Service, Warfarin Management  [facility] [phone]  Visit Date: 8/26/2021   Subjective:   Caty Oshea is a 45 y.o. female who presents to clinic today for anticoagulation monitoring and adjustment. Patient seen in clinic for warfarin management due to  Indication:   DVT. INR goal: of 2.0-3.0. Duration of therapy: indefinite. Assessment and PLAN   PT/INR done in office per protocol. INR today is 2.1, therapeutic. Plan: Will continue current regimen of warfarin 2.5 mg on Monday, Thursday and 5 mg all other days of the week. Using warfarin 5 mg tablets. Recheck INR in ~three weeks. Patient seen at [address] [place] [address]  Patient attested to self screening for COVID - 19. Patient verbalized understanding of dosing directions and information discussed. Dosing schedule given to patient. Progress note sent to referring office. Patient acknowledges working in consult agreement with pharmacist as referred by his/her physician.       Electronically signed by Tomasa Quinteros, [address] [place] [address] on 8/26/21 at 10:26 AM Mercedes Lentz No recommendations provided   Time Spent (min): 10

## 2021-08-26 NOTE — PROGRESS NOTES
Chief Complaint   Patient presents with    Follow-up     review status of disease    Discuss Labs    Other     Discuss a different blood thinner        DIAGNOSIS:   1- T2, N0, M0 ER positive MA positive and HER-2/eve negative invasive ductal carcinoma of the left breast ( inner upper quadrant)  2- Repeated GI bleeding greetings 2 symptomatic anemia, despite extensive GI workup, source of bleeding was never found. 3- finally a small mass was seen in the jejunum. 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 Margins negative   4- compression fractures and bone lesions. Likely metastatic cancer (5/2020)     CURRENT THERAPY:  1- Mastectomy and axillary sampling  2- adjuvant chemotherapy with Taxotere and Cytoxan starting 5/16/2013. Chemotherapy stopped after 3 cycles because of ongoing GI bleeding and symptomatic anemia  3- Conservative management for GI bleeding. Leading completely stopped after the discontinuation of chemotherapy  4- started tamoxifen after chemotherapy was completed. 8/2013. 5- transitioned to Arimidex 6/2014, completed 08/2018  6- GIST resection, resected 9/2017   7-status post kyphoplasty and biopsy of bone metastases 5/2020. The tumor was minimally ER positive at 5% and MA negative. 8- rodding of the left femur completed. Plan for  Radiation  9- Systemic therapy, plan Arimidex plus Ibrance  10 - XRT completed 07/2020  11-current treatment starting 7/30/2020, monthly Xgeva, oral Ibrance and Arimidex  12-2/2021, progression of disease, plan to switch to Faslodex plus CDK inhibitor abemaciclib, continue Xgeva    BRIEF CASE HISTORY:   Ricky Santizo is a very pleasant 45 y.o. who felt a mass in the medial aspect of her left breast, around the 9:00 position. Patient sought medical attention and mammogram and ultrasound were done.  Showing an irregular, high-density mass with indistinct margins containing pleomorphic calcifications in the lower inner quadrant of the left breast close to 9:00 location this mass measures are mammogram 2.7 x 2.6 x 2 cm. Subsequently targeted ultrasound was performed showing hypoechoic, irregular, taller than wider, solid mass at 9:00 with posterior acoustic shadowing. The calcifications were visible on ultrasound . On ultrasound the solid mass measured 2.4 x 2.6 x 1 cm. Image guided biopsy of the mass was done and showed invasive ductal carcinoma with surrounding DCIS (cribriform type) the tumor was ER and LA positive and HER-2/eve negative with a fish ratio 1.1. The patient was referred to us and she also was seen by radiation oncology , She  decided on proceeding with total mastectomy and breast reconstruction . Allergy showed 2.5 cm intermediate grade invasive ductal carcinoma, [place] lymph node was negative. The tumor was ER/LA positive and HER-2/eve negative. We discussed options of adjuvant therapy including chemotherapy and hormone therapy, The patient decided to proceed with adjuvant chemotherapy without undergone a Oncotype study. TC chemotherapy X4 cycles is planned  After 3 cycles, and presented with persistent GI bleeding leading to symptomatic anemia and syncope, the patient's condition was Serious enough to warrant repeated hospital admissions. Repeated GI workup including endoscopic evaluation and Tagged Red cell scans were negative. We decided to treat her conservatively and chemotherapy was stopped after 3 cycles. After that All her symptoms improved and she was started on tamoxifen. After one year, she was transitioned to anastrozole since she had no periods and hormonal testing showed post menopausal state. 08/2017  She had been having abdominal pain and went to [facility] for consult. She had work up which showed mass in the colon showed subtle changes in the mid-small intestine, CT enterography showed 3.6CM mass in the distal small bowel. She was also hospitalized in the interim with pancreatitis.    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 Margins were negative. Observation was decided, no need for adjuvant therapy  In May/2020, the patient presented with severe back pain. MRI showed multiple bone lesions with evidence of compression fracture. Patient underwent successful kyphoplasty and biopsy and pathology showed breast cancer, it was 5% ER positive and AZ was negative. HER-2 was +2 which is equivocal so a fish test was ordered. Plan for radiation, staging PET, and Arimidex. PET scan showed widespread metastatic bony disease including the thoracic spine, the lumbar spine, the left femur and the rib area. She underwent repeated kyphoplasty and rodding of the left femur. She is undergoing radiation to the painful bony areas in thoracic, rib and lumbar areas as well as the left femur. We will likely use Ibrance plus Arimidex. As well as monthly Xgeva to be started after completion of radiation. After completion of radiation, we maintained her on Arimidex plus Ibrance, due to cytopenias, Ibrance dose was decreased to 100 mg/day which was much better tolerated. Also we maintained her on Xgeva monthly. PET scan was done in October and showed essentially stable disease. There was some suspicious activity in the hip but this was asymptomatic and we decided that this might be post radiation or related to physical therapy. We decided to continue current therapy until clear progression. Further testing in early 2021 showed clear progression, will plan to switch to Faslodex and continue with CDk inhibitor(switch to [employer])  and Xgeva. Further testing showed essentially stable bone metastases but new liver lesion that was discordant. We decided to offer SBRT to the liver lesion we will continue with systemic therapy. INTERIM HISTORY: She comes in today for follow up for metastatic breast cancer and discuss further treatment plan.   She is undergoing SBRT to isolated liver metastases. She has 1 more fraction to complete. She has new pain in the right shoulder blade. She thinks that this is from the radiation table but we will exclude progressive metastatic disease. She has diffuse abdominal discomfort, especially epigastric pain or heartburn. She has ongoing diarrhea related to Verzenio unchanged from before. She also has heartburn. She has been using Zofran with PPI and the combination has been helping    GYNECOLOGICAL HISTORY  Menarche at age 14. He is premenopausal with regular periods. +2. She used birth control minimally. PAST MEDICAL HISTORY: has a past medical history of Anemia, Anxiety, Atrial fibrillation ([address] Breast cancer ([address] Carpal tunnel syndrome, Depression, GI bleed, History of blood transfusion, Hx of blood clots, Hypertension, Liver metastases ([address] Lymphedema, Neurologic cardiac syncope, Obesity, Pain, Sleep apnea, ANA PAULA (stress urinary incontinence, female), Tachycardia, Varicella, and Varicella without complication. PAST SURGICAL HISTORY: has a past surgical history that includes Shoulder arthroscopy (); Colonoscopy (); Dilation and curettage of uterus; Knee arthroscopy; fracture surgery; Tunneled venous port placement (Right); Upper gastrointestinal endoscopy (2013); Colonoscopy (2013); Mastectomy (Left, 13); other surgical history (1969); Breast biopsy (Left, 3/21/13); ileoscopy (10/12/05); Hysterectomy; denia and bso (cervix removed) (14); Enterocele repair (14); Cystoscopy (14); bladder suspension (14); Colonoscopy (10/12/2005); Upper gastrointestinal endoscopy (2015); tumor removal; Cosmetic surgery; Cholecystectomy; orif femur decompression (Left); Fixation Kyphoplasty; and back surgery.      CURRENT MEDICATIONS:  has a current medication list which includes the following prescription(s): lorazepam, oxycodone, apixaban, diphenoxylate-atropine, ondansetron, duloxetine, abemaciclib, klor-con m20, calcium-vitamin d, diazepam, losartan, omeprazole, hydrochlorothiazide, amlodipine, and metoprolol tartrate, and the following Facility-Administered Medications: fulvestrant, fulvestrant, denosumab, sodium chloride, and potassium chloride. ALLERGIES:  is allergic to adhesive tape. FAMILY HISTORY: Negative for any hematological or oncological conditions. Specifically no history of breast cancer in the family    SOCIAL HISTORY:  reports that she quit smoking about 2 years ago. Her smoking use included Cigarettes. She smoked About 20 years. She has never used smokeless tobacco. She reports that she does not drink alcohol or use illicit drugs. REVIEW OF SYSTEMS:   General: No fever or night sweats. Weight is stable. No mouth sores  Eyes: No double or blurred vision. Ears: No tinnitus or hearing problem,    Throat: No dysphagia or sore throat   Respiratory: No chest pain, shortness of breath at rest, no hemoptysis. She has exercise intolerance   Cardiovascular: Denies chest pain, PND or orthopnea, or palpitations. +LE edema improved  Gastrointestinal: No nausea or vomiting;+ epigastric pain and heartburn, +freuqent diarrhea with cramping and constipation  Genitourinary: Denies dysuria, hematuria, frequency, urgency or incontinence. No vaginal bleeding. Neurological: Denies decreased LOC, no sensory or motor focal deficits. +headaches - unchanged, mild  Musculoskeletal: No arthralgia or joint swelling. +back pain, right chest wall pain, and right shoulder blade pain   Skin: There are no rashes or bleeding. +hair thinning  Psychiatric: ++ anxiety, depression as discussed. Endocrine: No diabetes or thyroid disease. Hematologic: No bleeding, no adenopathy. PHYSICAL EXAM:  The patient is not in acute distress.   Vital signs: Blood pressure 121/74, pulse 80, temperature 96.3 °F (35.7 °C), temperature source Temporal, weight 274 lb 1.6 oz (124.3 kg), last menstrual period 03/17/2014, not currently breastfeeding. HEENT:  Eyes are normal. Ears, nose is congested, no bleeding. Neck: Supple. No lymph node enlargement. No thyroid enlargement. Trachea is centrally located. Chest:  Clear to auscultation. No wheezes or crepitations. Breast:  Right: No masses, no adenopathy. No skin or nippple abnormalities. Left: left-sided mastectomy, surgery site is healed completely, no adenopathy  Heart: Regular sinus rhythm. Abdomen: Soft, nontender. No hepatosplenomegaly. No masses. Extremities:  Mild edema right side. Lymph Nodes:  No cervical, axillary or inguinal lymph node enlargement. Neurologic: Conscious and oriented. No focal neurological deficits. Psychosocial: No depression, anxiety or stress. Skin: No rashes, bruises or ecchymoses      REVIEW OF LABORATORY DATA:     Lab Results   Component Value Date    WBC 1.8 (L) 08/23/2021    HGB 9.2 (L) 08/23/2021    HCT 26.9 (L) 08/23/2021    .0 (H) 08/23/2021    PLT 83 (L) 08/23/2021     Lab Results   Component Value Date    IRON 30 (L) 11/29/2017    TIBC 403 11/29/2017    FERRITIN 33 04/04/2019     Lab Results   Component Value Date    NEUTROABS 1.24 (L) 08/23/2021         Chemistry        Component Value Date/Time     08/23/2021 1150    K 3.9 08/23/2021 1150     08/23/2021 1150    CO2 25 08/23/2021 1150    BUN 19 08/23/2021 1150    CREATININE 1.09 (H) 08/23/2021 1150        Component Value Date/Time    CALCIUM 9.6 08/23/2021 1150    ALKPHOS 93 08/23/2021 1150    AST 21 08/23/2021 1150    ALT 17 08/23/2021 1150    BILITOT 0.39 08/23/2021 1150         Results for Lizeth Herrera (MRN [id number]) as of 2/11/2021 09:57   Ref.  Range 8/29/2017 00:05 8/30/2017 09:15 7/21/2020 10:42 8/27/2020 10:20 9/24/2020 09:47 10/22/2020 09:24 11/19/2020 10:17 12/14/2020 14:38 1/11/2021 13:51 2/8/2021 13:06   CA 27-29 Latest Ref Range: 0 - 38 U/mL   164 (H) 88 (H) 95 (H) 129 (H) 146 (H) 208 (H) 248 (H) 294 (H)       REVIEW OF RADIOLOGICAL

## 2021-08-26 NOTE — TELEPHONE ENCOUNTER
AVS from 8/26/21     Proceed with treatment per orders.    rv in 4 weeks with treatment and labs     Tx today as scheduled    RV scheduled 9/28/21 @ 10:45am    PT was given AVS and an appt schedule    Electronically signed by Kacey Johnson on 8/26/2021 at 1:05 PM

## 2021-08-27 NOTE — PROGRESS NOTES
[facility]            Radiation Oncology          [address] [facility], [address]        [place]: [phone]        F: [phone]       [email or web address]         Date of Service: 2021     Location:  [address],   [address] [facility], [facility], [facility]   [phone]        RADIATION ONCOLOGY FOLLOW UP NOTE    Patient ID:   Jo Ann Herrera  : 1970   MRN: [id number]    DIAGNOSIS:  Cancer Staging  Malignant neoplasm of upper-inner quadrant of female breast [facility])  Staging form: Breast, AJCC 7th Edition  - Clinical stage from 2020: Stage IV (T2, N0, M1) - Signed by Nawaf Freed MD on 2020  -s/p L MRM SLN 13  -s/p TC x3   -s/p Tamoxifen then Arimedex completing in   -s/p RT to new bone mets:   Sternum, L 6th ribs, and T4-T8: 30Gy 30Gy 20  L 10th rib, L4-SI Joints: 30Gy 20  L femur (s/p ORIF): 30Gy 20  R Scapula: 20 Gy 21  Liver lesion 50Gy 21  -s/p Beena Shields and Esa Wright  -on Faslodex Xgeva Verzenio    INTERVAL HISTORY:   Ms Sha Parks is a 48-year-old female with a diagnosed stage IV breast cancer for which she underwent palliative radiation therapy to multiple sites presented with oligo progression of a liver metastasis and underwent SBRT completing 1 month ago. Patient comes in today for 1 month follow-up visit and reports that over the past few weeks she had increasing abdominal discomfort and nausea which was exacerbated by eating. She however does state that over the past few days her symptoms have improved. She otherwise has maintained her systemic treatments with medical oncology. she did recently have a cabinet fall on her right wrist and had x-rays done which showed no fracture. She denies any symptoms of dizziness, chest pain, shortness of breath, abdominal pain, nausea, diarrhea, dysuria, or any bleeding.       MEDICATIONS:    Current Outpatient Medications:     LORazepam (ATIVAN) 1 Adhesive Tape Other (See Comments)     Skin breakdown         REVIEW OF SYSTEMS:    A full 14 point review of systems was performed and assessed and found to be negative except as noted above. PHYSICAL EXAMINATION:    CHAPERONE: Family/friend/companieon Present    ECO Symptomatic but completely ambulatory    VITAL SIGNS: /75   Pulse 90   Temp 98.6 °F (37 °C)   Resp 16   Wt 273 lb (123.8 kg)   LMP 2014   SpO2 98%   BMI 40.32 kg/m²   GENERAL:  General appearance is that of a well-nourished, well-developed in no apparent distress. HEENT: Normocephalic, atraumatic, EOMI, moist mucosa, no erythema. NECK:  No adenopathy or a palpable thyroid mass, trachea is midline. HEART:  Regular rate and rhythm, S1, S2, no murmurs. LUNGS:  Clear to auscultation bilaterally with no wheezing or crackles. ABDOMEN:  Soft, nontender, non distended. EXTREMITIES:  No clubbing, cyanosis, or edema. No calf tenderness. MSK: No joint tenderness. No spinal tenderness. NEUROLOGICAL: No focal deficits. CN II-XII intact. Strength and sensation intact bilaterally. SKIN: No erythema or desquamation. LABS:  WBC   Date Value Ref Range Status   2021 1.8 (L) 3.5 - 11.0 k/uL Final     Segs Absolute   Date Value Ref Range Status   2021 1.24 (L) 1.8 - 7.7 k/uL Final     Hemoglobin   Date Value Ref Range Status   2021 9.2 (L) 12.0 - 16.0 g/dL Final     Platelet Count   Date Value Ref Range Status   2012 181 140 - 450 k/uL Final     Platelets   Date Value Ref Range Status   2021 83 (L) 140 - 450 k/uL Final        IMAGING:   PET Scan 10/15/20  Impression   1. The previously identified abnormal FDG activity within the pancreatic body   appears to have resolved.  No new areas of abnormal FDG activity is   identified to suggest progression of disease.    2. Diffuse FDG avid bony metastatic disease similar in distribution to the   prior study from 2020.   3. Focal areas of abnormal FDG activity is identified involving the superior   segments of the lower lobes corresponding to ill-defined consolidations   within this region.  No definite mass is seen within the region on the   concurrent CT.  Finding is nonspecific and underlying infectious process   cannot be excluded.  CT follow-up is recommended. PET Scan 1/11/21  Impression   PET-CT evidence of mixed response of therapy.       *There appears to be interval decrease in the overall FDG activity identified   involving the osseous metastatic disease particularly within the lumbar spine   and bony pelvis when compared to the prior PET-CT study suggestive of   response to therapy. Salazar Luke is still multiple areas of abnormal FDG activity   identified involving the visualized appendicular and axial skeletons   consistent with residual metastatic disease. *New consolidation involving the left hilar region which appears to be FDG   avid.  Finding may be post treatment in nature.  Attention on follow-up is   recommended. *New focal area of FDG activity identified involving the inferior aspect of   the right lobe of the liver corresponding to a hyperdense area on the   concurrent CT.  There is surrounding appendix steatosis.  New metastatic   disease cannot be excluded complete evaluation with CT or MRI utilizing liver   mass protocol is recommended. PET Scan 5/25/21   Impression   PET-CT evidence of mixed response of therapy when compared to the prior   PET-CT study.       *Interval improvement of the FDG activity involving the osseous metastatic   disease. *Interval resolution the FDG activity and improvement of the consolidative   findings of the left lung when compared to the prior PET-CT.   *Interval increase in FDG activity involving the patient's known liver   metastatic disease.  No new FDG avid lesion is seen within the liver.          ASSESSMENT AND PLAN:  Christina Marcelo is a 45 y.o. 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 [facility])  Staging form: Breast, AJCC 7th Edition  - Clinical stage from 5/27/2020: Stage IV (T2, N0, M1) - Signed by Thang Marte MD on 6/8/2020  -s/p L MRM SLN 4/8/13  -s/p TC x3 2013  -s/p Tamoxifen then Arimedex completing in 2018  - s/p RT to new bone mets:  Sternum, L 6th ribs, and T4-T8: 30Gy 30Gy 7/7/20  L 10th rib, L4-SI Joints: 30Gy 7/22/20  L femur (s/p ORIF): 30Gy 7/22/20  R Scapula: 20 Gy 2/24/21  Liver lesion 50Gy 7/30/21  -s/p Ibrance and Ricardo Class  -on Faslodex Xgeva Verzenio    Patient comes in today for a 1 month follow-up set after completion of radiation treatment to her liver lesion. Patient overall had some nausea and abdominal cramping which has improved since completing treatments in the last few days. We encourage patient to continue on titrating up her diet and recommend she use her pain medication as needed. Patient is recommended to have a repeat PET scan done approximately 3 months after her radiation course which would be at the end of October. We will therefore order the test and recommend patient follow-up with us afterwards to review. If she has any questions, concerns, or changes in symptoms she can certainly see us sooner. Patient was in agreement with my recommendations. All questions were answered to their satisfaction. Patient was advised to contact us anytime should they have any questions or concerns.      Electronically signed by Thang Marte MD on 8/27/2021 at 9:06 AM        Medications Prescribed:   New Prescriptions    No medications on file       Orders:   Orders Placed This Encounter   Procedures    PET CT SKULL BASE TO MID THIGH       CC:  Patient Care Team:  Sherrilee Duverney, MD as PCP - Leslie Bahena MD as Surgeon (General Surgery)  Charles Carvalho DO as Consulting Physician (Obstetrics & Gynecology)  Miguel Purcell MD as Consulting Physician (Hematology and Oncology)  Heath Rios RN as Nurse Navigator (Oncology)  Juliano Aguilar RN as Registered Nurse (Oncology)

## 2021-08-27 NOTE — PROGRESS NOTES
[facility]            Radiation Oncology          [address] [facility]          [address]        Yaneli Yolande: [phone]        F: [phone]       [employer]         Dear Dr Yadira Teresa: Thank you for referring Caty Oshea to me for evaluation and treatment. Below is a summary of the patient's recently completed radiation course. If you have questions, please do not hesitate to call me. I look forward to following this patient along with you. Sincerely,  Electronically signed by Nelsy Armas MD on 21 at 9:09 AM EDT      CC: Patient Care Team:  Alex Rivero MD as PCP - General  Isabel Mccartney MD as Surgeon (General Surgery)  Juanjo Connelly DO as Consulting Physician (Obstetrics & Gynecology)  Jonatan Ramos MD as Consulting Physician (Hematology and Oncology)  Renetta Sanchez RN as Nurse Navigator (Oncology)  Dior Alonzo RN as Registered Nurse (Oncology)  ------------------------------------------------------------------------------------------------------------------------------------------------------------------------------------------        Date of Service: 2021     Location:  [facility] Radiation Oncology,   [address] [place] [address]., [place]   [phone]        RADIATION ONCOLOGY END OF TREATMENT SUMMARY:    Patient ID:   Caty Oshea  : 1970   MRN: [id number]    DIAGNOSIS:  Cancer Staging  Malignant neoplasm of upper-inner quadrant of female breast [facility])  Staging form: Breast, AJCC 7th Edition  - Clinical stage from 2020: Stage IV (T2, N0, M1) - Signed by Nelsy Armas MD on 2020      TREATMENT DETAILS:    Treatment Technique: IMRT  Fraction Technique: Every other day          Concurrent Chemotherapy: NA    CLINICAL COURSE:    Patient completed the treatment as prescribed and tolerated treatment as expected. Patient developed some abdominal discomfort and nausea.  Patient will come back in 1 month for a follow up visit and to assess treatment toxicity and response. Patient was advised to continue close follow up with medical oncology as well. Patient does have our contact information in case they have any questions or concerns in the interim.     Electronically signed by Kristi Salazar MD on 7/30/21 at 9:09 AM

## 2021-09-07 NOTE — TELEPHONE ENCOUNTER
Spoke with Poly Scales. We talked about what I can assist with as her nurse navigator. She relates she received my previous calls and was doing well so she  had not reached out. Today we talked about [address] and she relates she would like a referral.  She relates it may help her. I let her know I will put in referral.  They will contact her. We also talked about counseling. I let her know that we now have short term counseling with our  [facility]. She would like a referral to that as well. I let her know that I will place referral and Ramos or Alex Dennison will be calling to schedule. We talked about metastatic breast cancer support group, she relates she has attended this before. We talked about the [facility] and she relates she has utilized that service as well. I let her know that she can continue to utilize these resources and that I will place new referrals. I let her know that we should talk next week to make sure that things are moving forward with referrals. Poly Scales is agreeable. Referrals placed.

## 2021-09-13 NOTE — TELEPHONE ENCOUNTER
Counseling referral received for patient.  called patient to schedule appointment. Patient requesting 9/21 at 2:00pm at [facility].

## 2021-09-17 NOTE — PROGRESS NOTES
Patient's oncologist has switched patient from warfarin to apixaban. Patient will no longer follow in the coumadin clinic. It was a please servicing the patient. Episodes of care and referral closed accordingly    Adiel Kan.  Ludwig,   PharmD  9/17/2021 2:30 PM

## 2021-09-21 NOTE — PROGRESS NOTES
Oncology Psychosocial Diagnostic Assessment  Niyah Cross JEREMIAH   9/21/2021  3:55 PM  Gayatri Ace  1970  [id number]    Length of session: 60 minutes    Pt was provided informed consent for Oncology Psychosocial Counseling. Discussed with patient model of service to include the limits of confidentiality (i.e. abuse reporting, suicide intervention, etc.) and short-term intervention focused approach. Pt indicated understanding.         PRESENTING PROBLEM:  \"Emotional low periods with a lack of interest in activities, help getting up and moving\"          LIVING SITUATION, FAMILY HX AND PERTINENT INFORMATION:  Lives at home with daughter (23) and grandson (4)    LOSS, TRAUMA PAST & PRESENT: ( See PCL-5 & ACES in flow sheets)  Previous cancer diagnoses, divorce    STRENGTHS AND LIMITATIONS:  Typically pulls self out of \"emotional low periods,\" proactively joined support group, seeks out support from others    SOCIAL, PEER SUPPORT, FRIENDSHIPS, MEANINGFUL ACTIVITY, COMMUNITY SUPPORT:  Supportive family in and out of town, close with neighbors and friends, strong support from former employer, Gnosticist community  Mor[employer], SPIRITUALITY:  Attends multiple churches, goes every Wednesday with a friend to a service  CULTURAL, ETHNIC ISSUES, CONCERNS:    SEXUAL HISTORY, CONCERNS:    EDUCATION HISTORY:     HISTORY OF LEANING DIFFICULTIES:    SPECIAL COMMUNICATION NEEDS:    EMPLOYMENT: (COMMENTS ON PAST / PRESENT SKILLS)  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, still close with staff at school    HISTORY:    MENTAL HEALTH HISTORY:  Current agency or physician, diagnoses:  Past: Saw counselor for anxiety in past after 2nd cancer diagnosis  Medications:    ALCOHOL AND DRUG HISTORY: (See SBIRT in flow sheets)        MSE:     Appearance    alert, cooperative, crying, mild distress  Appetite normal  Sleep disturbance No  Fatigue Yes  Loss of pleasure No  Impulsive behavior No  Speech    normal rate and normal volume  Mood    Depressed  Affect    normal affect  Thought Content    intact  Thought Process    goal directed and coherent  Associations    logical connections  Insight    Good  Judgment    Intact  Orientation    oriented to person, place, time, and general circumstances  Memory    recent and remote memory intact  Attention/Concentration    intact  Morbid ideation No  Suicide Assessment    no suicidal ideation    (See C-SSRS in flow sheets if suicidal ideations are present)  (Options: HO-7 and PHQ-9 in flow sheets)              MEDICAL HISTORY from EMR:    There were no encounter diagnoses. Diagnosis Date    Anemia     Anxiety     Atrial fibrillation ([facility] [address] 3 28 12    dr. Kranthi Bustamante Breast cancer [facility])     lt./chemo 6/2013    Carpal tunnel syndrome     Depression     post partum    GI bleed     History of blood transfusion     Hx of blood clots     Hypertension     Liver metastases ([facility] [address] 2/2/2021    Lymphedema     Neurologic cardiac syncope     Obesity     Pain     pelvic    Sleep apnea     ANA PAULA (stress urinary incontinence, female) 3/4/2014    Tachycardia     Varicella     Age 4, severe    Varicella without complication 8/1/4958       Medications:   Current Outpatient Medications   Medication Sig Dispense Refill    KLOR-CON M20 20 MEQ extended release tablet TAKE 1 TABLET BY MOUTH EVERY DAY 30 tablet 5    Abemaciclib 150 MG TABS Take 150 mg by mouth 2 times daily 60 tablet 3    LORazepam (ATIVAN) 1 MG tablet Take 1 tablet by mouth every 6 hours as needed for Anxiety (Take 1 pill at betime as needed) for up to 30 doses. 120 tablet 0    oxyCODONE (ROXICODONE) 5 MG immediate release tablet Take 1 tablet by mouth every 6 hours as needed for Pain for up to 30 days. 90 tablet 0    apixaban (ELIQUIS) 5 MG TABS tablet Take 1 tablet by mouth 2 times daily 60 tablet 0    diphenoxylate-atropine (LOMOTIL) 2.5-0.025 MG per tablet Take 1 tablet by mouth 4 times daily as needed for Diarrhea.       ondansetron (ZOFRAN-ODT) 4 MG disintegrating tablet Place 1 tablet under the tongue every 8 hours as needed for Nausea or Vomiting 30 tablet 1    DULoxetine (CYMBALTA) 60 MG extended release capsule TAKE 1 CAPSULE BY MOUTH EVERY DAY 30 capsule 3    CALCIUM-VITAMIN D PO Take by mouth daily      diazePAM (VALIUM) 2 MG tablet TAKE ONE TABLET BY MOUTH FOR ONE DOSE PRIOR TO MRI      losartan (COZAAR) 100 MG tablet Take 100 mg by mouth daily      omeprazole (PRILOSEC) 20 MG delayed release capsule Take 20 mg by mouth daily      hydrochlorothiazide (HYDRODIURIL) 12.5 MG tablet Take 12.5 mg by mouth daily      amLODIPine (NORVASC) 5 MG tablet Take 10 mg by mouth daily       metoprolol tartrate (LOPRESSOR) 25 MG tablet TAKE ONE TABLET BY MOUTH TWICE A DAY 60 tablet 4     Current Facility-Administered Medications   Medication Dose Route Frequency Provider Last Rate Last Admin    0.9 % sodium chloride bolus  500 mL IntraVENous Once Tanya Ascencio MD        potassium chloride (KLOR-CON M) extended release tablet 20 mEq  20 mEq Oral Once Tanya Ascencio MD           Social History:   Social History     Socioeconomic History    Marital status:      Spouse name: Not on file    Number of children: Not on file    Years of education: Not on file    Highest education level: Not on file   Occupational History    Not on file   Tobacco Use    Smoking status: Former Smoker     Types: Cigarettes     Quit date: 11/26/2010     Years since quitting: 10.8    Smokeless tobacco: Never Used   Vaping Use    Vaping Use: Never used   Substance and Sexual Activity    Alcohol use: No     Alcohol/week: 0.0 standard drinks    Drug use: No    Sexual activity: Yes     Partners: Male     Birth control/protection: Surgical   Other Topics Concern    Not on file   Social History Narrative    Not on file     Social Determinants of Health     Financial Resource Strain:     Difficulty of Paying Living Expenses:    Food

## 2021-09-24 PROBLEM — F32.1 CURRENT MODERATE EPISODE OF MAJOR DEPRESSIVE DISORDER WITHOUT PRIOR EPISODE (HCC): Status: ACTIVE | Noted: 2021-01-01

## 2021-09-28 NOTE — PROGRESS NOTES
Chief Complaint   Patient presents with    Follow-up     review status of disease    Results     go over labs    Fatigue     worse       DIAGNOSIS:   1- T2, N0, M0 ER positive KY positive and HER-2/eve negative invasive ductal carcinoma of the left breast ( inner upper quadrant)  2- Repeated GI bleeding greetings 2 symptomatic anemia, despite extensive GI workup, source of bleeding was never found. 3- finally a small mass was seen in the jejunum. 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 Margins negative   4- compression fractures and bone lesions. Likely metastatic cancer (5/2020)     CURRENT THERAPY:  1- Mastectomy and axillary sampling  2- adjuvant chemotherapy with Taxotere and Cytoxan starting 5/16/2013. Chemotherapy stopped after 3 cycles because of ongoing GI bleeding and symptomatic anemia  3- Conservative management for GI bleeding. Leading completely stopped after the discontinuation of chemotherapy  4- started tamoxifen after chemotherapy was completed. 8/2013. 5- transitioned to Arimidex 6/2014, completed 08/2018  6- GIST resection, resected 9/2017   7-status post kyphoplasty and biopsy of bone metastases 5/2020. The tumor was minimally ER positive at 5% and KY negative. 8- rodding of the left femur completed. Plan for  Radiation  9- Systemic therapy, plan Arimidex plus Ibrance  10 - XRT completed 07/2020  11-current treatment starting 7/30/2020, monthly Xgeva, oral Ibrance and Arimidex  12-2/2021, progression of disease, plan to switch to Faslodex plus CDK inhibitor abemaciclib, continue Xgeva  13- 8/2021 liver progression, plan SBRT to liver lesion     BRIEF CASE HISTORY:   Hiram Salcido is a very pleasant 45 y.o. who felt a mass in the medial aspect of her left breast, around the 9:00 position. Patient sought medical attention and mammogram and ultrasound were done.  Showing an irregular, high-density mass with indistinct margins containing pleomorphic calcifications in the lower inner quadrant of the left breast close to 9:00 location this mass measures are mammogram 2.7 x 2.6 x 2 cm. Subsequently targeted ultrasound was performed showing hypoechoic, irregular, taller than wider, solid mass at 9:00 with posterior acoustic shadowing. The calcifications were visible on ultrasound . On ultrasound the solid mass measured 2.4 x 2.6 x 1 cm. Image guided biopsy of the mass was done and showed invasive ductal carcinoma with surrounding DCIS (cribriform type) the tumor was ER and IN positive and HER-2/eve negative with a fish ratio 1.1. The patient was referred to us and she also was seen by radiation oncology , She  decided on proceeding with total mastectomy and breast reconstruction . Allergy showed 2.5 cm intermediate grade invasive ductal carcinoma, [place] lymph node was negative. The tumor was ER/IN positive and HER-2/eve negative. We discussed options of adjuvant therapy including chemotherapy and hormone therapy, The patient decided to proceed with adjuvant chemotherapy without undergone a Oncotype study. TC chemotherapy X4 cycles is planned  After 3 cycles, and presented with persistent GI bleeding leading to symptomatic anemia and syncope, the patient's condition was Serious enough to warrant repeated hospital admissions. Repeated GI workup including endoscopic evaluation and Tagged Red cell scans were negative. We decided to treat her conservatively and chemotherapy was stopped after 3 cycles. After that All her symptoms improved and she was started on tamoxifen. After one year, she was transitioned to anastrozole since she had no periods and hormonal testing showed post menopausal state. 08/2017  She had been having abdominal pain and went to [facility] for consult. She had work up which showed mass in the colon showed subtle changes in the mid-small intestine, CT enterography showed 3.6CM mass in the distal small bowel. She was also hospitalized in the interim with pancreatitis. 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 Margins were negative. Observation was decided, no need for adjuvant therapy  In May/2020, the patient presented with severe back pain. MRI showed multiple bone lesions with evidence of compression fracture. Patient underwent successful kyphoplasty and biopsy and pathology showed breast cancer, it was 5% ER positive and MT was negative. HER-2 was +2 which is equivocal so a fish test was ordered. Plan for radiation, staging PET, and Arimidex. PET scan showed widespread metastatic bony disease including the thoracic spine, the lumbar spine, the left femur and the rib area. She underwent repeated kyphoplasty and rodding of the left femur. She is undergoing radiation to the painful bony areas in thoracic, rib and lumbar areas as well as the left femur. We will likely use Ibrance plus Arimidex. As well as monthly Xgeva to be started after completion of radiation. After completion of radiation, we maintained her on Arimidex plus Ibrance, due to cytopenias, Ibrance dose was decreased to 100 mg/day which was much better tolerated. Also we maintained her on Xgeva monthly. PET scan was done in October and showed essentially stable disease. There was some suspicious activity in the hip but this was asymptomatic and we decided that this might be post radiation or related to physical therapy. We decided to continue current therapy until clear progression. Further testing in early 2021 showed clear progression, will plan to switch to Faslodex and continue with CDk inhibitor(switch to [employer])  and Xgeva. Further testing showed essentially stable bone metastases but new liver lesion that was discordant. We decided to offer SBRT to the liver lesion we will continue with systemic therapy.   INTERIM HISTORY: She comes in today for follow up for metastatic breast cancer and discuss further treatment plan. She finished with radiation to the liver. She is feeling much weaker with worsening exercise tolerance and increased fatigue. She is having worsening diarrhea as well. GYNECOLOGICAL HISTORY  Menarche at age 14. He is premenopausal with regular periods. +2. She used birth control minimally. PAST MEDICAL HISTORY: has a past medical history of Anemia, Anxiety, Atrial fibrillation ([address] Breast cancer ([address] Carpal tunnel syndrome, Depression, GI bleed, History of blood transfusion, Hx of blood clots, Hypertension, Liver metastases ([address] Lymphedema, Neurologic cardiac syncope, Obesity, Pain, Sleep apnea, ANA PAULA (stress urinary incontinence, female), Tachycardia, Varicella, and Varicella without complication. PAST SURGICAL HISTORY: has a past surgical history that includes Shoulder arthroscopy (); Colonoscopy (); Dilation and curettage of uterus; Knee arthroscopy; fracture surgery; Tunneled venous port placement (Right); Upper gastrointestinal endoscopy (2013); Colonoscopy (2013); Mastectomy (Left, 13); other surgical history (1969); Breast biopsy (Left, 3/21/13); ileoscopy (10/12/05); Hysterectomy; denia and bso (cervix removed) (14); Enterocele repair (14); Cystoscopy (14); bladder suspension (14); Colonoscopy (10/12/2005); Upper gastrointestinal endoscopy (2015); tumor removal; Cosmetic surgery; Cholecystectomy; orif femur decompression (Left); Fixation Kyphoplasty; and back surgery. CURRENT MEDICATIONS:  has a current medication list which includes the following prescription(s): eliquis, diphenoxylate-atropine, klor-con m20, abemaciclib, ondansetron, duloxetine, calcium-vitamin d, diazepam, losartan, omeprazole, hydrochlorothiazide, amlodipine, and metoprolol tartrate, and the following Facility-Administered Medications: sodium chloride and potassium chloride. ALLERGIES:  is allergic to adhesive tape.     FAMILY HISTORY: Negative for any hematological or oncological conditions. Specifically no history of breast cancer in the family    SOCIAL HISTORY:  reports that she quit smoking about 2 years ago. Her smoking use included Cigarettes. She smoked About 20 years. She has never used smokeless tobacco. She reports that she does not drink alcohol or use illicit drugs. REVIEW OF SYSTEMS:   General: No fever or night sweats. Weight is stable. Worsening fatigue  Eyes: No double or blurred vision. Ears: No tinnitus or hearing problem,    Throat: No dysphagia or sore throat   Respiratory: No chest pain, shortness of breath at rest, no hemoptysis. She has severe exercise intolerance and was short of breath for at least 10 minutes after even walking to the bathroom in the office  Cardiovascular: Denies chest pain, PND or orthopnea, or palpitations. +LE edema improved  Gastrointestinal: No nausea or vomiting;+ epigastric pain and heartburn, +freuqent diarrhea with cramping and constipation  Genitourinary: Denies dysuria, hematuria, frequency, urgency or incontinence. No vaginal bleeding. Neurological: Denies decreased LOC, no sensory or motor focal deficits. +headaches - unchanged, mild  Musculoskeletal: No arthralgia or joint swelling. +back pain, right chest wall pain, and right shoulder blade pain   Skin: There are no rashes or bleeding. +hair thinning  Psychiatric: ++ anxiety, depression as discussed. Endocrine: No diabetes or thyroid disease. Hematologic: No bleeding, no adenopathy. PHYSICAL EXAM:  The patient is not in acute distress. Vital signs: Blood pressure 110/74, pulse 92, temperature 96.5 °F (35.8 °C), temperature source Temporal, weight 272 lb 8 oz (123.6 kg), last menstrual period 03/17/2014, SpO2 99 %, not currently breastfeeding. HEENT:  Eyes are normal. Ears, nose is congested, no bleeding. Neck: Supple. No lymph node enlargement. No thyroid enlargement. Trachea is centrally located.   Chest:  Clear to auscultation. No wheezes or crepitations. Breast:  Right: No masses, no adenopathy. No skin or nippple abnormalities. Left: left-sided mastectomy, surgery site is healed completely, no adenopathy  Heart: Regular sinus rhythm. Abdomen: Soft, nontender. No hepatosplenomegaly. No masses. Extremities:  Mild edema right side. Lymph Nodes:  No cervical, axillary or inguinal lymph node enlargement. Neurologic: Conscious and oriented. No focal neurological deficits. Psychosocial: No depression, anxiety or stress. Skin: No rashes, bruises or ecchymoses      REVIEW OF LABORATORY DATA:     Lab Results   Component Value Date    WBC 1.5 (L) 09/23/2021    HGB 8.7 (L) 09/23/2021    HCT 25.7 (L) 09/23/2021    .3 (H) 09/23/2021    PLT 70 (L) 09/23/2021     Lab Results   Component Value Date    IRON 30 (L) 11/29/2017    TIBC 403 11/29/2017    FERRITIN 33 04/04/2019     Lab Results   Component Value Date    NEUTROABS 1.11 (L) 09/23/2021         Chemistry        Component Value Date/Time     09/23/2021 1555    K 3.8 09/23/2021 1555     09/23/2021 1555    CO2 21 09/23/2021 1555    BUN 12 09/23/2021 1555    CREATININE 1.14 (H) 09/23/2021 1555        Component Value Date/Time    CALCIUM 9.5 09/23/2021 1555    ALKPHOS 103 09/23/2021 1555    AST 26 09/23/2021 1555    ALT 17 09/23/2021 1555    BILITOT 0.60 09/23/2021 1555         Results for Dominique Duke (MRN [id number]) as of 2/11/2021 09:57   Ref.  Range 8/29/2017 00:05 8/30/2017 09:15 7/21/2020 10:42 8/27/2020 10:20 9/24/2020 09:47 10/22/2020 09:24 11/19/2020 10:17 12/14/2020 14:38 1/11/2021 13:51 2/8/2021 13:06   CA 27-29 Latest Ref Range: 0 - 38 U/mL   164 (H) 88 (H) 95 (H) 129 (H) 146 (H) 208 (H) 248 (H) 294 (H)       REVIEW OF RADIOLOGICAL RESULTS:        IMPRESSION:   1- Arlene  Has  T2, N0, M0 ER/MN positive and HER-2/eve negative invasive ductal carcinoma of the left breast  2- Chemotherapy: received 3 cycles of TC, stopped due to GI bleeding  3- GI bleeding , related to the GIST tumor. Currently on oral iron and hemoglobin is improving  4- on anastrozole. We will continue, plan 5 years of therapy, completed 08/2018  5- for hot flashes, better on Effexor. 6- Recent pancreatitis, possibly biliary. MRI CT scan did not show any gallbladder stone. We will discuss with her surgeons. 7-   distal small bowel mass measuring 3.6 cm, resection shows low risk GIST. No need for adjuvant therapy, margins were negative. 8-bone metastases with compression fracture diagnosed in May/2020. Status post kyphoplasty and biopsy. 9- S/P radiation 07/2020  10- for systemic therapy, she was started on Arimidex, adding Ibrance. Due to cytopenias, dose was reduced to 100 mg/day  11-in February/2021, there was signs of progression, will plan to switch to Faslodex   12-June/2021, good response to treatment but discordant growth in one of the liver lesion, plan SBRT to the growing lesion and continue with systemic therapy      PLAN:   1. The patient is handling radiation fairly well  2. I am really concerned for her worsening symptoms  3. I think some of that might be related to the pancytopenia from the [facility]. She is also having severe diarrhea. 4. After consideration I asked her to hold the Verzenio for at least 7 to 10 days. 5. We will restage her disease with a PET scan  6.  We will evaluate her after PET scan, meanwhile continue with Faslodex and with Concepcion Friend

## 2021-09-28 NOTE — PATIENT INSTRUCTIONS
Proceed with faslodex and Xgeva today  rv 10/28 with treatment  Labs cbc, cmp CA 27-29 on 10/26 (day of PET scan)

## 2021-09-28 NOTE — PROGRESS NOTES
Pt here for xgeva and faslodex injections. Pt was seen by Dr. María Malik prior to treatment. Confirmed with pt she's taking calcium and vitamin D at home. Denies any new jaw, hip, or leg pain. Pt was treated without incident and d/c'd in stable condition. Returns 10/28/21 for next injections and MD follow up.

## 2021-10-05 NOTE — PROGRESS NOTES
Oncology Psychosocial Counseling Therapy Note  Marthena Castleman, LISW   10/5/2021  2:10 PM  Honey Signs  1970  [id number]    Time spent with Patient: 50 minutes      Pt was provided informed consent for Oncology Psychosocial Counseling. Discussed with patient model of service to include the limits of confidentiality (i.e. abuse reporting, suicide intervention, etc.) and short-term intervention focused approach. Pt indicated understanding. S:  Patient returns for Oncology Psychosocial Counseling appointment for depression. Patient reports being tired and irritable and feeling alone, especially with continued restrictions due to COVID-19. Patient states she has not been organizing as many social activities recently and has been \"going through the motions. \" Patient currently on \"break\" from treatment to allow labs to rebound and has upcoming appointments for a scan and doctor visits. O:  MSE:     Appearance    alert, cooperative, crying, mild distress  Appetite normal  Sleep disturbance No  Fatigue No  Loss of pleasure No  Impulsive behavior No  Speech    normal rate, normal volume and well articulated  Mood    Depressed  Affect    normal affect  Thought Content    intact and hopeful  Thought Process    goal directed  Associations    logical connections  Insight    Good  Judgment    Intact  Orientation    oriented to person, place, time, and general circumstances  Memory    recent and remote memory intact  Attention/Concentration    intact  Morbid ideation No  Suicide Assessment    no suicidal ideation    A:  Patient continues to struggle with depressive symptoms. Patient struggling to engage in social activities and is feeling isolated. Patient is able to to identify pleasurable activities and active participant in discussing activity planning worksheet. Patient engaging in discussion and willing to attempt homework assigned.        History from Medical Record:        Diagnosis Date    Anemia     Anxiety     [facility] 3 28 12    dr. Caity Torres    Breast cancer [facility])     lt./chemo 6/2013    Carpal tunnel syndrome     Depression     post partum    GI bleed     History of blood transfusion     Hx of blood clots     Hypertension     Liver metastases ([facility] [address] 2/2/2021    Lymphedema     Neurologic cardiac syncope     Obesity     Pain     pelvic    Sleep apnea     ANA PAULA (stress urinary incontinence, female) 3/4/2014    Tachycardia     Varicella     Age 4, severe    Varicella without complication 6/8/0176     Medications:   Current Outpatient Medications   Medication Sig Dispense Refill    ELIQUIS 5 MG TABS tablet TAKE 1 TABLET BY MOUTH TWICE A DAY 60 tablet 0    diphenoxylate-atropine (LOMOTIL) 2.5-0.025 MG per tablet TAKE 1 TABLET BY MOUTH 4 TIMES DAILY AS NEEDED FOR DIARRHEA FOR UP TO 10 DAYS.  120 tablet 0    KLOR-CON M20 20 MEQ extended release tablet TAKE 1 TABLET BY MOUTH EVERY DAY 30 tablet 5    Abemaciclib 150 MG TABS Take 150 mg by mouth 2 times daily 60 tablet 3    ondansetron (ZOFRAN-ODT) 4 MG disintegrating tablet Place 1 tablet under the tongue every 8 hours as needed for Nausea or Vomiting 30 tablet 1    DULoxetine (CYMBALTA) 60 MG extended release capsule TAKE 1 CAPSULE BY MOUTH EVERY DAY 30 capsule 3    CALCIUM-VITAMIN D PO Take by mouth daily      diazePAM (VALIUM) 2 MG tablet TAKE ONE TABLET BY MOUTH FOR ONE DOSE PRIOR TO MRI      losartan (COZAAR) 100 MG tablet Take 100 mg by mouth daily      omeprazole (PRILOSEC) 20 MG delayed release capsule Take 20 mg by mouth daily      hydrochlorothiazide (HYDRODIURIL) 12.5 MG tablet Take 12.5 mg by mouth daily      amLODIPine (NORVASC) 5 MG tablet Take 10 mg by mouth daily       metoprolol tartrate (LOPRESSOR) 25 MG tablet TAKE ONE TABLET BY MOUTH TWICE A DAY 60 tablet 4     Current Facility-Administered Medications   Medication Dose Route Frequency Provider Last Rate Last Admin    0.9 % sodium chloride bolus  500 mL IntraVENous Once Gisselle Mesa MD        potassium chloride (KLOR-CON M) extended release tablet 20 mEq  20 mEq Oral Once Gisselle Mesa MD           Social History:   Social History     Socioeconomic History    Marital status:      Spouse name: Not on file    Number of children: Not on file    Years of education: Not on file    Highest education level: Not on file   Occupational History    Not on file   Tobacco Use    Smoking status: Former Smoker     Types: Cigarettes     Quit date: 11/26/2010     Years since quitting: 10.8    Smokeless tobacco: Never Used   Vaping Use    Vaping Use: Never used   Substance and Sexual Activity    Alcohol use: No     Alcohol/week: 0.0 standard drinks    Drug use: No    Sexual activity: Yes     Partners: Male     Birth control/protection: Surgical   Other Topics Concern    Not on file   Social History Narrative    Not on file     Social Determinants of Health     Financial Resource Strain:     Difficulty of Paying Living Expenses:    Food Insecurity:     Worried About Running Out of Food in the Last Year:     [address] in the Last Year:    Transportation Needs:     Lack of Transportation (Medical):  Lack of Transportation (Non-Medical):    Physical Activity:     Days of Exercise per Week:     Minutes of Exercise per Session:    Stress:     Feeling of Stress :    Social Connections:     Frequency of Communication with Friends and Family:     Frequency of Social Gatherings with Friends and Family:     Attends Christianity Services:     Active Member of Clubs or Organizations:     Attends Club or Organization Meetings:     Marital Status:    Intimate Partner Violence:     Fear of Current or Ex-Partner:     Emotionally Abused:     Physically Abused:     Sexually Abused:        TOBACCO:   reports that she quit smoking about 10 years ago. Her smoking use included cigarettes.  She has never used smokeless tobacco.  ETOH:   reports no history of alcohol use. Family History:   Family History   Problem Relation Age of Onset    High Blood Pressure Mother     High Blood Pressure Father     High Blood Pressure Brother     High Blood Pressure Brother            Diagnoses from Medical Record: The encounter diagnosis was Depression. Pt interventions:  Discussed self-care (sleep, nutrition, rewarding activities, social support, exercise), Discussed potential barriers to change and Established rapport   discussed and modeled activity planning and mood charting with patient. Patient given worksheets. PLAN:   Patient to return October 19 at 1:00pm. Patient given worksheets for activity planning and mood tracking to be completed and returned at next appointment.

## 2021-10-19 NOTE — TELEPHONE ENCOUNTER
Called and spoke with Kenyon Funk. She relates she is on her way to see Ramos. She relates that talking with him is helping her. She relates that she is back on her verzenio and she relates she can tell. She relates when she took the break from it she was feeling good and diarrhea went away. She relates she is back on verzenio about 10 days and she is having abdominal cramping, no complaints of diarrhea at this point. She relates she has prescription and over the counter antidiarrheals to take as needed. Encouraged her to call if her symptoms return and the as needed meds are not helping. Encouraged her to call me as needed. Pt on pending.

## 2021-10-19 NOTE — PROGRESS NOTES
Oncology Psychosocial Counseling Therapy Note  JEREMIAH Red   10/19/2021  2:14 PM  Nan Caspergalina  1970  [id number]    Time spent with Patient: 60 minutes      Pt was provided informed consent for Oncology Psychosocial Counseling. Discussed with patient model of service to include the limits of confidentiality (i.e. abuse reporting, suicide intervention, etc.) and short-term intervention focused approach. Pt indicated understanding. S:  Patient arrives for therapy session and states her last appointment was for therapy and has not had any other doctor appointments at the cancer center which is not common. Yue Lazo reports being \"busy\" between appointments including her son and his family visiting, celebrating her daughter's birthday, and working on getting her house ready to sell. Patient has scans and doctor visits to go over results next week. Patient completed mood tracking and behavior planning homework. Patient plans on going to [facility] after appointment. O:  MSE:     Appearance    no distress  Appetite normal  Sleep disturbance No  Fatigue Yes  Loss of pleasure No  Impulsive behavior No  Speech    normal rate and normal volume  Mood    level  Affect    normal affect  Thought Content    intact  Thought Process    linear  Associations    logical connections  Insight    Good  Judgment    Intact  Orientation    oriented to person, place, time, and general circumstances  Memory    recent and remote memory intact  Attention/Concentration    intact  Morbid ideation No  Suicide Assessment    no suicidal ideation    A:  Patient completed mood tracking and activity planning homework. Patient states home work not \"helpful or [employer]" but did recognize mood patterns (i.e. feeling anxious around son due to his anxiety).  Yue Lazo also states it reinforced her belief that she does not \"sweat the small things\" and is able to move on from disappointing situations (dinner plans fell through, missed shyam due to another appointment being moved earlier). Patient discussed upcoming scan/doctor appointments, stating the day of the results appointment is full of \"panic. \" Patient described her typical coping mechanisms (meditation dung, reading, focusing on her breathing) and states she will \"run through those pretty quick\" while in the waiting room.  demonstrated grounding techniques and provided handout. Patient encouraged to practice these along with her other coping mechanisms. Carter Hoang demonstrated understanding of techniques which will continue to be reinforced and practiced. Patient continues to utilize support group and other services at [facility]. History from Medical Record:        Diagnosis Date    Anemia     Anxiety     Atrial fibrillation ([facility] [address] 3 28 12    dr. Paola Newton    Breast cancer [facility])     lt./chemo 6/2013    Carpal tunnel syndrome     Depression     post partum    GI bleed     History of blood transfusion     Hx of blood clots     Hypertension     Liver metastases ([facility] [address] 2/2/2021    Lymphedema     Neurologic cardiac syncope     Obesity     Pain     pelvic    Sleep apnea     ANA PAULA (stress urinary incontinence, female) 3/4/2014    Tachycardia     Varicella     Age 4, severe    Varicella without complication 9/9/9215     Medications:   Current Outpatient Medications   Medication Sig Dispense Refill    DULoxetine (CYMBALTA) 60 MG extended release capsule TAKE 1 CAPSULE BY MOUTH EVERY DAY 30 capsule 3    ELIQUIS 5 MG TABS tablet TAKE 1 TABLET BY MOUTH TWICE A DAY 60 tablet 0    diphenoxylate-atropine (LOMOTIL) 2.5-0.025 MG per tablet TAKE 1 TABLET BY MOUTH 4 TIMES DAILY AS NEEDED FOR DIARRHEA FOR UP TO 10 DAYS.  120 tablet 0    KLOR-CON M20 20 MEQ extended release tablet TAKE 1 TABLET BY MOUTH EVERY DAY 30 tablet 5    Abemaciclib 150 MG TABS Take 150 mg by mouth 2 times daily 60 tablet 3    ondansetron (ZOFRAN-ODT) 4 MG disintegrating tablet Place 1 tablet under the tongue every 8 hours as needed for Nausea or Vomiting 30 tablet 1    CALCIUM-VITAMIN D PO Take by mouth daily      diazePAM (VALIUM) 2 MG tablet TAKE ONE TABLET BY MOUTH FOR ONE DOSE PRIOR TO MRI      losartan (COZAAR) 100 MG tablet Take 100 mg by mouth daily      omeprazole (PRILOSEC) 20 MG delayed release capsule Take 20 mg by mouth daily      hydrochlorothiazide (HYDRODIURIL) 12.5 MG tablet Take 12.5 mg by mouth daily      amLODIPine (NORVASC) 5 MG tablet Take 10 mg by mouth daily       metoprolol tartrate (LOPRESSOR) 25 MG tablet TAKE ONE TABLET BY MOUTH TWICE A DAY 60 tablet 4     Current Facility-Administered Medications   Medication Dose Route Frequency Provider Last Rate Last Admin    0.9 % sodium chloride bolus  500 mL IntraVENous Once Alina Hurtado MD        potassium chloride (KLOR-CON M) extended release tablet 20 mEq  20 mEq Oral Once Alina Hurtado MD           Social History:   Social History     Socioeconomic History    Marital status:      Spouse name: Not on file    Number of children: Not on file    Years of education: Not on file    Highest education level: Not on file   Occupational History    Not on file   Tobacco Use    Smoking status: Former Smoker     Types: Cigarettes     Quit date: 11/26/2010     Years since quitting: 10.9    Smokeless tobacco: Never Used   Vaping Use    Vaping Use: Never used   Substance and Sexual Activity    Alcohol use: No     Alcohol/week: 0.0 standard drinks    Drug use: No    Sexual activity: Yes     Partners: Male     Birth control/protection: Surgical   Other Topics Concern    Not on file   Social History Narrative    Not on file     Social Determinants of Health     Financial Resource Strain:     Difficulty of Paying Living Expenses:    Food Insecurity:     Worried About Running Out of Food in the Last Year:     Ran Out of Food in the Last Year:    Transportation Needs:     Lack of Transportation (Medical):  Lack of Transportation (Non-Medical):    Physical Activity:     Days of Exercise per Week:     Minutes of Exercise per Session:    Stress:     Feeling of Stress :    Social Connections:     Frequency of Communication with Friends and Family:     Frequency of Social Gatherings with Friends and Family:     Attends [employer] Services:     Active Member of Clubs or Organizations:     Attends Club or Organization Meetings:     Marital Status:    Intimate Partner Violence:     Fear of Current or Ex-Partner:     Emotionally Abused:     Physically Abused:     Sexually Abused:        TOBACCO:   reports that she quit smoking about 10 years ago. Her smoking use included cigarettes. She has never used smokeless tobacco.  ETOH:   reports no history of alcohol use. Family History:   Family History   Problem Relation Age of Onset    High Blood Pressure Mother     High Blood Pressure Father     High Blood Pressure Brother     High Blood Pressure Brother          Visit Diagnosis:           Diagnoses from Medical Record:    Depression        Pt interventions:  Trained in relaxation strategies, Discussed self-care (sleep, nutrition, rewarding activities, social support, exercise), Established rapport and Cognitive strategies to target worry and rumination including grounding techniques, muscle relaxation, meditation, and breathing. PLAN:   Patient given resources for grounding techniques and encouraged to practice before upcoming appointments. Patient to return 11/9 at 1:00pm and encouraged to call for earlier appointment if needed.

## 2021-10-20 NOTE — TELEPHONE ENCOUNTER
[employer] form filled out and signed by Dr Apurva Sims, copy placed in mail per pts request  Lamin Newby RN

## 2021-10-28 NOTE — PATIENT INSTRUCTIONS
Refer to Dr Carmina Haney at [facility] after consultation. Hold treatment for now   Need IR biopsy of liver lesion , please send for Temp[employer] testing .

## 2021-10-28 NOTE — PROGRESS NOTES
[facility]            Radiation Oncology          [address] [facility]          [address]        Michi Due: [phone]        F: [phone]       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 Rhenda Gemma female who presents for above diagnosis and treatment. She is doing well not having any issues from the radiation treatment she received to her liver back in July. She is not having new belly pain or nausea vomiting. She denies any problems with diarrhea or constipation. She denies any blood in the stools or urine. She is currently on faslodex & Verzenio and sees Medical Oncology today. PET/CT from 10/26/2021 showed interval progression of disease is more numerous metabolically active liver lesions are present compared to the recent MRI. The previously dominant liver lesion was not FDG avid anymore. MEDICATIONS:    Current Outpatient Medications:     oxyCODONE 5 MG capsule, Take 5 mg by mouth every 6 hours as needed for Pain., Disp: , Rfl:     LORazepam (ATIVAN) 1 MG tablet, Take 1 mg by mouth every 8 hours as needed for Anxiety. , Disp: , Rfl:     ferrous sulfate (IRON 325) 325 (65 Fe) MG tablet, Take 325 mg by mouth every other day, Disp: , Rfl:     ELIQUIS 5 MG TABS tablet, TAKE 1 TABLET BY MOUTH TWICE A DAY, Disp: 60 tablet, Rfl: 0    DULoxetine (CYMBALTA) 60 MG extended release capsule, TAKE 1 CAPSULE BY MOUTH EVERY DAY, Disp: 30 capsule, Rfl: 3    KLOR-CON M20 20 MEQ extended release tablet, TAKE 1 TABLET BY MOUTH EVERY DAY, Disp: 30 tablet, Rfl: 5    Abemaciclib 150 MG TABS, Take 150 mg by mouth 2 times daily, Disp: 60 tablet, Rfl: 3    ondansetron (ZOFRAN-ODT) 4 MG disintegrating tablet, Place 1 tablet under the tongue every 8 hours as needed for Nausea or Vomiting, Disp: 30 tablet, Rfl: 1    CALCIUM-VITAMIN D PO, Take by mouth daily, Disp: , Rfl:     diazePAM (VALIUM) 2 MG tablet, TAKE ONE TABLET BY MOUTH FOR ONE DOSE PRIOR TO MRI, Disp: , Rfl:     losartan (COZAAR) 100 MG tablet, Take 100 mg by mouth daily, Disp: , Rfl:     omeprazole (PRILOSEC) 20 MG delayed release capsule, Take 20 mg by mouth daily, Disp: , Rfl:     hydrochlorothiazide (HYDRODIURIL) 12.5 MG tablet, Take 12.5 mg by mouth daily, Disp: , Rfl:     amLODIPine (NORVASC) 5 MG tablet, Take 10 mg by mouth daily , Disp: , Rfl:     metoprolol tartrate (LOPRESSOR) 25 MG tablet, TAKE ONE TABLET BY MOUTH TWICE A DAY, Disp: 60 tablet, Rfl: 4    Current Facility-Administered Medications:     0.9 % sodium chloride bolus, 500 mL, IntraVENous, Once, Gisselle Mesa MD    potassium chloride (KLOR-CON M) extended release tablet 20 mEq, 20 mEq, Oral, Once, Mulu Anderson MD    ALLERGIES:  Allergies   Allergen Reactions    Adhesive Tape Other (See Comments)     Skin breakdown         REVIEW OF SYSTEMS:    A full 14 point review of systems was performed and assessed and found to be negative except as noted above.       PHYSICAL EXAMINATION:    CHAPERONE: Not Required    ECO Asymptomatic    VITAL SIGNS: /81   Pulse 80   Temp 98.5 °F (36.9 °C) (Oral) Resp 18   Wt 271 lb 12.8 oz (123.3 kg)   LMP 03/17/2014   SpO2 98%   BMI 40.14 kg/m²   GENERAL:  General appearance is that of a well-nourished, well-developed in no apparent distress. HEENT: Normocephalic, atraumatic, EOMI, moist mucosa, no erythema. Indirect exam .  NECK:  No adenopathy or a palpable thyroid mass, trachea is midline. LYMPHATICS: No cervical, supraclavicular, or axillary adenopathy. HEART:  Normal rate and regular rhythm, normal heart sounds. LUNGS:  Pulmonary effort normal. Normal breath sounds. ABDOMEN:  Soft, nontender, non distended, and no hepatosplenomegaly. EXTREMITIES:  No edema. No calf tenderness. MSK:  No spinal tenderness. Normal ROM. NEUROLOGICAL: Alert and oriented. Strength and sensation intact bilaterally. No focal deficits. PSYCH: Mood normal, behavior normal.  SKIN: No erythema, no desquamation.   RECTAL: Deferred   GYN: Deferred   BREAST: Deferred       LABS:  WBC   Date Value Ref Range Status   10/26/2021 2.1 (L) 3.5 - 11.3 k/uL Final     Segs Absolute   Date Value Ref Range Status   10/26/2021 1.49 (L) 1.8 - 7.7 k/uL Final     Hemoglobin   Date Value Ref Range Status   10/26/2021 8.7 (L) 11.9 - 15.1 g/dL Final     Platelet Count   Date Value Ref Range Status   04/26/2012 181 140 - 450 k/uL Final     Platelets   Date Value Ref Range Status   10/26/2021 135 (L) 138 - 453 k/uL Final     No results found for: , CEA  No results found for: PSA    IMAGING:   PET/CT from 10/26 & 5/25 reviewed and noted above      ASSESSMENT AND PLAN:  Josh Frank is a 45 y.o. 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 ([facility] [address]  Staging form: Breast, AJCC 7th Edition  - Clinical stage from 5/27/2020: Stage IV (T2, N0, M1) - Signed by Liliam Shrestha MD on 6/8/2020    -s/p L MRM SLN 4/8/13  -s/p TC x3 2013  -s/p Tamoxifen then Arimedex completing in 2018  - s/p RT to new bone mets:  Sternum, L 6th ribs, and T4-T8: 30Gy 30Gy 7/7/20  L 10th rib, L4-SI Joints: 30Gy 7/22/20  L femur (s/p ORIF): 30Gy 7/22/20  R Scapula: 20 Gy 2/24/21  Liver lesion 50Gy 7/30/21  -s/p Ibrance and Larance Pitcher  -on Faslodex Swathi Vivar    She is not having new symptoms or problems but the PET/CT done on 1026 does show a fairly large new dominant liver lesion was not present on prior imaging. It is immediately adjacent to the treatment field that underwent SBRT treatments in July. I told her in terms of liver directed treatment SBRT or a hyperfractionated course of radiation was possible however I would not know unless I did a simulation scan and started planning something. I wouldn't treat her unless it was deemed safe by myself and physics after a careful review of a composite consisting of her prior treatment fields/doses and a new plan. Another option potentially be radiofrequency ablation or RFA. Another would be potentially a change in systemic therapy. She is going to see medical oncology after this we will have discussions with them. I told her if she wants liver directed therapy I will probably want RFA over reirradiation if possible as would probably be safer to both her liver and nearby organs at risk. I will call her tomorrow and get an idea of her conversation with Med Onc and see where we need to go from there. We will figure out follow up after that conversation as well. Patient was in agreement with my recommendations. All questions were answered to their satisfaction. Patient was advised to contact us anytime should they have any questions or concerns. Time spent 40 minutes with 50% spent face to face with the patient. Electronically signed by April Alatorre MD on 10/28/2021 at 11:06 AM        Medications Prescribed:   New Prescriptions    No medications on file       Orders: No orders of the defined types were placed in this encounter.       CC:  Patient Care Team:  Elidu Gross MD as PCP - Ayden Roy MD as Surgeon (General Surgery)  Markus Rice DO as Consulting Physician (Obstetrics & Gynecology)  Nereyda Go MD as Consulting Physician (Hematology and Oncology)  Bailey Mitchell RN as Nurse Navigator (Oncology)  Marta Richmond RN as Registered Nurse (Oncology)

## 2021-10-28 NOTE — PROGRESS NOTES
Chief Complaint   Patient presents with    Follow-up     review status of disease    Results     go over PET scan and labs       DIAGNOSIS:   1- T2, N0, M0 ER positive AL positive and HER-2/eve negative invasive ductal carcinoma of the left breast ( inner upper quadrant)  2- Repeated GI bleeding greetings 2 symptomatic anemia, despite extensive GI workup, source of bleeding was never found. 3- finally a small mass was seen in the jejunum. 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 Margins negative   4- compression fractures and bone lesions. Likely metastatic cancer (5/2020)     CURRENT THERAPY:  1- Mastectomy and axillary sampling  2- adjuvant chemotherapy with Taxotere and Cytoxan starting 5/16/2013. Chemotherapy stopped after 3 cycles because of ongoing GI bleeding and symptomatic anemia  3- Conservative management for GI bleeding. Leading completely stopped after the discontinuation of chemotherapy  4- started tamoxifen after chemotherapy was completed. 8/2013. 5- transitioned to Arimidex 6/2014, completed 08/2018  6- GIST resection, resected 9/2017   7-status post kyphoplasty and biopsy of bone metastases 5/2020. The tumor was minimally ER positive at 5% and AL negative. 8- rodding of the left femur completed. Plan for  Radiation  9- Systemic therapy, plan Arimidex plus Ibrance  10 - XRT completed 07/2020  11-current treatment starting 7/30/2020, monthly Xgeva, oral Ibrance and Arimidex  12-2/2021, progression of disease, plan to switch to Faslodex plus CDK inhibitor abemaciclib, continue Xgeva  13- 8/2021 liver progression, plan SBRT to liver lesion   14-discussing options for progression of cancer    BRIEF CASE HISTORY:   Mary Ann Garcia is a very pleasant 45 y.o. who felt a mass in the medial aspect of her left breast, around the 9:00 position. Patient sought medical attention and mammogram and ultrasound were done.  Showing an irregular, high-density mass with indistinct margins containing pleomorphic calcifications in the lower inner quadrant of the left breast close to 9:00 location this mass measures are mammogram 2.7 x 2.6 x 2 cm. Subsequently targeted ultrasound was performed showing hypoechoic, irregular, taller than wider, solid mass at 9:00 with posterior acoustic shadowing. The calcifications were visible on ultrasound . On ultrasound the solid mass measured 2.4 x 2.6 x 1 cm. Image guided biopsy of the mass was done and showed invasive ductal carcinoma with surrounding DCIS (cribriform type) the tumor was ER and FL positive and HER-2/eve negative with a fish ratio 1.1. The patient was referred to us and she also was seen by radiation oncology , She  decided on proceeding with total mastectomy and breast reconstruction . Allergy showed 2.5 cm intermediate grade invasive ductal carcinoma, [place] lymph node was negative. The tumor was ER/FL positive and HER-2/eve negative. We discussed options of adjuvant therapy including chemotherapy and hormone therapy, The patient decided to proceed with adjuvant chemotherapy without undergone a Oncotype study. TC chemotherapy X4 cycles is planned  After 3 cycles, and presented with persistent GI bleeding leading to symptomatic anemia and syncope, the patient's condition was Serious enough to warrant repeated hospital admissions. Repeated GI workup including endoscopic evaluation and Tagged Red cell scans were negative. We decided to treat her conservatively and chemotherapy was stopped after 3 cycles. After that All her symptoms improved and she was started on tamoxifen. After one year, she was transitioned to anastrozole since she had no periods and hormonal testing showed post menopausal state. 08/2017  She had been having abdominal pain and went to [facility] ([facility] for consult. She had work up which showed mass in the colon showed subtle changes in the mid-small intestine, CT enterography showed 3.6CM mass in the distal small bowel.  She was also hospitalized in the interim with pancreatitis. 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 Margins were negative. Observation was decided, no need for adjuvant therapy  In May/2020, the patient presented with severe back pain. MRI showed multiple bone lesions with evidence of compression fracture. Patient underwent successful kyphoplasty and biopsy and pathology showed breast cancer, it was 5% ER positive and MI was negative. HER-2 was +2 which is equivocal so a fish test was ordered. Plan for radiation, staging PET, and Arimidex. PET scan showed widespread metastatic bony disease including the thoracic spine, the lumbar spine, the left femur and the rib area. She underwent repeated kyphoplasty and rodding of the left femur. She is undergoing radiation to the painful bony areas in thoracic, rib and lumbar areas as well as the left femur. We will likely use Ibrance plus Arimidex. As well as monthly Xgeva to be started after completion of radiation. After completion of radiation, we maintained her on Arimidex plus Ibrance, due to cytopenias, Ibrance dose was decreased to 100 mg/day which was much better tolerated. Also we maintained her on Xgeva monthly. PET scan was done in October and showed essentially stable disease. There was some suspicious activity in the hip but this was asymptomatic and we decided that this might be post radiation or related to physical therapy. We decided to continue current therapy until clear progression. Further testing in early 2021 showed clear progression, will plan to switch to Faslodex and continue with CDk inhibitor(switch to [employer])  and Xgeva. Further testing showed essentially stable bone metastases but new liver lesion that was discordant. We decided to offer SBRT to the liver lesion we will continue with systemic therapy.   INTERIM HISTORY: She comes in today for follow up for metastatic breast cancer and discuss further treatment plan. I reviewed her recent PET scan. Radiation was completed to the liver but PET scan unfortunately showed rapid progression of disease  The patient states that her pain is better actually. She has no nausea or vomiting, she is devastated with the news. GYNECOLOGICAL HISTORY  Menarche at age 14. He is premenopausal with regular periods. +2. She used birth control minimally. PAST MEDICAL HISTORY: has a past medical history of Anemia, Anxiety, Atrial fibrillation ([address] Breast cancer ([address] Carpal tunnel syndrome, Depression, GI bleed, History of blood transfusion, Hx of blood clots, Hypertension, Liver metastases ([address] Lymphedema, Neurologic cardiac syncope, Obesity, Pain, Sleep apnea, ANA PAULA (stress urinary incontinence, female), Tachycardia, Varicella, and Varicella without complication. PAST SURGICAL HISTORY: has a past surgical history that includes Shoulder arthroscopy (); Colonoscopy (); Dilation and curettage of uterus; Knee arthroscopy; fracture surgery; Tunneled venous port placement (Right); Upper gastrointestinal endoscopy (2013); Colonoscopy (2013); Mastectomy (Left, 13); other surgical history (1969); Breast biopsy (Left, 3/21/13); ileoscopy (10/12/05); Hysterectomy; denia and bso (cervix removed) (14); Enterocele repair (14); Cystoscopy (14); bladder suspension (14); Colonoscopy (10/12/2005); Upper gastrointestinal endoscopy (2015); tumor removal; Cosmetic surgery; Cholecystectomy; orif femur decompression (Left); Fixation Kyphoplasty; and back surgery.      CURRENT MEDICATIONS:  has a current medication list which includes the following prescription(s): oxycodone, lorazepam, ferrous sulfate, eliquis, duloxetine, klor-con m20, abemaciclib, ondansetron, calcium-vitamin d, diazepam, losartan, omeprazole, hydrochlorothiazide, amlodipine, and metoprolol tartrate, and the following Facility-Administered Medications: sodium chloride and potassium chloride. ALLERGIES:  is allergic to adhesive tape. FAMILY HISTORY: Negative for any hematological or oncological conditions. Specifically no history of breast cancer in the family    SOCIAL HISTORY:  reports that she quit smoking about 2 years ago. Her smoking use included Cigarettes. She smoked About 20 years. She has never used smokeless tobacco. She reports that she does not drink alcohol or use illicit drugs. REVIEW OF SYSTEMS:   General: No fever or night sweats. Weight is stable. Worsening fatigue  Eyes: No double or blurred vision. Ears: No tinnitus or hearing problem,    Throat: No dysphagia or sore throat   Respiratory: No chest pain, shortness of breath at rest, no hemoptysis. She has severe exercise intolerance and was short of breath for at least 10 minutes after even walking to the bathroom in the office  Cardiovascular: Denies chest pain, PND or orthopnea, or palpitations. +LE edema improved  Gastrointestinal: No nausea or vomiting;+ epigastric pain and heartburn, +freuqent diarrhea with cramping and constipation  Genitourinary: Denies dysuria, hematuria, frequency, urgency or incontinence. No vaginal bleeding. Neurological: Denies decreased LOC, no sensory or motor focal deficits. +headaches - unchanged, mild  Musculoskeletal: No arthralgia or joint swelling. +back pain, right chest wall pain, and right shoulder blade pain   Skin: There are no rashes or bleeding. +hair thinning  Psychiatric: ++ anxiety, depression as discussed. Endocrine: No diabetes or thyroid disease. Hematologic: No bleeding, no adenopathy. PHYSICAL EXAM:  The patient is not in acute distress. Vital signs: Blood pressure 124/81, pulse 80, temperature 98.5 °F (36.9 °C), temperature source Temporal, weight 271 lb (122.9 kg), last menstrual period 03/17/2014, not currently breastfeeding.    HEENT:  Eyes are normal. Ears, nose is congested, no bleeding. Neck: Supple. No lymph node enlargement. No thyroid enlargement. Trachea is centrally located. Chest:  Clear to auscultation. No wheezes or crepitations. Breast:  Right: No masses, no adenopathy. No skin or nippple abnormalities. Left: left-sided mastectomy, surgery site is healed completely, no adenopathy  Heart: Regular sinus rhythm. Abdomen: Soft, nontender. No hepatosplenomegaly. No masses. Extremities:  Mild edema right side. Lymph Nodes:  No cervical, axillary or inguinal lymph node enlargement. Neurologic: Conscious and oriented. No focal neurological deficits. Psychosocial: No depression, anxiety or stress. Skin: No rashes, bruises or ecchymoses      REVIEW OF LABORATORY DATA:     Lab Results   Component Value Date    WBC 2.1 (L) 10/26/2021    HGB 8.7 (L) 10/26/2021    HCT 26.9 (L) 10/26/2021    .5 (H) 10/26/2021     (L) 10/26/2021     Lab Results   Component Value Date    IRON 30 (L) 11/29/2017    TIBC 403 11/29/2017    FERRITIN 33 04/04/2019     Lab Results   Component Value Date    NEUTROABS 1.49 (L) 10/26/2021         Chemistry        Component Value Date/Time     (L) 10/26/2021 0921    K 4.0 10/26/2021 0921     10/26/2021 0921    CO2 21 10/26/2021 0921    BUN 15 10/26/2021 0921    CREATININE 1.12 (H) 10/26/2021 0921        Component Value Date/Time    CALCIUM 9.2 10/26/2021 0921    ALKPHOS 111 (H) 10/26/2021 0921    AST 29 10/26/2021 0921    ALT 21 10/26/2021 0921    BILITOT 0.40 10/26/2021 0921         Results for Denae Oliver (MRN [id number]) as of 2/11/2021 09:57   Ref.  Range 8/29/2017 00:05 8/30/2017 09:15 7/21/2020 10:42 8/27/2020 10:20 9/24/2020 09:47 10/22/2020 09:24 11/19/2020 10:17 12/14/2020 14:38 1/11/2021 13:51 2/8/2021 13:06   CA 27-29 Latest Ref Range: 0 - 38 U/mL   164 (H) 88 (H) 95 (H) 129 (H) 146 (H) 208 (H) 248 (H) 294 (H)       REVIEW OF RADIOLOGICAL RESULTS:  PET scan  Impression   1.  Interval progression of disease as more numerous metabolically active   liver lesions are present compared to recent MRI exam and prior PET-CT. Interval increase in number of metabolically active bone lesions since prior   PET-CT.       2.  Local treatment response in the previously demonstrated dominant liver   lesion in the right hepatic lobe without residual metabolic activity within   this lesion. IMPRESSION:   1- Arlene  Has  T2, N0, M0 ER/VT positive and HER-2/eve negative invasive ductal carcinoma of the left breast  2- Chemotherapy: received 3 cycles of TC, stopped due to GI bleeding  3- GI bleeding , related to the GIST tumor. Currently on oral iron and hemoglobin is improving  4- on anastrozole. We will continue, plan 5 years of therapy, completed 08/2018  5- for hot flashes, better on Effexor. 6- Recent pancreatitis, possibly biliary. MRI CT scan did not show any gallbladder stone. We will discuss with her surgeons. 7-   distal small bowel mass measuring 3.6 cm, resection shows low risk GIST. No need for adjuvant therapy, margins were negative. 8-bone metastases with compression fracture diagnosed in May/2020. Status post kyphoplasty and biopsy. 9- S/P radiation 07/2020  10- for systemic therapy, she was started on Arimidex, adding Ibrance. Due to cytopenias, dose was reduced to 100 mg/day  11-in February/2021, there was signs of progression, will plan to switch to Faslodex   12-June/2021, good response to treatment but discordant growth in one of the liver lesion, plan SBRT to the growing lesion and continue with systemic therapy  13- further progression, plan systemic therapy       PLAN:   1. The patient is having progressive cancer unfortunately  2. She previously had bone only metastases and she was responding somewhat to antiestrogen plus CDK  3.  She was not very interested in chemotherapy and that is why we switched more than 1 kind and then when she had an isolated liver metastasis, we will try to give her SBRT and continue  4. Unfortunately, now she has rapid progression of disease with multiple liver lesions so there is no option but to start her on systemic therapy  5. We will send molecular testing to look for [facility] among others, she might need fresh tissue  6. We talked about standard therapy which is taxane based therapy versus Xeloda. I will send her for second opinion at [facility] clinic    7.  We will hold Xgeva Verzenio and Faslodex for now

## 2021-10-28 NOTE — TELEPHONE ENCOUNTER
Faxed tempus order, demographics, insurance, progress notes and surgical path report. Christiano Magaña here today to have labs drawn. Confirmation that they received paperwork returned.

## 2021-10-28 NOTE — PROGRESS NOTES
Abiola Curet  10/28/2021  10:24 AM      Vitals:    10/28/21 1016   BP: 124/81   Pulse: 80   Resp: 18   Temp: 98.5 °F (36.9 °C)   SpO2: 98%    :        Pain Level: 2       Wt Readings from Last 1 Encounters:   10/28/21 271 lb 12.8 oz (123.3 kg)                Current Outpatient Medications:     oxyCODONE 5 MG capsule, Take 5 mg by mouth every 6 hours as needed for Pain., Disp: , Rfl:     LORazepam (ATIVAN) 1 MG tablet, Take 1 mg by mouth every 8 hours as needed for Anxiety. , Disp: , Rfl:     ferrous sulfate (IRON 325) 325 (65 Fe) MG tablet, Take 325 mg by mouth every other day, Disp: , Rfl:     ELIQUIS 5 MG TABS tablet, TAKE 1 TABLET BY MOUTH TWICE A DAY, Disp: 60 tablet, Rfl: 0    DULoxetine (CYMBALTA) 60 MG extended release capsule, TAKE 1 CAPSULE BY MOUTH EVERY DAY, Disp: 30 capsule, Rfl: 3    KLOR-CON M20 20 MEQ extended release tablet, TAKE 1 TABLET BY MOUTH EVERY DAY, Disp: 30 tablet, Rfl: 5    Abemaciclib 150 MG TABS, Take 150 mg by mouth 2 times daily, Disp: 60 tablet, Rfl: 3    ondansetron (ZOFRAN-ODT) 4 MG disintegrating tablet, Place 1 tablet under the tongue every 8 hours as needed for Nausea or Vomiting, Disp: 30 tablet, Rfl: 1    CALCIUM-VITAMIN D PO, Take by mouth daily, Disp: , Rfl:     diazePAM (VALIUM) 2 MG tablet, TAKE ONE TABLET BY MOUTH FOR ONE DOSE PRIOR TO MRI, Disp: , Rfl:     losartan (COZAAR) 100 MG tablet, Take 100 mg by mouth daily, Disp: , Rfl:     omeprazole (PRILOSEC) 20 MG delayed release capsule, Take 20 mg by mouth daily, Disp: , Rfl:     hydrochlorothiazide (HYDRODIURIL) 12.5 MG tablet, Take 12.5 mg by mouth daily, Disp: , Rfl:     amLODIPine (NORVASC) 5 MG tablet, Take 10 mg by mouth daily , Disp: , Rfl:     metoprolol tartrate (LOPRESSOR) 25 MG tablet, TAKE ONE TABLET BY MOUTH TWICE A DAY, Disp: 60 tablet, Rfl: 4    Current Facility-Administered Medications:     0.9 % sodium chloride bolus, 500 mL, IntraVENous, Once, Teri Obrien MD    potassium chloride (KLOR-CON M) extended release tablet 20 mEq, 20 mEq, Oral, Once, Edis Esquivel MD    Immunizations:    Influenza status:    []   Current   [x]   Patient declined    Pneumococcal status:  [x]   Current  []   Patient declined  Covid status:   [x]  Dose #1:                     [x]  Dose #2:               [x]  Dose #3    Smoking Status:    [] Smoker - PPD:   [x] Nonsmoker - Quit Date:               [] Never a smoker        FALLS RISK SCREEN  Instructions:  Assess the patient and enter the appropriate indicators that are present for fall risk identification. Total the numbers entered and assign a fall risk score from Table 2.  Reassess patient at a minimum every 12 weeks or with status change. Assessment   Date  10/28/2021     1. Mental Ability: confusion/cognitively impaired 0     2. Elimination Issues: incontinence, frequency 0       3. Ambulatory: use of assistive devices (walker, cane, off-loading devices),        attached to equipment (IV pole, oxygen) 0     4. Sensory Limitations: dizziness, vertigo, impaired vision 0     5. Age less than 65        0     6. Age 72 or greater 0     7. Medication: diuretics, strong analgesics, hypnotics, sedatives,        antihypertensive agents 3   8. Falls:  recent history of falls within the last 3 months (not to include slipping or        tripping) 0   TOTAL 3    If score of 4 or greater was education given? No           TABLE 2   Risk Score Risk Level Plan of Care   0-3 Little or  No Risk 1. Provide assistance as indicated for ambulation activities  2. Reorient confused/cognitively impaired patient  3. Chair/bed in low position, stretcher/bed with siderails up except when performing patient care activities  5. Educate patient/family/caregiver on falls prevention  6.  Reassess in 12 weeks or with any noted change in patient condition which places them at a risk for a fall   4-6 Moderate Risk 1. Provide assistance as indicated for ambulation activities  2. Reorient confused/cognitively impaired patient  3. Chair/bed in low position, stretcher/bed with siderails up except when performing patient care activities  4. Educate patient/family/caregiver on falls prevention     7 or   Higher High Risk 1. Place patient in easily observable treatment room  2. Patient attended at all times by family member or staff  3. Provide assistance as indicated for ambulation activities  4. Reorient confused/cognitively impaired patient  5. Chair/bed in low position, stretcher/bed with siderails up except when performing patient care activities  6. Educate patient/family/caregiver on falls prevention         PLAN: Patient is seen today in follow up. Complains of intermittent pain to previous treatment areas. Here to review recent PET Scan. Dr. Judit Booth updated and examined pt. Per MD pt will meet with Dr. Emanuel Roberts today and review options for treatment. Pt put on pending list to call tomorrow to discuss next steps.           Efren Minor RN

## 2021-10-28 NOTE — TELEPHONE ENCOUNTER
AVS from 10/28/21    Refer to Dr Barrera Dickens at [address] [facility] after consultation  Hold treatment for now    Urgent referral line at [facility] called for intake of referral    [facility] referral form faxed and confirmation received (scanned in media) with request for Dr Milton Heart to reach out to Dr. Sydelle Paget    Per Dr Sydelle Paget, liver biopsy & tempus testing to be done    PT coming back today for tempus, paperwork completed and signed by md, given to triage, phleb & registration notified as well    IR coordinator notified of stat referral, will contact pt to schedule    PT was given AVS and an appt schedule    Electronically signed by Fito Kamara on 10/28/2021 at 2:15 PM

## 2021-11-02 NOTE — TELEPHONE ENCOUNTER
Ivan Anthony - Dr Eulalia Garner office to notify that biopsy has been scheduled for 11/4/21    Electronically signed by El Lucas on 11/2/2021 at 3:55 PM

## 2021-11-04 NOTE — POST SEDATION
Sedation Post Procedure Note    Patient Name: Priti Pickett   YOB: 1970  Room/Bed: Room/bed info not found  Medical Record Number: [id number]  Date: 11/4/2021   Time: 10:54 AM         Physicians/Assistants: YOLY Hollis    Procedure Performed:  Liver bx    Post-Sedation Vital Signs:  Vitals:    11/04/21 1049   BP: (!) 91/51   Pulse: 71   Resp: 14   Temp:    SpO2: 95%      Vital signs were reviewed and were stable after the procedure (see flow sheet for vitals)            Post-Sedation Exam: Pt remains stable           Complications: none    Electronically signed by YOLY Gill on 11/4/2021 at 10:54 AM

## 2021-11-04 NOTE — PRE SEDATION
Sedation Pre-Procedure Note    Patient Name: Toy Gomez   YOB: 1970  Room/Bed: Room/bed info not found  Medical Record Number: [id number]  Date: 11/4/2021   Time: 10:00 AM    Indication:  Liver bx    Consent: I have discussed with the patient and/or the patient representative the indication, alternatives, and the possible risks and/or complications of the planned procedure and the anesthesia methods. The patient and/or patient representative appear to understand and agree to proceed. Vital Signs:   Vitals:    11/04/21 1049   BP: (!) 91/51   Pulse: 71   Resp: 14   Temp:    SpO2: 95%       Past Medical History:   has a past medical history of Anemia, Anxiety, Atrial fibrillation ([address] Breast cancer ([address] Carpal tunnel syndrome, Depression, GI bleed, History of blood transfusion, Hx of blood clots, Hypertension, Liver metastases ([address] Lymphedema, Neurologic cardiac syncope, Obesity, Pain, Sleep apnea, ANA PAULA (stress urinary incontinence, female), Tachycardia, Varicella, and Varicella without complication. Past Surgical History:   has a past surgical history that includes Shoulder arthroscopy (2008); Colonoscopy (2005); Dilation and curettage of uterus; Knee arthroscopy; fracture surgery; Tunneled venous port placement (Right); Upper gastrointestinal endoscopy (7/6/2013); Colonoscopy (7/6/2013); Mastectomy (Left, 4/8/13); other surgical history (1969); Breast biopsy (Left, 3/21/13); ileoscopy (10/12/05); Hysterectomy; denia and bso (cervix removed) (4/23/14); Enterocele repair (4/23/14); Cystoscopy (4/23/14); bladder suspension (4/23/14); Colonoscopy (10/12/2005); Upper gastrointestinal endoscopy (04/23/2015); tumor removal; Cosmetic surgery; Cholecystectomy; orif femur decompression (Left); Fixation Kyphoplasty; and back surgery.     Medications:   Scheduled Meds:    sodium chloride  500 mL IntraVENous Once    potassium chloride  20 mEq Oral Once     Continuous Infusions:    sodium chloride 20 mL/hr at 11/04/21 0908     PRN Meds:   Home Meds:   Prior to Admission medications    Medication Sig Start Date End Date Taking? Authorizing Provider   oxyCODONE 5 MG capsule Take 5 mg by mouth every 6 hours as needed for Pain. Yes Historical Provider, MD   LORazepam (ATIVAN) 1 MG tablet Take 1 mg by mouth every 8 hours as needed for Anxiety. Yes Historical Provider, MD   ferrous sulfate (IRON 325) 325 (65 Fe) MG tablet Take 325 mg by mouth every other day   Yes Historical Provider, MD   ELIQUIS 5 MG TABS tablet TAKE 1 TABLET BY MOUTH TWICE A DAY 10/19/21  Yes Katelyn Grossman MD   DULoxetine (CYMBALTA) 60 MG extended release capsule TAKE 1 CAPSULE BY MOUTH EVERY DAY 10/18/21  Yes Katelyn Grossman MD   KLOR-CON M20 20 MEQ extended release tablet TAKE 1 TABLET BY MOUTH EVERY DAY 9/15/21  Yes Katelyn Grossman MD   ondansetron (ZOFRAN-ODT) 4 MG disintegrating tablet Place 1 tablet under the tongue every 8 hours as needed for Nausea or Vomiting 7/29/21  Yes Katelyn Grossman MD   CALCIUM-VITAMIN D PO Take by mouth daily   Yes Historical Provider, MD   losartan (COZAAR) 100 MG tablet Take 100 mg by mouth daily   Yes Historical Provider, MD   omeprazole (PRILOSEC) 20 MG delayed release capsule Take 20 mg by mouth daily   Yes Historical Provider, MD   hydrochlorothiazide (HYDRODIURIL) 12.5 MG tablet Take 12.5 mg by mouth daily   Yes Historical Provider, MD   amLODIPine (NORVASC) 5 MG tablet Take 10 mg by mouth daily    Yes Historical Provider, MD   metoprolol tartrate (LOPRESSOR) 25 MG tablet TAKE ONE TABLET BY MOUTH TWICE A DAY 9/26/16  Yes Junaid Moore MD   oxyCODONE (ROXICODONE) 5 MG immediate release tablet Take 1 tablet by mouth every 6 hours as needed for Pain for up to 30 days.  10/28/21 11/27/21  Mulu Anderson MD   Abemaciclib 150 MG TABS Take 150 mg by mouth 2 times daily 9/7/21   Champ Anderson MD     Coumadin Use Last 7 Days:  no  Antiplatelet drug therapy use last 7 days: no  Other anticoagulant use last 7 days: no  Additional Medication Information:  See [facility]      Pre-Sedation Documentation and Exam:   I have reviewed the patient's history and review of systems.     Mallampati Airway Assessment:  Mallampati Class II - (soft palate, fauces & uvula are visible)    Prior History of Anesthesia Complications:   none    ASA Classification:  Class 2 - A normal healthy patient with mild systemic disease    Sedation/ Anesthesia Plan:   intravenous sedation    Medications Planned:   midazolam (Versed) intravenously and fentanyl intravenously    Patient is an appropriate candidate for plan of sedation: yes    Electronically signed by YOLY Martinez on 11/4/2021 at 10:54 AM

## 2021-11-04 NOTE — H&P
History and Physical    Pt Name: Jacqueline Cruz  MRN: [id number]  YOB: 1970  Date of evaluation: 11/4/2021  Primary Care Physician: Ana María Lowe MD    SUBJECTIVE:   History of Chief Complaint:    Jacqueline Cruz is a 45 y.o. female who is scheduled today for IR liver biopsy. She reports a history of metastatic cancer. She reports initial diagnosis of breast cancer, s/p mastectomy in 2013. She reports initial bone mets, then to the liver and has completed radiation for the liver in  July, but states recent imaging revealed progression. She reports she is going to have biopsy and will be doing clinical trials, going to [facility]. Allergies  is allergic to adhesive tape. Medications  Prior to Admission medications    Medication Sig Start Date End Date Taking? Authorizing Provider   oxyCODONE 5 MG capsule Take 5 mg by mouth every 6 hours as needed for Pain. Yes Historical Provider, MD   LORazepam (ATIVAN) 1 MG tablet Take 1 mg by mouth every 8 hours as needed for Anxiety.    Yes Historical Provider, MD   ferrous sulfate (IRON 325) 325 (65 Fe) MG tablet Take 325 mg by mouth every other day   Yes Historical Provider, MD   ELIQUIS 5 MG TABS tablet TAKE 1 TABLET BY MOUTH TWICE A DAY 10/19/21  Yes Dai Sutton MD   DULoxetine (CYMBALTA) 60 MG extended release capsule TAKE 1 CAPSULE BY MOUTH EVERY DAY 10/18/21  Yes Dai Sutton MD   KLOR-CON M20 20 MEQ extended release tablet TAKE 1 TABLET BY MOUTH EVERY DAY 9/15/21  Yes Dai Sutton MD   ondansetron (ZOFRAN-ODT) 4 MG disintegrating tablet Place 1 tablet under the tongue every 8 hours as needed for Nausea or Vomiting 7/29/21  Yes Dai Sutton MD   CALCIUM-VITAMIN D PO Take by mouth daily   Yes Historical Provider, MD   losartan (COZAAR) 100 MG tablet Take 100 mg by mouth daily   Yes Historical Provider, MD   omeprazole (PRILOSEC) 20 MG delayed release capsule Take 20 mg by mouth daily   Yes Historical Provider, MD hydrochlorothiazide (HYDRODIURIL) 12.5 MG tablet Take 12.5 mg by mouth daily   Yes Historical Provider, MD   amLODIPine (NORVASC) 5 MG tablet Take 10 mg by mouth daily    Yes Historical Provider, MD   metoprolol tartrate (LOPRESSOR) 25 MG tablet TAKE ONE TABLET BY MOUTH TWICE A DAY 9/26/16  Yes Riccardo Hernandez MD   oxyCODONE (ROXICODONE) 5 MG immediate release tablet Take 1 tablet by mouth every 6 hours as needed for Pain for up to 30 days. 10/28/21 11/27/21  Gabino Lozoya MD   Abemaciclib 150 MG TABS Take 150 mg by mouth 2 times daily 9/7/21   Gabino Lozoya MD     Past Medical History    has a past medical history of Anemia, Anxiety, Atrial fibrillation ([address] Breast cancer ([address] Carpal tunnel syndrome, Depression, GI bleed, History of blood transfusion, Hx of blood clots, Hypertension, Liver metastases ([address] Lymphedema, Neurologic cardiac syncope, Obesity, Pain, Sleep apnea, ANA PAULA (stress urinary incontinence, female), Tachycardia, Varicella, and Varicella without complication. Past Surgical History   has a past surgical history that includes Shoulder arthroscopy (2008); Colonoscopy (2005); Dilation and curettage of uterus; Knee arthroscopy; fracture surgery; Tunneled venous port placement (Right); Upper gastrointestinal endoscopy (7/6/2013); Colonoscopy (7/6/2013); Mastectomy (Left, 4/8/13); other surgical history (1969); Breast biopsy (Left, 3/21/13); ileoscopy (10/12/05); Hysterectomy; denia and bso (cervix removed) (4/23/14); Enterocele repair (4/23/14); Cystoscopy (4/23/14); bladder suspension (4/23/14); Colonoscopy (10/12/2005); Upper gastrointestinal endoscopy (04/23/2015); tumor removal; Cosmetic surgery; Cholecystectomy; orif femur decompression (Left); Fixation Kyphoplasty; and back surgery. Social History   reports that she quit smoking about 10 years ago. Her smoking use included cigarettes. She has never used smokeless tobacco.   reports no history of alcohol use.    reports no history of drug use. Marital Status   Children   Occupation was a teacher  Family History  Family Status   Relation Name Status    Mother  Alive    Father  Alive    Brother gerardo Alive    Brother Mustapha Hanson     family history includes High Blood Pressure in her brother, brother, father, and mother. Review of Systems:  CONSTITUTIONAL:   negative for fevers, chills, fatigue and malaise    EYES:   negative for double vision, blurred vision and photophobia    HEENT:   negative for tinnitus, epistaxis and sore throat     RESPIRATORY:   negative for cough, shortness of breath, wheezing     CARDIOVASCULAR:   negative for chest pain, palpitations, syncope, edema     GASTROINTESTINAL:   negative for nausea, vomiting     GENITOURINARY:   negative for incontinence     MUSCULOSKELETAL:   +back pain, left hip pain   NEUROLOGICAL:   +tingling extremities  negative for headaches and dizziness     PSYCHIATRIC:   negative for anxiety       OBJECTIVE:   VITALS:  height is 5' 9\" (1.753 m) and weight is 270 lb (122.5 kg). Her temporal temperature is 96.8 °F (36 °C). Her blood pressure is 130/78 and her pulse is 74. Her respiration is 18 and oxygen saturation is 98%. CONSTITUTIONAL:alert & oriented x 3, no acute distress. Calm and pleasant. SKIN:  Warm and dry, no rashes on exposed areas of skin   HEAD:  Normocephalic, atraumatic   EYES: PERRL. EOMs intact. EARS:  Equal bilaterally, no edema or thickening, skin is intact without lumps or lesions. No discharge. Hearing grossly WNL. NOSE:  Nares patent. No rhinorrhea   MOUTH/THROAT:  Mucous membranes moist, tongue is pink, uvula midline, teeth appear to be intact  NECK:supple, full ROM  LUNGS: Respirations even and non-labored. Clear to auscultation bilaterally, no wheezes, rales, or rhonchi.     CARDIOVASCULAR: Regular rate and rhythm, no murmurs/rubs/gallops   ABDOMEN: soft, non-tender, non-distended, bowel sounds active x 4, rotund   EXTREMITIES: No edema bilateral lower extremities. No varicosities bilateral lower extremities. NEUROLOGIC: CN II-XII are grossly intact. Gait not assessed.    IMPRESSIONS:   Bone metastases   Liver mets   History of breast cancer   PLANS:   IR liver biopsy     WILMA CAGE CNP   Electronically signed 11/4/2021 at 9:20 AM

## 2021-11-04 NOTE — PROGRESS NOTES
Discharge instructions reviewed with pt. Pt verb understanding. Assessment remains the same. Discharged to home per wheelchair.

## 2021-11-04 NOTE — PROGRESS NOTES
Pt comes to [facility]. Skin warm and dry. resp easy on room air. Denies pain at present. Band aid to right abdomen is clean, dry and intact. Lunch provided.

## 2021-11-04 NOTE — BRIEF OP NOTE
Brief Postoperative Note    Melanie Titus  YOB: 1970  [id number]    Pre-operative Diagnosis: Liver mass; breast ca      Post-operative Diagnosis: Same    Procedure: Liver bx    Medication Given: fentanyl and versed    Anesthesia: 1%Lidocaine     Surgeons/Assistants:  Romeo Guzman MD and Darinel Hernandez PA-C    Estimated Blood Loss: Minimal    Complications: none    Technically successful bx of liver mass. 3 core samples were obtained and given to the onsite pathologist that confirmed tissue sampling.      Electronically signed by YOLY Morelos on 11/4/2021 at 10:55 AM

## 2021-11-08 NOTE — TELEPHONE ENCOUNTER
Notified by [facility] that the path order they rec'd is on benign tissue. Questioning if there is a positive specimen that can be requested. Notified Alireza Martini at [facility] that pt had liver bx on 11/4/21. Awaiting results. She states to fax the path report over to [facility] once complete and they will request that tissue. Message left in triage to follow up.

## 2021-11-08 NOTE — TELEPHONE ENCOUNTER
Final pathology on liver bx resulted. Notified Brenna Villalobos at [facility]. Griselda Marinas Is faxing the pathology report to [facility] for them to request tissue for sampling.

## 2021-11-08 NOTE — TELEPHONE ENCOUNTER
Writer spoke with Patient on the phone.     Electronically signed by Lux Pham, Oncology Outpatient [facility] 19, [address] [facility] Radiation Oncology  11/8/2021  10:39 AM

## 2021-11-08 NOTE — FLOWSHEET NOTE
Situation:  Writer called Patient to offer support regarding Patient's test results and treatment plan. Assessment:  Ms. Neftali Pizarro told writer she did not have long to talk. She expressed hopes that the appointment tomorrow at the [facility] will go well and allow her to receive a clinical trial. She voiced her prayer to have \"quality of life. \" She expressed gratitude for her fellow support group members. \"I love my [place]. \" She is aware that writer is available for support. Intervention:  Writer inquired about Pt's coping; assured Pt of her and her support group's prayers and support; affirmed Pt's strengths; wished Pt well for her appointment tomorrow. Outcome:  Ms. Brent Careyha for the call. Plan:  Writer is available for follow up support and visits as needed as Patient continues treatment. 11/08/21 1039   Encounter Summary   Services provided to: Patient   Referral/Consult From: Patient   Support System Family members; Children; Friends/neighbors   Continue Visiting   (11/8/21)   Complexity of Encounter Moderate   Length of Encounter 15 minutes   Spiritual Assessment Completed Yes   Routine   Type Follow up   Spiritual/Jainism   Type Spiritual support   Assessment Calm; Coping; Hopeful   Intervention Active listening; Explored feelings, thoughts, concerns; Explored coping resources; Sustaining presence/ Ministry of presence; Discussed illness/injury and it's impact; Discussed belief system/Lutheran practices/bibiana; Discussed meaning/purpose   Outcome Coping; Expressed feelings/needs/concerns; Engaged in conversation; Expressed gratitude;  Hopeful; Receptive; Encouraged     Electronically signed by Chelsy Sibley, Oncology Outpatient [facility] 08, [address] [facility] Radiation Oncology  11/8/2021  10:41 AM

## 2021-11-10 NOTE — TELEPHONE ENCOUNTER
Spoke with Amadeo Velasco today. She relates she was at [facility] 11/9/21 and saw Dr. Dante Bullard. Dr. Dante Bullard is recommending a treatment to be done at [facility] under Dr. Barney Arthur. Govind Velarde know I was glad that she was seen and that I will let the schedulers know to get her back in to see Dr. Barney Arthur at [facility] as soon as possible to discuss next steps. Message sent to [facility] requesting she contact patient to schedule follow up with Dr. Barney Arthur.

## 2021-11-16 NOTE — TELEPHONE ENCOUNTER
AVS from 11/16/21    Start xeloda once pt has it  [place] today  rv in 4 weeks with xgeva and labs    Oral meds to be continued as prescribed, Pt to  rx at pharmacy on file    Xgeva added today and in 4 weeks    PT was given AVS and an appt schedule    Electronically signed by Keaton Sales on 11/16/2021 at 4:06 PM  a

## 2021-11-16 NOTE — PROGRESS NOTES
Oncology Psychosocial Counseling Therapy Note  JEREMIAH Nguyen   11/16/2021  2:36 PM  Isabel Mehta  1970  [id number]    Time spent with Patient: 60 minutes      Pt was provided informed consent for Oncology Psychosocial Counseling. Discussed with patient model of service to include the limits of confidentiality (i.e. abuse reporting, suicide intervention, etc.) and short-term intervention focused approach. Pt indicated understanding. S:  Patient reports an \"eventual\" time between appointments. All treatment is stopped at this time due to growth of cancer in bone and liver. Patient has been to [facility] for further evaluation and will be starting treatment via oral medication. Patient reports distress from making decision on what her next steps were (clinical trial vs. oral medication). Patient meets with Oncologist after today's appointment to further discuss treatment. Patient reports distress stemming from her home selling and from social relationship. O:  MSE:     Appearance    alert, mild distress  Appetite normal, appetite back since treatment stopped  Sleep disturbance No  Fatigue Yes  Loss of pleasure Yes  Impulsive behavior No  Speech    normal rate and normal volume  Mood    Hopeful  Affect    normal affect  Thought Content    intact  Thought Process    linear  Associations    logical connections  Insight    Good  Judgment    Intact  Orientation    oriented to person, place, time, and general circumstances  Memory    recent and remote memory intact  Attention/Concentration    intact  Morbid ideation No  Suicide Assessment    no suicidal ideation    A:  Completed HO-7 and PHQ-9. Patient tearful when recounting her lab results and new treatment plan, but was hopeful throughout session and laughing at other times. Patient discussed utilizing grounding techniques that were practiced last session.  Christiano [place] able to weigh pros and cons of her treatment choices and choose what she feels is best for her. Patient upset due to having to take a step back from her friend group that gets together due to a couple choose to not get vaccinated. Patient reports that she had put her house for sale and had an offer but it was rescinded. Despite multiple setbacks over the last few weeks, Christiano Magaña shows ability to compartmentalize and move on with time. Patient and  explored thought process of various events. History from Medical Record:        Diagnosis Date    Anemia     Anxiety     Atrial fibrillation ([facility] [address] 3 28 12    dr. Stephanie Shore    Breast cancer [facility])     lt./chemo 6/2013    Carpal tunnel syndrome     Depression     post partum    GI bleed     History of blood transfusion     Hx of blood clots     Hypertension     Liver metastases ([facility] [address] 2/2/2021    Lymphedema     Neurologic cardiac syncope     Obesity     Pain     pelvic    Sleep apnea     ANA PAULA (stress urinary incontinence, female) 3/4/2014    Tachycardia     Varicella     Age 4, severe    Varicella without complication 5/1/7721     Medications:   Current Outpatient Medications   Medication Sig Dispense Refill    ondansetron (ZOFRAN-ODT) 4 MG disintegrating tablet PLACE 1 TABLET UNDER THE TONGUE EVERY 8 HOURS AS NEEDED FOR NAUSEA OR VOMITING 30 tablet 1    ELIQUIS 5 MG TABS tablet TAKE 1 TABLET BY MOUTH TWICE A DAY 60 tablet 0    capecitabine (XELODA) 500 MG chemo tablet Take 4 tablets twice daily 7 days on and 7 days off 112 tablet 3    oxyCODONE 5 MG capsule Take 5 mg by mouth every 6 hours as needed for Pain.  LORazepam (ATIVAN) 1 MG tablet Take 1 mg by mouth every 8 hours as needed for Anxiety.  ferrous sulfate (IRON 325) 325 (65 Fe) MG tablet Take 325 mg by mouth every other day      oxyCODONE (ROXICODONE) 5 MG immediate release tablet Take 1 tablet by mouth every 6 hours as needed for Pain for up to 30 days.  90 tablet 0    DULoxetine (CYMBALTA) 60 MG extended release capsule TAKE 1 CAPSULE BY MOUTH EVERY DAY Transportation Needs:     Lack of Transportation (Medical): Not on file    Lack of Transportation (Non-Medical): Not on file   Physical Activity:     Days of Exercise per Week: Not on file    Minutes of Exercise per Session: Not on file   Stress:     Feeling of Stress : Not on file   Social Connections:     Frequency of Communication with Friends and Family: Not on file    Frequency of Social Gatherings with Friends and Family: Not on file    Attends Druze Services: Not on file    Active Member of [address] [place] [address] [place] or Organizations: Not on file    Attends Club or Organization Meetings: Not on file    Marital Status: Not on file   Intimate Partner Violence:     Fear of Current or Ex-Partner: Not on file    Emotionally Abused: Not on file    Physically Abused: Not on file    Sexually Abused: Not on file   Housing Stability:     Unable to Pay for Housing in the Last Year: Not on file    Number of [place] in the Last Year: Not on file    Unstable Housing in the Last Year: Not on file       TOBACCO:   reports that she quit smoking about 10 years ago. Her smoking use included cigarettes. She has never used smokeless tobacco.  ETOH:   reports no history of alcohol use. Family History:   Family History   Problem Relation Age of Onset    High Blood Pressure Mother     High Blood Pressure Father     High Blood Pressure Brother     High Blood Pressure Brother          Visit Diagnosis:           Diagnoses from Medical Record: Major depressive disorder    Pt interventions:  Discussed self-care (sleep, nutrition, rewarding activities, social support, exercise) and Identified maladaptive thoughts        PLAN:   Patient to continue to utilize grounding techniques and behavioral activation.  encouraged patient to call if needed before next appointment. Patient to return December 14 when she follows up with Medical Oncology.

## 2021-11-16 NOTE — PROGRESS NOTES
Pt here for Xgeva injection. Labs reviewed. Pt seen by Dr Kelsea Reese prior to treatment. Pt was treated without incident and discharged in stable condition. Pt will return in 4 weeks for next Xgeva.

## 2021-11-17 NOTE — TELEPHONE ENCOUNTER
Spoke with [place] re: Xeloda cost.  She states that even the $80/month will be a burden for her. Reviewed process for obtaining Xeloda from the . She wishes to pursue this route for future refills. Patient portion of application mailed to [place], will complete provider portion with Dr. Tana Ward on Friday 11/19/20. She voices appreciation for assistance with medication.

## 2021-11-17 NOTE — PROGRESS NOTES
Chief Complaint   Patient presents with    Follow-up     review status of disease    Results     go over biopsy     Other     [facility] went        DIAGNOSIS:   1- T2, N0, M0 ER positive TN positive and HER-2/eve negative invasive ductal carcinoma of the left breast ( inner upper quadrant)  2- Repeated GI bleeding greetings 2 symptomatic anemia, despite extensive GI workup, source of bleeding was never found. 3- finally a small mass was seen in the jejunum. 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 Margins negative   4- compression fractures and bone lesions. Likely metastatic cancer (5/2020)   5- biopsy proven liver metastasis. CURRENT THERAPY:  1- Mastectomy and axillary sampling  2- adjuvant chemotherapy with Taxotere and Cytoxan starting 5/16/2013. Chemotherapy stopped after 3 cycles because of ongoing GI bleeding and symptomatic anemia  3- Conservative management for GI bleeding. Leading completely stopped after the discontinuation of chemotherapy  4- started tamoxifen after chemotherapy was completed. 8/2013. 5- transitioned to Arimidex 6/2014, completed 08/2018  6- GIST resection, resected 9/2017   7-status post kyphoplasty and biopsy of bone metastases 5/2020. The tumor was minimally ER positive at 5% and TN negative. 8- rodding of the left femur completed. Plan for  Radiation  9- Systemic therapy, plan Arimidex plus Ibrance  10 - XRT completed 07/2020  11-current treatment starting 7/30/2020, monthly Xgeva, oral Ibrance and Arimidex  12-2/2021, progression of disease, plan to switch to Faslodex plus CDK inhibitor abemaciclib, continue Xgeva  13- 8/2021 liver progression, plan SBRT to liver lesion   14-further progression, plan NGS testing and starting Xeloda     BRIEF CASE HISTORY:   Joanna Cole is a very pleasant 45 y.o. who felt a mass in the medial aspect of her left breast, around the 9:00 position. Patient sought medical attention and mammogram and ultrasound were done. Showing an irregular, high-density mass with indistinct margins containing pleomorphic calcifications in the lower inner quadrant of the left breast close to 9:00 location this mass measures are mammogram 2.7 x 2.6 x 2 cm. Subsequently targeted ultrasound was performed showing hypoechoic, irregular, taller than wider, solid mass at 9:00 with posterior acoustic shadowing. The calcifications were visible on ultrasound . On ultrasound the solid mass measured 2.4 x 2.6 x 1 cm. Image guided biopsy of the mass was done and showed invasive ductal carcinoma with surrounding DCIS (cribriform type) the tumor was ER and OR positive and HER-2/eve negative with a fish ratio 1.1. The patient was referred to us and she also was seen by radiation oncology , She  decided on proceeding with total mastectomy and breast reconstruction . Allergy showed 2.5 cm intermediate grade invasive ductal carcinoma, [place] lymph node was negative. The tumor was ER/OR positive and HER-2/eve negative. We discussed options of adjuvant therapy including chemotherapy and hormone therapy, The patient decided to proceed with adjuvant chemotherapy without undergone a Oncotype study. TC chemotherapy X4 cycles is planned  After 3 cycles, and presented with persistent GI bleeding leading to symptomatic anemia and syncope, the patient's condition was Serious enough to warrant repeated hospital admissions. Repeated GI workup including endoscopic evaluation and Tagged Red cell scans were negative. We decided to treat her conservatively and chemotherapy was stopped after 3 cycles. After that All her symptoms improved and she was started on tamoxifen. After one year, she was transitioned to anastrozole since she had no periods and hormonal testing showed post menopausal state. 08/2017  She had been having abdominal pain and went to [facility] for consult.  She had work up which showed mass in the colon showed subtle changes in the mid-small intestine, CT enterography showed 3.6CM mass in the distal small bowel. She was also hospitalized in the interim with pancreatitis. 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 Margins were negative. Observation was decided, no need for adjuvant therapy  In May/2020, the patient presented with severe back pain. MRI showed multiple bone lesions with evidence of compression fracture. Patient underwent successful kyphoplasty and biopsy and pathology showed breast cancer, it was 5% ER positive and NJ was negative. HER-2 was +2 which is equivocal so a fish test was ordered. Plan for radiation, staging PET, and Arimidex. PET scan showed widespread metastatic bony disease including the thoracic spine, the lumbar spine, the left femur and the rib area. She underwent repeated kyphoplasty and rodding of the left femur. She is undergoing radiation to the painful bony areas in thoracic, rib and lumbar areas as well as the left femur. We will likely use Ibrance plus Arimidex. As well as monthly Xgeva to be started after completion of radiation. After completion of radiation, we maintained her on Arimidex plus Ibrance, due to cytopenias, Ibrance dose was decreased to 100 mg/day which was much better tolerated. Also we maintained her on Xgeva monthly. PET scan was done in October and showed essentially stable disease. There was some suspicious activity in the hip but this was asymptomatic and we decided that this might be post radiation or related to physical therapy. We decided to continue current therapy until clear progression. Further testing in early 2021 showed clear progression, will plan to switch to Faslodex and continue with CDk inhibitor(switch to [employer])  and Xgeva. Further testing showed essentially stable bone metastases but new liver lesion that was discordant.   We decided to offer SBRT to the liver lesion we will continue with systemic therapy. Unfortunately she continued to progress, we sent her to [facility] who recommended bx of liver lesion for NGS testing and consider Xeloda in absence of targetable mutation. INTERIM HISTORY: She comes in today for follow up for metastatic breast cancer and discuss further treatment plan. bx was done and we are awaiting the tempus testing. She is still waiting for insurance company to approve her xeloda. GYNECOLOGICAL HISTORY  Menarche at age 14. He is premenopausal with regular periods. +2. She used birth control minimally. PAST MEDICAL HISTORY: has a past medical history of Anemia, Anxiety, Atrial fibrillation ([address] Breast cancer ([address] Carpal tunnel syndrome, Depression, GI bleed, History of blood transfusion, Hx of blood clots, Hypertension, Liver metastases ([address] Lymphedema, Neurologic cardiac syncope, Obesity, Pain, Sleep apnea, ANA PAULA (stress urinary incontinence, female), Tachycardia, Varicella, and Varicella without complication. PAST SURGICAL HISTORY: has a past surgical history that includes Shoulder arthroscopy (); Colonoscopy (); Dilation and curettage of uterus; Knee arthroscopy; fracture surgery; Tunneled venous port placement (Right); Upper gastrointestinal endoscopy (2013); Colonoscopy (2013); Mastectomy (Left, 13); other surgical history (1969); Breast biopsy (Left, 3/21/13); ileoscopy (10/12/05); Hysterectomy; denia and bso (cervix removed) (14); Enterocele repair (14); Cystoscopy (14); bladder suspension (14); Colonoscopy (10/12/2005); Upper gastrointestinal endoscopy (2015); tumor removal; Cosmetic surgery; Cholecystectomy; orif femur decompression (Left); Fixation Kyphoplasty; back surgery; and US BIOPSY LIVER PERCUTANEOUS (2021).      CURRENT MEDICATIONS:  has a current medication list which includes the following prescription(s): ondansetron, capecitabine, eliquis, oxycodone, lorazepam, ferrous sulfate, oxycodone, duloxetine, klor-con m20, abemaciclib, calcium-vitamin d, losartan, omeprazole, hydrochlorothiazide, amlodipine, and metoprolol tartrate, and the following Facility-Administered Medications: sodium chloride and potassium chloride. ALLERGIES:  is allergic to adhesive tape. FAMILY HISTORY: Negative for any hematological or oncological conditions. Specifically no history of breast cancer in the family    SOCIAL HISTORY:  reports that she quit smoking about 2 years ago. Her smoking use included Cigarettes. She smoked About 20 years. She has never used smokeless tobacco. She reports that she does not drink alcohol or use illicit drugs. REVIEW OF SYSTEMS:   General: No fever or night sweats. Weight is stable. Worsening fatigue  Eyes: No double or blurred vision. Ears: No tinnitus or hearing problem,    Throat: No dysphagia or sore throat   Respiratory: No chest pain, shortness of breath at rest, no hemoptysis. She has severe exercise intolerance and was short of breath for at least 10 minutes after even walking to the bathroom in the office  Cardiovascular: Denies chest pain, PND or orthopnea, or palpitations. +LE edema improved  Gastrointestinal: No nausea or vomiting;+ epigastric pain and heartburn, +freuqent diarrhea with cramping and constipation  Genitourinary: Denies dysuria, hematuria, frequency, urgency or incontinence. No vaginal bleeding. Neurological: Denies decreased LOC, no sensory or motor focal deficits. +headaches - unchanged, mild  Musculoskeletal: No arthralgia or joint swelling. +back pain, right chest wall pain, and right shoulder blade pain   Skin: There are no rashes or bleeding. +hair thinning  Psychiatric: ++ anxiety, depression as discussed. Endocrine: No diabetes or thyroid disease. Hematologic: No bleeding, no adenopathy. PHYSICAL EXAM:  The patient is not in acute distress.   Vital signs: Blood pressure 113/75, pulse 69, temperature 97.3 °F (36.3 °C), 164 (H) 88 (H) 95 (H) 129 (H) 146 (H) 208 (H) 248 (H) 294 (H)       REVIEW OF RADIOLOGICAL RESULTS:  PET scan  Impression   1.  Interval progression of disease as more numerous metabolically active   liver lesions are present compared to recent MRI exam and prior PET-CT. Interval increase in number of metabolically active bone lesions since prior   PET-CT.       2.  Local treatment response in the previously demonstrated dominant liver   lesion in the right hepatic lobe without residual metabolic activity within   this lesion. IMPRESSION:   1- Arlene  Has  T2, N0, M0 ER/NC positive and HER-2/eve negative invasive ductal carcinoma of the left breast  2- Chemotherapy: received 3 cycles of TC, stopped due to GI bleeding  3- GI bleeding , related to the GIST tumor. Currently on oral iron and hemoglobin is improving  4- on anastrozole. We will continue, plan 5 years of therapy, completed 08/2018  5- for hot flashes, better on Effexor. 6- Recent pancreatitis, possibly biliary. MRI CT scan did not show any gallbladder stone. We will discuss with her surgeons. 7-   distal small bowel mass measuring 3.6 cm, resection shows low risk GIST. No need for adjuvant therapy, margins were negative. 8-bone metastases with compression fracture diagnosed in May/2020. Status post kyphoplasty and biopsy. 9- S/P radiation 07/2020  10- for systemic therapy, she was started on Arimidex, adding Ibrance. Due to cytopenias, dose was reduced to 100 mg/day  11-in February/2021, there was signs of progression, will plan to switch to Faslodex   12-June/2021, good response to treatment but discordant growth in one of the liver lesion, plan SBRT to the growing lesion and continue with systemic therapy  13- further progression, plan systemic therapy with xeloda and NGS testing.         PLAN:   The patient is having progressive cancer unfortunately  She previously had bone only metastases and she was responding somewhat to antiestrogen plus CDK  Plan xeloda.    Await NGS testing   Unfortunately, now she has rapid progression of disease with multiple liver lesions so there is no option but to start her on systemic therapy  We will send molecular testing to look for [facility] among others, she might need fresh tissue  Plan Xeloda , side effects were explained

## 2021-11-19 NOTE — TELEPHONE ENCOUNTER
ASSISTING Yoni Lee, RN NAVIGATOR. PER NEELAM'S REQUEST I CALLED PATIENT TO SEE IF SHE WAS ABLE TO  HER XELODA AND IF SO DID SHE START IT. I HAD TO LEAVE A MESSAGE FOR PATIENT ASKING FOR HER TO CALL EITHER NEELAM OR MYSELF BACK. I LEFT BOTH OF OUR PHONE NUMBERS FOR CALL BACK.

## 2021-11-24 NOTE — TELEPHONE ENCOUNTER
Called  and left message. Reminded her that I am her oncology nurse navigator at [facility] ([facility]). Let her know that I wanted to check in with her, see how she is doing. Let her know that I wanted to make sure that she was able to get the xeloda and if she had started the medication. Wanted to see how she is doing with it. Encouraged her to call me.

## 2021-11-24 NOTE — TELEPHONE ENCOUNTER
Amador Belcher returned my call and relates that she was able to get initial prescription. She has completed one week and is doing well. She does not think she is having any side effects. She is starting her week off today. Daniel Lauren is working on assistance for obtaining medication. Pt on pending.

## 2021-12-09 NOTE — TELEPHONE ENCOUNTER
Writer received a call from triage that [place] had questions about her refill. Obedr reached out to [place]. Informed [place] that she needed to call and schedule her shipment. Levi Osvaldoelvia found the phone number and said she would call.  checked back with [place] and [place] was able to schedule her delivery. Levi Rileykhadramagaly was given instruction about ordering her refill. She will be using [employer]. Will monitor.

## 2021-12-09 NOTE — TELEPHONE ENCOUNTER
ASSISTING  Earl Wadsworth  , RN NAVIGATOR. I CALLED TO CHECK IN WITH PATIENT TO SEE HOW SHE IS DOING AND LET HER KNOW THAT NEELAM HAS TAKEN ANOTHER POSITION AND IS DISCONTINUING NAVIGATION PER NEELAM'S REQUEST. I SPOKE WITH PATIENT AND NOTIFIED HER AND LET HER KNOW THAT SHE CAN CONTACT MEDICAL ONCOLOGY OR ORAL COMPLIANCE, AS WELL AS THE NAVIGATION TEAM. SHE STATES THAT SHE HAS A QUESTION ABOUT FILLING ONE OF HER MEDICATIONS AND I TRANSFERRED HER TO TRIAGE.

## 2021-12-14 NOTE — FLOWSHEET NOTE
SPIRITUAL CARE PROGRESS NOTE    Spiritual Assessment: Ms. Prachi Quintero was sitting in the waiting area of the medical oncology clinic. She smiled and greeted writer. She expressed gratitude for the good news she received from her doctor today. She shared about other favorable events in her life. She was tearful as she recounted all that is going well. She shared that she is waiting for her counseling appointment and is grateful for the support. Intervention: Writer provided supportive presence and active listening; inquired about Pt's coping, needs, and recent news; offered words of support and encouragement; affirmed Pt's strengths; wished Pt well. Outcome: Ms. Prachi Quintero expressed emotion and gratitude for what is going on in her life. She thanked writer and wished her well. Plan: Chaplains will remain available to provide emotional and spiritual support as needed. 12/14/21 1230   Encounter Summary   Services provided to: Patient   Referral/Consult From: [address] [place] [address] Family members; Children; Friends/neighbors   Continue Visiting   (12/14/21)   Complexity of Encounter Moderate   Length of Encounter 15 minutes   Spiritual Assessment Completed Yes   Routine   Type Follow up   Spiritual/Anabaptism   Type Spiritual support   Assessment Calm; Approachable; Coping   Intervention Active listening; Explored feelings, thoughts, concerns; Explored coping resources; Sustaining presence/ Ministry of presence; Discussed meaning/purpose; Discussed illness/injury and it's impact   Outcome Expressed gratitude; Coping; Expressed feelings/needs/concerns; Engaged in conversation; Receptive; Hopeful; Encouraged;  Tearful     Electronically signed by Marques Butler, Oncology [facility] [address], [id number] [facility] Radiation Oncology  12/14/2021  12:32 PM

## 2021-12-14 NOTE — PROGRESS NOTES
Chief Complaint   Patient presents with    Follow-up    Pain     back, knees, hips     Mouth Lesions    Headache       DIAGNOSIS:   1- T2, N0, M0 ER positive MS positive and HER-2/eve negative invasive ductal carcinoma of the left breast ( inner upper quadrant)  2- Repeated GI bleeding greetings 2 symptomatic anemia, despite extensive GI workup, source of bleeding was never found. 3- finally a small mass was seen in the jejunum. 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 Margins negative   4- compression fractures and bone lesions. Likely metastatic cancer (5/2020)   5- biopsy proven liver metastasis. 6- Molecular testing suggests PI K3 CA mutation, T p53 mutation and MAP kinase mutation    CURRENT THERAPY:  1- Mastectomy and axillary sampling  2- adjuvant chemotherapy with Taxotere and Cytoxan starting 5/16/2013. Chemotherapy stopped after 3 cycles because of ongoing GI bleeding and symptomatic anemia  3- Conservative management for GI bleeding. Leading completely stopped after the discontinuation of chemotherapy  4- started tamoxifen after chemotherapy was completed. 8/2013. 5- transitioned to Arimidex 6/2014, completed 08/2018  6- GIST resection, resected 9/2017   7-status post kyphoplasty and biopsy of bone metastases 5/2020. The tumor was minimally ER positive at 5% and MS negative. 8- rodding of the left femur completed. Plan for  Radiation  9- Systemic therapy, plan Arimidex plus Ibrance  10 - XRT completed 07/2020  11-current treatment starting 7/30/2020, monthly Xgeva, oral Ibrance and Arimidex  12-2/2021, progression of disease, plan to switch to Faslodex plus CDK inhibitor abemaciclib, continue Xgeva  13- 8/2021 liver progression, plan SBRT to liver lesion   14-further progression, plan NGS testing and starting Xeloda     BRIEF CASE HISTORY:   Raza Salinas is a very pleasant 45 y.o. who felt a mass in the medial aspect of her left breast, around the 9:00 position.   Patient sought medical attention and mammogram and ultrasound were done. Showing an irregular, high-density mass with indistinct margins containing pleomorphic calcifications in the lower inner quadrant of the left breast close to 9:00 location this mass measures are mammogram 2.7 x 2.6 x 2 cm. Subsequently targeted ultrasound was performed showing hypoechoic, irregular, taller than wider, solid mass at 9:00 with posterior acoustic shadowing. The calcifications were visible on ultrasound . On ultrasound the solid mass measured 2.4 x 2.6 x 1 cm. Image guided biopsy of the mass was done and showed invasive ductal carcinoma with surrounding DCIS (cribriform type) the tumor was ER and NJ positive and HER-2/eve negative with a fish ratio 1.1. The patient was referred to us and she also was seen by radiation oncology , She  decided on proceeding with total mastectomy and breast reconstruction . Allergy showed 2.5 cm intermediate grade invasive ductal carcinoma, [place] lymph node was negative. The tumor was ER/NJ positive and HER-2/eve negative. We discussed options of adjuvant therapy including chemotherapy and hormone therapy, The patient decided to proceed with adjuvant chemotherapy without undergone a Oncotype study. TC chemotherapy X4 cycles is planned  After 3 cycles, and presented with persistent GI bleeding leading to symptomatic anemia and syncope, the patient's condition was Serious enough to warrant repeated hospital admissions. Repeated GI workup including endoscopic evaluation and Tagged Red cell scans were negative. We decided to treat her conservatively and chemotherapy was stopped after 3 cycles. After that All her symptoms improved and she was started on tamoxifen. After one year, she was transitioned to anastrozole since she had no periods and hormonal testing showed post menopausal state. 08/2017  She had been having abdominal pain and went to [facility] for consult.  She had work up which showed mass in the colon offer SBRT to the liver lesion we will continue with systemic therapy. Unfortunately she continued to progress, we sent her to [facility] who recommended bx of liver lesion for NGS testing and consider Xeloda in absence of targetable mutation. INTERIM HISTORY: She comes in today for follow up for metastatic breast cancer . She is on Xeloda and that seems to be well-tolerated. She started feeling improved symptoms with improved pain, she had minimal nausea no vomiting. No diarrhea. Overall performance status seems to be slowly improving. She feels much more hopeful than last visit. GYNECOLOGICAL HISTORY  Menarche at age 14. He is premenopausal with regular periods. +2. She used birth control minimally. PAST MEDICAL HISTORY: has a past medical history of Anemia, Anxiety, Atrial fibrillation ([address] Breast cancer ([address] Carpal tunnel syndrome, Depression, GI bleed, History of blood transfusion, Hx of blood clots, Hypertension, Liver metastases ([address] Lymphedema, Neurologic cardiac syncope, Obesity, Pain, Sleep apnea, ANA PAULA (stress urinary incontinence, female), Tachycardia, Varicella, and Varicella without complication. PAST SURGICAL HISTORY: has a past surgical history that includes Shoulder arthroscopy (); Colonoscopy (); Dilation and curettage of uterus; Knee arthroscopy; fracture surgery; Tunneled venous port placement (Right); Upper gastrointestinal endoscopy (2013); Colonoscopy (2013); Mastectomy (Left, 13); other surgical history (1969); Breast biopsy (Left, 3/21/13); ileoscopy (10/12/05); Hysterectomy; denia and bso (cervix removed) (14); Enterocele repair (14); Cystoscopy (14); bladder suspension (14); Colonoscopy (10/12/2005); Upper gastrointestinal endoscopy (2015); tumor removal; Cosmetic surgery; Cholecystectomy; orif femur decompression (Left); Fixation Kyphoplasty; back surgery; and US BIOPSY LIVER PERCUTANEOUS (2021).      CURRENT MEDICATIONS: has a current medication list which includes the following prescription(s): docusate sodium, ondansetron, capecitabine, eliquis, oxycodone, lorazepam, ferrous sulfate, duloxetine, klor-con m20, calcium-vitamin d, losartan, omeprazole, hydrochlorothiazide, amlodipine, and metoprolol tartrate, and the following Facility-Administered Medications: denosumab, sodium chloride, and potassium chloride. ALLERGIES:  is allergic to adhesive tape. FAMILY HISTORY: Negative for any hematological or oncological conditions. Specifically no history of breast cancer in the family    SOCIAL HISTORY:  reports that she quit smoking about 2011. Her smoking use included Cigarettes. She smoked About 20 years. She has never used smokeless tobacco. She reports that she does not drink alcohol or use illicit drugs. REVIEW OF SYSTEMS:   General: No fever or night sweats. Weight is stable. Worsening fatigue  Eyes: No double or blurred vision. Ears: No tinnitus or hearing problem,    Throat: No dysphagia or sore throat   Respiratory: No chest pain, shortness of breath at rest, no hemoptysis. She has severe exercise intolerance and was short of breath for at least 10 minutes after even walking to the bathroom in the office  Cardiovascular: Denies chest pain, PND or orthopnea, or palpitations. +LE edema improved  Gastrointestinal: No nausea or vomiting;+ epigastric pain and heartburn, +freuqent diarrhea with cramping and constipation  Genitourinary: Denies dysuria, hematuria, frequency, urgency or incontinence. No vaginal bleeding. Neurological: Denies decreased LOC, no sensory or motor focal deficits. +headaches - unchanged, mild  Musculoskeletal: No arthralgia or joint swelling. +back pain, right chest wall pain, and right shoulder blade pain   Skin: There are no rashes or bleeding. +hair thinning  Psychiatric: ++ anxiety, depression as discussed. Endocrine: No diabetes or thyroid disease.    Hematologic: No bleeding, no adenopathy. PHYSICAL EXAM:  The patient is not in acute distress. Vital signs: Blood pressure 110/73, pulse 80, temperature 98.1 °F (36.7 °C), temperature source Oral, resp. rate 16, weight 273 lb 3.2 oz (123.9 kg), last menstrual period 03/17/2014, not currently breastfeeding. HEENT:  Eyes are normal. Ears, nose is congested, no bleeding. Neck: Supple. No lymph node enlargement. No thyroid enlargement. Trachea is centrally located. Chest:  Clear to auscultation. No wheezes or crepitations. Breast:  Right: No masses, no adenopathy. No skin or nippple abnormalities. Left: left-sided mastectomy, surgery site is healed completely, no adenopathy  Heart: Regular sinus rhythm. Abdomen: Soft, nontender. No hepatosplenomegaly. No masses. Extremities:  Mild edema right side. Lymph Nodes:  No cervical, axillary or inguinal lymph node enlargement. Neurologic: Conscious and oriented. No focal neurological deficits. Psychosocial: No depression, anxiety or stress. Skin: No rashes, bruises or ecchymoses      REVIEW OF LABORATORY DATA:     Lab Results   Component Value Date    WBC 3.1 (L) 12/14/2021    HGB 9.5 (L) 12/14/2021    HCT 27.8 (L) 12/14/2021    .3 (H) 12/14/2021     12/14/2021     Lab Results   Component Value Date    IRON 30 (L) 11/29/2017    TIBC 403 11/29/2017    FERRITIN 33 04/04/2019     Lab Results   Component Value Date    NEUTROABS PENDING 12/14/2021         Chemistry        Component Value Date/Time     12/14/2021 1015    K 3.7 12/14/2021 1015     12/14/2021 1015    CO2 24 12/14/2021 1015    BUN 16 12/14/2021 1015    CREATININE 0.84 12/14/2021 1015        Component Value Date/Time    CALCIUM 9.5 12/14/2021 1015    ALKPHOS 114 (H) 12/14/2021 1015    AST 31 12/14/2021 1015    ALT 22 12/14/2021 1015    BILITOT 0.37 12/14/2021 1015         Results for Umair Du (MRN [id number]) as of 2/11/2021 09:57   Ref.  Range 8/29/2017 00:05 8/30/2017 09:15 7/21/2020 10:42 8/27/2020 10:20 9/24/2020 09:47 10/22/2020 09:24 11/19/2020 10:17 12/14/2020 14:38 1/11/2021 13:51 2/8/2021 13:06   CA 27-29 Latest Ref Range: 0 - 38 U/mL   164 (H) 88 (H) 95 (H) 129 (H) 146 (H) 208 (H) 248 (H) 294 (H)       REVIEW OF RADIOLOGICAL RESULTS:  PET scan  Impression   1.  Interval progression of disease as more numerous metabolically active   liver lesions are present compared to recent MRI exam and prior PET-CT. Interval increase in number of metabolically active bone lesions since prior   PET-CT.       2.  Local treatment response in the previously demonstrated dominant liver   lesion in the right hepatic lobe without residual metabolic activity within   this lesion. IMPRESSION:   1- Arlene  Has  T2, N0, M0 ER/PA positive and HER-2/eve negative invasive ductal carcinoma of the left breast  2- Chemotherapy: received 3 cycles of TC, stopped due to GI bleeding  3- GI bleeding , related to the GIST tumor. Currently on oral iron and hemoglobin is improving  4- on anastrozole. We will continue, plan 5 years of therapy, completed 08/2018  5- for hot flashes, better on Effexor. 6- Recent pancreatitis, possibly biliary. MRI CT scan did not show any gallbladder stone. We will discuss with her surgeons. 7-   distal small bowel mass measuring 3.6 cm, resection shows low risk GIST. No need for adjuvant therapy, margins were negative. 8-bone metastases with compression fracture diagnosed in May/2020. Status post kyphoplasty and biopsy. 9- S/P radiation 07/2020  10- for systemic therapy, she was started on Arimidex, adding Ibrance. Due to cytopenias, dose was reduced to 100 mg/day  11-in February/2021, there was signs of progression, will plan to switch to Faslodex   12-June/2021, good response to treatment but discordant growth in one of the liver lesion, plan SBRT to the growing lesion and continue with systemic therapy  13- further progression, plan systemic therapy with xeloda and NGS testing.

## 2021-12-14 NOTE — TELEPHONE ENCOUNTER
AVS from 12/14/21    xgeva today  rv in 4 weeks with Mario Padilla and labs cbc, cmp CA27-29    Tx today as scheduled    RV scheduled 1/11/22 @ 9:45am    PT was given AVS and an appt schedule    Electronically signed by Katherine Newton on 12/14/2021 at 3:14 PM

## 2021-12-14 NOTE — PROGRESS NOTES
Oncology Psychosocial Counseling Therapy Note  JEREMIAH Landin   12/14/2021  12:07 PM  Jacqueline Cruz  1970  [id number]    Time spent with Patient: 70 minutes      Pt was provided informed consent for Oncology Psychosocial Counseling. Discussed with patient model of service to include the limits of confidentiality (i.e. abuse reporting, suicide intervention, etc.) and short-term intervention focused approach. Pt indicated understanding. S:  Patient arrives to appointment after seeing her Oncologist and receiving injection. Patient reports good lab results with her new medication she started last month. Patient worried about potential side effects from medication but states she has had minimal side effects so far. Olayinka Yanez will continue to follow up with Oncologist every month and says she gets scans every three months. Patient is still in the process of moving into her new home but has sold her old home. Olayinka Yanez reports that she spoke with her Nurse Navigator recently and learned that she will be moving to a new position. Patient states she received a lot of help and support and is worried about transitioning to a new Navigator. Olayinka Yanez reports she is hosting [employer] this year in her new home and is worried about dynamics of having her family all together.      O:  MSE:     Appearance    alert, crying, mild distress  Appetite normal  Sleep disturbance No  Fatigue No  Loss of pleasure No  Impulsive behavior No  Speech    normal rate and normal volume  Mood    Depressed  Affect    depressed affect  Thought Content    intact  Thought Process    linear  Associations    logical connections  Insight    Good  Judgment    Intact  Orientation    oriented to person, place, time, and general circumstances  Memory    recent and remote memory intact  Attention/Concentration    intact  Morbid ideation No  Suicide Assessment    no suicidal ideation    A:  Patient continues to adjust to new medication and reports minimal physical side effects at this time. [facility] looking forward to break in between doctor appointments and is hopeful that her current medication will continue to give her quality of life. Patient states overall being in a \"better place\" but is worried about upcoming holidays in regards to her family dynamics. [facility] states she is hosting [place] for her family and that she wants it to be a celebration because she does not know how many more holidays she will have with her family. Patient states divide in family due to parents being \"opinionated and prejudiced. \" Patient reports extra anxiety and depression when around her parents because they create a divide within the family. Patient states her parents Marnee Art have a comment about something\" and that it creates an uncomfortable environment for everyone.  modeled assertive communication and boundary making. Patient brainstormed different conversations and ways to redirect conversations when with her family.  encouraged patient to reach out before holidays if needed to further discuss.        History from Medical Record:        Diagnosis Date    Anemia     Anxiety     Atrial fibrillation ([facility] 3 28 12    dr. Gay Eubanks Breast cancer [facility])     lt./chemo 6/2013    Carpal tunnel syndrome     Depression     post partum    GI bleed     History of blood transfusion     Hx of blood clots     Hypertension     Liver metastases ([facility] 2/2/2021    Lymphedema     Neurologic cardiac syncope     Obesity     Pain     pelvic    Sleep apnea     ANA PAULA (stress urinary incontinence, female) 3/4/2014    Tachycardia     Varicella     Age 4, severe    Varicella without complication 3/3/3327     Medications:   Current Outpatient Medications   Medication Sig Dispense Refill    docusate sodium (COLACE) 100 MG capsule Take 100 mg by mouth 2 times daily      LORazepam (ATIVAN) 2 MG tablet Take 1 tablet by mouth every 8 hours as needed for Anxiety for up to 30 days. 60 tablet 0    ondansetron (ZOFRAN-ODT) 4 MG disintegrating tablet Place 1 tablet under the tongue every 8 hours as needed for Nausea or Vomiting 30 tablet 1    capecitabine (XELODA) 500 MG chemo tablet Take 4 tablets twice daily 7 days on and 7 days off 112 tablet 0    ELIQUIS 5 MG TABS tablet TAKE 1 TABLET BY MOUTH TWICE A DAY 60 tablet 0    oxyCODONE 5 MG capsule Take 5 mg by mouth every 6 hours as needed for Pain.       ferrous sulfate (IRON 325) 325 (65 Fe) MG tablet Take 325 mg by mouth every other day      DULoxetine (CYMBALTA) 60 MG extended release capsule TAKE 1 CAPSULE BY MOUTH EVERY DAY 30 capsule 3    KLOR-CON M20 20 MEQ extended release tablet TAKE 1 TABLET BY MOUTH EVERY DAY 30 tablet 5    CALCIUM-VITAMIN D PO Take by mouth daily      losartan (COZAAR) 100 MG tablet Take 100 mg by mouth daily      omeprazole (PRILOSEC) 20 MG delayed release capsule Take 20 mg by mouth daily      hydrochlorothiazide (HYDRODIURIL) 12.5 MG tablet Take 12.5 mg by mouth daily      amLODIPine (NORVASC) 5 MG tablet Take 10 mg by mouth daily       metoprolol tartrate (LOPRESSOR) 25 MG tablet TAKE ONE TABLET BY MOUTH TWICE A DAY 60 tablet 4     Current Facility-Administered Medications   Medication Dose Route Frequency Provider Last Rate Last Admin    0.9 % sodium chloride bolus  500 mL IntraVENous Once Harpreet Clemons MD        potassium chloride (KLOR-CON M) extended release tablet 20 mEq  20 mEq Oral Once Harpreet Clemons MD           Social History:   Social History     Socioeconomic History    Marital status:      Spouse name: Not on file    Number of children: Not on file    Years of education: Not on file    Highest education level: Not on file   Occupational History    Not on file   Tobacco Use    Smoking status: Former Smoker     Types: Cigarettes     Quit date: 2010     Years since quittin.0    Smokeless tobacco: Never Used   Vaping Use    Vaping Use: Never used   Substance and Sexual Activity    Alcohol use: No     Alcohol/week: 0.0 standard drinks    Drug use: No    Sexual activity: Yes     Partners: Male     Birth control/protection: Surgical   Other Topics Concern    Not on file   Social History Narrative    Not on file     Social Determinants of Health     Financial Resource Strain:     Difficulty of Paying Living Expenses: Not on file   Food Insecurity:     Worried About Running Out of Food in the Last Year: Not on file    Prema of Food in the Last Year: Not on file   Transportation Needs:     Lack of Transportation (Medical): Not on file    Lack of Transportation (Non-Medical): Not on file   Physical Activity:     Days of Exercise per Week: Not on file    Minutes of Exercise per Session: Not on file   Stress:     Feeling of Stress : Not on file   Social Connections:     Frequency of Communication with Friends and Family: Not on file    Frequency of Social Gatherings with Friends and Family: Not on file    Attends Sabianism Services: Not on file    Active Member of [address] [address] Penemarie K Murphy or Organizations: Not on file    Attends Club or Organization Meetings: Not on file    Marital Status: Not on file   Intimate Partner Violence:     Fear of Current or Ex-Partner: Not on file    Emotionally Abused: Not on file    Physically Abused: Not on file    Sexually Abused: Not on file   Housing Stability:     Unable to Pay for Housing in the Last Year: Not on file    Number of [place] in the Last Year: Not on file    Unstable Housing in the Last Year: Not on file       TOBACCO:   reports that she quit smoking about 11 years ago. Her smoking use included cigarettes. She has never used smokeless tobacco.  ETOH:   reports no history of alcohol use.     Family History:   Family History   Problem Relation Age of Onset    High Blood Pressure Mother     High Blood Pressure Father     High Blood Pressure Brother     High Blood Pressure Brother          Visit Diagnosis:           Diagnoses from Medical Record: Major depressive disorder        Pt interventions:  Practiced assertive communication, Trained in improving communication skills, Discussed self-care (sleep, nutrition, rewarding activities, social support, exercise) and Identified maladaptive thoughts        PLAN:   Olayinka Yanez to continue to utilize coping skills and behavioral activation discussed at previous sessions. Patient to practice assertive communication and boundaries with family. Patient to return 1/11 at 12:00pm to coincide with oncology follow up. Patient encouraged to call if needed.

## 2021-12-29 NOTE — TELEPHONE ENCOUNTER
Pt called in stating she took a home test today and it was positive for covid. She wanted to know what she should do. Writer states PCP manages care for covid infection. She states her PCP will not accept an at-home covid test, and wants her to go to urgent care. She states they also mentioned prescribing the antibody infusion if she is positive. She states she will see how see feels tomorrow and will decide what to do at that time. She will follow up with PCP. Pt has appt 1/11 with Dr. Rian Salcido and xgeva to follow.  Will discuss with Dr. Rian Salcido if pt is able to come in for that appt or if pt needs to be rescheduled d/t positive covid test.

## 2022-01-01 ENCOUNTER — TELEPHONE (OUTPATIENT)
Dept: ONCOLOGY | Age: 52
End: 2022-01-01

## 2022-01-01 ENCOUNTER — TELEPHONE (OUTPATIENT)
Dept: INFUSION THERAPY | Age: 52
End: 2022-01-01

## 2022-01-01 ENCOUNTER — TELEPHONE (OUTPATIENT)
Dept: SPIRITUAL SERVICES | Age: 52
End: 2022-01-01

## 2022-01-01 ENCOUNTER — TELEMEDICINE (OUTPATIENT)
Dept: ONCOLOGY | Age: 52
End: 2022-01-01
Payer: COMMERCIAL

## 2022-01-01 ENCOUNTER — HOSPITAL ENCOUNTER (OUTPATIENT)
Dept: INFUSION THERAPY | Age: 52
Discharge: HOME | End: 2022-04-12

## 2022-01-01 ENCOUNTER — HOSPITAL ENCOUNTER (INPATIENT)
Age: 52
LOS: 1 days | Discharge: HOME | DRG: 194 | End: 2022-03-05
Attending: STUDENT IN AN ORGANIZED HEALTH CARE EDUCATION/TRAINING PROGRAM | Admitting: INTERNAL MEDICINE
Payer: COMMERCIAL

## 2022-01-01 ENCOUNTER — APPOINTMENT (OUTPATIENT)
Dept: GENERAL RADIOLOGY | Age: 52
End: 2022-01-01
Payer: COMMERCIAL

## 2022-01-01 ENCOUNTER — HOSPITAL ENCOUNTER (OUTPATIENT)
Age: 52
Discharge: HOME | End: 2022-04-11
Payer: COMMERCIAL

## 2022-01-01 ENCOUNTER — HOSPITAL ENCOUNTER (OUTPATIENT)
Dept: NUCLEAR MEDICINE | Age: 52
Discharge: HOME | End: 2022-05-05
Payer: COMMERCIAL

## 2022-01-01 ENCOUNTER — HOSPITAL ENCOUNTER (OUTPATIENT)
Dept: NUCLEAR MEDICINE | Age: 52
Discharge: HOME | End: 2022-02-10
Payer: COMMERCIAL

## 2022-01-01 ENCOUNTER — HOSPITAL ENCOUNTER (OUTPATIENT)
Dept: MRI IMAGING | Age: 52
Discharge: HOME | End: 2022-03-25
Payer: COMMERCIAL

## 2022-01-01 ENCOUNTER — OFFICE VISIT (OUTPATIENT)
Dept: ONCOLOGY | Age: 52
End: 2022-01-01
Payer: COMMERCIAL

## 2022-01-01 ENCOUNTER — APPOINTMENT (OUTPATIENT)
Dept: MRI IMAGING | Age: 52
DRG: 194 | End: 2022-01-01
Payer: COMMERCIAL

## 2022-01-01 ENCOUNTER — HOSPITAL ENCOUNTER (OUTPATIENT)
Dept: INFUSION THERAPY | Age: 52
End: 2022-01-01

## 2022-01-01 ENCOUNTER — HOSPITAL ENCOUNTER (OUTPATIENT)
Dept: NON INVASIVE DIAGNOSTICS | Age: 52
Discharge: HOME | End: 2022-05-05
Payer: COMMERCIAL

## 2022-01-01 ENCOUNTER — HOSPITAL ENCOUNTER (OUTPATIENT)
Age: 52
Discharge: HOME | End: 2022-02-08
Payer: COMMERCIAL

## 2022-01-01 ENCOUNTER — HOSPITAL ENCOUNTER (OUTPATIENT)
Dept: INFUSION THERAPY | Age: 52
Discharge: HOME | End: 2022-05-09
Payer: COMMERCIAL

## 2022-01-01 ENCOUNTER — HOSPITAL ENCOUNTER (OUTPATIENT)
Dept: INFUSION THERAPY | Age: 52
Discharge: HOME | End: 2022-01-13
Payer: COMMERCIAL

## 2022-01-01 ENCOUNTER — HOSPITAL ENCOUNTER (EMERGENCY)
Age: 52
Discharge: HOME | End: 2022-05-03
Attending: EMERGENCY MEDICINE
Payer: COMMERCIAL

## 2022-01-01 ENCOUNTER — APPOINTMENT (OUTPATIENT)
Dept: CT IMAGING | Age: 52
DRG: 194 | End: 2022-01-01
Payer: COMMERCIAL

## 2022-01-01 ENCOUNTER — HOSPITAL ENCOUNTER (OUTPATIENT)
Dept: INFUSION THERAPY | Age: 52
Discharge: HOME | End: 2022-03-15
Payer: COMMERCIAL

## 2022-01-01 ENCOUNTER — HOSPITAL ENCOUNTER (OUTPATIENT)
Dept: INFUSION THERAPY | Age: 52
Discharge: HOME | End: 2022-02-10
Payer: COMMERCIAL

## 2022-01-01 ENCOUNTER — HOSPITAL ENCOUNTER (OUTPATIENT)
Dept: CT IMAGING | Age: 52
Discharge: HOME | End: 2022-02-10
Payer: COMMERCIAL

## 2022-01-01 VITALS
RESPIRATION RATE: 18 BRPM | BODY MASS INDEX: 41.19 KG/M2 | OXYGEN SATURATION: 98 % | WEIGHT: 278.9 LBS | HEART RATE: 75 BPM | SYSTOLIC BLOOD PRESSURE: 128 MMHG | DIASTOLIC BLOOD PRESSURE: 86 MMHG | TEMPERATURE: 97.1 F

## 2022-01-01 VITALS
WEIGHT: 275 LBS | SYSTOLIC BLOOD PRESSURE: 120 MMHG | BODY MASS INDEX: 40.61 KG/M2 | DIASTOLIC BLOOD PRESSURE: 79 MMHG | TEMPERATURE: 96.6 F | HEART RATE: 73 BPM

## 2022-01-01 VITALS
WEIGHT: 280 LBS | BODY MASS INDEX: 41.35 KG/M2 | SYSTOLIC BLOOD PRESSURE: 113 MMHG | TEMPERATURE: 97.6 F | DIASTOLIC BLOOD PRESSURE: 78 MMHG | HEART RATE: 70 BPM

## 2022-01-01 VITALS
TEMPERATURE: 97.8 F | HEART RATE: 67 BPM | DIASTOLIC BLOOD PRESSURE: 58 MMHG | SYSTOLIC BLOOD PRESSURE: 104 MMHG | RESPIRATION RATE: 16 BRPM

## 2022-01-01 VITALS
HEIGHT: 69 IN | BODY MASS INDEX: 40.56 KG/M2 | TEMPERATURE: 98.1 F | RESPIRATION RATE: 18 BRPM | OXYGEN SATURATION: 97 % | WEIGHT: 273.81 LBS | SYSTOLIC BLOOD PRESSURE: 97 MMHG | HEART RATE: 68 BPM | DIASTOLIC BLOOD PRESSURE: 65 MMHG

## 2022-01-01 VITALS
HEIGHT: 69 IN | RESPIRATION RATE: 14 BRPM | BODY MASS INDEX: 39.99 KG/M2 | HEART RATE: 62 BPM | SYSTOLIC BLOOD PRESSURE: 96 MMHG | DIASTOLIC BLOOD PRESSURE: 62 MMHG | OXYGEN SATURATION: 97 % | WEIGHT: 270 LBS | TEMPERATURE: 97.6 F

## 2022-01-01 VITALS
HEART RATE: 76 BPM | SYSTOLIC BLOOD PRESSURE: 133 MMHG | TEMPERATURE: 97.1 F | WEIGHT: 275 LBS | BODY MASS INDEX: 40.61 KG/M2 | DIASTOLIC BLOOD PRESSURE: 79 MMHG

## 2022-01-01 VITALS
BODY MASS INDEX: 41.16 KG/M2 | DIASTOLIC BLOOD PRESSURE: 73 MMHG | TEMPERATURE: 97.2 F | HEART RATE: 62 BPM | SYSTOLIC BLOOD PRESSURE: 125 MMHG | WEIGHT: 278.7 LBS

## 2022-01-01 DIAGNOSIS — Z17.0 MALIGNANT NEOPLASM OF UPPER-OUTER QUADRANT OF LEFT BREAST IN FEMALE, ESTROGEN RECEPTOR POSITIVE (HCC): ICD-10-CM

## 2022-01-01 DIAGNOSIS — C78.7 LIVER METASTASES (HCC): ICD-10-CM

## 2022-01-01 DIAGNOSIS — C50.412 MALIGNANT NEOPLASM OF UPPER-OUTER QUADRANT OF LEFT BREAST IN FEMALE, ESTROGEN RECEPTOR POSITIVE (HCC): ICD-10-CM

## 2022-01-01 DIAGNOSIS — C50.211 BILATERAL MALIGNANT NEOPLASM OF UPPER INNER QUADRANT OF BREAST IN FEMALE, UNSPECIFIED ESTROGEN RECEPTOR STATUS (HCC): ICD-10-CM

## 2022-01-01 DIAGNOSIS — Z17.0 MALIGNANT NEOPLASM OF UPPER-OUTER QUADRANT OF LEFT BREAST IN FEMALE, ESTROGEN RECEPTOR POSITIVE (HCC): Primary | ICD-10-CM

## 2022-01-01 DIAGNOSIS — C49.A3 GIST (GASTROINTESTINAL STROMAL TUMOR) OF SMALL BOWEL, MALIGNANT (HCC): ICD-10-CM

## 2022-01-01 DIAGNOSIS — C50.212 BILATERAL MALIGNANT NEOPLASM OF UPPER INNER QUADRANT OF BREAST IN FEMALE, UNSPECIFIED ESTROGEN RECEPTOR STATUS (HCC): ICD-10-CM

## 2022-01-01 DIAGNOSIS — R42 VERTIGO: ICD-10-CM

## 2022-01-01 DIAGNOSIS — C50.412 MALIGNANT NEOPLASM OF UPPER-OUTER QUADRANT OF LEFT BREAST IN FEMALE, ESTROGEN RECEPTOR POSITIVE (HCC): Primary | ICD-10-CM

## 2022-01-01 DIAGNOSIS — C50.211 BILATERAL MALIGNANT NEOPLASM OF UPPER INNER QUADRANT OF BREAST IN FEMALE, UNSPECIFIED ESTROGEN RECEPTOR STATUS (HCC): Primary | ICD-10-CM

## 2022-01-01 DIAGNOSIS — C79.51 BONE METASTASIS (HCC): ICD-10-CM

## 2022-01-01 DIAGNOSIS — Z86.718 HISTORY OF DVT (DEEP VEIN THROMBOSIS): ICD-10-CM

## 2022-01-01 DIAGNOSIS — C50.212 BILATERAL MALIGNANT NEOPLASM OF UPPER INNER QUADRANT OF BREAST IN FEMALE, UNSPECIFIED ESTROGEN RECEPTOR STATUS (HCC): Primary | ICD-10-CM

## 2022-01-01 DIAGNOSIS — S39.012A STRAIN OF LUMBAR REGION, INITIAL ENCOUNTER: ICD-10-CM

## 2022-01-01 DIAGNOSIS — Z79.899 NEED FOR PROPHYLACTIC CHEMOTHERAPY: ICD-10-CM
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
## 2022-01-01 DIAGNOSIS — R29.90 STROKE-LIKE SYMPTOMS: Primary | ICD-10-CM

## 2022-01-01 DIAGNOSIS — C78.7 LIVER METASTASES (HCC): Primary | ICD-10-CM

## 2022-01-01 DIAGNOSIS — D05.10 DUCTAL CARCINOMA IN SITU (DCIS) OF BREAST, UNSPECIFIED LATERALITY: Primary | ICD-10-CM

## 2022-01-01 LAB
ABSOLUTE EOS #: 0.03 K/UL (ref 0–0.4)
ABSOLUTE EOS #: 0.11 K/UL (ref 0–0.4)
ABSOLUTE EOS #: 0.12 K/UL (ref 0–0.4)
ABSOLUTE EOS #: 0.14 K/UL (ref 0–0.4)
ABSOLUTE EOS #: 0.19 K/UL (ref 0–0.4)
ABSOLUTE EOS #: 0.2 K/UL (ref 0–0.4)
ABSOLUTE IMMATURE GRANULOCYTE: 0 K/UL (ref 0–0.3)
ABSOLUTE IMMATURE GRANULOCYTE: ABNORMAL K/UL (ref 0–0.3)
ABSOLUTE LYMPH #: 0.12 K/UL (ref 1–4.8)
ABSOLUTE LYMPH #: 0.39 K/UL (ref 1–4.8)
ABSOLUTE LYMPH #: 0.41 K/UL (ref 1–4.8)
ABSOLUTE LYMPH #: 0.5 K/UL (ref 1–4.8)
ABSOLUTE LYMPH #: 0.53 K/UL (ref 1–4.8)
ABSOLUTE LYMPH #: 0.63 K/UL (ref 1–4.8)
ABSOLUTE MONO #: 0.25 K/UL (ref 0.1–0.8)
ABSOLUTE MONO #: 0.3 K/UL (ref 0.1–0.8)
ABSOLUTE MONO #: 0.3 K/UL (ref 0.1–1.2)
ABSOLUTE MONO #: 0.36 K/UL (ref 0.1–0.8)
ABSOLUTE MONO #: 0.7 K/UL (ref 0.1–1.3)
ABSOLUTE MONO #: 0.74 K/UL (ref 0.1–0.8)
ALBUMIN SERPL-MCNC: 2.9 G/DL (ref 3.5–5.2)
ALBUMIN SERPL-MCNC: 3.5 G/DL (ref 3.5–5.2)
ALBUMIN SERPL-MCNC: 3.8 G/DL (ref 3.5–5.2)
ALBUMIN SERPL-MCNC: 3.9 G/DL (ref 3.5–5.2)
ALBUMIN SERPL-MCNC: 3.9 G/DL (ref 3.5–5.2)
ALBUMIN/GLOBULIN RATIO: 1.1 (ref 1–2.5)
ALBUMIN/GLOBULIN RATIO: 1.2 (ref 1–2.5)
ALBUMIN/GLOBULIN RATIO: 1.3 (ref 1–2.5)
ALBUMIN/GLOBULIN RATIO: 1.4 (ref 1–2.5)
ALBUMIN/GLOBULIN RATIO: 1.5 (ref 1–2.5)
ALP BLD-CCNC: 123 U/L (ref 35–104)
ALP BLD-CCNC: 125 U/L (ref 35–104)
ALP BLD-CCNC: 134 U/L (ref 35–104)
ALP BLD-CCNC: 138 U/L (ref 35–104)
ALP BLD-CCNC: 214 U/L (ref 35–104)
ALT SERPL-CCNC: 101 U/L (ref 5–33)
ALT SERPL-CCNC: 23 U/L (ref 5–33)
ALT SERPL-CCNC: 31 U/L (ref 5–33)
ALT SERPL-CCNC: 33 U/L (ref 5–33)
ALT SERPL-CCNC: 41 U/L (ref 5–33)
ANION GAP SERPL CALCULATED.3IONS-SCNC: 10 MMOL/L (ref 9–17)
ANION GAP SERPL CALCULATED.3IONS-SCNC: 11 MMOL/L (ref 9–17)
ANION GAP SERPL CALCULATED.3IONS-SCNC: 11 MMOL/L (ref 9–17)
ANION GAP SERPL CALCULATED.3IONS-SCNC: 12 MMOL/L (ref 9–17)
ANION GAP SERPL CALCULATED.3IONS-SCNC: 13 MMOL/L (ref 9–17)
ANION GAP SERPL CALCULATED.3IONS-SCNC: 8 MMOL/L (ref 9–17)
AST SERPL-CCNC: 39 U/L
AST SERPL-CCNC: 45 U/L
AST SERPL-CCNC: 46 U/L
AST SERPL-CCNC: 60 U/L
AST SERPL-CCNC: 82 U/L
BASOPHILS # BLD: 0 % (ref 0–2)
BASOPHILS # BLD: 1 % (ref 0–2)
BASOPHILS # BLD: 1 % (ref 0–2)
BASOPHILS ABSOLUTE: 0 K/UL (ref 0–0.2)
BASOPHILS ABSOLUTE: 0.03 K/UL (ref 0–0.2)
BILIRUB SERPL-MCNC: 0.39 MG/DL (ref 0.3–1.2)
BILIRUB SERPL-MCNC: 0.44 MG/DL (ref 0.3–1.2)
BILIRUB SERPL-MCNC: 0.45 MG/DL (ref 0.3–1.2)
BILIRUB SERPL-MCNC: 0.47 MG/DL (ref 0.3–1.2)
BILIRUB SERPL-MCNC: 0.5 MG/DL (ref 0.3–1.2)
BUN BLDV-MCNC: 12 MG/DL (ref 6–20)
BUN BLDV-MCNC: 14 MG/DL (ref 6–20)
BUN BLDV-MCNC: 15 MG/DL (ref 6–20)
BUN BLDV-MCNC: 22 MG/DL (ref 6–20)
BUN/CREAT BLD: ABNORMAL (ref 9–20)
BUN/CREAT BLD: ABNORMAL (ref 9–20)
CA 27-29: 197 U/ML (ref 0–38)
CA 27-29: 209 U/ML (ref 0–38)
CA 27-29: 312 U/ML (ref 0–38)
CA 27-29: 324 U/ML (ref 0–38)
CALCIUM SERPL-MCNC: 8.9 MG/DL (ref 8.6–10.4)
CALCIUM SERPL-MCNC: 9.2 MG/DL (ref 8.6–10.4)
CALCIUM SERPL-MCNC: 9.2 MG/DL (ref 8.6–10.4)
CALCIUM SERPL-MCNC: 9.4 MG/DL (ref 8.6–10.4)
CALCIUM SERPL-MCNC: 9.4 MG/DL (ref 8.6–10.4)
CALCIUM SERPL-MCNC: 9.6 MG/DL (ref 8.6–10.4)
CHLORIDE BLD-SCNC: 101 MMOL/L (ref 98–107)
CHLORIDE BLD-SCNC: 101 MMOL/L (ref 98–107)
CHLORIDE BLD-SCNC: 102 MMOL/L (ref 98–107)
CHLORIDE BLD-SCNC: 103 MMOL/L (ref 98–107)
CHLORIDE BLD-SCNC: 104 MMOL/L (ref 98–107)
CHLORIDE BLD-SCNC: 104 MMOL/L (ref 98–107)
CO2: 23 MMOL/L (ref 20–31)
CO2: 25 MMOL/L (ref 20–31)
CO2: 26 MMOL/L (ref 20–31)
CO2: 31 MMOL/L (ref 20–31)
CREAT SERPL-MCNC: 0.61 MG/DL (ref 0.5–0.9)
CREAT SERPL-MCNC: 0.65 MG/DL (ref 0.5–0.9)
CREAT SERPL-MCNC: 0.66 MG/DL (ref 0.5–0.9)
CREAT SERPL-MCNC: 0.74 MG/DL (ref 0.5–0.9)
CREAT SERPL-MCNC: 0.75 MG/DL (ref 0.5–0.9)
CREAT SERPL-MCNC: 0.8 MG/DL (ref 0.5–0.9)
DIFFERENTIAL TYPE: ABNORMAL
DIFFERENTIAL TYPE: ABNORMAL
EOSINOPHILS RELATIVE PERCENT: 1 % (ref 1–4)
EOSINOPHILS RELATIVE PERCENT: 3 % (ref 1–4)
EOSINOPHILS RELATIVE PERCENT: 3 % (ref 1–4)
EOSINOPHILS RELATIVE PERCENT: 4 % (ref 0–4)
EOSINOPHILS RELATIVE PERCENT: 4 % (ref 1–4)
EOSINOPHILS RELATIVE PERCENT: 5 % (ref 1–4)
GFR AFRICAN AMERICAN: >60 ML/MIN
GFR NON-AFRICAN AMERICAN: >60 ML/MIN
GFR SERPL CREATININE-BSD FRML MDRD: >60 ML/MIN
GFR SERPL CREATININE-BSD FRML MDRD: ABNORMAL ML/MIN/{1.73_M2}
GFR SERPL CREATININE-BSD FRML MDRD: NORMAL ML/MIN/{1.73_M2}
GLUCOSE BLD-MCNC: 100 MG/DL (ref 70–99)
GLUCOSE BLD-MCNC: 108 MG/DL (ref 65–105)
GLUCOSE BLD-MCNC: 111 MG/DL (ref 70–99)
GLUCOSE BLD-MCNC: 111 MG/DL (ref 70–99)
GLUCOSE BLD-MCNC: 122 MG/DL (ref 70–99)
GLUCOSE BLD-MCNC: 131 MG/DL (ref 70–99)
GLUCOSE BLD-MCNC: 61 MG/DL (ref 65–105)
GLUCOSE BLD-MCNC: 89 MG/DL (ref 70–99)
HCT VFR BLD CALC: 29.6 % (ref 36.3–47.1)
HCT VFR BLD CALC: 31.8 % (ref 36–46)
HCT VFR BLD CALC: 32.4 % (ref 36–46)
HCT VFR BLD CALC: 32.7 % (ref 36–46)
HCT VFR BLD CALC: 32.8 % (ref 36–46)
HCT VFR BLD CALC: 34 % (ref 36–46)
HEMOGLOBIN: 10.3 G/DL (ref 12–16)
HEMOGLOBIN: 11.1 G/DL (ref 12–16)
HEMOGLOBIN: 11.8 G/DL (ref 12–16)
HEMOGLOBIN: 9.7 G/DL (ref 11.9–15.1)
IMMATURE GRANULOCYTES: 0 %
IMMATURE GRANULOCYTES: ABNORMAL %
INR BLD: 1.1
LEGIONELLA PNEUMOPHILIA AG, URINE: NEGATIVE
LV EF: 62 %
LVEF MODALITY: NORMAL
LYMPHOCYTES # BLD: 11 % (ref 24–44)
LYMPHOCYTES # BLD: 11 % (ref 24–44)
LYMPHOCYTES # BLD: 17 % (ref 24–44)
LYMPHOCYTES # BLD: 17 % (ref 24–44)
LYMPHOCYTES # BLD: 4 % (ref 24–44)
LYMPHOCYTES # BLD: 9 % (ref 24–44)
MCH RBC QN AUTO: 34.3 PG (ref 26–34)
MCH RBC QN AUTO: 34.7 PG (ref 26–34)
MCH RBC QN AUTO: 34.8 PG (ref 26–34)
MCH RBC QN AUTO: 34.9 PG (ref 25.2–33.5)
MCH RBC QN AUTO: 35.5 PG (ref 26–34)
MCH RBC QN AUTO: 36.1 PG (ref 26–34)
MCHC RBC AUTO-ENTMCNC: 32.4 G/DL (ref 31–37)
MCHC RBC AUTO-ENTMCNC: 32.8 G/DL (ref 28.4–34.8)
MCHC RBC AUTO-ENTMCNC: 33.8 G/DL (ref 31–37)
MCHC RBC AUTO-ENTMCNC: 33.9 G/DL (ref 31–37)
MCHC RBC AUTO-ENTMCNC: 34.3 G/DL (ref 31–37)
MCHC RBC AUTO-ENTMCNC: 34.7 G/DL (ref 31–37)
MCV RBC AUTO: 101.3 FL (ref 80–100)
MCV RBC AUTO: 102.4 FL (ref 80–100)
MCV RBC AUTO: 102.7 FL (ref 80–100)
MCV RBC AUTO: 106 FL (ref 80–100)
MCV RBC AUTO: 106.5 FL (ref 82.6–102.9)
MCV RBC AUTO: 106.8 FL (ref 80–100)
MONOCYTES # BLD: 10 % (ref 2–11)
MONOCYTES # BLD: 15 % (ref 1–7)
MONOCYTES # BLD: 21 % (ref 1–7)
MONOCYTES # BLD: 8 % (ref 1–7)
MORPHOLOGY: ABNORMAL
MORPHOLOGY: NORMAL
MORPHOLOGY: NORMAL
MYCOPLASMA PNEUMONIAE IGM: 0.76
MYOGLOBIN: 36 NG/ML (ref 25–58)
NRBC AUTOMATED: 0 PER 100 WBC
NRBC AUTOMATED: ABNORMAL PER 100 WBC
PARTIAL THROMBOPLASTIN TIME: 28.4 SEC (ref 24–36)
PDW BLD-RTO: 16.4 % (ref 12.5–15.4)
PDW BLD-RTO: 17.2 % (ref 11.8–14.4)
PDW BLD-RTO: 17.6 % (ref 11.5–14.9)
PDW BLD-RTO: 17.6 % (ref 12.5–15.4)
PDW BLD-RTO: 18.1 % (ref 12.5–15.4)
PDW BLD-RTO: 19.9 % (ref 12.5–15.4)
PLATELET # BLD: 162 K/UL (ref 138–453)
PLATELET # BLD: 164 K/UL (ref 140–450)
PLATELET # BLD: 169 K/UL (ref 150–450)
PLATELET # BLD: 178 K/UL (ref 140–450)
PLATELET # BLD: 192 K/UL (ref 140–450)
PLATELET # BLD: 63 K/UL (ref 140–450)
PLATELET ESTIMATE: ABNORMAL
PLATELET ESTIMATE: ABNORMAL
PMV BLD AUTO: 10.3 FL (ref 8.1–13.5)
PMV BLD AUTO: 6.5 FL (ref 6–12)
PMV BLD AUTO: 7.3 FL (ref 6–12)
PMV BLD AUTO: 7.4 FL (ref 6–12)
PMV BLD AUTO: 8.1 FL (ref 6–12)
PMV BLD AUTO: 9.4 FL (ref 8–14)
POC CREATININE: 0.91 MG/DL (ref 0.51–1.19)
POTASSIUM SERPL-SCNC: 3.8 MMOL/L (ref 3.7–5.3)
POTASSIUM SERPL-SCNC: 3.9 MMOL/L (ref 3.7–5.3)
POTASSIUM SERPL-SCNC: 4 MMOL/L (ref 3.7–5.3)
POTASSIUM SERPL-SCNC: 4 MMOL/L (ref 3.7–5.3)
POTASSIUM SERPL-SCNC: 4.1 MMOL/L (ref 3.7–5.3)
POTASSIUM SERPL-SCNC: 4.3 MMOL/L (ref 3.7–5.3)
PROTHROMBIN TIME: 14.2 SEC (ref 11.8–14.6)
RBC # BLD: 2.78 M/UL (ref 3.95–5.11)
RBC # BLD: 3 M/UL (ref 4–5.2)
RBC # BLD: 3.07 M/UL (ref 4–5.2)
RBC # BLD: 3.18 M/UL (ref 4–5.2)
RBC # BLD: 3.2 M/UL (ref 4–5.2)
RBC # BLD: 3.32 M/UL (ref 4–5.2)
RBC # BLD: ABNORMAL 10*6/UL
RBC # BLD: ABNORMAL 10*6/UL
SEG NEUTROPHILS: 65 % (ref 36–66)
SEG NEUTROPHILS: 69 % (ref 36–66)
SEG NEUTROPHILS: 70 % (ref 36–66)
SEG NEUTROPHILS: 71 % (ref 36–66)
SEG NEUTROPHILS: 80 % (ref 36–66)
SEG NEUTROPHILS: 84 % (ref 36–66)
SEGMENTED NEUTROPHILS ABSOLUTE COUNT: 2.2 K/UL (ref 1.8–7.7)
SEGMENTED NEUTROPHILS ABSOLUTE COUNT: 2.26 K/UL (ref 1.8–7.7)
SEGMENTED NEUTROPHILS ABSOLUTE COUNT: 2.52 K/UL (ref 1.8–7.7)
SEGMENTED NEUTROPHILS ABSOLUTE COUNT: 2.58 K/UL (ref 1.8–7.7)
SEGMENTED NEUTROPHILS ABSOLUTE COUNT: 3.2 K/UL (ref 1.3–9.1)
SEGMENTED NEUTROPHILS ABSOLUTE COUNT: 3.59 K/UL (ref 1.8–7.7)
SODIUM BLD-SCNC: 136 MMOL/L (ref 135–144)
SODIUM BLD-SCNC: 138 MMOL/L (ref 135–144)
SODIUM BLD-SCNC: 140 MMOL/L (ref 135–144)
SODIUM BLD-SCNC: 141 MMOL/L (ref 135–144)
TOTAL CK: 47 U/L (ref 26–192)
TOTAL PROTEIN: 5.3 G/DL (ref 6.4–8.3)
TOTAL PROTEIN: 6.4 G/DL (ref 6.4–8.3)
TOTAL PROTEIN: 6.6 G/DL (ref 6.4–8.3)
TOTAL PROTEIN: 6.7 G/DL (ref 6.4–8.3)
TOTAL PROTEIN: 7 G/DL (ref 6.4–8.3)
TROPONIN, HIGH SENSITIVITY: <6 NG/L (ref 0–14)
WBC # BLD: 3 K/UL (ref 3.5–11)
WBC # BLD: 3.1 K/UL (ref 3.5–11.3)
WBC # BLD: 3.5 K/UL (ref 3.5–11)
WBC # BLD: 3.7 K/UL (ref 3.5–11)
WBC # BLD: 4.5 K/UL (ref 3.5–11)
WBC # BLD: 4.6 K/UL (ref 3.5–11)
WBC # BLD: ABNORMAL 10*3/UL
WBC # BLD: ABNORMAL 10*3/UL

## 2022-01-01 PROCEDURE — G8417 CALC BMI ABV UP PARAM F/U: HCPCS | Performed by: INTERNAL MEDICINE

## 2022-01-01 PROCEDURE — 80053 COMPREHEN METABOLIC PANEL: CPT

## 2022-01-01 PROCEDURE — 96372 THER/PROPH/DIAG INJ SC/IM: CPT

## 2022-01-01 PROCEDURE — 85025 COMPLETE CBC W/AUTO DIFF WBC: CPT

## 2022-01-01 PROCEDURE — 86300 IMMUNOASSAY TUMOR CA 15-3: CPT

## 2022-01-01 PROCEDURE — 82553 CREATINE MB FRACTION: CPT

## 2022-01-01 PROCEDURE — 74177 CT ABD & PELVIS W/CONTRAST: CPT

## 2022-01-01 PROCEDURE — G8428 CUR MEDS NOT DOCUMENT: HCPCS | Performed by: INTERNAL MEDICINE

## 2022-01-01 PROCEDURE — 85610 PROTHROMBIN TIME: CPT

## 2022-01-01 PROCEDURE — 6360000002 HC RX W HCPCS: Performed by: INTERNAL MEDICINE

## 2022-01-01 PROCEDURE — 36415 COLL VENOUS BLD VENIPUNCTURE: CPT

## 2022-01-01 PROCEDURE — 82947 ASSAY GLUCOSE BLOOD QUANT: CPT

## 2022-01-01 PROCEDURE — 99215 OFFICE O/P EST HI 40 MIN: CPT | Performed by: INTERNAL MEDICINE

## 2022-01-01 PROCEDURE — G8484 FLU IMMUNIZE NO ADMIN: HCPCS | Performed by: INTERNAL MEDICINE

## 2022-01-01 PROCEDURE — 2580000003 HC RX 258: Performed by: INTERNAL MEDICINE

## 2022-01-01 PROCEDURE — 99448 NTRPROF PH1/NTRNET/EHR 21-30: CPT | Performed by: PSYCHIATRY & NEUROLOGY

## 2022-01-01 PROCEDURE — 96375 TX/PRO/DX INJ NEW DRUG ADDON: CPT

## 2022-01-01 PROCEDURE — 1111F DSCHRG MED/CURRENT MED MERGE: CPT | Performed by: INTERNAL MEDICINE

## 2022-01-01 PROCEDURE — 99214 OFFICE O/P EST MOD 30 MIN: CPT | Performed by: INTERNAL MEDICINE

## 2022-01-01 PROCEDURE — 72100 X-RAY EXAM L-S SPINE 2/3 VWS: CPT

## 2022-01-01 PROCEDURE — 72158 MRI LUMBAR SPINE W/O & W/DYE: CPT

## 2022-01-01 PROCEDURE — 6370000000 HC RX 637 (ALT 250 FOR IP): Performed by: INTERNAL MEDICINE

## 2022-01-01 PROCEDURE — 3017F COLORECTAL CA SCREEN DOC REV: CPT | Performed by: INTERNAL MEDICINE

## 2022-01-01 PROCEDURE — A9579 GAD-BASE MR CONTRAST NOS,1ML: HCPCS | Performed by: INTERNAL MEDICINE

## 2022-01-01 PROCEDURE — G8427 DOCREV CUR MEDS BY ELIG CLIN: HCPCS | Performed by: INTERNAL MEDICINE

## 2022-01-01 PROCEDURE — 99211 OFF/OP EST MAY X REQ PHY/QHP: CPT | Performed by: INTERNAL MEDICINE

## 2022-01-01 PROCEDURE — 2060000000 HC ICU INTERMEDIATE R&B

## 2022-01-01 PROCEDURE — 6360000004 HC RX CONTRAST MEDICATION: Performed by: INTERNAL MEDICINE

## 2022-01-01 PROCEDURE — 1036F TOBACCO NON-USER: CPT | Performed by: INTERNAL MEDICINE

## 2022-01-01 PROCEDURE — 70553 MRI BRAIN STEM W/O & W/DYE: CPT

## 2022-01-01 PROCEDURE — 83874 ASSAY OF MYOGLOBIN: CPT

## 2022-01-01 PROCEDURE — 96413 CHEMO IV INFUSION 1 HR: CPT

## 2022-01-01 PROCEDURE — 87449 NOS EACH ORGANISM AG IA: CPT

## 2022-01-01 PROCEDURE — 78815 PET IMAGE W/CT SKULL-THIGH: CPT

## 2022-01-01 PROCEDURE — 82550 ASSAY OF CK (CPK): CPT

## 2022-01-01 PROCEDURE — 99283 EMERGENCY DEPT VISIT LOW MDM: CPT

## 2022-01-01 PROCEDURE — 36591 DRAW BLOOD OFF VENOUS DEVICE: CPT

## 2022-01-01 PROCEDURE — 82565 ASSAY OF CREATININE: CPT

## 2022-01-01 PROCEDURE — 6360000002 HC RX W HCPCS: Performed by: STUDENT IN AN ORGANIZED HEALTH CARE EDUCATION/TRAINING PROGRAM

## 2022-01-01 PROCEDURE — 73564 X-RAY EXAM KNEE 4 OR MORE: CPT

## 2022-01-01 PROCEDURE — 3430000000 HC RX DIAGNOSTIC RADIOPHARMACEUTICAL: Performed by: INTERNAL MEDICINE

## 2022-01-01 PROCEDURE — A9503 TC99M MEDRONATE: HCPCS | Performed by: INTERNAL MEDICINE

## 2022-01-01 PROCEDURE — 70450 CT HEAD/BRAIN W/O DYE: CPT

## 2022-01-01 PROCEDURE — 2580000003 HC RX 258: Performed by: STUDENT IN AN ORGANIZED HEALTH CARE EDUCATION/TRAINING PROGRAM

## 2022-01-01 PROCEDURE — 96401 CHEMO ANTI-NEOPL SQ/IM: CPT

## 2022-01-01 PROCEDURE — 70496 CT ANGIOGRAPHY HEAD: CPT

## 2022-01-01 PROCEDURE — 96415 CHEMO IV INFUSION ADDL HR: CPT

## 2022-01-01 PROCEDURE — 6360000004 HC RX CONTRAST MEDICATION: Performed by: STUDENT IN AN ORGANIZED HEALTH CARE EDUCATION/TRAINING PROGRAM

## 2022-01-01 PROCEDURE — A9552 F18 FDG: HCPCS | Performed by: INTERNAL MEDICINE

## 2022-01-01 PROCEDURE — 78306 BONE IMAGING WHOLE BODY: CPT

## 2022-01-01 PROCEDURE — 80048 BASIC METABOLIC PNL TOTAL CA: CPT

## 2022-01-01 PROCEDURE — 93306 TTE W/DOPPLER COMPLETE: CPT

## 2022-01-01 PROCEDURE — 99285 EMERGENCY DEPT VISIT HI MDM: CPT

## 2022-01-01 PROCEDURE — 86738 MYCOPLASMA ANTIBODY: CPT

## 2022-01-01 PROCEDURE — 6370000000 HC RX 637 (ALT 250 FOR IP): Performed by: NURSE PRACTITIONER

## 2022-01-01 PROCEDURE — 84484 ASSAY OF TROPONIN QUANT: CPT

## 2022-01-01 PROCEDURE — 85730 THROMBOPLASTIN TIME PARTIAL: CPT

## 2022-01-01 RX ORDER — LORAZEPAM 1 MG/1
1 TABLET ORAL ONCE
Status: COMPLETED | OUTPATIENT
Start: 2022-01-01 | End: 2022-01-01

## 2022-01-01 RX ORDER — SODIUM CHLORIDE 9 MG/ML
5-250 INJECTION, SOLUTION INTRAVENOUS PRN
Status: CANCELLED | OUTPATIENT
Start: 2022-01-01

## 2022-01-01 RX ORDER — DOCUSATE SODIUM 100 MG/1
100 CAPSULE, LIQUID FILLED ORAL 2 TIMES DAILY
Status: DISCONTINUED | OUTPATIENT
Start: 2022-01-01 | End: 2022-01-01 | Stop reason: HOSPADM

## 2022-01-01 RX ORDER — OXYCODONE HYDROCHLORIDE 5 MG/1
5 CAPSULE ORAL EVERY 6 HOURS PRN
Qty: 180 CAPSULE | Refills: 0 | Status: CANCELLED | OUTPATIENT
Start: 2022-01-01 | End: 2022-01-01

## 2022-01-01 RX ORDER — DULOXETIN HYDROCHLORIDE 60 MG/1
CAPSULE, DELAYED RELEASE ORAL
Qty: 90 CAPSULE | Refills: 1 | Status: SHIPPED | OUTPATIENT
Start: 2022-01-01

## 2022-01-01 RX ORDER — APIXABAN 5 MG/1
TABLET, FILM COATED ORAL
Qty: 60 TABLET | Refills: 0 | Status: SHIPPED | OUTPATIENT
Start: 2022-01-01 | End: 2022-01-01

## 2022-01-01 RX ORDER — ONDANSETRON 2 MG/ML
4 INJECTION INTRAMUSCULAR; INTRAVENOUS EVERY 6 HOURS PRN
Status: DISCONTINUED | OUTPATIENT
Start: 2022-01-01 | End: 2022-01-01 | Stop reason: HOSPADM

## 2022-01-01 RX ORDER — SODIUM CHLORIDE 0.9 % (FLUSH) 0.9 %
5-40 SYRINGE (ML) INJECTION EVERY 12 HOURS SCHEDULED
Status: DISCONTINUED | OUTPATIENT
Start: 2022-01-01 | End: 2022-01-01 | Stop reason: HOSPADM

## 2022-01-01 RX ORDER — ONDANSETRON 2 MG/ML
8 INJECTION INTRAMUSCULAR; INTRAVENOUS
Status: CANCELLED | OUTPATIENT
Start: 2022-01-01

## 2022-01-01 RX ORDER — ONDANSETRON 4 MG/1
4 TABLET, ORALLY DISINTEGRATING ORAL EVERY 8 HOURS PRN
Status: DISCONTINUED | OUTPATIENT
Start: 2022-01-01 | End: 2022-01-01 | Stop reason: HOSPADM

## 2022-01-01 RX ORDER — FERROUS SULFATE 325(65) MG
325 TABLET ORAL EVERY OTHER DAY
Status: DISCONTINUED | OUTPATIENT
Start: 2022-01-01 | End: 2022-01-01 | Stop reason: HOSPADM

## 2022-01-01 RX ORDER — ACETAMINOPHEN 325 MG/1
650 TABLET ORAL EVERY 6 HOURS PRN
Status: DISCONTINUED | OUTPATIENT
Start: 2022-01-01 | End: 2022-01-01 | Stop reason: HOSPADM

## 2022-01-01 RX ORDER — SODIUM CHLORIDE 0.9 % (FLUSH) 0.9 %
5-40 SYRINGE (ML) INJECTION PRN
Status: CANCELLED | OUTPATIENT
Start: 2022-01-01

## 2022-01-01 RX ORDER — SODIUM CHLORIDE 9 MG/ML
5-40 INJECTION INTRAVENOUS PRN
Status: CANCELLED | OUTPATIENT
Start: 2022-01-01

## 2022-01-01 RX ORDER — POLYETHYLENE GLYCOL 3350 17 G/17G
17 POWDER, FOR SOLUTION ORAL DAILY PRN
Status: DISCONTINUED | OUTPATIENT
Start: 2022-01-01 | End: 2022-01-01 | Stop reason: HOSPADM

## 2022-01-01 RX ORDER — SODIUM CHLORIDE 0.9 % (FLUSH) 0.9 %
10 SYRINGE (ML) INJECTION ONCE
Status: COMPLETED | OUTPATIENT
Start: 2022-01-01 | End: 2022-01-01

## 2022-01-01 RX ORDER — SODIUM CHLORIDE 0.9 % (FLUSH) 0.9 %
10 SYRINGE (ML) INJECTION PRN
Status: DISCONTINUED | OUTPATIENT
Start: 2022-01-01 | End: 2022-01-01 | Stop reason: HOSPADM

## 2022-01-01 RX ORDER — DULOXETIN HYDROCHLORIDE 60 MG/1
CAPSULE, DELAYED RELEASE ORAL
Qty: 30 CAPSULE | Refills: 3 | Status: SHIPPED | OUTPATIENT
Start: 2022-01-01 | End: 2022-01-01

## 2022-01-01 RX ORDER — DIPHENHYDRAMINE HYDROCHLORIDE 50 MG/ML
50 INJECTION INTRAMUSCULAR; INTRAVENOUS
Status: CANCELLED | OUTPATIENT
Start: 2022-01-01

## 2022-01-01 RX ORDER — OXYCODONE HYDROCHLORIDE 5 MG/1
5 TABLET ORAL EVERY 6 HOURS PRN
Status: DISCONTINUED | OUTPATIENT
Start: 2022-01-01 | End: 2022-01-01 | Stop reason: HOSPADM

## 2022-01-01 RX ORDER — LORAZEPAM 1 MG/1
2 TABLET ORAL ONCE
Status: COMPLETED | OUTPATIENT
Start: 2022-01-01 | End: 2022-01-01

## 2022-01-01 RX ORDER — OXYCODONE HYDROCHLORIDE 5 MG/1
5 CAPSULE ORAL EVERY 6 HOURS PRN
Qty: 120 CAPSULE | Refills: 0 | Status: SHIPPED | OUTPATIENT
Start: 2022-01-01 | End: 2022-01-01

## 2022-01-01 RX ORDER — DULOXETIN HYDROCHLORIDE 60 MG/1
60 CAPSULE, DELAYED RELEASE ORAL DAILY
Status: DISCONTINUED | OUTPATIENT
Start: 2022-01-01 | End: 2022-01-01 | Stop reason: HOSPADM

## 2022-01-01 RX ORDER — MECLIZINE HYDROCHLORIDE 25 MG/1
12.5 TABLET ORAL 3 TIMES DAILY PRN
Status: DISCONTINUED | OUTPATIENT
Start: 2022-01-01 | End: 2022-01-01 | Stop reason: HOSPADM

## 2022-01-01 RX ORDER — APIXABAN 5 MG/1
TABLET, FILM COATED ORAL
Qty: 60 TABLET | Refills: 0 | Status: SHIPPED | OUTPATIENT
Start: 2022-01-01

## 2022-01-01 RX ORDER — LORAZEPAM 2 MG/1
2 TABLET ORAL EVERY 8 HOURS PRN
Qty: 60 TABLET | Refills: 0 | Status: SHIPPED | OUTPATIENT
Start: 2022-01-01 | End: 2022-01-01 | Stop reason: SDUPTHER

## 2022-01-01 RX ORDER — OXYCODONE HYDROCHLORIDE 5 MG/1
5 TABLET ORAL ONCE
Status: COMPLETED | OUTPATIENT
Start: 2022-01-01 | End: 2022-01-01

## 2022-01-01 RX ORDER — FLUDEOXYGLUCOSE F 18 200 MCI/ML
10 INJECTION, SOLUTION INTRAVENOUS
Status: COMPLETED | OUTPATIENT
Start: 2022-01-01 | End: 2022-01-01

## 2022-01-01 RX ORDER — AMLODIPINE BESYLATE 10 MG/1
10 TABLET ORAL NIGHTLY
Status: DISCONTINUED | OUTPATIENT
Start: 2022-01-01 | End: 2022-01-01 | Stop reason: HOSPADM

## 2022-01-01 RX ORDER — SODIUM CHLORIDE 9 MG/ML
25 INJECTION, SOLUTION INTRAVENOUS PRN
Status: DISCONTINUED | OUTPATIENT
Start: 2022-01-01 | End: 2022-01-01 | Stop reason: HOSPADM

## 2022-01-01 RX ORDER — PALONOSETRON 0.05 MG/ML
0.25 INJECTION, SOLUTION INTRAVENOUS ONCE
Status: COMPLETED | OUTPATIENT
Start: 2022-01-01 | End: 2022-01-01

## 2022-01-01 RX ORDER — GABAPENTIN 300 MG/1
CAPSULE ORAL
COMMUNITY
Start: 2022-01-01

## 2022-01-01 RX ORDER — DEXTROSE MONOHYDRATE 50 MG/ML
5-250 INJECTION, SOLUTION INTRAVENOUS PRN
Status: DISCONTINUED | OUTPATIENT
Start: 2022-01-01 | End: 2022-01-01 | Stop reason: HOSPADM

## 2022-01-01 RX ORDER — POTASSIUM CHLORIDE 20 MEQ/1
20 TABLET, EXTENDED RELEASE ORAL
Status: DISCONTINUED | OUTPATIENT
Start: 2022-01-01 | End: 2022-01-01 | Stop reason: HOSPADM

## 2022-01-01 RX ORDER — POLYETHYLENE GLYCOL 3350 17 G/17G
17 POWDER, FOR SOLUTION ORAL DAILY
COMMUNITY
Start: 2022-01-01

## 2022-01-01 RX ORDER — OXYCODONE HYDROCHLORIDE 5 MG/1
5 CAPSULE ORAL EVERY 6 HOURS PRN
Qty: 90 CAPSULE | Refills: 0 | Status: SHIPPED | OUTPATIENT
Start: 2022-01-01 | End: 2022-01-01 | Stop reason: SDUPTHER

## 2022-01-01 RX ORDER — HEPARIN SODIUM (PORCINE) LOCK FLUSH IV SOLN 100 UNIT/ML 100 UNIT/ML
500 SOLUTION INTRAVENOUS PRN
Status: CANCELLED | OUTPATIENT
Start: 2022-01-01

## 2022-01-01 RX ORDER — PALONOSETRON 0.05 MG/ML
0.25 INJECTION, SOLUTION INTRAVENOUS ONCE
Status: CANCELLED | OUTPATIENT
Start: 2022-01-01 | End: 2022-01-01

## 2022-01-01 RX ORDER — DEXAMETHASONE SODIUM PHOSPHATE 10 MG/ML
10 INJECTION INTRAMUSCULAR; INTRAVENOUS ONCE
Status: COMPLETED | OUTPATIENT
Start: 2022-01-01 | End: 2022-01-01

## 2022-01-01 RX ORDER — MEPERIDINE HYDROCHLORIDE 50 MG/ML
12.5 INJECTION INTRAMUSCULAR; INTRAVENOUS; SUBCUTANEOUS PRN
Status: CANCELLED | OUTPATIENT
Start: 2022-01-01

## 2022-01-01 RX ORDER — 0.9 % SODIUM CHLORIDE 0.9 %
80 INTRAVENOUS SOLUTION INTRAVENOUS ONCE
Status: COMPLETED | OUTPATIENT
Start: 2022-01-01 | End: 2022-01-01

## 2022-01-01 RX ORDER — ALBUTEROL SULFATE 90 UG/1
4 AEROSOL, METERED RESPIRATORY (INHALATION) PRN
Status: CANCELLED | OUTPATIENT
Start: 2022-01-01

## 2022-01-01 RX ORDER — LORAZEPAM 2 MG/1
2 TABLET ORAL EVERY 8 HOURS PRN
Qty: 60 TABLET | Refills: 0 | Status: CANCELLED | OUTPATIENT
Start: 2022-01-01 | End: 2022-01-01

## 2022-01-01 RX ORDER — AZITHROMYCIN 250 MG/1
TABLET, FILM COATED ORAL
Qty: 1 PACKET | Refills: 0 | Status: SHIPPED | OUTPATIENT
Start: 2022-01-01 | End: 2022-01-01

## 2022-01-01 RX ORDER — SODIUM CHLORIDE 0.9 % (FLUSH) 0.9 %
5-40 SYRINGE (ML) INJECTION PRN
Status: DISCONTINUED | OUTPATIENT
Start: 2022-01-01 | End: 2022-01-01 | Stop reason: HOSPADM

## 2022-01-01 RX ORDER — AZITHROMYCIN 500 MG/1
500 TABLET, FILM COATED ORAL DAILY
Qty: 5 TABLET | Refills: 0 | Status: SHIPPED | OUTPATIENT
Start: 2022-01-01 | End: 2022-01-01

## 2022-01-01 RX ORDER — LORAZEPAM 2 MG/1
2 TABLET ORAL EVERY 8 HOURS PRN
Qty: 60 TABLET | Refills: 0 | Status: SHIPPED | OUTPATIENT
Start: 2022-01-01 | End: 2022-01-01

## 2022-01-01 RX ORDER — FAMOTIDINE 10 MG/ML
20 INJECTION, SOLUTION INTRAVENOUS
Status: CANCELLED | OUTPATIENT
Start: 2022-01-01

## 2022-01-01 RX ORDER — EPINEPHRINE 1 MG/ML
0.3 INJECTION, SOLUTION, CONCENTRATE INTRAVENOUS PRN
Status: CANCELLED | OUTPATIENT
Start: 2022-01-01

## 2022-01-01 RX ORDER — POTASSIUM CHLORIDE 1500 MG/1
TABLET, EXTENDED RELEASE ORAL
Qty: 30 TABLET | Refills: 5 | Status: SHIPPED | OUTPATIENT
Start: 2022-01-01

## 2022-01-01 RX ORDER — DEXTROSE MONOHYDRATE 50 MG/ML
5-250 INJECTION, SOLUTION INTRAVENOUS PRN
Status: CANCELLED | OUTPATIENT
Start: 2022-01-01

## 2022-01-01 RX ORDER — TC 99M MEDRONATE 20 MG/10ML
25 INJECTION, POWDER, LYOPHILIZED, FOR SOLUTION INTRAVENOUS
Status: COMPLETED | OUTPATIENT
Start: 2022-01-01 | End: 2022-01-01

## 2022-01-01 RX ORDER — ACETAMINOPHEN 325 MG/1
650 TABLET ORAL
Status: CANCELLED | OUTPATIENT
Start: 2022-01-01

## 2022-01-01 RX ORDER — ACETAMINOPHEN 650 MG/1
650 SUPPOSITORY RECTAL EVERY 6 HOURS PRN
Status: DISCONTINUED | OUTPATIENT
Start: 2022-01-01 | End: 2022-01-01 | Stop reason: HOSPADM

## 2022-01-01 RX ORDER — PANTOPRAZOLE SODIUM 40 MG/1
40 TABLET, DELAYED RELEASE ORAL
Status: DISCONTINUED | OUTPATIENT
Start: 2022-01-01 | End: 2022-01-01 | Stop reason: HOSPADM

## 2022-01-01 RX ORDER — SODIUM CHLORIDE 9 MG/ML
INJECTION, SOLUTION INTRAVENOUS CONTINUOUS
Status: CANCELLED | OUTPATIENT
Start: 2022-01-01

## 2022-01-01 RX ADMIN — SODIUM CHLORIDE, PRESERVATIVE FREE 10 ML: 5 INJECTION INTRAVENOUS at 07:59

## 2022-01-01 RX ADMIN — SODIUM CHLORIDE, PRESERVATIVE FREE 10 ML: 5 INJECTION INTRAVENOUS at 13:42

## 2022-01-01 RX ADMIN — SODIUM CHLORIDE, PRESERVATIVE FREE 10 ML: 5 INJECTION INTRAVENOUS at 22:11

## 2022-01-01 RX ADMIN — IOPAMIDOL 75 ML: 755 INJECTION, SOLUTION INTRAVENOUS at 13:42

## 2022-01-01 RX ADMIN — OXYCODONE HYDROCHLORIDE 5 MG: 5 TABLET ORAL at 05:12

## 2022-01-01 RX ADMIN — OXYCODONE HYDROCHLORIDE 5 MG: 5 TABLET ORAL at 21:42

## 2022-01-01 RX ADMIN — IOHEXOL 50 ML: 240 INJECTION, SOLUTION INTRATHECAL; INTRAVASCULAR; INTRAVENOUS; ORAL at 09:18

## 2022-01-01 RX ADMIN — SODIUM CHLORIDE 80 ML: 9 INJECTION, SOLUTION INTRAVENOUS at 13:42

## 2022-01-01 RX ADMIN — AMLODIPINE BESYLATE 10 MG: 10 TABLET ORAL at 02:21

## 2022-01-01 RX ADMIN — METOPROLOL TARTRATE 25 MG: 25 TABLET, FILM COATED ORAL at 21:28

## 2022-01-01 RX ADMIN — DOCUSATE SODIUM 100 MG: 100 CAPSULE ORAL at 21:28

## 2022-01-01 RX ADMIN — FERROUS SULFATE TAB 325 MG (65 MG ELEMENTAL FE) 325 MG: 325 (65 FE) TAB at 21:42

## 2022-01-01 RX ADMIN — LORAZEPAM 1 MG: 1 TABLET ORAL at 19:45

## 2022-01-01 RX ADMIN — SODIUM CHLORIDE, PRESERVATIVE FREE 10 ML: 5 INJECTION INTRAVENOUS at 09:18

## 2022-01-01 RX ADMIN — OXYCODONE HYDROCHLORIDE 5 MG: 5 TABLET ORAL at 15:06

## 2022-01-01 RX ADMIN — DULOXETINE 60 MG: 60 CAPSULE, DELAYED RELEASE ORAL at 08:19

## 2022-01-01 RX ADMIN — POTASSIUM CHLORIDE 20 MEQ: 1500 TABLET, EXTENDED RELEASE ORAL at 08:19

## 2022-01-01 RX ADMIN — GADOTERIDOL 20 ML: 279.3 INJECTION, SOLUTION INTRAVENOUS at 16:44

## 2022-01-01 RX ADMIN — TC 99M MEDRONATE 25.16 MILLICURIE: 20 INJECTION, POWDER, LYOPHILIZED, FOR SOLUTION INTRAVENOUS at 08:15

## 2022-01-01 RX ADMIN — CEFTRIAXONE SODIUM 1000 MG: 1 INJECTION, POWDER, FOR SOLUTION INTRAMUSCULAR; INTRAVENOUS at 21:48

## 2022-01-01 RX ADMIN — GADOTERIDOL 20 ML: 279.3 INJECTION, SOLUTION INTRAVENOUS at 12:09

## 2022-01-01 RX ADMIN — SODIUM CHLORIDE, PRESERVATIVE FREE 10 ML: 5 INJECTION INTRAVENOUS at 08:20

## 2022-01-01 RX ADMIN — PANTOPRAZOLE SODIUM 40 MG: 40 TABLET, DELAYED RELEASE ORAL at 05:12

## 2022-01-01 RX ADMIN — FLUDEOXYGLUCOSE F 18 9.8 MILLICURIE: 200 INJECTION, SOLUTION INTRAVENOUS at 10:56

## 2022-01-01 RX ADMIN — DENOSUMAB 120 MG: 120 INJECTION SUBCUTANEOUS at 13:53

## 2022-01-01 RX ADMIN — LORAZEPAM 2 MG: 1 TABLET ORAL at 10:00

## 2022-01-01 RX ADMIN — ONDANSETRON 4 MG: 2 INJECTION INTRAMUSCULAR; INTRAVENOUS at 23:31

## 2022-01-01 RX ADMIN — PALONOSETRON 0.25 MG: 0.25 INJECTION, SOLUTION INTRAVENOUS at 14:06

## 2022-01-01 RX ADMIN — SODIUM CHLORIDE, PRESERVATIVE FREE 10 ML: 5 INJECTION INTRAVENOUS at 10:58

## 2022-01-01 RX ADMIN — METOPROLOL TARTRATE 25 MG: 25 TABLET, FILM COATED ORAL at 08:19

## 2022-01-01 RX ADMIN — DENOSUMAB 120 MG: 120 INJECTION SUBCUTANEOUS at 15:50

## 2022-01-01 RX ADMIN — APIXABAN 5 MG: 5 TABLET, FILM COATED ORAL at 21:28

## 2022-01-01 RX ADMIN — DOCUSATE SODIUM 100 MG: 100 CAPSULE ORAL at 08:18

## 2022-01-01 RX ADMIN — DENOSUMAB 120 MG: 120 INJECTION SUBCUTANEOUS at 16:09

## 2022-01-01 RX ADMIN — SODIUM CHLORIDE, PRESERVATIVE FREE 10 ML: 5 INJECTION INTRAVENOUS at 12:10

## 2022-01-01 RX ADMIN — APIXABAN 5 MG: 5 TABLET, FILM COATED ORAL at 08:18

## 2022-01-01 RX ADMIN — LORAZEPAM 1 MG: 1 TABLET ORAL at 10:27

## 2022-01-01 RX ADMIN — FAM-TRASTUZUMAB DERUXTECAN-NXKI 548 MG: 100 INJECTION, POWDER, LYOPHILIZED, FOR SOLUTION INTRAVENOUS at 14:20

## 2022-01-01 RX ADMIN — SODIUM CHLORIDE 80 ML: 9 INJECTION, SOLUTION INTRAVENOUS at 09:17

## 2022-01-01 RX ADMIN — AZITHROMYCIN MONOHYDRATE 500 MG: 500 INJECTION, POWDER, LYOPHILIZED, FOR SOLUTION INTRAVENOUS at 22:50

## 2022-01-01 RX ADMIN — DENOSUMAB 120 MG: 120 INJECTION SUBCUTANEOUS at 13:58

## 2022-01-01 RX ADMIN — IOPAMIDOL 100 ML: 755 INJECTION, SOLUTION INTRAVENOUS at 09:17

## 2022-01-01 RX ADMIN — DEXAMETHASONE SODIUM PHOSPHATE 10 MG: 10 INJECTION INTRAMUSCULAR; INTRAVENOUS at 14:07

## 2022-01-01 ASSESSMENT — PAIN SCALES - GENERAL
PAINLEVEL_OUTOF10: 0
PAINLEVEL_OUTOF10: 7
PAINLEVEL_OUTOF10: 0
PAINLEVEL_OUTOF10: 3
PAINLEVEL_OUTOF10: 7
PAINLEVEL_OUTOF10: 7
PAINLEVEL_OUTOF10: 0
PAINLEVEL_OUTOF10: 0
PAINLEVEL_OUTOF10: 4

## 2022-01-01 ASSESSMENT — ENCOUNTER SYMPTOMS
DIARRHEA: 0
EYES NEGATIVE: 1
RESPIRATORY NEGATIVE: 1
FACIAL SWELLING: 0
ABDOMINAL PAIN: 0
PHOTOPHOBIA: 0
EYE ITCHING: 0
COLOR CHANGE: 0
ABDOMINAL PAIN: 0
CHEST TIGHTNESS: 0
VOMITING: 0
SHORTNESS OF BREATH: 0
SHORTNESS OF BREATH: 0
NAUSEA: 0
GASTROINTESTINAL NEGATIVE: 1
RHINORRHEA: 0
BACK PAIN: 1
COUGH: 0
COUGH: 1

## 2022-01-01 ASSESSMENT — PAIN DESCRIPTION - PAIN TYPE
TYPE: CHRONIC PAIN
TYPE: ACUTE PAIN

## 2022-01-01 ASSESSMENT — PAIN DESCRIPTION - LOCATION
LOCATION: BACK;HIP
LOCATION: BACK;HIP
LOCATION: BACK

## 2022-01-01 ASSESSMENT — PAIN DESCRIPTION - FREQUENCY: FREQUENCY: CONTINUOUS

## 2022-01-01 ASSESSMENT — PAIN DESCRIPTION - DESCRIPTORS: DESCRIPTORS: ACHING;STABBING

## 2022-01-13 NOTE — PATIENT INSTRUCTIONS
Continue with xeloda   Need PET scan prior to RV (februray)  Need cbc, cmp CA 27-29 on the day of PET scan   rv after the scan

## 2022-01-13 NOTE — PROGRESS NOTES
Chief Complaint   Patient presents with    Follow-up     review status of disease    Pain     in lower back and in hip        DIAGNOSIS:   1- T2, N0, M0 ER positive MA positive and HER-2/eve negative invasive ductal carcinoma of the left breast ( inner upper quadrant)  2- Repeated GI bleeding greetings 2 symptomatic anemia, despite extensive GI workup, source of bleeding was never found. 3- finally a small mass was seen in the jejunum. 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 Margins negative   4- compression fractures and bone lesions. Likely metastatic cancer (5/2020)   5- biopsy proven liver metastasis. 6- Molecular testing suggests PI K3 CA mutation, T p53 mutation and MAP kinase mutation    CURRENT THERAPY:  1- Mastectomy and axillary sampling  2- adjuvant chemotherapy with Taxotere and Cytoxan starting 5/16/2013. Chemotherapy stopped after 3 cycles because of ongoing GI bleeding and symptomatic anemia  3- Conservative management for GI bleeding. Leading completely stopped after the discontinuation of chemotherapy  4- started tamoxifen after chemotherapy was completed. 8/2013. 5- transitioned to Arimidex 6/2014, completed 08/2018  6- GIST resection, resected 9/2017   7-status post kyphoplasty and biopsy of bone metastases 5/2020. The tumor was minimally ER positive at 5% and MA negative. 8- rodding of the left femur completed. Plan for  Radiation  9- Systemic therapy, plan Arimidex plus Ibrance  10 - XRT completed 07/2020  11-current treatment starting 7/30/2020, monthly Xgeva, oral Ibrance and Arimidex  12-2/2021, progression of disease, plan to switch to Faslodex plus CDK inhibitor abemaciclib, continue Xgeva  13- 8/2021 liver progression, plan SBRT to liver lesion   14-further progression, NGS testing and started Xeloda, 11/2021     BRIEF CASE HISTORY:   Ad Tyson is a very pleasant 45 y.o. who felt a mass in the medial aspect of her left breast, around the 9:00 position.   Patient sought medical attention and mammogram and ultrasound were done. Showing an irregular, high-density mass with indistinct margins containing pleomorphic calcifications in the lower inner quadrant of the left breast close to 9:00 location this mass measures are mammogram 2.7 x 2.6 x 2 cm. Subsequently targeted ultrasound was performed showing hypoechoic, irregular, taller than wider, solid mass at 9:00 with posterior acoustic shadowing. The calcifications were visible on ultrasound . On ultrasound the solid mass measured 2.4 x 2.6 x 1 cm. Image guided biopsy of the mass was done and showed invasive ductal carcinoma with surrounding DCIS (cribriform type) the tumor was ER and RI positive and HER-2/eve negative with a fish ratio 1.1. The patient was referred to us and she also was seen by radiation oncology , She  decided on proceeding with total mastectomy and breast reconstruction . Allergy showed 2.5 cm intermediate grade invasive ductal carcinoma, [place] lymph node was negative. The tumor was ER/RI positive and HER-2/eve negative. We discussed options of adjuvant therapy including chemotherapy and hormone therapy, The patient decided to proceed with adjuvant chemotherapy without undergone a Oncotype study. TC chemotherapy X4 cycles is planned  After 3 cycles, and presented with persistent GI bleeding leading to symptomatic anemia and syncope, the patient's condition was Serious enough to warrant repeated hospital admissions. Repeated GI workup including endoscopic evaluation and Tagged Red cell scans were negative. We decided to treat her conservatively and chemotherapy was stopped after 3 cycles. After that All her symptoms improved and she was started on tamoxifen. After one year, she was transitioned to anastrozole since she had no periods and hormonal testing showed post menopausal state. 08/2017  She had been having abdominal pain and went to [facility] for consult.  She had work up which showed mass in the colon showed subtle changes in the mid-small intestine, CT enterography showed 3.6CM mass in the distal small bowel. She was also hospitalized in the interim with pancreatitis. 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 Margins were negative. Observation was decided, no need for adjuvant therapy  In May/2020, the patient presented with severe back pain. MRI showed multiple bone lesions with evidence of compression fracture. Patient underwent successful kyphoplasty and biopsy and pathology showed breast cancer, it was 5% ER positive and SD was negative. HER-2 was +2 which is equivocal so a fish test was ordered. Plan for radiation, staging PET, and Arimidex. PET scan showed widespread metastatic bony disease including the thoracic spine, the lumbar spine, the left femur and the rib area. She underwent repeated kyphoplasty and rodding of the left femur. She is undergoing radiation to the painful bony areas in thoracic, rib and lumbar areas as well as the left femur. We will likely use Ibrance plus Arimidex. As well as monthly Xgeva to be started after completion of radiation. After completion of radiation, we maintained her on Arimidex plus Ibrance, due to cytopenias, Ibrance dose was decreased to 100 mg/day which was much better tolerated. Also we maintained her on Xgeva monthly. PET scan was done in October and showed essentially stable disease. There was some suspicious activity in the hip but this was asymptomatic and we decided that this might be post radiation or related to physical therapy. We decided to continue current therapy until clear progression. Further testing in early 2021 showed clear progression, will plan to switch to Faslodex and continue with CDk inhibitor(switch to [employer])  and Xgeva. Further testing showed essentially stable bone metastases but new liver lesion that was discordant.   We decided to offer SBRT to the liver lesion we will continue with systemic therapy. Unfortunately she continued to progress, we sent her to [facility] who recommended bx of liver lesion for NGS testing and consider Xeloda in absence of targetable mutation. INTERIM HISTORY: She comes in today for follow up for metastatic breast cancer. She has been taking Xeloda, some nausea. She complains of new pain in the right hip with movement that has occurred 3-4 times. She only has vomiting with pain medication on empty stomach. She has recovered from recent COVID infection, symptoms were managed at home, some dizziness. She does have some constipation and is managing. GYNECOLOGICAL HISTORY  Menarche at age 14. He is premenopausal with regular periods. +2. She used birth control minimally. PAST MEDICAL HISTORY: has a past medical history of Anemia, Anxiety, Atrial fibrillation ([address] Breast cancer ([address] Carpal tunnel syndrome, Depression, GI bleed, History of blood transfusion, Hx of blood clots, Hypertension, Liver metastases ([address] Lymphedema, Neurologic cardiac syncope, Obesity, Pain, Sleep apnea, ANA PAULA (stress urinary incontinence, female), Tachycardia, Varicella, and Varicella without complication. PAST SURGICAL HISTORY: has a past surgical history that includes Shoulder arthroscopy (); Colonoscopy (); Dilation and curettage of uterus; Knee arthroscopy; fracture surgery; Tunneled venous port placement (Right); Upper gastrointestinal endoscopy (2013); Colonoscopy (2013); Mastectomy (Left, 13); other surgical history (1969); Breast biopsy (Left, 3/21/13); ileoscopy (10/12/05); Hysterectomy; denia and bso (cervix removed) (14); Enterocele repair (14); Cystoscopy (14); bladder suspension (14); Colonoscopy (10/12/2005); Upper gastrointestinal endoscopy (2015); tumor removal; Cosmetic surgery; Cholecystectomy; orif femur decompression (Left);  Fixation Kyphoplasty; back surgery; and US BIOPSY LIVER PERCUTANEOUS (11/4/2021). CURRENT MEDICATIONS:  has a current medication list which includes the following prescription(s): eliquis, docusate sodium, lorazepam, ondansetron, capecitabine, oxycodone, ferrous sulfate, duloxetine, klor-con m20, calcium-vitamin d, losartan, omeprazole, hydrochlorothiazide, amlodipine, and metoprolol tartrate, and the following Facility-Administered Medications: sodium chloride and potassium chloride. ALLERGIES:  is allergic to adhesive tape. FAMILY HISTORY: Negative for any hematological or oncological conditions. Specifically no history of breast cancer in the family    SOCIAL HISTORY:  reports that she quit smoking about 2011. Her smoking use included Cigarettes. She smoked About 20 years. She has never used smokeless tobacco. She reports that she does not drink alcohol or use illicit drugs. REVIEW OF SYSTEMS:   General: No fever or night sweats. Weight is stable. Eyes: No double or blurred vision. Ears: No tinnitus or hearing problem,    Throat: No dysphagia or sore throat   Respiratory: No chest pain, shortness of breath at rest, no hemoptysis. Cardiovascular: Denies chest pain, PND or orthopnea, or palpitations. +LE edema improved  Gastrointestinal: +nausea and vomiting as noted above, mild constipation  Genitourinary: Denies dysuria, hematuria, frequency, urgency or incontinence. No vaginal bleeding. Neurological: Denies decreased LOC, no sensory or motor focal deficits. +headaches - unchanged, mild  Musculoskeletal: No arthralgia or joint swelling. +back pain, right chest wall pain, and right shoulder blade pain, right hip pain  Skin: There are no rashes or bleeding. +hair thinning  Psychiatric: ++ anxiety, depression as discussed. Endocrine: No diabetes or thyroid disease. Hematologic: No bleeding, no adenopathy. PHYSICAL EXAM:  The patient is not in acute distress.   Vital signs: Blood pressure 133/79, pulse 76, temperature 97.1 °F (36.2 °C), temperature source Temporal, weight 275 lb (124.7 kg), last menstrual period 03/17/2014, not currently breastfeeding. HEENT:  Eyes are normal. Ears, nose is congested, no bleeding. Neck: Supple. No lymph node enlargement. No thyroid enlargement. Trachea is centrally located. Chest:  Clear to auscultation. No wheezes or crepitations. Breast:  Right: No masses, no adenopathy. No skin or nippple abnormalities. Left: left-sided mastectomy, surgery site is healed completely, no adenopathy  Heart: Regular sinus rhythm. Abdomen: Soft, nontender. No hepatosplenomegaly. No masses. Extremities:  Mild edema right side. No LE edema. Lymph Nodes:  No cervical, axillary or inguinal lymph node enlargement. Neurologic: Conscious and oriented. No focal neurological deficits. Psychosocial: No depression, anxiety or stress. Skin: No rashes, bruises or ecchymoses       REVIEW OF LABORATORY DATA:     Lab Results   Component Value Date    WBC 3.1 (L) 12/14/2021    HGB 9.5 (L) 12/14/2021    HCT 27.8 (L) 12/14/2021    .3 (H) 12/14/2021     12/14/2021     Lab Results   Component Value Date    IRON 30 (L) 11/29/2017    TIBC 403 11/29/2017    FERRITIN 33 04/04/2019     Lab Results   Component Value Date    NEUTROABS 2.29 12/14/2021         Chemistry        Component Value Date/Time     01/13/2022 1538    K 4.0 01/13/2022 1538     01/13/2022 1538    CO2 25 01/13/2022 1538    BUN 12 01/13/2022 1538    CREATININE 0.80 01/13/2022 1538        Component Value Date/Time    CALCIUM 9.2 01/13/2022 1538    ALKPHOS 134 (H) 01/13/2022 1538    AST 45 (H) 01/13/2022 1538    ALT 33 01/13/2022 1538    BILITOT 0.47 01/13/2022 1538         Results for Daril Res (MRN [id number]) as of 2/11/2021 09:57   Ref.  Range 8/29/2017 00:05 8/30/2017 09:15 7/21/2020 10:42 8/27/2020 10:20 9/24/2020 09:47 10/22/2020 09:24 11/19/2020 10:17 12/14/2020 14:38 1/11/2021 13:51 2/8/2021 13:06   CA 27-29 Latest Ref Range: 0 - 38 U/mL   164 (H) 88 (H) 95 (H) 129 (H) 146 (H) 208 (H) 248 (H) 294 (H)       REVIEW OF RADIOLOGICAL RESULTS:            IMPRESSION:   1- Cece Linda  Has  T2, N0, M0 ER/NC positive and HER-2/eve negative invasive ductal carcinoma of the left breast  2- Chemotherapy: received 3 cycles of TC, stopped due to GI bleeding  3- GI bleeding , related to the GIST tumor. Currently on oral iron and hemoglobin is improving  4- on anastrozole. We will continue, plan 5 years of therapy, completed 08/2018  5- for hot flashes, better on Effexor. 6- Recent pancreatitis, possibly biliary. MRI CT scan did not show any gallbladder stone. We will discuss with her surgeons. 7-   distal small bowel mass measuring 3.6 cm, resection shows low risk GIST. No need for adjuvant therapy, margins were negative. 8-bone metastases with compression fracture diagnosed in May/2020. Status post kyphoplasty and biopsy. 9- S/P radiation 07/2020  10- for systemic therapy, she was started on Arimidex, adding Ibrance. Due to cytopenias, dose was reduced to 100 mg/day  11-in February/2021, there was signs of progression, will plan to switch to Faslodex   12-June/2021, good response to treatment but discordant growth in one of the liver lesion, plan SBRT to the growing lesion and continue with systemic therapy  13- further progression, systemic therapy with xeloda and NGS testing. PLAN:   1. Her lab work was reviewed and discussed, stable. 2. I completed toxicity check. 3. She has some mild nausea and constipation, managed. 4. Overall, she is doing well and stable. 5. We will plan to continue Xeloda unchanged. 6. I discussed plan for imaging and I am putting in orders. 7. Return in 4 weeks. Scribe Attestation   This note was created by Abdirashid Jacobo acting as scribe for the physician signing this note  Electronically Signed  Abdirashid Jacobo, 1/13/2022  [employer].      Attending Attestation   Note was reviewed and edited.   I am in agreement with the note as entered    Jacqueline Anderson MD  Hematologist/Medical [id number] [facility] hematology oncology physicians

## 2022-01-13 NOTE — PROGRESS NOTES
Pt here for Xgeva injection. Labs reviewed. Injection given without incident. Pt discharged in stable condition. Returns in 4 weeks for next Xgeva.

## 2022-01-14 NOTE — TELEPHONE ENCOUNTER
Continue with xeloda  Need PET scan prior to RV (February)  Need cbc, cmp CA27-29 on the day of PET scan  rv after the scan    Oral meds to be continued as prescribed    PET scheduled 1/31/22 @ 12:30pm    RV scheduled 2/10/22 @ 3:30pm    Electronically signed by Magalys Coleman on 1/14/2022 at 9:33 AM

## 2022-01-28 NOTE — TELEPHONE ENCOUNTER
Patient called and notified that PET is denied   Dr Olga Armstrong would like to order a CT and bone scan, discussed with patient she is agreeable to have both CT and bone scan if Dr Olga Armstrong feels that they are sufficient scans to assess what he is looking for   Will notify Dr Jesus Toney, RN

## 2022-02-10 NOTE — PROGRESS NOTES
Chief Complaint   Patient presents with    Follow-up     review status of disease    Results     go over scan and labs    Other     Discuss plan of action with cancer getting worse     Pruritis     from pain meds       DIAGNOSIS:   1- T2, N0, M0 ER positive DC positive and HER-2/eve negative invasive ductal carcinoma of the left breast ( inner upper quadrant)  2- Repeated GI bleeding greetings 2 symptomatic anemia, despite extensive GI workup, source of bleeding was never found. 3- finally a small mass was seen in the jejunum. 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 Margins negative   4- compression fractures and bone lesions. Likely metastatic cancer (5/2020)   5- biopsy proven liver metastasis. 6- Molecular testing suggests PI K3 CA mutation, T p53 mutation and MAP kinase mutation    CURRENT THERAPY:  1- Mastectomy and axillary sampling  2- adjuvant chemotherapy with Taxotere and Cytoxan starting 5/16/2013. Chemotherapy stopped after 3 cycles because of ongoing GI bleeding and symptomatic anemia  3- Conservative management for GI bleeding. Leading completely stopped after the discontinuation of chemotherapy  4- started tamoxifen after chemotherapy was completed. 8/2013. 5- transitioned to Arimidex 6/2014, completed 08/2018  6- GIST resection, resected 9/2017   7-status post kyphoplasty and biopsy of bone metastases 5/2020. The tumor was minimally ER positive at 5% and DC negative. 8- rodding of the left femur completed.  Plan for  Radiation  9- Systemic therapy, plan Arimidex plus Ibrance  10 - XRT completed 07/2020  11-current treatment starting 7/30/2020, monthly Xgeva, oral Ibrance and Arimidex  12-2/2021, progression of disease, plan to switch to Faslodex plus CDK inhibitor abemaciclib, continue Xgeva  13- 8/2021 liver progression, plan SBRT to liver lesion   14-further progression, NGS testing and started Xeloda, 11/2021     BRIEF CASE HISTORY:   Chanda Harris is a very pleasant 45 y.o. who felt a mass in the medial aspect of her left breast, around the 9:00 position. Patient sought medical attention and mammogram and ultrasound were done. Showing an irregular, high-density mass with indistinct margins containing pleomorphic calcifications in the lower inner quadrant of the left breast close to 9:00 location this mass measures are mammogram 2.7 x 2.6 x 2 cm. Subsequently targeted ultrasound was performed showing hypoechoic, irregular, taller than wider, solid mass at 9:00 with posterior acoustic shadowing. The calcifications were visible on ultrasound . On ultrasound the solid mass measured 2.4 x 2.6 x 1 cm. Image guided biopsy of the mass was done and showed invasive ductal carcinoma with surrounding DCIS (cribriform type) the tumor was ER and [place] positive and HER-2/eve negative with a fish ratio 1.1. The patient was referred to us and she also was seen by radiation oncology , She  decided on proceeding with total mastectomy and breast reconstruction . Allergy showed 2.5 cm intermediate grade invasive ductal carcinoma, [place] lymph node was negative. The tumor was ER/KY positive and HER-2/eve negative. We discussed options of adjuvant therapy including chemotherapy and hormone therapy, The patient decided to proceed with adjuvant chemotherapy without undergone a Oncotype study. TC chemotherapy X4 cycles is planned  After 3 cycles, and presented with persistent GI bleeding leading to symptomatic anemia and syncope, the patient's condition was Serious enough to warrant repeated hospital admissions. Repeated GI workup including endoscopic evaluation and Tagged Red cell scans were negative. We decided to treat her conservatively and chemotherapy was stopped after 3 cycles. After that All her symptoms improved and she was started on tamoxifen. After one year, she was transitioned to anastrozole since she had no periods and hormonal testing showed post menopausal state.    08/2017  She had been having abdominal pain and went to [facility] for consult. She had work up which showed mass in the colon showed subtle changes in the mid-small intestine, CT enterography showed 3.6CM mass in the distal small bowel. She was also hospitalized in the interim with pancreatitis. 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 Margins were negative. Observation was decided, no need for adjuvant therapy  In May/2020, the patient presented with severe back pain. MRI showed multiple bone lesions with evidence of compression fracture. Patient underwent successful kyphoplasty and biopsy and pathology showed breast cancer, it was 5% ER positive and IA was negative. HER-2 was +2 which is equivocal so a fish test was ordered. Plan for radiation, staging PET, and Arimidex. PET scan showed widespread metastatic bony disease including the thoracic spine, the lumbar spine, the left femur and the rib area. She underwent repeated kyphoplasty and rodding of the left femur. She is undergoing radiation to the painful bony areas in thoracic, rib and lumbar areas as well as the left femur. We will likely use Ibrance plus Arimidex. As well as monthly Xgeva to be started after completion of radiation. After completion of radiation, we maintained her on Arimidex plus Ibrance, due to cytopenias, Ibrance dose was decreased to 100 mg/day which was much better tolerated. Also we maintained her on Xgeva monthly. PET scan was done in October and showed essentially stable disease. There was some suspicious activity in the hip but this was asymptomatic and we decided that this might be post radiation or related to physical therapy. We decided to continue current therapy until clear progression. Further testing in early 2021 showed clear progression, will plan to switch to Faslodex and continue with CDk inhibitor(switch to [employer])  and Xgeva.   Further testing showed essentially stable bone metastases but new liver lesion that was discordant. We decided to offer SBRT to the liver lesion we will continue with systemic therapy. Unfortunately she continued to progress, we sent her to [facility] who recommended bx of liver lesion for NGS testing and consider Xeloda in absence of targetable mutation. INTERIM HISTORY: She comes in today for follow up for metastatic breast cancer and to review imaging and discuss further recommendations. She feels her pain is more intense but has reduced pain medication due to itching and has tried medical marijuana with good results. She is taking Xeloda with no issues. GYNECOLOGICAL HISTORY  Menarche at age 14. He is premenopausal with regular periods. +2. She used birth control minimally. PAST MEDICAL HISTORY: has a past medical history of Anemia, Anxiety, Atrial fibrillation ([address] Breast cancer ([address] Carpal tunnel syndrome, Depression, GI bleed, History of blood transfusion, Hx of blood clots, Hypertension, Liver metastases ([address] Lymphedema, Neurologic cardiac syncope, Obesity, Pain, Sleep apnea, ANA PAULA (stress urinary incontinence, female), Tachycardia, Varicella, and Varicella without complication. PAST SURGICAL HISTORY: has a past surgical history that includes Shoulder arthroscopy (); Colonoscopy (); Dilation and curettage of uterus; Knee arthroscopy; fracture surgery; Tunneled venous port placement (Right); Upper gastrointestinal endoscopy (2013); Colonoscopy (2013); Mastectomy (Left, 13); other surgical history (1969); Breast biopsy (Left, 3/21/13); ileoscopy (10/12/05); Hysterectomy; denia and bso (cervix removed) (14); Enterocele repair (14); Cystoscopy (14); bladder suspension (14); Colonoscopy (10/12/2005); Upper gastrointestinal endoscopy (2015); tumor removal; Cosmetic surgery; Cholecystectomy; orif femur decompression (Left);  Fixation Kyphoplasty; back surgery; and US BIOPSY LIVER PERCUTANEOUS (11/4/2021). CURRENT MEDICATIONS:  has a current medication list which includes the following prescription(s): duloxetine, eliquis, docusate sodium, ondansetron, capecitabine, oxycodone, ferrous sulfate, klor-con m20, calcium-vitamin d, losartan, omeprazole, hydrochlorothiazide, amlodipine, and metoprolol tartrate, and the following Facility-Administered Medications: sodium chloride flush, sodium chloride, and potassium chloride. ALLERGIES:  is allergic to adhesive tape. FAMILY HISTORY: Negative for any hematological or oncological conditions. Specifically no history of breast cancer in the family    SOCIAL HISTORY:  reports that she quit smoking about 2011. Her smoking use included Cigarettes. She smoked About 20 years. She has never used smokeless tobacco. She reports that she does not drink alcohol or use illicit drugs. REVIEW OF SYSTEMS:   General: No fever or night sweats. Weight is stable. Eyes: No double or blurred vision. Ears: No tinnitus or hearing problem,    Throat: No dysphagia or sore throat   Respiratory: No chest pain, shortness of breath at rest, no hemoptysis. Cardiovascular: Denies chest pain, PND or orthopnea, or palpitations. +LE edema improved  Gastrointestinal: +nausea and vomiting as noted above, mild constipation  Genitourinary: Denies dysuria, hematuria, frequency, urgency or incontinence. No vaginal bleeding. Neurological: Denies decreased LOC, no sensory or motor focal deficits. +headaches - unchanged, mild  Musculoskeletal: No arthralgia or joint swelling. +back pain, right chest wall pain, and right shoulder blade pain, right hip pain  Skin: There are no rashes or bleeding. +hair thinning  Psychiatric: ++ anxiety, depression as discussed. Endocrine: No diabetes or thyroid disease. Hematologic: No bleeding, no adenopathy. PHYSICAL EXAM:  The patient is not in acute distress.   Vital signs: Blood pressure 125/73, pulse 62, 27-29 Latest Ref Range: 0 - 38 U/mL   164 (H) 88 (H) 95 (H) 129 (H) 146 (H) 208 (H) 248 (H) 294 (H)       REVIEW OF RADIOLOGICAL RESULTS:  02/08/2022 CT CHEST ABD PELVIS IMPRESSION:   1. Redemonstration of diffuse osseous metastasis showing no major change. 2. No new metastatic lesions are appreciated. 3. Scarring in superior segment left upper lobe extending into the perihilar   upper lobe paramediastinal region due in part at least to post treatment   changes. 4. Fiduciary markers in non FDG avid lesion in the right lobe of liver in   segment 5.  No CT evidence for new liver lesion. 02/08/2022 NM BONE SCAN IMPRESSION:   Multifocal osseous metastatic disease throughout the axial and appendicular   skeleton. IMPRESSION:   1- Arlene  Has  T2, N0, M0 ER/[place] positive and HER-2/eve negative invasive ductal carcinoma of the left breast  2- Chemotherapy: received 3 cycles of TC, stopped due to GI bleeding  3- GI bleeding , related to the GIST tumor. Currently on oral iron and hemoglobin is improving  4- on anastrozole. We will continue, plan 5 years of therapy, completed 08/2018  5- for hot flashes, better on Effexor. 6- Recent pancreatitis, possibly biliary. MRI CT scan did not show any gallbladder stone. We will discuss with her surgeons. 7-   distal small bowel mass measuring 3.6 cm, resection shows low risk GIST. No need for adjuvant therapy, margins were negative. 8-bone metastases with compression fracture diagnosed in May/2020. Status post kyphoplasty and biopsy. 9- S/P radiation 07/2020  10- for systemic therapy, she was started on Arimidex, adding Ibrance.  Due to cytopenias, dose was reduced to 100 mg/day  11-in February/2021, there was signs of progression, will plan to switch to Faslodex   12-June/2021, good response to treatment but discordant growth in one of the liver lesion, plan SBRT to the growing lesion and continue with systemic therapy  13- further progression, systemic therapy with xeloda and NGS testing. PLAN:   1. We discussed her CT and bone in detail, both showed stable findings. 2. Her lab work was reviewed. 3. I completed toxicity check. 4. We reviewed her Xeloda dosing. 5. She is tolerating treatment well and we will continue to progression in addition to Lennice Mage. 6. We had discussion regarding her pain and current dosing, she is having persistent itching with oxycodone and offered option to try different medication, she will continue with medical marijuana and use oxycodone as needed. 7. I am refilling her oxycodone and Ativan. 8. Return in 4 weeks. Scribe Attestation   This note was created by Varsha Davis acting as scribe for the physician signing this note  Electronically Signed  Kya Hagan, 2/10/2022  Scribe, [employer]. Attending Attestation   Note was reviewed and edited.   I am in agreement with the note as entered    Anahy Anderson MD  Hematologist/Medical [address] [place] [address] hematology oncology physicians

## 2022-02-11 NOTE — TELEPHONE ENCOUNTER
Continue with xeloda unchanged  rv in 4 weeks.  With Alray Councilman, with [facility], cmp CA 27-29    Tx today as scheduled    RV scheduled 3/10/22 @ 1:45pm    PT was given AVS and an appt schedule    Electronically signed by Breonna Martinez on 2/11/2022 at 10:41 AM

## 2022-03-04 PROBLEM — R41.82 ALTERED MENTAL STATUS: Status: ACTIVE | Noted: 2022-01-01

## 2022-03-04 NOTE — PROGRESS NOTES
Spoke with MRI tech, Juliann Najera, regarding stat MRI, will be done this evening, do not have a time yet.

## 2022-03-04 NOTE — PROGRESS NOTES
Medication History completed:    New medications: None    Medications discontinued: None    Other pertinent information: medications were confirmed by patient. Last dose of Capecitabine (Xeloda) was on 3/1/2022 (Currently on the 7 days off period). Thank you,  Wil Steve, PharmD candidate 2022.

## 2022-03-04 NOTE — ED NOTES
Mode of arrival (squad #, walk in, police, etc) : [id number]         Chief complaint(s): near syncopal episode         Arrival Note (brief scenario, treatment PTA, etc). : patient's LKW 1130 3/4/2022. Patient states she was at the pharmacy picking up her medications when she had a sudden onset of dizziness/lightheadedness. According to [facility] the pharmacist helped patient down to the ground when patient became dizzy and stated to them that patient was slightly altered when this episode took place. Patient states she feels like her speech is slurred. Patient is alert and oriented on arrival to ED. Patient is currently treating metastatic breast cancer with oral chemotherapy. C= \"Have you ever felt that you should Cut down on your drinking? \"  No  A= \"Have people Annoyed you by criticizing your drinking? \"  No  G= \"Have you ever felt bad or Guilty about your drinking? \"  No  E= \"Have you ever had a drink as an Eye-opener first thing in the morning to steady your nerves or to help a hangover? \"  No      Deferred []      Reason for deferring: N/A    *If yes to two or more: probable alcohol abuse. Thayne Litten, RN  03/04/22 [id number]

## 2022-03-04 NOTE — ED PROVIDER NOTES
EMERGENCY DEPARTMENT ENCOUNTER    Pt Name: Lucien Best  MRN: [id number]  [place] 1970  Date of evaluation: 3/4/22  CHIEF COMPLAINT       Chief Complaint   Patient presents with    Dizziness     HISTORY OF PRESENT ILLNESS   HPI  62-year-old female history of A. fib, DVT on Eliquis, metastatic breast cancer with metastatic disease presents for evaluation with acute onset dizziness and dysarthria which started at 1130 this morning. Patient feels like she is slurring her words. She intermittently feels like she has been dizzy over the past several days but it acutely worsened 1130 today with her speech change. She was coming home from physical therapy and had stopped at a pharmacy and 911 was called and she came into the hospital.  She is reporting no weakness or headaches. No antecedent trauma. She does take Eliquis and has been taking this. Symptoms are moderate and constant. REVIEW OF SYSTEMS     Review of Systems   Constitutional: Negative for chills and fatigue. HENT: Negative for facial swelling, postnasal drip and rhinorrhea. Eyes: Negative for photophobia and itching. Respiratory: Negative for cough and shortness of breath. Cardiovascular: Negative for chest pain and leg swelling. Gastrointestinal: Negative for abdominal pain, diarrhea, nausea and vomiting. Genitourinary: Negative for dysuria, flank pain and hematuria. Musculoskeletal: Negative for arthralgias and joint swelling. Skin: Negative for color change and rash. Neurological: Positive for dizziness and speech difficulty. Negative for numbness and headaches.      PASTMEDICAL HISTORY     Past Medical History:   Diagnosis Date    Anemia     Anxiety     Atrial fibrillation ([facility] [address] 3 28 12    dr. Abdirashid Uriarte    Breast cancer [facility])     lt./chemo 6/2013    Carpal tunnel syndrome     Depression     post partum    GI bleed     History of blood transfusion     Hx of blood clots     Hypertension     Liver metastases ([address] 2/2/2021    Lymphedema     Neurologic cardiac syncope     Obesity     Pain     pelvic    Sleep apnea     ANA PAULA (stress urinary incontinence, female) 3/4/2014    Tachycardia     Varicella     Age 4, severe    Varicella without complication 8/3/8802     Past Problem List  Patient Active Problem List   Diagnosis Code    Malignant neoplasm of upper-inner quadrant of female breast ([facility] [address] C50.219    Lymphedema I89.0    GI bleeding K92.2    Neurocardiogenic syncope R55    Personal history of breast cancer Z85.3    DCIS (ductal carcinoma in situ) of breast D05.10    Tachycardia R00.0    Carpal tunnel syndrome G56.00    H/O transfusion of packed red blood cells ( 10 units) Z92.89    BRCA1 negative Z13.71    BRCA2 negative Z13.71    Estrogen receptor positive tumor status Z17.0    Ovarian cyst, left N83.202    Status post HOANG-BSO Z90.710, Z90.722, Z90.79    GI (gastrointestinal bleed) K92.2    Abdominal pain R10.9    Anemia D64.9    Obesity E66.9    Essential hypertension I10    Varicella without complication B97.6    Iron deficiency anemia due to chronic blood loss D50.0    Idiopathic acute pancreatitis without infection or necrosis K85.00    Biliary dyskinesia K82.8    Small bowel mass K63.89    Bone metastasis (HCC) C79.51    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 A3    Malignant neoplasm of upper-outer quadrant of left breast in female, estrogen receptor positive ([address] C50.412, Z17.0    Liver metastases (HCC) C78.7    History of DVT (deep vein thrombosis) Z86.718    Depression F32.1     SURGICAL HISTORY       Past Surgical History:   Procedure Laterality Date    BACK SURGERY      kyphoplasty    BLADDER SUSPENSION  4/23/14    midurethral    BREAST BIOPSY Left 3/21/13    CHOLECYSTECTOMY      COLONOSCOPY  2005    normal    COLONOSCOPY  7/6/2013    normal, fair prep    COLONOSCOPY  10/12/2005    normal    COSMETIC SURGERY      facial due to accident    CYSTOSCOPY  4/23/14    DILATION AND CURETTAGE OF UTERUS      ENTEROCELE REPAIR  4/23/14    FIXATION KYPHOPLASTY      FRACTURE SURGERY      facial due to accident    HYSTERECTOMY      ILEOSCOPY  10/12/05    KNEE ARTHROSCOPY      both knees    MASTECTOMY Left 4/8/13    with Left SLN biopsy    ORIF FEMUR DECOMPRESSION Left     Keo placed in femur due to cancer    OTHER SURGICAL HISTORY  1969    mild inflammation in duodenum, ?diverticulum in probable 3rd part of duodenum, several areas in small bowel multiple erosions    SHOULDER ARTHROSCOPY  2008    left    HOANG AND BSO  4/23/14    TUMOR REMOVAL      in bowel    TUNNELED VENOUS PORT PLACEMENT Right     infusa port, power port    UPPER GASTROINTESTINAL ENDOSCOPY  7/6/2013    normal    UPPER GASTROINTESTINAL ENDOSCOPY  04/23/2015    MILD DISTAL ESOPHAGITIS    US GUIDED LIVER BIOPSY PERCUTANEOUS  11/4/2021    US GUIDED LIVER BIOPSY PERCUTANEOUS 11/4/2021 [facility] ULTRASOUND     CURRENT MEDICATIONS       Previous Medications    AMLODIPINE (NORVASC) 5 MG TABLET    Take 10 mg by mouth daily     CALCIUM-VITAMIN D PO    Take by mouth daily    CAPECITABINE (XELODA) 500 MG CHEMO TABLET    Take 4 tablets twice daily 7 days on and 7 days off    DOCUSATE SODIUM (COLACE) 100 MG CAPSULE    Take 100 mg by mouth 2 times daily    DULOXETINE (CYMBALTA) 60 MG EXTENDED RELEASE CAPSULE    TAKE 1 CAPSULE BY MOUTH EVERY DAY    ELIQUIS 5 MG TABS TABLET    TAKE 1 TABLET BY MOUTH TWICE A DAY    FERROUS SULFATE (IRON 325) 325 (65 FE) MG TABLET    Take 325 mg by mouth every other day    HYDROCHLOROTHIAZIDE (HYDRODIURIL) 12.5 MG TABLET    Take 12.5 mg by mouth daily    KLOR-CON M20 20 MEQ EXTENDED RELEASE TABLET    TAKE 1 TABLET BY MOUTH EVERY DAY    LORAZEPAM (ATIVAN) 2 MG TABLET    Take 1 tablet by mouth every 8 hours as needed for Anxiety for up to 30 days.     LOSARTAN (COZAAR) 100 MG TABLET    Take 100 mg by mouth daily    METOPROLOL TARTRATE (LOPRESSOR) 25 MG TABLET    TAKE ONE TABLET BY MOUTH TWICE A DAY    OMEPRAZOLE (PRILOSEC) 20 MG DELAYED RELEASE CAPSULE    Take 20 mg by mouth daily    ONDANSETRON (ZOFRAN-ODT) 4 MG DISINTEGRATING TABLET    Place 1 tablet under the tongue every 8 hours as needed for Nausea or Vomiting    OXYCODONE 5 MG CAPSULE    Take 1 capsule by mouth every 6 hours as needed for Pain for up to 30 days. ALLERGIES     is allergic to adhesive tape. FAMILY HISTORY     She indicated that her mother is alive. She indicated that her father is alive. She indicated that both of her brothers are alive. SOCIAL HISTORY       Social History     Tobacco Use    Smoking status: Former Smoker     Types: Cigarettes     Quit date: 2010     Years since quittin.2    Smokeless tobacco: Never Used   Vaping Use    Vaping Use: Never used   Substance Use Topics    Alcohol use: No     Alcohol/week: 0.0 standard drinks    Drug use: No     PHYSICAL EXAM     INITIAL VITALS: BP (!) 103/57   Pulse 64   Resp 18   Ht 5' 9\" (1.753 m)   Wt 270 lb (122.5 kg)   LMP 2014   SpO2 97%   BMI 39.87 kg/m²    Physical Exam  Vitals and nursing note reviewed. Constitutional:       Appearance: She is normal weight. HENT:      Head: Normocephalic and atraumatic. Eyes:      Extraocular Movements: Extraocular movements intact. Pupils: Pupils are equal, round, and reactive to light. Cardiovascular:      Rate and Rhythm: Normal rate and regular rhythm. Pulmonary:      Effort: Pulmonary effort is normal.      Breath sounds: Normal breath sounds. Abdominal:      General: Abdomen is flat. There is no distension. Palpations: There is no mass. Musculoskeletal:         General: No swelling. Normal range of motion. Cervical back: Normal range of motion and neck supple. Skin:     General: Skin is warm and dry. Neurological:      General: No focal deficit present. Mental Status: She is alert. Mental status is at baseline. Comments: Mild dysarthria. No ataxia on finger-nose-finger. Right-sided facial droop. Sensation intact. Normal mental status. NIH is 2. MEDICAL DECISION MAKIN-year-old female presents for evaluation with acute onset dizziness and dysarthria which started at 1130 today. With acute onset neurologic symptoms we will do a stroke alert. The patient is not a TPA candidate due to her ongoing anticoagulant use. Initial imaging is unremarkable. The CTA did actually catch a pneumonia but there is no large vessel occlusion or intracranial hemorrhage. We will start antibiotics. Discussed with neurology we will admit for stroke work-up. Discussed with primary care Dr. Aj Henry who will admit             CRITICAL CARE:   30 minutes for management of symptoms concerning for acute CVA, consideration of thrombolytic therapy, neurology consultation, admission     PROCEDURES:    Procedures    DIAGNOSTIC RESULTS   EKG:All EKG's are interpreted by the Emergency Department Physician who either signs or Co-signs this chart in the absence of a cardiologist.        RADIOLOGY:All plain film, CT, MRI, and formal ultrasound images (except ED bedside ultrasound) are read by the radiologist, see reports below, unless otherwisenoted in MDM or here. CTA HEAD NECK W CONTRAST   Final Result   Unremarkable CTA of the head and neck without stenosis or occlusion. Small left-sided effusion with adjacent infiltrate. CT Head WO Contrast   Final Result   No acute intracranial abnormality. Findings were discussed with Nguyen Sender at 1:25 pm on 3/4/2022. LABS: All lab results were reviewed by myself, and all abnormals are listed below.   Labs Reviewed   STROKE PANEL - Abnormal; Notable for the following components:       Result Value    Glucose 100 (*)     RBC 3.20 (*)     Hemoglobin 11.1 (*)     Hematocrit 32.4 (*)     .3 (*)     MCH 34.7 (*)     RDW 17.6 (*)     Seg Neutrophils 69 (*) Lymphocytes 11 (*)     Monocytes 15 (*)     Absolute Lymph # 0.50 (*)     All other components within normal limits   POC GLUCOSE FINGERSTICK - Abnormal; Notable for the following components:    POC Glucose 108 (*)     All other components within normal limits   CREATININE W/GFR POINT OF CARE       EMERGENCY DEPARTMENTCOURSE:         Vitals:    Vitals:    03/04/22 1346 03/04/22 1401 03/04/22 1416 03/04/22 1431   BP:       Pulse: 67 66 66 64   Resp: 22 17 15 18   TempSrc:       SpO2: 100% 97% 95% 97%   Weight:       Height:           The patient was given the following medications while in the emergency department:  Orders Placed This Encounter   Medications    0.9 % sodium chloride bolus    sodium chloride flush 0.9 % injection 10 mL    iopamidol (ISOVUE-370) 76 % injection 75 mL    cefTRIAXone (ROCEPHIN) 1000 mg IVPB in 50 mL D5W minibag     Order Specific Question:   Antimicrobial Indications     Answer:   Pneumonia (CAP)    azithromycin (ZITHROMAX) 500 mg in D5W 250ml addavial     Order Specific Question:   Antimicrobial Indications     Answer:   Pneumonia (CAP)     CONSULTS:  IP CONSULT TO INTERNAL MEDICINE    FINAL IMPRESSION      1. Stroke-like symptoms          DISPOSITION/PLAN   DISPOSITION Decision To Admit 03/04/2022 01:58:47 PM      PATIENT REFERRED TO:  No follow-up provider specified. DISCHARGE MEDICATIONS:  New Prescriptions    No medications on file     The care is provided during an unprecedented national emergency due to the novel coronavirus, COVID 19.   Marrion Schwab, MD Marrion Schwab, MD  03/04/22 1294

## 2022-03-04 NOTE — ED TRIAGE NOTES
Mode of arrival (squad #, walk in, police, etc) : SQUAD      Chief complaint(s): DIZZINESS, SLURRED SPEECH        Arrival Note (brief scenario, treatment PTA, etc). : ONSET 1.5 (1130) HR, SUDDEN ONSET OF DIZZINESS (LIKE ROOM SPIN ING, NEAR SYNCOPE). PT HAS SLURRED SPEECH AND \"CLOUDY\" HEAD, PT DOES HAVE DROOP ON RT SIDE OF FACE DUE TO OLD SCAR. BGL PER .        C= \"Have you ever felt that you should Cut down on your drinking? \" NO  A= \"Have people Annoyed you by criticizing your drinking? \"  NO  G= \"Have you ever felt bad or Guilty about your drinking? \"  NO  E= \"Have you ever had a drink as an Eye-opener first thing in the morning to steady your nerves or to help a hangover? \" NO     Deferred []      Reason for deferring: NA    *If yes to two or more: probable alcohol abuse. *

## 2022-03-04 NOTE — FLOWSHEET NOTE
Writer responded to Stroke Alert; SC visit with patient and her parents; medical update provided by patient; declined prayer     03/04/22 1448   Encounter Summary   Services provided to: Patient and family together   Referral/Consult From: Multi-disciplinary team   Support System Parent   Continue Visiting   (3/4/22)   Complexity of Encounter Moderate   Length of Encounter 15 minutes   Spiritual Assessment Completed Yes   Crisis   Type Stroke Alert   Assessment Approachable; Anxious; Hopeful;Coping;Helplessness   Intervention Active listening;Explored feelings, thoughts, concerns;Sustaining presence/ Ministry of presence; Discussed illness/injury and it's impact   Outcome Expressed gratitude;Engaged in conversation;Expressed feelings/needs/concerns;Coping; Hopeful;Receptive

## 2022-03-04 NOTE — VIRTUAL HEALTH
[address] [facility] Stroke and Vascular Neurology Consult for  [facility] Stroke Alert through [address] @ 114pm 3/4/2022  3/4/2022 1:20 PM    Pt Name: Kristi Mosquera  MRN: [id number]  YOB: 1970  Date of evaluation: 3/4/2022  Primary Care Physician: Jose Carlos Yi MD  Reason for Evaluation: Stroke evaluation with Phone Consult, Discussion and Review of imaging    44yo female with pmh of old R face droop, afib on eliquis, hx dvt, breast cancer with metastasis to liver, htn, erendira, depression/anxiety, lymphedema. Pt presents with several weeks of dizzy episodes, now with dysarthria. At baseline pt has R face droop, etiology unclear. For the last several weeks pt has had intermittent episodes of dizziness. LKN was 1130am 3/4/2022. Pt had sudden dizziness, headache, and dysarthria, which is new. Symptoms were persistent, sbp 103, glu 108    Allergies  is allergic to adhesive tape. Medications  Prior to Admission medications    Medication Sig Start Date End Date Taking? Authorizing Provider   KLOR-CON M2Romulo 20 MEQ extended release tablet TAKE 1 TABLET BY MOUTH EVERY DAY 3/1/22   Mulu Anderson MD   ELIQUIS 5 MG TABS tablet TAKE 1 TABLET BY MOUTH TWICE A DAY 2/28/22   Mulu Anderson MD   oxyCODONE 5 MG capsule Take 1 capsule by mouth every 6 hours as needed for Pain for up to 30 days. 2/10/22 3/12/22  Mulu Anderson MD   LORazepam (ATIVAN) 2 MG tablet Take 1 tablet by mouth every 8 hours as needed for Anxiety for up to 30 days.  2/10/22 3/12/22  Mulu Anderson MD   DULoxetine (CYMBALTA) 60 MG extended release capsule TAKE 1 CAPSULE BY MOUTH EVERY DAY 1/24/22   Mulu Anderson MD   docusate sodium (COLACE) 100 MG capsule Take 100 mg by mouth 2 times daily    Historical Provider, MD   ondansetron (ZOFRAN-ODT) 4 MG disintegrating tablet Place 1 tablet under the tongue every 8 hours as needed for Nausea or Vomiting 11/16/21   Graeme Osler, MD   capecitabine (XELODA) 500 MG chemo tablet Take 4 tablets twice daily 7 days on and 7 days off 21   Guanaco Anderson MD   ferrous sulfate (IRON 325) 325 (65 Fe) MG tablet Take 325 mg by mouth every other day    Historical Provider, MD   CALCIUM-VITAMIN D PO Take by mouth daily    Historical Provider, MD   losartan (COZAAR) 100 MG tablet Take 100 mg by mouth daily    Historical Provider, MD   omeprazole (PRILOSEC) 20 MG delayed release capsule Take 20 mg by mouth daily    Historical Provider, MD   hydrochlorothiazide (HYDRODIURIL) 12.5 MG tablet Take 12.5 mg by mouth daily    Historical Provider, MD   amLODIPine (NORVASC) 5 MG tablet Take 10 mg by mouth daily     Historical Provider, MD   metoprolol tartrate (LOPRESSOR) 25 MG tablet TAKE ONE TABLET BY MOUTH TWICE A DAY 16   Phil Atwood MD    Scheduled Meds:   sodium chloride  500 mL IntraVENous Once    potassium chloride  20 mEq Oral Once     Continuous Infusions:  PRN Meds:.  Past Medical History   has a past medical history of Anemia, Anxiety, Atrial fibrillation ([facility] [address] Breast cancer ([facility] [address] Carpal tunnel syndrome, Depression, GI bleed, History of blood transfusion, Hx of blood clots, Hypertension, Liver metastases ([facility] [address] Lymphedema, Neurologic cardiac syncope, Obesity, Pain, Sleep apnea, ANA PAULA (stress urinary incontinence, female), Tachycardia, Varicella, and Varicella without complication.   Social History  Social History     Socioeconomic History    Marital status:      Spouse name: Not on file    Number of children: Not on file    Years of education: Not on file    Highest education level: Not on file   Occupational History    Not on file   Tobacco Use    Smoking status: Former Smoker     Types: Cigarettes     Quit date: 2010     Years since quittin.2    Smokeless tobacco: Never Used   Vaping Use    Vaping Use: Never used   Substance and Sexual Activity    Alcohol use: No     Alcohol/week: 0.0 standard drinks    Drug use: No    Sexual activity: Yes Partners: Male     Birth control/protection: Surgical   Other Topics Concern    Not on file   Social History Narrative    Not on file     Social Determinants of Health     Financial Resource Strain:     Difficulty of Paying Living Expenses: Not on file   Food Insecurity:     Worried About Running Out of Food in the Last Year: Not on file    Prema of Food in the Last Year: Not on file   Transportation Needs:     Lack of Transportation (Medical): Not on file    Lack of Transportation (Non-Medical): Not on file   Physical Activity:     Days of Exercise per Week: Not on file    Minutes of Exercise per Session: Not on file   Stress:     Feeling of Stress : Not on file   Social Connections:     Frequency of Communication with Friends and Family: Not on file    Frequency of Social Gatherings with Friends and Family: Not on file    Attends Cheondoism Services: Not on file    Active Member of [address] [place] [address] [employer] or Organizations: Not on file    Attends Club or Organization Meetings: Not on file    Marital Status: Not on file   Intimate Partner Violence:     Fear of Current or Ex-Partner: Not on file    Emotionally Abused: Not on file    Physically Abused: Not on file    Sexually Abused: Not on file   Housing Stability:     Unable to Pay for Housing in the Last Year: Not on file    Number of [place] in the Last Year: Not on file    Unstable Housing in the Last Year: Not on file     Family History      Problem Relation Age of Onset    High Blood Pressure Mother     High Blood Pressure Father     High Blood Pressure Brother     High Blood Pressure Brother        OBJECTIVE  Vitals:    03/04/22 1308   BP: (!) 103/57   Pulse: 64   Resp: 20   SpO2: 100%        Patient not seen in person. NIHSS calculated based on ED MD report. R face droop (chronic) and dysarthria. NIH Stroke Scale:   1a  Level of consciousness: 0 - alert; keenly responsive   1b. LOC questions:  0 - answers both questions correctly   1c. LOC commands: 0 - performs both tasks correctly   2. Best Gaze: 0 - normal   3. Visual: 0 - no visual loss   4. Facial Palsy: 1 - minor paralysis (flattened nasolabial fold, asymmetric on smiling)   5a. Motor left arm: 0 - no drift, limb holds 90 (or 45) degrees for full 10 seconds   5b. Motor right arm: 0 - no drift, limb holds 90 (or 45) degrees for full 10 seconds   6a. Motor left le - no drift; leg holds 30 degree position for full 5 seconds   6b  Motor right le - no drift; leg holds 30 degree position for full 5 seconds   7. Limb Ataxia: 0 - absent   8. Sensory: 0 - normal; no sensory loss   9. Best Language:  0 - no aphasia, normal   10. Dysarthria: 1 - mild to moderate, patient slurs at least some words and at worst, can be understood with some difficulty   11. Extinction and Inattention: 0 - no abnormality         Total:   2     Premorbid MRS: 1      Imaging:  No imaging available at this time. Assessment  46yo female with pmh of old R face droop, afib on eliquis, hx dvt, breast cancer with metastasis to liver, htn, erendira, depression/anxiety, lymphedema. Pt presents with several weeks of dizzy episodes, now with dysarthria. Rule out acute stroke. ddx also includes brain metastasis, metabolic/infectious/toxic etio, complicated migraine    Recommendations:  --no tpa, on AC  --check ct and cta head and neck stat. If there is lvo can consider mt. --sbp < 220  --bolus 1L ns, continue 100cc/hr  --addtl recs to follow    Addendum:  Ct and cta completed, reviewed on viz.ai. no bleed or hypodensity, no lvo. Still need to rule out small stroke. recs updated:    --admit to [facility], no txf at this time  --mri brain wo con. Remainder of workup and care as per neuro  --continue eliquis 5 bid  --other recs as above.     Bob Escoto telestroke    Discussed with ED Physician    At least 30 min of Telemedicine and time in conversation directly with ED staff and physician for the patient who is in imminent and life threatening deterioration without further treatment and evaluation. This Virtual Visit was conducted with patient's (and/or legal guardian's) consent, to provide telestroke consultation and necessary medical care. Time spent examining patient, reviewing the images personally, reviewing the chart, perform high complexity decision making and speaking with the nursing staff regarding recommendations    This is a Phone Consult, I have not seen the patient face to face, the telemedicine device was not utilized.     Kwame Juarez MD, PhD  Stroke, Neurocritical Care And/or [address] [place] [address] Stroke Network  [id number] [place]  Electronically signed 3/4/2022 at 1:20 PM

## 2022-03-04 NOTE — PROGRESS NOTES
Pt received from ER, Pt is alert and  O x4  No c/o or pain or SOB at this time  Telemetry applied  Pt's parents are at the bedside  Call light within reach  Bedside swallow test done --Pt.  Had no problems swallowing water at this time

## 2022-03-04 NOTE — PLAN OF CARE
Please have patient or family member fill out the MRI Screening form and fax to dept @ [phone]. Any questions. .please call [facility] MRI @ [phone]. MRI exam will be scheduled after receiving the completed screening form.  Thank you!!

## 2022-03-04 NOTE — ED NOTES
Pt. Ambulated to bathroom with one assist. Pt. Tolerated well. No complaints of weakness or dizziness.      Billy Roger RN  03/04/22 8309

## 2022-03-05 PROBLEM — R42 VERTIGO: Status: ACTIVE | Noted: 2022-01-01

## 2022-03-05 NOTE — PLAN OF CARE
830pm. Attempted MRI exam, patient claustro and began yelling before exam even started. Patient feels she needs stronger sedation. Notified nurse Sofia Park, she will notify [phone].

## 2022-03-05 NOTE — H&P
Internal Medicine  History and Physical    CHIEF COMPLAINT:    Chief Complaint   Patient presents with    Dizziness       History Obtained From:  patient  PCP: Esperanza Israel MD    HISTORY OF PRESENT ILLNESS:   The patient is a 45 y.o. female who presents with room spinning and slurred speech. Was in PT yesterday, and was doing fine. Showered, went to drugstore, and while there, felt dizzy, and had signif. Room spinning, where she had to be helped down. Squad was called, and pt. Apparently had slurred speech. Brought to ER here, and stroke alert called. Had CTA brain, neck. No acute findings. MRI unable to be done due to claustrophobia, despite ativan. This AM feels fine. No HA, slurred speech or weakness. Started on IV abx, due to ? Infiltrate on CTA. Has had some cough since here. Has complex hx of breast CA with metastatic dz. Last PET showed no new mets.     Past Medical History:        Diagnosis Date    Anemia     Anxiety     Atrial fibrillation ([facility] [address] 3 28 12    dr. Edgardo Ramírez Breast [facility])     lt./chemo 6/2013    Carpal tunnel syndrome     Depression     post partum    GI bleed     History of blood transfusion     Hx of blood clots     Hypertension     Liver metastases ([address] 2/2/2021    Lymphedema     Neurologic cardiac syncope     Obesity     Pain     pelvic    Sleep apnea     ANA PAULA (stress urinary incontinence, female) 3/4/2014    Tachycardia     Varicella     Age 4, severe    Varicella without complication 8/3/7679       Past Surgical History:        Procedure Laterality Date    BACK SURGERY      kyphoplasty    BLADDER SUSPENSION  4/23/14    midurethral    BREAST BIOPSY Left 3/21/13    CHOLECYSTECTOMY      COLONOSCOPY  2005    normal    COLONOSCOPY  7/6/2013    normal, fair prep    COLONOSCOPY  10/12/2005    normal    COSMETIC SURGERY      facial due to accident    CYSTOSCOPY  4/23/14    DILATION AND CURETTAGE OF UTERUS      ENTEROCELE REPAIR  4/23/14    FIXATION KYPHOPLASTY      FRACTURE SURGERY      facial due to accident    HYSTERECTOMY      ILEOSCOPY  10/12/05    KNEE ARTHROSCOPY      both knees    MASTECTOMY Left 4/8/13    with Left SLN biopsy    ORIF FEMUR DECOMPRESSION Left     Keo placed in femur due to cancer    OTHER SURGICAL HISTORY  1969    mild inflammation in duodenum, ?diverticulum in probable 3rd part of duodenum, several areas in small bowel multiple erosions    SHOULDER ARTHROSCOPY  2008    left    HOANG AND BSO  4/23/14    TUMOR REMOVAL      in bowel    TUNNELED VENOUS PORT PLACEMENT Right     infusa port, power port    UPPER GASTROINTESTINAL ENDOSCOPY  7/6/2013    normal    UPPER GASTROINTESTINAL ENDOSCOPY  04/23/2015    MILD DISTAL ESOPHAGITIS    US GUIDED LIVER BIOPSY PERCUTANEOUS  11/4/2021    US GUIDED LIVER BIOPSY PERCUTANEOUS 11/4/2021 [facility] ULTRASOUND       Medications Prior to Admission:    Prior to Admission medications    Medication Sig Start Date End Date Taking? Authorizing Provider   KLOR-CON M20 20 MEQ extended release tablet TAKE 1 TABLET BY MOUTH EVERY DAY 3/1/22  Yes Henry Gold MD   ELIQUIS 5 MG TABS tablet TAKE 1 TABLET BY MOUTH TWICE A DAY 2/28/22  Yes Henry Gold MD   LORazepam (ATIVAN) 2 MG tablet Take 1 tablet by mouth every 8 hours as needed for Anxiety for up to 30 days.  2/10/22 3/12/22 Yes Henry Gold MD   DULoxetine (CYMBALTA) 60 MG extended release capsule TAKE 1 CAPSULE BY MOUTH EVERY DAY 1/24/22  Yes Henry Gold MD   docusate sodium (COLACE) 100 MG capsule Take 100 mg by mouth 2 times daily   Yes Historical Provider, MD   ondansetron (ZOFRAN-ODT) 4 MG disintegrating tablet Place 1 tablet under the tongue every 8 hours as needed for Nausea or Vomiting 11/16/21  Yes Henry Gold MD   capecitabine (XELODA) 500 MG chemo tablet Take 4 tablets twice daily 7 days on and 7 days off 11/16/21  Yes Henry Gold MD   ferrous sulfate (IRON 325) 325 (65 Fe) MG tablet Take 325 mg by mouth every other day   Yes Historical Provider, MD   CALCIUM-VITAMIN D PO Take by mouth daily   Yes Historical Provider, MD   losartan (COZAAR) 100 MG tablet Take 100 mg by mouth daily   Yes Historical Provider, MD   omeprazole (PRILOSEC) 20 MG delayed release capsule Take 20 mg by mouth daily   Yes Historical Provider, MD   hydrochlorothiazide (HYDRODIURIL) 12.5 MG tablet Take 12.5 mg by mouth daily   Yes Historical Provider, MD   amLODIPine (NORVASC) 5 MG tablet Take 10 mg by mouth daily    Yes Historical Provider, MD   metoprolol tartrate (LOPRESSOR) 25 MG tablet TAKE ONE TABLET BY MOUTH TWICE A DAY 16  Yes Toy Burroughs MD   oxyCODONE 5 MG capsule Take 1 capsule by mouth every 6 hours as needed for Pain for up to 30 days. 2/10/22 3/12/22  George Mac MD       Allergies: Allergies   Allergen Reactions    Adhesive Tape Other (See Comments)     Skin breakdown       Social History:   TOBACCO:   reports that she quit smoking about 11 years ago. Her smoking use included cigarettes. She has never used smokeless tobacco.  ETOH:   reports no history of alcohol use. OCCUPATION:      Family History:       Problem Relation Age of Onset    High Blood Pressure Mother     High Blood Pressure Father     High Blood Pressure Brother     High Blood Pressure Brother      Review of Systems   Constitutional: Negative for activity change, fatigue and unexpected weight change. Eyes: Negative for visual disturbance. Respiratory: Positive for cough. Negative for chest tightness and shortness of breath. Cardiovascular: Negative for chest pain, palpitations and leg swelling. Gastrointestinal: Negative for abdominal pain. Musculoskeletal: Negative for arthralgias and myalgias. Neurological: Positive for dizziness and speech difficulty. Negative for weakness, light-headedness and headaches.      Objective         Vitals Last 24 Hours:  TEMPERATURE:  Temp  Av.1 °F (36.7 °C)  Min: 97.5 °F (36.4 °C)  Max: 98.8 °F (37.1 °C)  RESPIRATIONS RANGE: Resp  Av.6  Min: 15  Max: 22  PULSE OXIMETRY RANGE: SpO2  Av %  Min: 93 %  Max: 100 %  PULSE RANGE: Pulse  Av.2  Min: 62  Max: 72  BLOOD PRESSURE RANGE: Systolic (13PFQ), HTI:900 , Min:103 , JDI:748   ; Diastolic (82DRN), DEF:90, Min:57, Max:79    I/O (24Hr): Intake/Output Summary (Last 24 hours) at 3/5/2022 1027  Last data filed at 3/5/2022 0532  Gross per 24 hour   Intake 325 ml   Output 2800 ml   Net -2475 ml     Objective:  General Appearance:  Comfortable, well-appearing and in no acute distress. Vital signs: (most recent): Blood pressure 103/68, pulse 65, temperature 98.4 °F (36.9 °C), temperature source Oral, resp. rate 18, height 5' 9\" (1.753 m), weight 273 lb 13 oz (124.2 kg), last menstrual period 2014, SpO2 93 %, not currently breastfeeding. Vital signs are normal.    Output: Producing urine. HEENT: Normal HEENT exam.    Lungs:  Normal effort. Breath sounds clear to auscultation. She is not in respiratory distress. No rales. Heart: Normal rate. Regular rhythm. S1 normal and S2 normal.    Chest: No chest wall tenderness. Abdomen: Abdomen is soft and non-distended. Bowel sounds are normal.   There is no abdominal tenderness. Extremities: There is no dependent edema. Neurological: Patient is alert and oriented to person, place and time. Normal strength. (Speech fluent this AM). Skin:  Warm and dry. No rash or cyanosis.      Labs/Imaging/Diagnostics    Labs:  CBC:  Recent Labs     22  1333   WBC 4.6   RBC 3.20*   HGB 11.1*   HCT 32.4*   .3*   RDW 17.6*        CHEMISTRIES:  Recent Labs     22  1329 22  1333   NA  --  140   K  --  4.1   CL  --  103   CO2  --  26   BUN  --  15   CREATININE 0.91 0.75   GLUCOSE  --  100*     PT/INR:  Recent Labs     22  1333   PROTIME 14.2   INR 1.1     APTT:  Recent Labs     22  1333   APTT 28.4     LIVER PROFILE:No results for input(s): AST, ALT, BILIDIR, BILITOT, ALKPHOS in the last 72 hours. Imaging Last 24 Hours:  CT Head WO Contrast    Result Date: 3/4/2022  EXAMINATION: CT OF THE HEAD WITHOUT CONTRAST  3/4/2022 1:17 pm TECHNIQUE: CT of the head was performed without the administration of intravenous contrast. Dose modulation, iterative reconstruction, and/or weight based adjustment of the mA/kV was utilized to reduce the radiation dose to as low as reasonably achievable. COMPARISON: None. HISTORY: ORDERING SYSTEM PROVIDED HISTORY: dizziness, slurred speech TECHNOLOGIST PROVIDED HISTORY: dizziness, slurred speech Decision Support Exception - unselect if not a suspected or confirmed emergency medical condition->Emergency Medical Condition (MA) Is the patient pregnant?->No Reason for Exam: dizziness, slurred speech Relevant Medical/Surgical History: breast cancer FINDINGS: BRAIN/VENTRICLES: There is no acute intracranial hemorrhage, mass effect, or midline shift. There is satisfactory overall gray-white matter differentiation. The ventricular structures are symmetric and unremarkable. The infratentorial structures are unremarkable. ORBITS: The visualized portion of the orbits demonstrate no acute abnormality. SINUSES: The visualized paranasal sinuses and mastoid air cells demonstrate no acute abnormality. SOFT TISSUES/SKULL:  No acute abnormality of the visualized skull or soft tissues. No acute intracranial abnormality. Findings were discussed with Wilian Lomas at 1:25 pm on 3/4/2022. CTA HEAD NECK W CONTRAST    Result Date: 3/4/2022  EXAMINATION: CTA OF THE HEAD AND NECK WITH CONTRAST 3/4/2022 1:32 pm: TECHNIQUE: CTA of the head and neck was performed with the administration of intravenous contrast. Multiplanar reformatted images are provided for review. MIP images are provided for review. Stenosis of the internal carotid arteries measured using NASCET criteria.  Dose modulation, iterative reconstruction, and/or weight based adjustment of the mA/kV was utilized to reduce the radiation dose to as low as reasonably achievable. This scan was analyzed using Viz. ai contact LVO. Identification of suspected findings is not for diagnostic use beyond notification. Viz LVO is limited to analysis of imaging data and should not be used in-lieu of full patient evaluation or relied upon to make or confirm diagnosis. COMPARISON: None. HISTORY: ORDERING SYSTEM PROVIDED HISTORY: dizziness, slurred speech TECHNOLOGIST PROVIDED HISTORY: dizziness, slurred speech Decision Support Exception - unselect if not a suspected or confirmed emergency medical condition->Emergency Medical Condition (MA) Reason for Exam: dizziness, slurred speech Relevant Medical/Surgical History: breast cancer FINDINGS: CTA NECK: AORTIC ARCH/ARCH VESSELS: No dissection or arterial injury. No significant stenosis of the brachiocephalic or subclavian arteries. CAROTID ARTERIES: No dissection, arterial injury, or hemodynamically significant stenosis by NASCET criteria. VERTEBRAL ARTERIES: No dissection, arterial injury, or significant stenosis. SOFT TISSUES: There is a small left-sided effusion with adjacent infiltrate. No cervical or superior mediastinal lymphadenopathy. The larynx and pharynx are unremarkable. No acute abnormality of the salivary and thyroid glands. BONES: No acute osseous abnormality. CTA HEAD: ANTERIOR CIRCULATION: No significant stenosis of the intracranial internal carotid, anterior cerebral, or middle cerebral arteries. No aneurysm. POSTERIOR CIRCULATION: No significant stenosis of the vertebral, basilar, or posterior cerebral arteries. No aneurysm. OTHER: No dural venous sinus thrombosis on this non-dedicated study. BRAIN: No mass effect or midline shift. No extra-axial fluid collection. The gray-white differentiation is maintained. Unremarkable CTA of the head and neck without stenosis or occlusion.  Small left-sided effusion with adjacent infiltrate. 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 [address] 3/5/2022 Yes    Personal history of breast cancer 3/5/2022 Yes    Overview Signed 2/4/2014  4:03 PM by Tony Herrmann, DO     Left mastectomy and Chemotherapy  BRCA Ordered         Obesity 3/5/2022 Yes    Essential hypertension 3/5/2022 Yes    Bone metastasis ([facility] [address] 3/5/2022 Yes    Vertigo 3/5/2022 Yes        Assessment:    Condition: In stable condition. 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 Plan:   MRI head. Start antibiotics. (Admit. Resume most home meds. Will check MRI brain, given her breast CA hx, and met. Dz.  Will cont. IV abx for now given CTA findings. Looks very good this AM clinically. Consider D/C later today, if MRI unrevealing, and if she's doing well. On eliquis already for DVT prev. Check pneumonia serologies. Can start meclizine if needed. ).        Electronically signed by Kenney Nevarez MD on 3/5/22 at 10:27 AM EST

## 2022-03-05 NOTE — CARE COORDINATION
CASE MANAGEMENT NOTE:    Admission Date:  3/4/2022 Lilian Martinez is a 45 y.o.  female    Admitted for : Altered mental status [R41.82]  Stroke-like symptoms [R29.90]    Met with:  Patient    PCP:  Dr. Makayla Carlton:  Laura Cough       Is patient alert and oriented at time of discussion:  Yes    Current Residence/ Living Arrangements:  independently at home             Current Services PTA:  No    Does patient go to outpatient dialysis:   If yes, location and chair time:     Is patient agreeable to VNS: No    Freedom of choice provided:  No    List of [address] provided: NA    VNS chosen:  NA    DME:  straight cane and walker    Home Oxygen: No    Nebulizer: No    CPAP/BIPAP: NA    Supplier: N/A    Potential Assistance Needed: No    SNF needed: No    Freedom of choice and list provided: NA    Pharmacy:  [facility]       Does Patient want to use MEDS to BEDS? No    Is patient currently receiving oral anticoagulation therapy? Yes    Is the Patient an [facility] with Readmission Risk Score greater than 14%? No  If yes, pt needs a follow up appointment made within 7 days. Family Members/Caregivers that pt would like involved in their care:    Yes    If yes, list name here:  Mando Garciajennifer     Transportation Provider:  Family             Discharge Plan:  3/5/22 [facility] [place] Pt is from home in a two story home with her dtr DME walker and Cane VNS denies Plan is to discharge to home possibly today if MRI is ok will follow for needs . //tv                   Electronically signed by:  Ryann Jane RN on 3/5/2022 at 12:17 PM

## 2022-03-05 NOTE — PROGRESS NOTES
RN spoke with Dr. Chandana Pratt, we will attempt another MRI tomorrow and increase Ativan dose to 2 mg and give prior to MRI. Pt also wanted to get her Norvasc restarted. New order for Norvasc 10 mg nightly placed.

## 2022-03-05 NOTE — PROGRESS NOTES
Notified dr Vielka Orozco of mri results:     1. There is no evidence of an acute infarct. 2. A small focus of T2 FLAIR hyperintensity is seen within the left dorsal   pontomedullary junction with associated punctate area of enhancement. Unclear whether this represents a subacute infarct or perhaps a small   enhancing lesion with surrounding edema.  Suggest a short-term follow-up   examination in 4-8 weeks. 3. Otherwise, no acute intracranial abnormality. 4. Minimal chronic microvascular ischemic changes. Reviewed neuro is not on the case. Informed pt falls under our stroke protocol,   Reviewed therapy has not seen patient. He does not think she needs it and is ok with her leaving without it. Reviewed lipid profile was not done, previously 120 in 2017. He is ok wtihout it because she has liver mets, no need for it. He stated she is on eliquis and no need for aspirin and plavix. He is still ok with her discharging.  Orders placed

## 2022-03-05 NOTE — DISCHARGE INSTR - DIET

## 2022-03-05 NOTE — PROGRESS NOTES
Pt was unable to complete MRI d/t claustrophobia. Pt did receive 1 mg ativan prior to test but she said that it did not help. RN has page out to Dr. Chandana Pratt. Awaiting callback.

## 2022-03-05 NOTE — PROGRESS NOTES
Spoke with Charmaine Goltz from MRI, stat test will be done tonight at 815 pm, pt. Will be pre medicated with ativan PO, and taken down to MRI at 800 pm  This evening.

## 2022-03-05 NOTE — PROGRESS NOTES
D/c paperwork reviewed with patient. Reviewed follow up appts and med details and changes/due times. Pt leaving with all belongings and iv and heart monitor removed.      Awaiting  at 730 by family

## 2022-03-15 NOTE — PROGRESS NOTES
Pt here for xgeva. Saw Dr. Ekaterina Love prior to injection. See dictation for further notes. Tolerated without incident. Discharged stable and ambulatory.

## 2022-03-15 NOTE — PROGRESS NOTES
Chief Complaint   Patient presents with    Follow-up     review status of disease       DIAGNOSIS:   1- T2, N0, M0 ER positive DC positive and HER-2/eve negative invasive ductal carcinoma of the left breast ( inner upper quadrant)  2- Repeated GI bleeding greetings 2 symptomatic anemia, despite extensive GI workup, source of bleeding was never found. 3- finally a small mass was seen in the jejunum. 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 Margins negative   4- compression fractures and bone lesions. Likely metastatic cancer (5/2020)   5- biopsy proven liver metastasis. 6- Molecular testing suggests PI K3 CA mutation, T p53 mutation and MAP kinase mutation    CURRENT THERAPY:  1- Mastectomy and axillary sampling  2- adjuvant chemotherapy with Taxotere and Cytoxan starting 5/16/2013. Chemotherapy stopped after 3 cycles because of ongoing GI bleeding and symptomatic anemia  3- Conservative management for GI bleeding. Leading completely stopped after the discontinuation of chemotherapy  4- started tamoxifen after chemotherapy was completed. 8/2013. 5- transitioned to Arimidex 6/2014, completed 08/2018  6- GIST resection, resected 9/2017   7-status post kyphoplasty and biopsy of bone metastases 5/2020. The tumor was minimally ER positive at 5% and DC negative. 8- rodding of the left femur completed. Plan for  Radiation  9- Systemic therapy, plan Arimidex plus Ibrance  10 - XRT completed 07/2020  11-current treatment starting 7/30/2020, monthly Xgeva, oral Ibrance and Arimidex  12-2/2021, progression of disease, plan to switch to Faslodex plus CDK inhibitor abemaciclib, continue Xgeva  13- 8/2021 liver progression, plan SBRT to liver lesion   14-further progression, NGS testing and started Xeloda, 11/2021     BRIEF CASE HISTORY:   Rock Peters is a very pleasant 45 y.o. who felt a mass in the medial aspect of her left breast, around the 9:00 position.   Patient sought medical attention and mammogram and ultrasound were done. Showing an irregular, high-density mass with indistinct margins containing pleomorphic calcifications in the lower inner quadrant of the left breast close to 9:00 location this mass measures are mammogram 2.7 x 2.6 x 2 cm. Subsequently targeted ultrasound was performed showing hypoechoic, irregular, taller than wider, solid mass at 9:00 with posterior acoustic shadowing. The calcifications were visible on ultrasound . On ultrasound the solid mass measured 2.4 x 2.6 x 1 cm. Image guided biopsy of the mass was done and showed invasive ductal carcinoma with surrounding DCIS (cribriform type) the tumor was ER and DC positive and HER-2/eve negative with a fish ratio 1.1. The patient was referred to us and she also was seen by radiation oncology , She  decided on proceeding with total mastectomy and breast reconstruction . Allergy showed 2.5 cm intermediate grade invasive ductal carcinoma, Heath lymph node was negative. The tumor was ER/DC positive and HER-2/eve negative. We discussed options of adjuvant therapy including chemotherapy and hormone therapy, The patient decided to proceed with adjuvant chemotherapy without undergone a Oncotype study. TC chemotherapy X4 cycles is planned  After 3 cycles, and presented with persistent GI bleeding leading to symptomatic anemia and syncope, the patient's condition was Serious enough to warrant repeated hospital admissions. Repeated GI workup including endoscopic evaluation and Tagged Red cell scans were negative. We decided to treat her conservatively and chemotherapy was stopped after 3 cycles. After that All her symptoms improved and she was started on tamoxifen. After one year, she was transitioned to anastrozole since she had no periods and hormonal testing showed post menopausal state. 08/2017  She had been having abdominal pain and went to [facility] for consult.  She had work up which showed mass in the colon showed subtle changes in the mid-small intestine, CT enterography showed 3.6CM mass in the distal small bowel. She was also hospitalized in the interim with pancreatitis. 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 Margins were negative. Observation was decided, no need for adjuvant therapy  In May/2020, the patient presented with severe back pain. MRI showed multiple bone lesions with evidence of compression fracture. Patient underwent successful kyphoplasty and biopsy and pathology showed breast cancer, it was 5% ER positive and MS was negative. HER-2 was +2 which is equivocal so a fish test was ordered. Plan for radiation, staging PET, and Arimidex. PET scan showed widespread metastatic bony disease including the thoracic spine, the lumbar spine, the left femur and the rib area. She underwent repeated kyphoplasty and rodding of the left femur. She is undergoing radiation to the painful bony areas in thoracic, rib and lumbar areas as well as the left femur. We will likely use Ibrance plus Arimidex. As well as monthly Xgeva to be started after completion of radiation. After completion of radiation, we maintained her on Arimidex plus Ibrance, due to cytopenias, Ibrance dose was decreased to 100 mg/day which was much better tolerated. Also we maintained her on Xgeva monthly. PET scan was done in October and showed essentially stable disease. There was some suspicious activity in the hip but this was asymptomatic and we decided that this might be post radiation or related to physical therapy. We decided to continue current therapy until clear progression. Further testing in early 2021 showed clear progression, will plan to switch to Faslodex and continue with CDk inhibitor(switch to [employer])  and Xgeva. Further testing showed essentially stable bone metastases but new liver lesion that was discordant.   We decided to offer SBRT to the liver lesion we will continue with systemic therapy. Unfortunately she continued to progress, we sent her to [facility] who recommended bx of liver lesion for NGS testing and consider Xeloda in absence of targetable mutation. INTERIM HISTORY: She comes in today for follow up for metastatic breast cancer and toxicity check. She reports last week after physical therapy she nearly fainted at pharmacy, she was taken to hospital and admitted, work up was negative for stroke and brain metastases, possibly hypotension based on readings at the hospital. She notes she has had other episodes of dizziness. She complains of tingling and pain radiation outer right leg that goes to the foot extending to the arch affecting her ability to walk, it was worsened since it developed. She is taking Xeloda, she has fatigue on days 8 and 9, she rests and recovers well after. She feels physical therapy has been very beneficial and continues to go 3 times per week. She tried medical marijuana and her pain was well controlled but felt it was too dulling to her cognitive and discontinued. GYNECOLOGICAL HISTORY  Menarche at age 14. He is premenopausal with regular periods. +2. She used birth control minimally. PAST MEDICAL HISTORY: has a past medical history of Anemia, Anxiety, Atrial fibrillation ([address] Breast cancer ([address] Carpal tunnel syndrome, Depression, GI bleed, History of blood transfusion, Hx of blood clots, Hypertension, Liver metastases ([address] Lymphedema, Neurologic cardiac syncope, Obesity, Pain, Sleep apnea, ANA PAULA (stress urinary incontinence, female), Tachycardia, Varicella, and Varicella without complication. PAST SURGICAL HISTORY: has a past surgical history that includes Shoulder arthroscopy (); Colonoscopy (); Dilation and curettage of uterus; Knee arthroscopy; fracture surgery; Tunneled venous port placement (Right); Upper gastrointestinal endoscopy (2013); Colonoscopy (2013);  Mastectomy (Left, 13); other surgical history (1969); Breast biopsy (Left, 3/21/13); ileoscopy (10/12/05); Hysterectomy; denia and bso (cervix removed) (4/23/14); Enterocele repair (4/23/14); Cystoscopy (4/23/14); bladder suspension (4/23/14); Colonoscopy (10/12/2005); Upper gastrointestinal endoscopy (04/23/2015); tumor removal; Cosmetic surgery; Cholecystectomy; orif femur decompression (Left); Fixation Kyphoplasty; back surgery; and US BIOPSY LIVER PERCUTANEOUS (11/4/2021). CURRENT MEDICATIONS:  has a current medication list which includes the following prescription(s): azithromycin, klor-con m20, eliquis, duloxetine, docusate sodium, ondansetron, capecitabine, ferrous sulfate, calcium-vitamin d, losartan, omeprazole, hydrochlorothiazide, amlodipine, and metoprolol tartrate, and the following Facility-Administered Medications: sodium chloride and potassium chloride. ALLERGIES:  is allergic to adhesive tape. FAMILY HISTORY: Negative for any hematological or oncological conditions. Specifically no history of breast cancer in the family    SOCIAL HISTORY:  reports that she quit smoking about 2011. Her smoking use included Cigarettes. She smoked About 20 years. She has never used smokeless tobacco. She reports that she does not drink alcohol or use illicit drugs. REVIEW OF SYSTEMS:   General: No fever or night sweats. Weight is stable. Eyes: No double or blurred vision. Ears: No tinnitus or hearing problem,    Throat: No dysphagia or sore throat   Respiratory: No chest pain, no hemoptysis. +exertional dyspnea   Cardiovascular: Denies chest pain, PND or orthopnea, or palpitations. +LE edema improved +hypotension with dizziness    Gastrointestinal: +nausea and vomiting as noted above, mild constipation  Genitourinary: Denies dysuria, hematuria, frequency, urgency or incontinence. No vaginal bleeding. Neurological: Denies decreased LOC, no sensory or motor focal deficits. +headaches - unchanged, mild +neuropathic pain in right leg as noted above  Musculoskeletal: No arthralgia or joint swelling. +back pain, right chest wall pain, and right shoulder blade pain, right hip pain  Skin: There are no rashes or bleeding. +hair thinning  Psychiatric: ++ anxiety, depression as discussed. Endocrine: No diabetes or thyroid disease. Hematologic: No bleeding, no adenopathy. PHYSICAL EXAM:  The patient is not in acute distress. Vital signs: Blood pressure 113/78, pulse 70, temperature 97.6 °F (36.4 °C), temperature source Temporal, weight 280 lb (127 kg), last menstrual period 03/17/2014, not currently breastfeeding. HEENT:  Eyes are normal. Ears, nose is congested, no bleeding. Neck: Supple. No lymph node enlargement. No thyroid enlargement. Trachea is centrally located. Chest:  Clear to auscultation. No wheezes or crepitations. Breast:  Right: No masses, no adenopathy. No skin or nippple abnormalities. Left: left-sided mastectomy, surgery site is healed completely, no adenopathy  Heart: Regular sinus rhythm. Abdomen: Soft, nontender. No hepatosplenomegaly. No masses. Extremities:  Mild edema right side. No LE edema. Lymph Nodes:  No cervical, axillary or inguinal lymph node enlargement. Neurologic: Conscious and oriented. No focal neurological deficits. Psychosocial: No depression, anxiety or stress.  Skin: No rashes, bruises or ecchymoses   Lateral aspect of shin and goes to foot, pain around L3       REVIEW OF LABORATORY DATA:     Lab Results   Component Value Date    WBC 4.5 03/15/2022    HGB 11.8 (L) 03/15/2022    HCT 34.0 (L) 03/15/2022    .4 (H) 03/15/2022     03/15/2022     Lab Results   Component Value Date    IRON 30 (L) 11/29/2017    TIBC 403 11/29/2017    FERRITIN 33 04/04/2019     Lab Results   Component Value Date    NEUTROABS 3.59 03/15/2022         Chemistry        Component Value Date/Time     03/15/2022 1213    K 3.9 03/15/2022 1213     03/15/2022 1213    CO2 25 03/15/2022 1213 BUN 15 03/15/2022 1213    CREATININE 0.65 03/15/2022 1213        Component Value Date/Time    CALCIUM 9.2 03/15/2022 1213    ALKPHOS 125 (H) 03/15/2022 1213    AST 46 (H) 03/15/2022 1213    ALT 31 03/15/2022 1213    BILITOT 0.50 03/15/2022 1213           REVIEW OF RADIOLOGICAL RESULTS:      IMPRESSION:   1- Kyree Kay  Has  T2, N0, M0 ER/MO positive and HER-2/eve negative invasive ductal carcinoma of the left breast  2- Chemotherapy: received 3 cycles of TC, stopped due to GI bleeding  3- GI bleeding , related to the GIST tumor. Currently on oral iron and hemoglobin is improving  4- on anastrozole. We will continue, plan 5 years of therapy, completed 08/2018  5- for hot flashes, better on Effexor. 6- Recent pancreatitis, possibly biliary. MRI CT scan did not show any gallbladder stone. We will discuss with her surgeons. 7-   distal small bowel mass measuring 3.6 cm, resection shows low risk GIST. No need for adjuvant therapy, margins were negative. 8-bone metastases with compression fracture diagnosed in May/2020. Status post kyphoplasty and biopsy. 9- S/P radiation 07/2020  10- for systemic therapy, she was started on Arimidex, adding Ibrance. Due to cytopenias, dose was reduced to 100 mg/day  11-in February/2021, there was signs of progression, will plan to switch to Faslodex   12-June/2021, good response to treatment but discordant growth in one of the liver lesion, plan SBRT to the growing lesion and continue with systemic therapy  13- further progression, systemic therapy with xeloda and NGS testing. PLAN:   1. We reviewed her recent episode of dizziness and hospitalization, work up was negative, based on her blood pressure readings showing downward trend I asked her to hold Amlodipine and hydrochlorothiazide, she will monitor blood pressure at home and notify us with next visit. 2. Her disease remains well controlled.    3. We discussed her neuropathic pain as noted, I am ordering MRI for further review. 4. Her lab work shows stable counts and electrolytes in range. 5. I completed toxicity check. 6. We will plan to continue treatment unchanged with plan to treat to progression. 7. Return in 4 weeks. Scribe Attestation   This note was created by Quin Sommers acting as scribe for the physician signing this note  Electronically Signed  Quin Sommers, 3/15/2022  Scribe, [employer]. Attending Attestation   Note was reviewed and edited.   I am in agreement with the note as entered    Tami Anderson MD  Hematologist/Medical [id number] [facility] hematology oncology physicians

## 2022-03-16 NOTE — PROGRESS NOTES
Physician Progress Note      Layo Gurrola  CSN #:                  [id number]  :                       1970  ADMIT DATE:       3/4/2022 1:01 PM  [address] DATE:        3/5/2022 7:16 PM  RESPONDING  PROVIDER #:        Mary Stovall MD          QUERY TEXT:    Patient admitted with altered mental status, noted to have left sided pleural   effusion with infiltrate on CT . If possible, please document in progress   notes and d/c summary further specificity regarding the type/underlying cause   of pleural effusion: The medical record reflects the following:  Risk Factors: 45 y.o. female with extensive PMH including metastatic cancer. Clinical Indicators: Per ED Provider Note 3/4: The CTA did actually catch a   pneumonia. Per 3/5 H&P: Started on IV abx, due to ? Infiltrate on CTA. Has   had some cough since here. . Check pneumonia serologies. CT head/neck w/   contrast: Small left-sided effusion with adjacent infiltrate. Treatment: Zithromax and Rocephin IV inpatient, discharged on PO Zithromax for   clinical indication of CAP. Options provided:  -- Pleural effusion with infiltrate due to pneumonia  -- Pleural effusion with infiltrate due, Please specify cause. -- Other - I will add my own diagnosis  -- Disagree - Not applicable / Not valid  -- Disagree - Clinically unable to determine / Unknown  -- Refer to Clinical Documentation Reviewer    PROVIDER RESPONSE TEXT:    Patient has pleural effusion with infiltrate due to pneumonia.     Query created by: Kayden Harrell on 3/11/2022 2:09 PM      Electronically signed by:  Mary Stovall MD 3/16/2022 7:25 AM

## 2022-03-23 NOTE — TELEPHONE ENCOUNTER
Pt called c/o worsening neuropathy. She states when she was here on 3/15, she had tingling in her root foot. Since then, it has spread to her entire right leg, left leg, left hand, and her vaginal/hip area. She has an MRI scheduled April 7th. Writer discussed with Dr. Sherly Colmenares; he states he needs the MRI results to see what could be causing the neuropathy. He states to have the MRI done in the next day or two, and we can f/u on results. Writer notified pt of above. Provided scheduling phone number. She will call now to reschedule to tomorrow or Friday.

## 2022-03-24 NOTE — TELEPHONE ENCOUNTER
Dr. Usama Boyce reviewed MRI; nothing new that would be causing the numbness. He states everything shown on MRI is chronic and nothing is worsening. Notified pt and advised her to call PCP to do further workup. Pt verbalized understanding.

## 2022-04-12 NOTE — PROGRESS NOTES
Chief Complaint   Patient presents with    Follow-up    Discuss Labs    Results     MRI    Other     rollator / shower seat prescription please    Medication Refill       DIAGNOSIS:   1- T2, N0, M0 ER positive MN positive and HER-2/eve negative invasive ductal carcinoma of the left breast ( inner upper quadrant)  2- Repeated GI bleeding greetings 2 symptomatic anemia, despite extensive GI workup, source of bleeding was never found. 3- finally a small mass was seen in the jejunum. 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 Margins negative   4- compression fractures and bone lesions. Likely metastatic cancer (5/2020)   5- biopsy proven liver metastasis. 6- Molecular testing suggests PI K3 CA mutation, T p53 mutation and MAP kinase mutation    CURRENT THERAPY:  1- Mastectomy and axillary sampling  2- adjuvant chemotherapy with Taxotere and Cytoxan starting 5/16/2013. Chemotherapy stopped after 3 cycles because of ongoing GI bleeding and symptomatic anemia  3- Conservative management for GI bleeding. Leading completely stopped after the discontinuation of chemotherapy  4- started tamoxifen after chemotherapy was completed. 8/2013. 5- transitioned to Arimidex 6/2014, completed 08/2018  6- GIST resection, resected 9/2017   7-status post kyphoplasty and biopsy of bone metastases 5/2020. The tumor was minimally ER positive at 5% and MN negative. 8- rodding of the left femur completed.  Plan for  Radiation  9- Systemic therapy, plan Arimidex plus Ibrance  10 - XRT completed 07/2020  11-current treatment starting 7/30/2020, monthly Xgeva, oral Ibrance and Arimidex  12-2/2021, progression of disease, plan to switch to Faslodex plus CDK inhibitor abemaciclib, continue Xgeva  13- 8/2021 liver progression, plan SBRT to liver lesion   14-further progression, NGS testing and started Xeloda, 11/2021     BRIEF CASE HISTORY:   Ari Da Silva is a very pleasant 45 y.o. who felt a mass in the medial aspect of her left breast, around the 9:00 position. Patient sought medical attention and mammogram and ultrasound were done. Showing an irregular, high-density mass with indistinct margins containing pleomorphic calcifications in the lower inner quadrant of the left breast close to 9:00 location this mass measures are mammogram 2.7 x 2.6 x 2 cm. Subsequently targeted ultrasound was performed showing hypoechoic, irregular, taller than wider, solid mass at 9:00 with posterior acoustic shadowing. The calcifications were visible on ultrasound . On ultrasound the solid mass measured 2.4 x 2.6 x 1 cm. Image guided biopsy of the mass was done and showed invasive ductal carcinoma with surrounding DCIS (cribriform type) the tumor was ER and LA positive and HER-2/eve negative with a fish ratio 1.1. The patient was referred to us and she also was seen by radiation oncology , She  decided on proceeding with total mastectomy and breast reconstruction . Allergy showed 2.5 cm intermediate grade invasive ductal carcinoma, [place] lymph node was negative. The tumor was ER/LA positive and HER-2/eve negative. We discussed options of adjuvant therapy including chemotherapy and hormone therapy, The patient decided to proceed with adjuvant chemotherapy without undergone a Oncotype study. TC chemotherapy X4 cycles is planned  After 3 cycles, and presented with persistent GI bleeding leading to symptomatic anemia and syncope, the patient's condition was Serious enough to warrant repeated hospital admissions. Repeated GI workup including endoscopic evaluation and Tagged Red cell scans were negative. We decided to treat her conservatively and chemotherapy was stopped after 3 cycles. After that All her symptoms improved and she was started on tamoxifen. After one year, she was transitioned to anastrozole since she had no periods and hormonal testing showed post menopausal state.    08/2017  She had been having abdominal pain and went to [facility] [place] for consult. She had work up which showed mass in the colon showed subtle changes in the mid-small intestine, CT enterography showed 3.6CM mass in the distal small bowel. She was also hospitalized in the interim with pancreatitis. 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 Margins were negative. Observation was decided, no need for adjuvant therapy  In May/2020, the patient presented with severe back pain. MRI showed multiple bone lesions with evidence of compression fracture. Patient underwent successful kyphoplasty and biopsy and pathology showed breast cancer, it was 5% ER positive and CA was negative. HER-2 was +2 which is equivocal so a fish test was ordered. Plan for radiation, staging PET, and Arimidex. PET scan showed widespread metastatic bony disease including the thoracic spine, the lumbar spine, the left femur and the rib area. She underwent repeated kyphoplasty and rodding of the left femur. She is undergoing radiation to the painful bony areas in thoracic, rib and lumbar areas as well as the left femur. We will likely use Ibrance plus Arimidex. As well as monthly Xgeva to be started after completion of radiation. After completion of radiation, we maintained her on Arimidex plus Ibrance, due to cytopenias, Ibrance dose was decreased to 100 mg/day which was much better tolerated. Also we maintained her on Xgeva monthly. PET scan was done in October and showed essentially stable disease. There was some suspicious activity in the hip but this was asymptomatic and we decided that this might be post radiation or related to physical therapy. We decided to continue current therapy until clear progression. Further testing in early 2021 showed clear progression, will plan to switch to Faslodex and continue with CDk inhibitor(switch to [employer])  and Xgeva.   Further testing showed essentially stable bone metastases but new liver lesion that was discordant. We decided to offer SBRT to the liver lesion we will continue with systemic therapy. Unfortunately she continued to progress, we sent her to [facility] who recommended bx of liver lesion for NGS testing and consider Xeloda in absence of targetable mutation. INTERIM HISTORY: She comes in today for follow up for metastatic breast cancer . When I saw her last time, she was having some pain in her right lower extremity. MRI was done and showed chronic compression fractures of L1 5 and as well as T12 but no nerve compression. It seemed that patient neurological symptoms worsened significantly since then where she is having now numbness and decreased sensation both lower extremity up to the below umbilicus level. She is having sphincter issues with the urine and stool. And she is having significant pain. Patient went to emergency room where a CT scan was done. The patient was discharged and coming here for an appointment. She reports the neuropathy in both legs up to the waist that has worsened in the interim making walking difficult, she also has no feeling in vaginal and anal area making continence difficult, she was told in ER this is part of her cancer and was discharged. GYNECOLOGICAL HISTORY  Menarche at age 14. He is premenopausal with regular periods. +2. She used birth control minimally. PAST MEDICAL HISTORY: has a past medical history of Anemia, Anxiety, Atrial fibrillation ([address] Breast cancer ([address] Carpal tunnel syndrome, Depression, GI bleed, History of blood transfusion, Hx of blood clots, Hypertension, Liver metastases ([address] Lymphedema, Neurologic cardiac syncope, Obesity, Pain, Sleep apnea, ANA PAULA (stress urinary incontinence, female), Tachycardia, Varicella, and Varicella without complication. PAST SURGICAL HISTORY: has a past surgical history that includes Shoulder arthroscopy (); Colonoscopy ();  Dilation and curettage of uterus; Knee arthroscopy; fracture surgery; Tunneled venous port placement (Right); Upper gastrointestinal endoscopy (7/6/2013); Colonoscopy (7/6/2013); Mastectomy (Left, 4/8/13); other surgical history (1969); Breast biopsy (Left, 3/21/13); ileoscopy (10/12/05); Hysterectomy; denia and bso (cervix removed) (4/23/14); Enterocele repair (4/23/14); Cystoscopy (4/23/14); bladder suspension (4/23/14); Colonoscopy (10/12/2005); Upper gastrointestinal endoscopy (04/23/2015); tumor removal; Cosmetic surgery; Cholecystectomy; orif femur decompression (Left); Fixation Kyphoplasty; back surgery; and US BIOPSY LIVER PERCUTANEOUS (11/4/2021). CURRENT MEDICATIONS:  has a current medication list which includes the following prescription(s): polyethylene glycol, gabapentin, eliquis, klor-con m20, duloxetine, docusate sodium, ondansetron, capecitabine, ferrous sulfate, calcium-vitamin d, losartan, omeprazole, amlodipine, metoprolol tartrate, and hydrochlorothiazide, and the following Facility-Administered Medications: sodium chloride and potassium chloride. ALLERGIES:  is allergic to adhesive tape. FAMILY HISTORY: Negative for any hematological or oncological conditions. Specifically no history of breast cancer in the family    SOCIAL HISTORY:  reports that she quit smoking about 2011. Her smoking use included Cigarettes. She smoked About 20 years. She has never used smokeless tobacco. She reports that she does not drink alcohol or use illicit drugs. REVIEW OF SYSTEMS:   General: No fever or night sweats. Weight is stable. Eyes: No double or blurred vision. Ears: No tinnitus or hearing problem,    Throat: No dysphagia or sore throat   Respiratory: No chest pain, no hemoptysis. +exertional dyspnea   Cardiovascular: Denies chest pain, PND or orthopnea, or palpitations.  +LE edema improved +hypotension with dizziness    Gastrointestinal: +nausea and vomiting as noted above, mild constipation  Genitourinary: Denies dysuria, hematuria, frequency, urgency or incontinence. No vaginal bleeding. Neurological: Denies decreased LOC, no sensory or motor focal deficits. +headaches - unchanged, mild +neuropathic pain worsened affecting walking, urination and defecation   Musculoskeletal: No arthralgia or joint swelling. +back pain, right chest wall pain, and right shoulder blade pain, right hip pain  Skin: There are no rashes or bleeding. +hair thinning  Psychiatric: ++ anxiety, depression as discussed. Endocrine: No diabetes or thyroid disease. Hematologic: No bleeding, no adenopathy. PHYSICAL EXAM:  The patient is not in acute distress. Vital signs: Blood pressure 128/86, pulse 75, temperature 97.1 °F (36.2 °C), resp. rate 18, weight 278 lb 14.4 oz (126.5 kg), last menstrual period 03/17/2014, SpO2 98 %, not currently breastfeeding. HEENT:  Eyes are normal. Ears, nose is congested, no bleeding. Neck: Supple. No lymph node enlargement. No thyroid enlargement. Trachea is centrally located. Chest:  Clear to auscultation. No wheezes or crepitations. Breast:  Right: No masses, no adenopathy. No skin or nippple abnormalities. Left: left-sided mastectomy, surgery site is healed completely, no adenopathy  Heart: Regular sinus rhythm. Abdomen: Soft, nontender. No hepatosplenomegaly. No masses. Extremities:  Mild edema right side. No LE edema. Lymph Nodes:  No cervical, axillary or inguinal lymph node enlargement. Neurologic: Conscious and oriented. Clear decreased sensation just below the umbilicus. Difficulty with walking. Psychosocial: No depression, anxiety or stress.  Skin: No rashes, bruises or ecchymoses   Lateral aspect of shin and goes to foot, pain around L3       REVIEW OF LABORATORY DATA:     Lab Results   Component Value Date    WBC 3.7 04/11/2022    HGB 11.1 (L) 04/11/2022    HCT 32.7 (L) 04/11/2022    .7 (H) 04/11/2022     04/11/2022     Lab Results   Component Value Date    IRON 30 (L) 11/29/2017 TIBC 403 11/29/2017    FERRITIN 33 04/04/2019     Lab Results   Component Value Date    NEUTROABS 2.58 04/11/2022         Chemistry        Component Value Date/Time     04/11/2022 1248    K 4.3 04/11/2022 1248     04/11/2022 1248    CO2 23 04/11/2022 1248    BUN 14 04/11/2022 1248    CREATININE 0.66 04/11/2022 1248        Component Value Date/Time    CALCIUM 9.4 04/11/2022 1248    ALKPHOS 138 (H) 04/11/2022 1248    AST 60 (H) 04/11/2022 1248    ALT 41 (H) 04/11/2022 1248    BILITOT 0.45 04/11/2022 1248           REVIEW OF RADIOLOGICAL RESULTS:  MRI LUMBAR SPINE W WO CONTRAST: 3/24/2022    Diffuse osseous metastatic disease with compression deformities at T12 and L5 both of which appear chronic. Diffuse lumbar spine degenerative changes with lateral recess and foraminal stenosis as detailed above at L2-3, L4-5 and L5-S1. IMPRESSION:   1- Arlene  Has  T2, N0, M0 ER/CA positive and HER-2/eve negative invasive ductal carcinoma of the left breast  2- Chemotherapy: received 3 cycles of TC, stopped due to GI bleeding  3- GI bleeding , related to the GIST tumor. Currently on oral iron and hemoglobin is improving  4- on anastrozole. We will continue, plan 5 years of therapy, completed 08/2018  5- for hot flashes, better on Effexor. 6- Recent pancreatitis, possibly biliary. MRI CT scan did not show any gallbladder stone. We will discuss with her surgeons. 7-   distal small bowel mass measuring 3.6 cm, resection shows low risk GIST. No need for adjuvant therapy, margins were negative. 8-bone metastases with compression fracture diagnosed in May/2020. Status post kyphoplasty and biopsy. 9- S/P radiation 07/2020  10- for systemic therapy, she was started on Arimidex, adding Ibrance.  Due to cytopenias, dose was reduced to 100 mg/day  11-in February/2021, there was signs of progression, will plan to switch to Faslodex   12-June/2021, good response to treatment but discordant growth in one of the liver lesion, plan SBRT to the growing lesion and continue with systemic therapy  13- further progression, systemic therapy with xeloda and NGS testing. PLAN:   1. Her blood pressure log was reviewed, in range. 2. We reviewed her recent MRI of the lumbar spine and worsening symptoms  3. I am very concerned about the finding. The patient needs urgent evaluation by neurosurgery. I offered to send her to [facility] for neurosurgical evaluation but the patient prefers to go to [facility]. The patient is established with neurosurgeon at [facility] and we will try to see if we are able to evaluate her there today or tomorrow. .   4. I am refilling her Ativan and Oxycodone. 5. We will hold Xgeva today. 6. Return after surgery. Scribe Attestation   This note was created by Vinnie Johansen acting as scribe for the physician signing this note  Electronically Signed  Vinnie Johansen, 4/12/2022  Scribe, [employer]. Attending Attestation   Note was reviewed and edited.   I am in agreement with the note as entered    Kayla Anderson MD  Hematologist/Medical [id number] [facility] hematology oncology physicians

## 2022-04-12 NOTE — TELEPHONE ENCOUNTER
Hold xgeva rv TBD    tx held per instructions    RV TBD after surgery    PT was given AVS and an appt schedule    Electronically signed by Silver Abarca on 4/12/2022 at 3:49 PM

## 2022-04-21 NOTE — PATIENT INSTRUCTIONS
Please see orders for new chemotherapy, return in 2 weeks with chemotherapy and labs.   If the chemotherapy needs peer to peer, please let me know as soon as possible

## 2022-04-21 NOTE — PROGRESS NOTES
Chief Complaint   Patient presents with    Follow-up     review status of disease    Other     Discuss visit from [address] [place] [address]:   1- T2, N0, M0 ER positive MD positive and HER-2/eve negative invasive ductal carcinoma of the left breast ( inner upper quadrant)  2- Repeated GI bleeding greetings 2 symptomatic anemia, despite extensive GI workup, source of bleeding was never found. 3- finally a small mass was seen in the jejunum. 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 Margins negative   4- compression fractures and bone lesions. Likely metastatic cancer (5/2020)   5- biopsy proven liver metastasis. 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 Chemotherapy stopped after 3 cycles because of ongoing GI bleeding and symptomatic anemia  3- Conservative management for GI bleeding. Leading completely stopped after the discontinuation of chemotherapy  4- started tamoxifen after chemotherapy was completed. 8/2013. 5- transitioned to Arimidex 6/2014, completed 08/2018  6- GIST resection, resected 9/2017   7-status post kyphoplasty and biopsy of bone metastases 5/2020. The tumor was minimally ER positive at 5% and MD negative. 8- rodding of the left femur completed.  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 who felt a mass in the medial aspect of her left breast, around the 9:00 position. Patient sought medical attention and mammogram and ultrasound were done. Showing an irregular, high-density mass with indistinct margins containing pleomorphic calcifications in the lower inner quadrant of the left breast close to 9:00 location this mass measures are mammogram 2.7 x 2.6 x 2 cm. Subsequently targeted ultrasound was performed showing hypoechoic, irregular, taller than wider, solid mass at 9:00 with posterior acoustic shadowing. The calcifications were visible on ultrasound . On ultrasound the solid mass measured 2.4 x 2.6 x 1 cm. Image guided biopsy of the mass was done and showed invasive ductal carcinoma with surrounding DCIS (cribriform type) the tumor was ER and KS positive and HER-2/eve negative with a fish ratio 1.1. The patient was referred to us and she also was seen by radiation oncology , She  decided on proceeding with total mastectomy and breast reconstruction . Allergy showed 2.5 cm intermediate grade invasive ductal carcinoma, [place] lymph node was negative. The tumor was ER/KS positive and HER-2/eve negative. We discussed options of adjuvant therapy including chemotherapy and hormone therapy, The patient decided to proceed with adjuvant chemotherapy without undergone a Oncotype study. TC chemotherapy X4 cycles is planned  After 3 cycles, and presented with persistent GI bleeding leading to symptomatic anemia and syncope, the patient's condition was Serious enough to warrant repeated hospital admissions. Repeated GI workup including endoscopic evaluation and Tagged Red cell scans were negative. We decided to treat her conservatively and chemotherapy was stopped after 3 cycles. After that All her symptoms improved and she was started on tamoxifen.    After one year, she was transitioned to anastrozole since she had no periods and hormonal testing showed post menopausal state. 08/2017  She had been having abdominal pain and went to [facility] for consult. She had work up which showed mass in the colon showed subtle changes in the mid-small intestine, CT enterography showed 3.6CM mass in the distal small bowel. She was also hospitalized in the interim with pancreatitis. 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 Margins were negative. Observation was decided, no need for adjuvant therapy  In May/2020, the patient presented with severe back pain. MRI showed multiple bone lesions with evidence of compression fracture. Patient underwent successful kyphoplasty and biopsy and pathology showed breast cancer, it was 5% ER positive and TN was negative. HER-2 was +2 which is equivocal so a fish test was ordered. Plan for radiation, staging PET, and Arimidex. PET scan showed widespread metastatic bony disease including the thoracic spine, the lumbar spine, the left femur and the rib area. She underwent repeated kyphoplasty and rodding of the left femur. She is undergoing radiation to the painful bony areas in thoracic, rib and lumbar areas as well as the left femur. We will likely use Ibrance plus Arimidex. As well as monthly Xgeva to be started after completion of radiation. After completion of radiation, we maintained her on Arimidex plus Ibrance, due to cytopenias, Ibrance dose was decreased to 100 mg/day which was much better tolerated. Also we maintained her on Xgeva monthly. PET scan was done in October and showed essentially stable disease. There was some suspicious activity in the hip but this was asymptomatic and we decided that this might be post radiation or related to physical therapy. We decided to continue current therapy until clear progression.   Further testing in early  showed clear progression, will plan to switch to Faslodex and continue with CDk inhibitor(switch to [employer])  and Enma Jacobs. Further testing showed essentially stable bone metastases but new liver lesion that was discordant. We decided to offer SBRT to the liver lesion we will continue with systemic therapy. Unfortunately she continued to progress, we sent her to [facility] who recommended bx of liver lesion for NGS testing and consider Xeloda in absence of targetable mutation. In 2022 she presented with severe numbness in lower extremeties with some incontinence, she followed up with [facility] and work up revealed extensive neurological metastases, she underwent radiation to the brain as well as lumbosacral spine the patient was seen by medical oncology there who recommended anti-HER2 therapy with Enhertu, the patient wished to proceed. INTERIM HISTORY: She comes in today for follow up for metastatic breast cancer and to discuss further recommendations. She followed up with [facility] and work up was done. She reports her walking is somewhat improved in the interim and she is able to control her urine and stool, she is taking Gabapentin to manage. She has completed radiation. GYNECOLOGICAL HISTORY  Menarche at age 14. He is premenopausal with regular periods. +2. She used birth control minimally. PAST MEDICAL HISTORY: has a past medical history of Anemia, Anxiety, Atrial fibrillation ([address] Breast cancer ([address] Carpal tunnel syndrome, Depression, GI bleed, History of blood transfusion, Hx of blood clots, Hypertension, Liver metastases ([address] Lymphedema, Neurologic cardiac syncope, Obesity, Pain, Sleep apnea, ANA PAULA (stress urinary incontinence, female), Tachycardia, Varicella, and Varicella without complication. PAST SURGICAL HISTORY: has a past surgical history that includes Shoulder arthroscopy (); Colonoscopy ();  Dilation and curettage of uterus; Knee arthroscopy; fracture surgery; Tunneled venous port placement (Right); Upper gastrointestinal endoscopy (7/6/2013); Colonoscopy (7/6/2013); Mastectomy (Left, 4/8/13); other surgical history (1969); Breast biopsy (Left, 3/21/13); ileoscopy (10/12/05); Hysterectomy; denia and bso (cervix removed) (4/23/14); Enterocele repair (4/23/14); Cystoscopy (4/23/14); bladder suspension (4/23/14); Colonoscopy (10/12/2005); Upper gastrointestinal endoscopy (04/23/2015); tumor removal; Cosmetic surgery; Cholecystectomy; orif femur decompression (Left); Fixation Kyphoplasty; back surgery; and US BIOPSY LIVER PERCUTANEOUS (11/4/2021). CURRENT MEDICATIONS:  has a current medication list which includes the following prescription(s): polyethylene glycol, gabapentin, oxycodone, lorazepam, eliquis, klor-con m20, duloxetine, docusate sodium, ondansetron, capecitabine, ferrous sulfate, calcium-vitamin d, losartan, omeprazole, hydrochlorothiazide, amlodipine, and metoprolol tartrate, and the following Facility-Administered Medications: sodium chloride and potassium chloride. ALLERGIES:  is allergic to adhesive tape. FAMILY HISTORY: Negative for any hematological or oncological conditions. Specifically no history of breast cancer in the family    SOCIAL HISTORY:  reports that she quit smoking about 2011. Her smoking use included Cigarettes. She smoked About 20 years. She has never used smokeless tobacco. She reports that she does not drink alcohol or use illicit drugs. REVIEW OF SYSTEMS:   General: No fever or night sweats. Weight is stable. Eyes: No double or blurred vision. Ears: No tinnitus or hearing problem,    Throat: No dysphagia or sore throat   Respiratory: No chest pain, no hemoptysis. +exertional dyspnea   Cardiovascular: Denies chest pain, PND or orthopnea, or palpitations.  +LE edema improved +hypotension with dizziness    Gastrointestinal: +nausea and vomiting as noted above, mild constipation  Genitourinary: Denies dysuria, hematuria, frequency, urgency or incontinence. No vaginal bleeding. Neurological: Denies decreased LOC, no sensory or motor focal deficits. +headaches - unchanged, mild +neuropathic pain affecting walking, urination and defecation - improved with Gabapentin   Musculoskeletal: No arthralgia or joint swelling. +back pain, right chest wall pain, and right shoulder blade pain, right hip pain  Skin: There are no rashes or bleeding. +hair thinning  Psychiatric: ++ anxiety, depression as discussed. Endocrine: No diabetes or thyroid disease. Hematologic: No bleeding, no adenopathy. PHYSICAL EXAM:  The patient is not in acute distress. Vital signs: Blood pressure 120/79, pulse 73, temperature 96.6 °F (35.9 °C), temperature source Temporal, weight 275 lb (124.7 kg), last menstrual period 03/17/2014, not currently breastfeeding. HEENT:  Eyes are normal. Ears, nose is congested, no bleeding. Neck: Supple. No lymph node enlargement. No thyroid enlargement. Trachea is centrally located. Chest:  Clear to auscultation. No wheezes or crepitations. Breast:  Right: No masses, no adenopathy. No skin or nippple abnormalities. Left: left-sided mastectomy, surgery site is healed completely, no adenopathy  Heart: Regular sinus rhythm. Abdomen: Soft, nontender. No hepatosplenomegaly. No masses. Extremities:  Mild edema right side. No LE edema. Lymph Nodes:  No cervical, axillary or inguinal lymph node enlargement. Neurologic: Conscious and oriented. Clear decreased sensation just below the umbilicus. Difficulty with walking. Incontinent psychosocial: No depression, anxiety or stress.  Skin: No rashes, bruises or ecchymoses   Lateral aspect of shin and goes to foot, pain around L3       REVIEW OF LABORATORY DATA:     Lab Results   Component Value Date    WBC 3.7 04/11/2022    HGB 11.1 (L) 04/11/2022    HCT 32.7 (L) 04/11/2022    .7 (H) 04/11/2022     04/11/2022     Lab Results   Component Value Date    IRON 30 (L) 11/29/2017    TIBC 403 11/29/2017    FERRITIN 33 04/04/2019     Lab Results   Component Value Date    NEUTROABS 2.58 04/11/2022         Chemistry        Component Value Date/Time     04/11/2022 1248    K 4.3 04/11/2022 1248     04/11/2022 1248    CO2 23 04/11/2022 1248    BUN 14 04/11/2022 1248    CREATININE 0.66 04/11/2022 1248        Component Value Date/Time    CALCIUM 9.4 04/11/2022 1248    ALKPHOS 138 (H) 04/11/2022 1248    AST 60 (H) 04/11/2022 1248    ALT 41 (H) 04/11/2022 1248    BILITOT 0.45 04/11/2022 1248           REVIEW OF RADIOLOGICAL RESULTS:  MRI LUMBAR SPINE W WO CONTRAST: 3/24/2022    Diffuse osseous metastatic disease with compression deformities at T12 and L5 both of which appear chronic. Diffuse lumbar spine degenerative changes with lateral recess and foraminal stenosis as detailed above at L2-3, L4-5 and L5-S1. IMPRESSION:   1- Arlene  Has  T2, N0, M0 ER/KS positive and HER-2/eve negative invasive ductal carcinoma of the left breast  2- Chemotherapy: received 3 cycles of TC, stopped due to GI bleeding  3- GI bleeding , related to the GIST tumor. Currently on oral iron and hemoglobin is improving  4- on anastrozole. We will continue, plan 5 years of therapy, completed 08/2018  5- for hot flashes, better on Effexor. 6- Recent pancreatitis, possibly biliary. MRI CT scan did not show any gallbladder stone. We will discuss with her surgeons. 7-   distal small bowel mass measuring 3.6 cm, resection shows low risk GIST. No need for adjuvant therapy, margins were negative. 8-bone metastases with compression fracture diagnosed in May/2020. Status post kyphoplasty and biopsy. 9- S/P radiation 07/2020  10- for systemic therapy, she was started on Arimidex, adding Ibrance.  Due to cytopenias, dose was reduced to 100 mg/day  11-in February/2021, there was signs of progression, will plan to switch to Faslodex   12-June/2021, good response to treatment but discordant growth in one of the liver lesion, plan SBRT to the growing lesion and continue with systemic therapy  13- further progression, systemic therapy with xeloda and NGS testing. 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 We had lengthy discussion regarding findings at [facility] which showed extensive neurological metastases, we reviewed her recent treatment history which has consistently refractory disease and their recommendation for anti-HER2 therapy. With Enhertu  2. I discussed prognostic data and possible efficacy of treatment, we had discussion regarding quality of life and expectations. 3. The patient is clear about continuing with therapy, I am putting in orders and writing for PET. 4. Her pain is currently well controlled with Gabapentin. 5. Return to commence with treatment. Scribe Attestation   This note was created by Brittni José acting as scribe for the physician signing this note  Electronically Signed  Kya De La Rosa, 4/21/2022  Scribe, [employer]. Attending Attestation   Note was reviewed and edited.   I am in agreement with the note as entered    NEGIN YOUSIF [facility] MD Justin  Hematologist/Medical [id number] [facility] hematology oncology physicians

## 2022-04-25 NOTE — TELEPHONE ENCOUNTER
Writer spoke with Dr Nancy Segura to clarify if he would like a PET completed. order obtained. Writer notified, Ella Stallings, [address] [facility] , to get PET scheduled prior to her next f/u with Dr Nancy Segura.      Will cont to follow

## 2022-04-27 NOTE — DISCHARGE SUMMARY
Principal Problem:    Altered mental status  Active Problems:    Malignant neoplasm of upper-inner quadrant of female breast ([address]    Personal history of breast cancer    Obesity    Essential hypertension    Bone metastasis (HCC)    Vertigo    Stroke-like symptoms  Resolved Problems:    * No resolved hospital problems. *     Admitted for AMS, dizziness. Found to have pneumonia on CT. Treated with IV abx. MRI showed:    1. There is no evidence of an acute infarct. 2. A small focus of T2 FLAIR hyperintensity is seen within the left dorsal   pontomedullary junction with associated punctate area of enhancement. Unclear whether this represents a subacute infarct or perhaps a small   enhancing lesion with surrounding edema.  Suggest a short-term follow-up   examination in 4-8 weeks. 3. Otherwise, no acute intracranial abnormality. 4. Minimal chronic microvascular ischemic changes. Pt. Was maintained on A/C. Will be seeing [facility] soon. Small infarct vs. Met. On MRI. Will need surveillance imaging. D/C to home. meds below.        Medication List      CONTINUE taking these medications    amLODIPine 5 MG tablet  Commonly known as: NORVASC     CALCIUM-VITAMIN D PO     capecitabine 500 MG chemo tablet  Commonly known as: XELODA  Take 4 tablets twice daily 7 days on and 7 days off     docusate sodium 100 MG capsule  Commonly known as: COLACE     DULoxetine 60 MG extended release capsule  Commonly known as: CYMBALTA  TAKE 1 CAPSULE BY MOUTH EVERY DAY     ferrous sulfate 325 (65 Fe) MG tablet  Commonly known as: IRON 325     hydroCHLOROthiazide 12.5 MG tablet  Commonly known as: HYDRODIURIL     Klor-Con M20 20 MEQ extended release tablet  Generic drug: potassium chloride  TAKE 1 TABLET BY MOUTH EVERY DAY     losartan 100 MG tablet  Commonly known as: COZAAR     metoprolol tartrate 25 MG tablet  Commonly known as: LOPRESSOR  TAKE ONE TABLET BY MOUTH TWICE A DAY     omeprazole 20 MG delayed release capsule  Commonly known as: PRILOSEC     ondansetron 4 MG disintegrating tablet  Commonly known as: ZOFRAN-ODT  Place 1 tablet under the tongue every 8 hours as needed for Nausea or Vomiting        STOP taking these medications    Eliquis 5 MG Tabs tablet  Generic drug: apixaban        ASK your doctor about these medications    azithromycin 250 MG tablet  Commonly known as: Zithromax  2 tablets now then 1 daily until gone. Ask about: Should I take this medication?            Where to Get Your Medications      You can get these medications from any pharmacy    Bring a paper prescription for each of these medications  · azithromycin 250 MG tablet

## 2022-05-03 NOTE — ED TRIAGE NOTES
Patient was brought to the ER for evaluation of back and knee pain after a fall at home. Patient stated that she lost her balance and fell down at home on to her back. Patient's daughter was present and helped lower her to the ground. Patient did fall again when going to the car when her knees gave out. Patient was triaged to room 7, assessed, and updated on the plan of care.

## 2022-05-03 NOTE — ED PROVIDER NOTES
[id number] [facility] ED  [address] [address] [facility] [address] [facility] [address]  Phone: [phone]  Fax: [phone]      Attending Physician Attestation    I performed a history and physical examination of the patient and discussed management with the mid level provider. I reviewed the mid level provider's note and agree with the documented findings and plan of care. Any areas of disagreement are noted on the chart. I was personally present for the key portions of any procedures. I have documented in the chart those procedures where I was not present during the key portions. I have reviewed the emergency nurses triage note. I agree with the chief complaint, past medical history, past surgical history, allergies, medications, social and family history as documented unless otherwise noted below. Documentation of the HPI, Physical Exam and Medical Decision Making performed by mid level providers is based on my personal performance of the HPI, PE and MDM. For Physician Assistant/ Nurse Practitioner cases/documentation I have personally evaluated this patient and have completed at least one if not all key elements of the E/M (history, physical exam, and MDM). Additional findings are as noted. CHIEF COMPLAINT       Chief Complaint   Patient presents with    Solange Westfall this AM. Back, left arm and leg pain, both knees         HISTORY OF PRESENT ILLNESS    Maia Devlin is a 45 y.o. female who presents after fall from standing. PAST MEDICAL HISTORY    has a past medical history of Anemia, Anxiety, Atrial fibrillation ([address] Breast cancer ([address] Carpal tunnel syndrome, Depression, GI bleed, History of blood transfusion, Hx of blood clots, Hypertension, Liver metastases ([address] Lymphedema, Neurologic cardiac syncope, Obesity, Pain, Sleep apnea, ANA PAULA (stress urinary incontinence, female), Tachycardia, Varicella, and Varicella without complication.     SURGICAL HISTORY      has a past surgical history that includes Shoulder arthroscopy (2008); Colonoscopy (2005); Dilation and curettage of uterus; Knee arthroscopy; fracture surgery; Tunneled venous port placement (Right); Upper gastrointestinal endoscopy (7/6/2013); Colonoscopy (7/6/2013); Mastectomy (Left, 4/8/13); other surgical history (1969); Breast biopsy (Left, 3/21/13); ileoscopy (10/12/05); Hysterectomy; denia and bso (cervix removed) (4/23/14); Enterocele repair (4/23/14); Cystoscopy (4/23/14); bladder suspension (4/23/14); Colonoscopy (10/12/2005); Upper gastrointestinal endoscopy (04/23/2015); tumor removal; Cosmetic surgery; Cholecystectomy; orif femur decompression (Left); Fixation Kyphoplasty; back surgery; and US BIOPSY LIVER PERCUTANEOUS (11/4/2021). CURRENT MEDICATIONS       Previous Medications    AMLODIPINE (NORVASC) 5 MG TABLET    Take 5 mg by mouth daily     CALCIUM-VITAMIN D PO    Take by mouth daily    CAPECITABINE (XELODA) 500 MG CHEMO TABLET    Take 4 tablets twice daily 7 days on and 7 days off    DOCUSATE SODIUM (COLACE) 100 MG CAPSULE    Take 100 mg by mouth 2 times daily    DULOXETINE (CYMBALTA) 60 MG EXTENDED RELEASE CAPSULE    TAKE 1 CAPSULE BY MOUTH EVERY DAY    ELIQUIS 5 MG TABS TABLET    TAKE 1 TABLET BY MOUTH TWICE A DAY    FERROUS SULFATE (IRON 325) 325 (65 FE) MG TABLET    Take 325 mg by mouth every other day    GABAPENTIN (NEURONTIN) 300 MG CAPSULE    TAKE 1 CAPSULE BY MOUTH IN THE MORNING AND 1 CAPSULE AT NOON AND 1 CAPSULE BEFORE BEDTIME. HYDROCHLOROTHIAZIDE (HYDRODIURIL) 12.5 MG TABLET    Take 12.5 mg by mouth daily     KLOR-CON M20 20 MEQ EXTENDED RELEASE TABLET    TAKE 1 TABLET BY MOUTH EVERY DAY    LORAZEPAM (ATIVAN) 2 MG TABLET    Take 1 tablet by mouth every 8 hours as needed for Anxiety for up to 30 days.     LOSARTAN (COZAAR) 100 MG TABLET    Take 100 mg by mouth daily    METOPROLOL TARTRATE (LOPRESSOR) 25 MG TABLET    TAKE ONE TABLET BY MOUTH TWICE A DAY    OMEPRAZOLE (PRILOSEC) 20 MG DELAYED RELEASE CAPSULE    Take 20 mg by mouth daily    ONDANSETRON (ZOFRAN-ODT) 4 MG DISINTEGRATING TABLET    Place 1 tablet under the tongue every 8 hours as needed for Nausea or Vomiting    OXYCODONE 5 MG CAPSULE    Take 1 capsule by mouth every 6 hours as needed for Pain for up to 30 days. POLYETHYLENE GLYCOL (GLYCOLAX) 17 GM/SCOOP POWDER    Take 17 g by mouth daily       ALLERGIES     is allergic to adhesive tape. FAMILY HISTORY     She indicated that her mother is alive. She indicated that her father is alive. She indicated that both of her brothers are alive. family history includes High Blood Pressure in her brother, brother, father, and mother. SOCIAL HISTORY      reports that she quit smoking about 11 years ago. Her smoking use included cigarettes. She has never used smokeless tobacco. She reports that she does not drink alcohol and does not use drugs. PHYSICAL EXAM     INITIAL VITALS:  height is 5' 9\" (1.753 m) and weight is 122.5 kg (270 lb). Her oral temperature is 97.6 °F (36.4 °C). Her blood pressure is 96/62 and her pulse is 62. Her respiration is 14 and oxygen saturation is 97%. DIAGNOSTIC RESULTS     EKG: All EKG's are interpreted by the Emergency Department Physician who either signs or Co-signs this chart in the absence of a cardiologist.        RADIOLOGY:   Non-plain film images such as CT, Ultrasound and MRI are read by the radiologist. Plain radiographic images are visualized and the radiologist interpretations are reviewed as follows:         LABS:  No results found for this visit on 05/03/22.         EMERGENCY DEPARTMENT COURSE:   Vitals:    Vitals:    05/03/22 1316   BP: 96/62   Pulse: 62   Resp: 14   Temp: 97.6 °F (36.4 °C)   TempSrc: Oral   SpO2: 97%   Weight: 122.5 kg (270 lb)   Height: 5' 9\" (1.753 m)     -------------------------  BP: 96/62, Temp: 97.6 °F (36.4 °C), Pulse: 62, Resp: 14      PERTINENT ATTENDING PHYSICIAN COMMENTS:    Patient has a history of metastatic cancer and is in the process of following up with hospice. Had a mechanical fall and was lowered gently to the ground by her daughter. She complains of left knee pain some low back pain but is able to walk and ambulate after the injury. She is on Eliquis for A. fib but she did not hit her head or neck or lose consciousness. She has no pain in her chest or abdomen. She is afebrile nontoxic on exam her left knee is diffusely tender with some bruising ecchymosis deformity and no gross laxity. She has no signs suggest trauma to her head, neck, chest abdomen or pelvis. (Please note that portions of this note were completed with a voice recognition program.  Efforts were made to edit the dictations but occasionally words are mis-transcribed. )    Rogerio Arreola MD,, MD, F.A.C.E.P.   Attending Emergency Medicine Physician        Luiz West MD  05/03/22 2457

## 2022-05-03 NOTE — ED NOTES
Pastoral care contacted and Denzel Lima will come down to ER at approximately [address], RN  05/03/22 4844

## 2022-05-03 NOTE — ED PROVIDER NOTES
[address] [facility] ED  [address] [address] [facility] [address] [facility] [address]  Phone: [phone]  Fax: [phone]        Pt Name: Caty Oshea  MRN: [id number]  [place] 1970  Date of evaluation: 5/3/22    CHIEFCOMPLAINT       Chief Complaint   Patient presents with   [facility] this AM. Back, left arm and leg pain, both knees       HISTORY OF PRESENT ILLNESS (Location/Symptom, Timing/Onset, Context/Setting, Quality, Duration, Modifying Factors, Severity)      Caty Oshea is a 45 y.o. female with no pertinent PMH who presents to the ED via private auto with complaints of a fall this morning. Patient states she is been having frequent falls, last 1 being about 3 weeks ago. She was diagnosed with stage IV breast cancer with mets to the bones and is currently undergoing chemotherapy. Her oncologist is Dr. Yadira Teresa. She lives at home with her daughter. She states that she was walking with her walker through the hallway, when she tripped and was lowered to the ground by her daughter who was standing right next to her. She is complaining of an increase in her lower back pain, the low back pain is not new, but states it has increased in intensity since the incident this morning. Patient also states that her left knee gave out on her on her way to the car and she tripped forward down 2 steps landing on her left knee. She did not hit her head nor lose consciousness either time. Her daughter was right next to her for both occurrences and states that patient did not go crashing to the ground, but was rather lowered to the ground. Her biggest complaint is low back pain as well as left knee pain. She denies any chest pain or shortness of breath. Denies any headache dizziness or lightheadedness. She does take Eliquis for history of A. fib, but states she did not hit her head nor lose consciousness with the falls. She denies any upper extremity pain. Denies any hip pain.   She states she took her oxycodone which she takes regularly to help with her chronic pain due to the cancer, which has improved her symptoms. She denies need for any further pain medication at this time. She denies any new numbness or tingling in her extremities. She denies any syncopal episodes. Her daughter is her caregiver, but states that they did meet with hospice yesterday. Her daughter states that they may be stopping chemotherapy, and if doing so they will be following with hospice or palliative care. PAST MEDICAL / SURGICAL / SOCIAL / FAMILY HISTORY     PMH:  has a past medical history of Anemia, Anxiety, Atrial fibrillation ([address] Breast cancer ([address] Carpal tunnel syndrome, Depression, GI bleed, History of blood transfusion, Hx of blood clots, Hypertension, Liver metastases ([address] Lymphedema, Neurologic cardiac syncope, Obesity, Pain, Sleep apnea, ANA PAULA (stress urinary incontinence, female), Tachycardia, Varicella, and Varicella without complication. Surgical History:  has a past surgical history that includes Shoulder arthroscopy (2008); Colonoscopy (2005); Dilation and curettage of uterus; Knee arthroscopy; fracture surgery; Tunneled venous port placement (Right); Upper gastrointestinal endoscopy (7/6/2013); Colonoscopy (7/6/2013); Mastectomy (Left, 4/8/13); other surgical history (1969); Breast biopsy (Left, 3/21/13); ileoscopy (10/12/05); Hysterectomy; denia and bso (cervix removed) (4/23/14); Enterocele repair (4/23/14); Cystoscopy (4/23/14); bladder suspension (4/23/14); Colonoscopy (10/12/2005); Upper gastrointestinal endoscopy (04/23/2015); tumor removal; Cosmetic surgery; Cholecystectomy; orif femur decompression (Left); Fixation Kyphoplasty; back surgery; and US BIOPSY LIVER PERCUTANEOUS (11/4/2021). Social History:  reports that she quit smoking about 11 years ago. Her smoking use included cigarettes.  She has never used smokeless tobacco. She reports that she does not drink alcohol and does not use drugs. Family History: She indicated that her mother is alive. She indicated that her father is alive. She indicated that both of her brothers are alive. family history includes High Blood Pressure in her brother, brother, father, and mother. Psychiatric History: None    Allergies: Adhesive tape    Home Medications:   Prior to Admission medications    Medication Sig Start Date End Date Taking? Authorizing Provider   ELIQUIS 5 MG TABS tablet TAKE 1 TABLET BY MOUTH TWICE A DAY 4/26/22   Mulu Anderson MD   polyethylene glycol (GLYCOLAX) 17 GM/SCOOP powder Take 17 g by mouth daily 4/8/22   Historical Provider, MD   gabapentin (NEURONTIN) 300 MG capsule TAKE 1 CAPSULE BY MOUTH IN THE MORNING AND 1 CAPSULE AT NOON AND 1 CAPSULE BEFORE BEDTIME. 3/30/22   Historical Provider, MD   oxyCODONE 5 MG capsule Take 1 capsule by mouth every 6 hours as needed for Pain for up to 30 days. 4/12/22 5/12/22  Mulu Anderson MD   LORazepam (ATIVAN) 2 MG tablet Take 1 tablet by mouth every 8 hours as needed for Anxiety for up to 30 days.  4/12/22 5/12/22  Mulu Anderson MD   KLOR-CON M20 20 MEQ extended release tablet TAKE 1 TABLET BY MOUTH EVERY DAY  Patient not taking: Reported on 5/3/2022 3/1/22   Penelope Stover MD   DULoxetine (CYMBALTA) 60 MG extended release capsule TAKE 1 CAPSULE BY MOUTH EVERY DAY 1/24/22   Mulu Anderson MD   docusate sodium (COLACE) 100 MG capsule Take 100 mg by mouth 2 times daily    Historical Provider, MD   ondansetron (ZOFRAN-ODT) 4 MG disintegrating tablet Place 1 tablet under the tongue every 8 hours as needed for Nausea or Vomiting 11/16/21   Brian Anderson MD   capecitabine (XELODA) 500 MG chemo tablet Take 4 tablets twice daily 7 days on and 7 days off  Patient not taking: Reported on 5/3/2022 11/16/21   [facility] Anderson MD   ferrous sulfate (IRON 325) 325 (65 Fe) MG tablet Take 325 mg by mouth every other day    Historical Provider, MD   CALCIUM-VITAMIN D PO Take by mouth daily    Historical Provider, MD   losartan (COZAAR) 100 MG tablet Take 100 mg by mouth daily  Patient not taking: Reported on 5/3/2022    Historical Provider, MD   omeprazole (PRILOSEC) 20 MG delayed release capsule Take 20 mg by mouth daily    Historical Provider, MD   hydrochlorothiazide (HYDRODIURIL) 12.5 MG tablet Take 12.5 mg by mouth daily   Patient not taking: Reported on 5/3/2022    Historical Provider, MD   amLODIPine (NORVASC) 5 MG tablet Take 5 mg by mouth daily   Patient not taking: Reported on 5/3/2022    Historical Provider, MD   metoprolol tartrate (LOPRESSOR) 25 MG tablet TAKE ONE TABLET BY MOUTH TWICE A DAY 9/26/16   Gonzalo Rojas MD       REVIEW OF SYSTEMS  (2-9 systems for level 4, 10 ormore for level 5)      Review of Systems   Constitutional: Negative. HENT: Negative. Eyes: Negative. Respiratory: Negative. Cardiovascular: Negative. Gastrointestinal: Negative. Endocrine: Negative. Genitourinary: Negative. Musculoskeletal: Positive for back pain. Negative for arthralgias, gait problem, joint swelling, myalgias, neck pain and neck stiffness. Back and knee pain following fall   Skin: Negative. Neurological: Negative. Hematological: Negative. Psychiatric/Behavioral: Negative. All other systems negative except as marked. PHYSICAL EXAM  (up to 7 for level 4, 8 or more for level 5)      INITIAL VITALS:  height is 5' 9\" (1.753 m) and weight is 122.5 kg (270 lb). Her oral temperature is 97.6 °F (36.4 °C). Her blood pressure is 96/62 and her pulse is 62. Her respiration is 14 and oxygen saturation is 97%. Vital signs reviewed. Physical Exam  Constitutional:       Appearance: Normal appearance. HENT:      Head: Normocephalic.       Right Ear: Tympanic membrane, ear canal and external ear normal.      Left Ear: Tympanic membrane, ear canal and external ear normal.      Nose: Nose normal.      Mouth/Throat:      Mouth: Mucous membranes are moist.      Pharynx: Oropharynx is clear. Eyes:      Extraocular Movements: Extraocular movements intact. Conjunctiva/sclera: Conjunctivae normal.      Pupils: Pupils are equal, round, and reactive to light. Cardiovascular:      Rate and Rhythm: Normal rate and regular rhythm. Pulses: Normal pulses. Heart sounds: Normal heart sounds. Pulmonary:      Effort: Pulmonary effort is normal.      Breath sounds: Normal breath sounds. Abdominal:      General: Bowel sounds are normal. There is no distension. Palpations: Abdomen is soft. There is no mass. Tenderness: There is no abdominal tenderness. There is no guarding. Musculoskeletal:         General: Tenderness present. No swelling or deformity. Cervical back: Normal range of motion. Right lower leg: Edema present. Left lower leg: Edema present. Comments: Patient does experience some pain in the lumbar region with palpation as well as with movement; patient is experiencing some pain in her left knee, primarily the lateral aspect; no erythema, no deformity, no swelling, no signs of injury noted of the knee or the back; patient does have some bilateral lower extremity edema, this is not new for her   Skin:     General: Skin is warm and dry. Capillary Refill: Capillary refill takes less than 2 seconds. Neurological:      Mental Status: She is alert and oriented to person, place, and time. Psychiatric:         Mood and Affect: Mood normal.         Behavior: Behavior normal.         Thought Content: Thought content normal.         Judgment: Judgment normal.           DIFFERENTIAL DIAGNOSIS / MDM     Upon exam, patient resting in her room with her daughter at bedside. No distress noted at this time. Patient denies any chest pain or shortness of breath. Heart sounds within normal limits upon auscultation. Lung sounds are clear and equal bilaterally. Bowel sounds are present in all 4 quadrants.   No abdominal distention, tenderness, guarding, mass noted. Upper extremity strength equal bilaterally. Lower extremity strength is equal bilaterally. There is no focal spinal tenderness with palpation along the entire spine, patient does experience some bilateral paraspinal lumbar region tenderness with palpation with range of motion. There are no signs of deformity or trauma to the back or the left knee. There is no erythema or bruising noted to the back of the left knee. Patient denies any episodes of incontinence. Denies any trouble with urination or defecation. Denies any numbness or tingling of the extremities. I will order an x-ray of the left knee as well as a lumbar spine. Patient educated to please let us know if she would like something addition for pain control. Patient and her daughter agree with this plan of care. Left knee x-ray showing osteoarthritis. No acute fracture. X-ray lumbar spine showing no acute fracture or listhesis. New, when compared to previous study, anterior wedging superior endplate O65 by about 30 to 40%. Consider MRI follow-up. Mild generative changes in the lumbar spine. Mild bilaterally symmetrical SI joint osteoarthritis. As stated above, patient has no focal spinal tenderness, no focal spinal tenderness over T12, no deformity noted. No thoracic paraspinal tenderness. No cervical spine tenderness, no cervical paraspinal tenderness. Patient does have bilateral lumbar paraspinal tenderness with palpation and with movement. Patient states she has an appointment to follow-up with Dr. Magno Scott tomorrow. Patient states that her bedroom as well as the bathroom is on the first floor, her and her daughter feel comfortable going home at this time.   She is encouraged to continue her home medication, I will give her a dose of her oxycodone while she is here as she states she was due for another dose at 1 PM.  Patient states she does have lidocaine patches at home, and will use those when she gets home help with the pain as well. Patient states she also has muscle relaxers at home that she can take as needed. Educated patient to please follow-up tomorrow with her appointment as scheduled, as well as with her PCP. She is encouraged to please return to the emergency department with any new concerning or worsening symptoms pressure including any numbness or tingling to lower extremities, gait changes, inability to urinate or defecate, episodes of incontinence, chest pain, shortness of breath, further falls, worsening of overall symptoms. Patient and daughter state understanding of education. Patient stable for outpatient follow-up. PLAN (LABS / IMAGING / EKG):  Orders Placed This Encounter   Procedures    XR LUMBAR SPINE (2-3 VIEWS)    XR KNEE LEFT (MIN 4 VIEWS)       MEDICATIONS ORDERED:  Orders Placed This Encounter   Medications    oxyCODONE (ROXICODONE) immediate release tablet 5 mg       Controlled Substances Monitoring:     DIAGNOSTIC RESULTS     EKG: All EKG's are interpreted by the Emergency Department Physician who either signs or Co-signs this chart in the absenceof a cardiologist.      RADIOLOGY: All images are read by the radiologist and their interpretations are reviewed. XR LUMBAR SPINE (2-3 VIEWS)   Final Result   1. No acute lumbar fracture or listhesis. 2. New (compared to prior study) anterior wedging superior endplate C31 by   about 30-40%. Consider MRI characterization versus bone scan if clinically   there is uncertainty of acute versus chronic fracture. 3. Mild degenerative changes lumbar spine. 4. L5 kyphoplasty. 5. Mild, bilaterally symmetrical SI joint osteoarthritis is noted. XR KNEE LEFT (MIN 4 VIEWS)   Final Result   Osteoarthrosis left knee. No acute fracture. LABS:  No results found for this visit on 05/03/22.     EMERGENCY DEPARTMENT COURSE           Vitals:    Vitals:    05/03/22 1316   BP: 96/62   Pulse: 62 Resp: 14   Temp: 97.6 °F (36.4 °C)   TempSrc: Oral   SpO2: 97%   Weight: 122.5 kg (270 lb)   Height: 5' 9\" (1.753 m)     -------------------------  BP: 96/62, Temp: 97.6 °F (36.4 °C), Pulse: 62, Resp: 14      RE-EVALUATION:  See ED Course notes above. CONSULTS:  None    PROCEDURES:  None    FINAL IMPRESSION      1. Fall, initial encounter    2. Strain of lumbar region, initial encounter          DISPOSITION / PLAN     CONDITION ON DISPOSITION:   Good / Stable for discharge. PATIENT REFERRED TO:  Abigail Salas MD  [address] [place] [id number]    Call in 1 day      [facility] ED  [address] [facility]   [address] [place] [address]  [phone]  Go to   If symptoms worsen    Daisy Steen MD  [address] [place] [address]  [phone]    Go on 5/4/2022        DISCHARGE MEDICATIONS:  Current Discharge Medication List          WILMA Kat - NP   Emergency Medicine Nurse Practitioner    (Please note that portions of this note were completed with a voice recognition program.  Efforts were made to edit the dictations but occasionally words aremis-transcribed.)     WILMA Kat NP  05/03/22 [address], APRN - NP  05/03/22 7316

## 2022-05-03 NOTE — FLOWSHEET NOTE
[address] - [address] [facility]  PROGRESS NOTE    Shift date: 5/3/22  Shift day: Tuesday   Shift # 1    Room # ER07/ER07   Name: Elodia Mabry            Age: 45 y.o. Gender: female            Referral: Routine Visit    Admit Date & Time: 5/3/2022  1:11 PM    PATIENT/EVENT DESCRIPTION:  Elodia Mabry is a 45 y.o. female with whom writer visited in the Emergency Department. Writer received referral from Tarun Kiran, [address] [place] [address], who shared that Patient requested a visit with Rohit Desai. RN mentioned that Pt is considering hospice care. SPIRITUAL ASSESSMENT/INTERVENTION:  Patient was lying flat in her bed. Daughter was at bedside. Patient smiled and greeted Rohit Desai. She asked daughter to assist her with sitting up more. Pt grimaced as the bed moved, and she endorsed having pain. Pt shared the reason she was admitted to the hospital. She expressed gratitude for her daughter's help. She appeared fatigued and closed her eyes during the conversation. She was receptive to writer offering a prayer. Patient thanked Rohit Desai. Writer offered supportive presence and inquired about Pt's coping and needs; offered empathy and care; affirmed Pt's strengths; prayed for Pt and Family; gave Pt a copy of \"Our Daily Bread. \"      SPIRITUAL CARE FOLLOW-UP PLAN:  Chaplains will remain available to offer spiritual and emotional support as needed. Writer invited Pt to join the [facility] tomorrow night online if Pt is able. 05/03/22 1517   Encounter Summary   Encounter Overview/Reason  Spiritual/Emotional Needs   Service Provided For: Patient and family together   Referral/Consult From: Nurse;Patient   Support System Children;Family members; Hospice   Last Encounter  12/14/21   Complexity of Encounter Moderate   Begin Time 1445   End Time  1500   Total Time Calculated 15 min   Encounter    Type Follow up   Spiritual/Emotional needs   Type Spiritual Support   Grief, Loss, and Adjustments   Type Adjustment to illness; Anticipatory Grief   Assessment/Intervention/Outcome   Assessment Calm;Sad   Intervention Discussed illness injury and its impact; Discussed meaning/purpose;Explored/Affirmed feelings, thoughts, concerns;Explored Coping Skills/Resources;Guided Imagery, Healing touch, etc.;Prayer (assurance of)/[place];Sustaining Presence/Ministry of presence;Read/Provided Scripture   Outcome Coping;Connection/Belonging;Expressed feelings, needs, and concerns;Expressed Gratitude;Receptive   Plan and Referrals   Plan/Referrals Continue Support (comment)       Electronically signed by Kevin Ruggiero on 5/3/2022 at 3:21 PM.  [phone]  [facility]

## 2022-05-04 NOTE — TELEPHONE ENCOUNTER
Received call from patient stating she had refills she needed and was fairly weak and wanted to know if she could speak with Dr. Krysten Mina through a virtual visit tomorrow instead.     Routing note to Dr. Krysten Mina and MA, pt transferred to triage for refill request    Electronically signed by Uche Gutiérrez on 5/4/2022 at 10:02 AM

## 2022-05-05 NOTE — PROGRESS NOTES
Chief Complaint   Patient presents with    Follow-up       DIAGNOSIS:   1- T2, N0, M0 ER positive DE positive and HER-2/eve negative invasive ductal carcinoma of the left breast ( inner upper quadrant)  2- Repeated GI bleeding greetings 2 symptomatic anemia, despite extensive GI workup, source of bleeding was never found. 3- finally a small mass was seen in the jejunum. 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 Margins negative   4- compression fractures and bone lesions. Likely metastatic cancer (5/2020)   5- biopsy proven liver metastasis. 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 Chemotherapy stopped after 3 cycles because of ongoing GI bleeding and symptomatic anemia  3- Conservative management for GI bleeding. Leading completely stopped after the discontinuation of chemotherapy  4- started tamoxifen after chemotherapy was completed. 8/2013. 5- transitioned to Arimidex 6/2014, completed 08/2018  6- GIST resection, resected 9/2017   7-status post kyphoplasty and biopsy of bone metastases 5/2020. The tumor was minimally ER positive at 5% and DE negative. 8- rodding of the left femur completed.  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 who felt a mass in the medial aspect of her left breast, around the 9:00 position. Patient sought medical attention and mammogram and ultrasound were done. Showing an irregular, high-density mass with indistinct margins containing pleomorphic calcifications in the lower inner quadrant of the left breast close to 9:00 location this mass measures are mammogram 2.7 x 2.6 x 2 cm. Subsequently targeted ultrasound was performed showing hypoechoic, irregular, taller than wider, solid mass at 9:00 with posterior acoustic shadowing. The calcifications were visible on ultrasound . On ultrasound the solid mass measured 2.4 x 2.6 x 1 cm. Image guided biopsy of the mass was done and showed invasive ductal carcinoma with surrounding DCIS (cribriform type) the tumor was ER and FL positive and HER-2/eve negative with a fish ratio 1.1. The patient was referred to us and she also was seen by radiation oncology , She  decided on proceeding with total mastectomy and breast reconstruction . Allergy showed 2.5 cm intermediate grade invasive ductal carcinoma, [place] lymph node was negative. The tumor was ER/FL positive and HER-2/eve negative. We discussed options of adjuvant therapy including chemotherapy and hormone therapy, The patient decided to proceed with adjuvant chemotherapy without undergone a Oncotype study. TC chemotherapy X4 cycles is planned  After 3 cycles, and presented with persistent GI bleeding leading to symptomatic anemia and syncope, the patient's condition was Serious enough to warrant repeated hospital admissions. Repeated GI workup including endoscopic evaluation and Tagged Red cell scans were negative. We decided to treat her conservatively and chemotherapy was stopped after 3 cycles. After that All her symptoms improved and she was started on tamoxifen.    After one year, she was transitioned to anastrozole since she had no periods and hormonal testing showed post menopausal state. 08/2017  She had been having abdominal pain and went to [facility] for consult. She had work up which showed mass in the colon showed subtle changes in the mid-small intestine, CT enterography showed 3.6CM mass in the distal small bowel. She was also hospitalized in the interim with pancreatitis. 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 Margins were negative. Observation was decided, no need for adjuvant therapy  In May/2020, the patient presented with severe back pain. MRI showed multiple bone lesions with evidence of compression fracture. Patient underwent successful kyphoplasty and biopsy and pathology showed breast cancer, it was 5% ER positive and RI was negative. HER-2 was +2 which is equivocal so a fish test was ordered. Plan for radiation, staging PET, and Arimidex. PET scan showed widespread metastatic bony disease including the thoracic spine, the lumbar spine, the left femur and the rib area. She underwent repeated kyphoplasty and rodding of the left femur. She is undergoing radiation to the painful bony areas in thoracic, rib and lumbar areas as well as the left femur. We will likely use Ibrance plus Arimidex. As well as monthly Xgeva to be started after completion of radiation. After completion of radiation, we maintained her on Arimidex plus Ibrance, due to cytopenias, Ibrance dose was decreased to 100 mg/day which was much better tolerated. Also we maintained her on Xgeva monthly. PET scan was done in October and showed essentially stable disease. There was some suspicious activity in the hip but this was asymptomatic and we decided that this might be post radiation or related to physical therapy. We decided to continue current therapy until clear progression.   Further testing in early 2021 showed clear progression, will plan to switch to Faslodex and continue with CDk inhibitor(switch to [employer])  and Xgeva. Further testing showed essentially stable bone metastases but new liver lesion that was discordant. We decided to offer SBRT to the liver lesion we will continue with systemic therapy. Unfortunately she continued to progress, we sent her to [facility] who recommended bx of liver lesion for NGS testing and consider Xeloda in absence of targetable mutation. In 2022 she presented with severe numbness in lower extremeties with some incontinence, she followed up with [facility] and work up revealed extensive neurological metastases, she underwent radiation to the brain as well as lumbosacral spine the patient was seen by medical oncology there who recommended anti-HER2 therapy with Enhertu, the patient wished to proceed. INTERIM HISTORY: This is a virtual visit. Patient pain has been worsening. She is taking 2 pain medication at the same time and that is helping. She is asking if it is worth it to proceed with therapy. Her echocardiogram was normal and her PET scan showed widespread   bone and liver metastases    GYNECOLOGICAL HISTORY  Menarche at age 14. He is premenopausal with regular periods. +2. She used birth control minimally. PAST MEDICAL HISTORY: has a past medical history of Anemia, Anxiety, Atrial fibrillation ([address] Breast cancer ([address] Carpal tunnel syndrome, Depression, GI bleed, History of blood transfusion, Hx of blood clots, Hypertension, Liver metastases ([address] Lymphedema, Neurologic cardiac syncope, Obesity, Pain, Sleep apnea, ANA PAULA (stress urinary incontinence, female), Tachycardia, Varicella, and Varicella without complication. PAST SURGICAL HISTORY: has a past surgical history that includes Shoulder arthroscopy (); Colonoscopy (); Dilation and curettage of uterus; Knee arthroscopy; fracture surgery; Tunneled venous port placement (Right); Upper gastrointestinal endoscopy (2013);  Colonoscopy (7/6/2013); Mastectomy (Left, 4/8/13); other surgical history (1969); Breast biopsy (Left, 3/21/13); ileoscopy (10/12/05); Hysterectomy; denia and bso (cervix removed) (4/23/14); Enterocele repair (4/23/14); Cystoscopy (4/23/14); bladder suspension (4/23/14); Colonoscopy (10/12/2005); Upper gastrointestinal endoscopy (04/23/2015); tumor removal; Cosmetic surgery; Cholecystectomy; orif femur decompression (Left); Fixation Kyphoplasty; back surgery; and US BIOPSY LIVER PERCUTANEOUS (11/4/2021). CURRENT MEDICATIONS:  has a current medication list which includes the following prescription(s): oxycodone, eliquis, polyethylene glycol, gabapentin, lorazepam, klor-con m20, duloxetine, docusate sodium, ondansetron, ferrous sulfate, calcium-vitamin d, omeprazole, metoprolol tartrate, capecitabine, losartan, and hydrochlorothiazide, and the following Facility-Administered Medications: sodium chloride and potassium chloride. ALLERGIES:  is allergic to adhesive tape. FAMILY HISTORY: Negative for any hematological or oncological conditions. Specifically no history of breast cancer in the family    SOCIAL HISTORY:  reports that she quit smoking about 2011. Her smoking use included Cigarettes. She smoked About 20 years. She has never used smokeless tobacco. She reports that she does not drink alcohol or use illicit drugs. REVIEW OF SYSTEMS:   General: No fever or night sweats. Weight is stable. Eyes: No double or blurred vision. Ears: No tinnitus or hearing problem,    Throat: No dysphagia or sore throat   Respiratory: No chest pain, no hemoptysis. +exertional dyspnea   Cardiovascular: Denies chest pain, PND or orthopnea, or palpitations. +LE edema improved +hypotension with dizziness    Gastrointestinal: +nausea and vomiting as noted above, mild constipation  Genitourinary: Denies dysuria, hematuria, frequency, urgency or incontinence. No vaginal bleeding.   Neurological: Denies decreased LOC, no sensory or motor focal deficits. +headaches - unchanged, mild +neuropathic pain affecting walking, urination and defecation - improved with Gabapentin   Musculoskeletal: No arthralgia or joint swelling. +back pain, right chest wall pain, and right shoulder blade pain, right hip pain  Skin: There are no rashes or bleeding. +hair thinning  Psychiatric: ++ anxiety, depression as discussed. Endocrine: No diabetes or thyroid disease. Hematologic: No bleeding, no adenopathy. PHYSICAL EXAM:     PHYSICAL EXAMINATION:    Vital Signs: (As obtained by patient/caregiver or practitioner observation)    Blood pressure-  Heart rate-    Respiratory rate-    Temperature-  Pulse oximetry-     Constitutional: [x] Appears well-developed and well-nourished [x] No apparent distress      [] Abnormal-   Mental status  [x] Alert and awake  [x] Oriented to person/place/time [x]Able to follow commands      Eyes:  EOM    [x]  Normal  [] Abnormal-  Sclera  [x]  Normal  [] Abnormal -         Discharge [x]  None visible  [] Abnormal -    HENT:   [x] Normocephalic, atraumatic.   [] Abnormal   [x] Mouth/Throat: Mucous membranes are moist.     External Ears [x] Normal  [] Abnormal-     Neck: [x] No visualized mass     Pulmonary/Chest: [x] Respiratory effort normal.  [x] No visualized signs of difficulty breathing or respiratory distress        [] Abnormal-      Musculoskeletal:   [x] Normal gait with no signs of ataxia         [x] Normal range of motion of neck        [] Abnormal-       Neurological:        [x] No Facial Asymmetry (Cranial nerve 7 motor function) (limited exam to video visit)          [x] No gaze palsy        [] Abnormal-         Skin:        [x] No significant exanthematous lesions or discoloration noted on facial skin         [] Abnormal-            Psychiatric:       [x] Normal Affect [x] No Hallucinations        [] Abnormal-     Other pertinent observable physical exam findings-                            REVIEW OF LABORATORY DATA: Lab Results   Component Value Date    WBC 3.7 04/11/2022    HGB 11.1 (L) 04/11/2022    HCT 32.7 (L) 04/11/2022    .7 (H) 04/11/2022     04/11/2022     Lab Results   Component Value Date    IRON 30 (L) 11/29/2017    TIBC 403 11/29/2017    FERRITIN 33 04/04/2019     Lab Results   Component Value Date    NEUTROABS 2.58 04/11/2022         Chemistry        Component Value Date/Time     04/11/2022 1248    K 4.3 04/11/2022 1248     04/11/2022 1248    CO2 23 04/11/2022 1248    BUN 14 04/11/2022 1248    CREATININE 0.66 04/11/2022 1248        Component Value Date/Time    CALCIUM 9.4 04/11/2022 1248    ALKPHOS 138 (H) 04/11/2022 1248    AST 60 (H) 04/11/2022 1248    ALT 41 (H) 04/11/2022 1248    BILITOT 0.45 04/11/2022 1248           REVIEW OF RADIOLOGICAL RESULTS:  MRI LUMBAR SPINE W WO CONTRAST: 3/24/2022    Diffuse osseous metastatic disease with compression deformities at T12 and L5 both of which appear chronic. Diffuse lumbar spine degenerative changes with lateral recess and foraminal stenosis as detailed above at L2-3, L4-5 and L5-S1. IMPRESSION:   1- Arlene  Has  T2, N0, M0 ER/SC positive and HER-2/eve negative invasive ductal carcinoma of the left breast  2- Chemotherapy: received 3 cycles of TC, stopped due to GI bleeding  3- GI bleeding , related to the GIST tumor. Currently on oral iron and hemoglobin is improving  4- on anastrozole. We will continue, plan 5 years of therapy, completed 08/2018  5- for hot flashes, better on Effexor. 6- Recent pancreatitis, possibly biliary. MRI CT scan did not show any gallbladder stone. We will discuss with her surgeons. 7-   distal small bowel mass measuring 3.6 cm, resection shows low risk GIST. No need for adjuvant therapy, margins were negative. 8-bone metastases with compression fracture diagnosed in May/2020. Status post kyphoplasty and biopsy.   9- S/P radiation 07/2020  10- for systemic therapy, she was started on Arimidex, adding Ibrance. Due to cytopenias, dose was reduced to 100 mg/day  11-in February/2021, there was signs of progression, will plan to switch to Faslodex   12-June/2021, good response to treatment but discordant growth in one of the liver lesion, plan SBRT to the growing lesion and continue with systemic therapy  13- further progression, systemic therapy with xeloda and NGS testing. 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 We had lengthy discussion regarding findings at [facility] which showed extensive neurological metastases, we reviewed her recent treatment history which has consistently refractory disease and their recommendation for anti-HER2 therapy. With Enhertu  2. PET CT scan reviewed with the patient, widespread metastatic disease  3. I discussed prognostic data and possible efficacy of treatment, we had discussion regarding quality of life and expectations. 4. The patient is clear about continuing with therapy, we plan to start Enhertu as soon as possible  5. Her pain is currently well controlled with Gabapentin. Continue pain medication as discussed  6. Return to commence with treatment. Martina Anderson MD  Hematologist/Medical [address] [place] [address] hematology oncology physicians  Attestation   The patient  being evaluated by a Virtual Visit (video visit) encounter to address concerns as mentioned above. A caregiver was present when appropriate. Due to this being a TeleHealth encounter (During BTBIV-12 public health emergency), evaluation of the following organ systems was limited: Vitals/Constitutional/EENT/Resp/CV/GI//MS/Neuro/Skin/Heme-Lymph-Imm.   Pursuant to the emergency declaration under the [facility]5 waiver authority and the [employer] Act, this Virtual Visit was conducted with patient's (and/or legal guardian's) consent, to reduce the patient's risk of exposure to COVID-19 and provide necessary medical care. The patient (and/or legal guardian) has also been advised to contact this office for worsening conditions or problems, and seek emergency medical treatment and/or call 911 if deemed necessary. Services were provided through a video synchronous discussion virtually to substitute for in-person clinic visit. Patient and provider were located at their individual homes. --Nell Mixon MD on 4/6/2020 at 3:50 PM    An electronic signature was used to authenticate this note.

## 2022-05-06 NOTE — PROGRESS NOTES
Treatment plan reviewed.     122kg    Enhertu 5.4 mg/kg every 3 weeks = 658.8 mg    LVEF 61.1% (5/3/22)

## 2022-05-06 NOTE — TELEPHONE ENCOUNTER
Chemotherapy orders received:    Ht=69 inches  Lw=079 lbs  BSA=2.44  Chemotherapy doses verified:  Enhertu 5.4 mg/kg = 663 mg  Morro Maier RN

## 2022-05-06 NOTE — TELEPHONE ENCOUNTER
Chemotherapy orders received:  Enhertu q 3 wks  Ht=69in El=657.7kg BSA=2.44  Chemotherapy doses verified:  Enhertu 5.4mg/pa=180ra    Rounded per pharmacy//SL

## 2022-05-06 NOTE — TELEPHONE ENCOUNTER
Start Enhertu next week, return in 3 to 4 weeks with cycle 2    Treatment starting 5/9/22    RV scheduled 5/31/22 @ 12pm    PT was given AVS and an appt schedule    Electronically signed by Bubba Cerda on 5/6/2022 at 3:36 PM

## 2022-05-09 NOTE — PROGRESS NOTES
CBC With Auto Differential [[id number]] (Abnormal)    Collected: 05/09/22 1237    Updated: 05/09/22 1307    Specimen Source: Blood     WBC 3.0 Low  k/uL    RBC 3.07 Low  m/uL    Hemoglobin 11.1 Low  g/dL    Hematocrit 32.8 Low  %    .8 High  fL    MCH 36.1 High  pg    MCHC 33.8 g/dL    RDW 19.9 High  %    Platelets 63 Low  k/uL    MPV 8.1 fL    Seg Neutrophils 84 High  %    Lymphocytes 4 Low  %    Monocytes 10 %    Eosinophils % 1 %    Basophils 1 %    Segs Absolute 2.52 k/uL    Absolute Lymph # 0.12 Low  k/uL    Absolute Mono # 0.30 k/uL    Absolute Eos # 0.03 k/uL    Basophils Absolute 0.03 k/uL    Morphology Normal   Dr. Carmina Bender aware of platelets. Due to thrombocytopenia will dose reduce per Dr. Carmina Bender down to 4.4mg/kg for Constance Munroe,   PharmD  5/9/2022 1:43 PM

## 2022-05-09 NOTE — PROGRESS NOTES
Pt here for C1D1 Enhertu and Xgeva. Denies any new complaints. Labs drawn from port and results reviewed. Dr Ingrid Caballero notified of elevated ALT/AST and low plts, ok to treat with dose reduction. Pt given [facility] information on drug and side effects discussed and chemo consent signed. Pt was treated without incident and d/c'd in stable condition. Pt will return on 5-31-22 for MD owens/micky and C2D1.

## 2022-05-09 NOTE — FLOWSHEET NOTE
SPIRITUAL CARE PROGRESS NOTE: Outpatient Oncology Care at [facility]    Spiritual Assessment: Writer visited with Patient in the infusion clinic. Patient shared how she was doing. She processed her thoughts and feelings about being at the end of her treatment options. She talked about her mortality. She was tearful at times when acknowledging her illness and her wishing to have more time with family and friends. She shared that she has been receiving much support from family, friends, former students, colleagues and her pastors. She expressed gratitude and agreed that this is a type of medicine. She spoke of her decision to try this new treatment. She noted that her priority is her quality of life and that she does not want to have certain side effects. She noted the importance of breathing and being in the moment. \"As long as I am breathing, I am okay. \" She reflected on how she has grown and how she is not afraid to be herself. She expressed gratitude for this growth and for her life. She reflected on her beliefs about the end of life, sharing that she believes in an afterlife. She was receptive to prayer and voiced her prayer requests. Intervention: Writer provided supportive presence and active listening; inquired about Pt's coping; explored Pt's sources of support, meaning, and strength; offered empathy and words of encouragement; prayed for Pt. Outcome: Ms. Vita Gonzalez shared how she was doing. She spoke of what matters to her and what she is hoping. She expressed gratitude for the love in her life. She acknowledged that she has learned to receive. She received prayer and thanked writer. Plan: Chaplains will remain available to provide emotional and spiritual support as needed. 05/09/22 3350   Encounter Summary   Encounter Overview/Reason  Spiritual/Emotional Needs   Service Provided For: Patient   Referral/Consult From: [facility] Children;Parent; Family members;Friends/neighbors; [employer]/bibiana community   Last Encounter  05/03/22   Complexity of Encounter High   Begin Time 1430   End Time  1545   Total Time Calculated 75 min   Encounter    Type Follow up   Spiritual/Emotional needs   Type Spiritual Support   Grief, Loss, and Adjustments   Type Adjustment to illness   Assessment/Intervention/Outcome   Assessment Impaired resilience; Powerlessness;Sad;Tearful   Intervention Active listening;Discussed belief system/Quaker practices/bibiana;Discussed death, afterlife; Discussed illness injury and its impact; Discussed relationship with God;Discussed meaning/purpose;Explored/Affirmed feelings, thoughts, concerns;Explored Coping Skills/Resources;Grief Care;Prayer (assurance of)/[place];Sustaining Presence/Ministry of presence   Outcome Connection/Belonging;Coping;Engaged in conversation;Expressed feelings, needs, and concerns;Expressed Gratitude;Grieving   Plan and Referrals   Plan/Referrals Continue Support (comment)     Electronically signed by Trinh Oneal, Oncology Outpatient [place] [address] [place] [address] Radiation Oncology  [phone]  5/9/2022  4:54 PM

## 2022-05-10 NOTE — TELEPHONE ENCOUNTER
Patient states she is having a fever 101.4, she states that she had her first chemo tx yesterday of [facility] and was told to notify Dr General Alex if she has a fever  States she currently has no other symptoms   Will address with Dr Senia Balderas, RN

## 2022-05-10 NOTE — TELEPHONE ENCOUNTER
Discussed with Dr Krystyna Aldridge   Azithromycin 500 mg daily for 5 days  Tylenol PRN   Pt called and notified of the above and will call into her [facility] pharmacy in [place], RN

## 2022-05-31 NOTE — TELEPHONE ENCOUNTER
Pt called stating she is going to stay in hospice, and asked that we cancel all future appts, including today's f/u with Dr. Zully Boykin. Notified scheduling to cx all appts.

## 2022-06-01 NOTE — TELEPHONE ENCOUNTER
Writer spoke with Patient on the phone.     Electronically signed by Aaron Khan, [facility] [id number] [facility] Radiation Oncology  6/1/2022  10:53 AM

## 2022-06-01 NOTE — FLOWSHEET NOTE
Writer spoke with Patient on the phone after learning of Pt's decision to enter hospice care. Patient shared how she was coping. She spoke of her decision and how she has peace about choosing quality of life. She is grateful to be able to interact with her family and friends who are very supportive of her. She has received visits from the hospice chaplain. She intends to attend the [facility] on Zoom tonight. Writer offered assurance of her prayers and words of encouragement. Pt thanked writer. 06/01/22 1047   Encounter Summary   Encounter Overview/Reason  Spiritual/Emotional Needs   Service Provided For: Patient   Referral/Consult From: [address] [facility] Family members; Children;Friends/neighbors   Last Encounter  05/09/22   Complexity of Encounter Moderate   Begin Time 1035   End Time  1045   Total Time Calculated 10 min   Encounter    Type Follow up   Spiritual/Emotional needs   Type Spiritual Support   Grief, Loss, and Adjustments   Type Adjustment to illness; Hospice   Assessment/Intervention/Outcome   Assessment Coping; Impaired resilience;Peaceful   Intervention Sustaining Presence/Ministry of presence;Prayer (assurance of)/[place];Explored/Affirmed feelings, thoughts, concerns;Explored Coping Skills/Resources; Discussed meaning/purpose;Discussed illness injury and its impact; Active listening   Outcome Coping;Engaged in conversation;Expressed feelings, needs, and concerns;Expressed Gratitude;Receptive   Plan and Referrals   Plan/Referrals Other (Comment)  (Patient is receiving support from [address]. )     Electronically signed by Jose Manuel, [facility] [address] [facility] Radiation Oncology  6/1/2022  10:52 AM

## 2024-12-03 NOTE — ED NOTES
Radiology here to take Pt to their department for testing.       Paulette King RN  04/22/19 3513
Refer to the Assessment tab to view/cancel completed assessment.